# Patient Record
Sex: FEMALE | Race: WHITE | Employment: OTHER | URBAN - METROPOLITAN AREA
[De-identification: names, ages, dates, MRNs, and addresses within clinical notes are randomized per-mention and may not be internally consistent; named-entity substitution may affect disease eponyms.]

---

## 2018-03-28 ENCOUNTER — OFFICE VISIT (OUTPATIENT)
Dept: VASCULAR SURGERY | Age: 68
End: 2018-03-28
Payer: MEDICARE

## 2018-03-28 ENCOUNTER — HOSPITAL ENCOUNTER (OUTPATIENT)
Dept: CARDIOLOGY | Age: 68
Discharge: HOME | End: 2018-03-28
Admitting: SURGERY
Payer: MEDICARE

## 2018-03-28 DIAGNOSIS — I73.9 PVD (PERIPHERAL VASCULAR DISEASE) WITH CLAUDICATION (HCC): Primary | Chronic | ICD-10-CM

## 2018-03-28 PROCEDURE — 99213 OFFICE O/P EST LOW 20 MIN: CPT | Performed by: SURGERY

## 2018-03-28 PROCEDURE — 93923 UPR/LXTR ART STDY 3+ LVLS: CPT

## 2018-03-28 RX ORDER — LEVOTHYROXINE SODIUM 137 MCG
137 TABLET ORAL DAILY
COMMUNITY
Start: 2018-01-16

## 2018-03-28 RX ORDER — TIZANIDINE 2 MG/1
2 TABLET ORAL DAILY
COMMUNITY
Start: 2018-01-02 | End: 2018-10-04

## 2018-03-28 RX ORDER — ALLOPURINOL 300 MG/1
300 TABLET ORAL DAILY
COMMUNITY
Start: 2018-03-24

## 2018-03-28 NOTE — PROGRESS NOTES
Chief Complaint:   Chief Complaint   Patient presents with    Circulatory Problem     follow up PVD         HPI:  Patient came for yearly follow-up of peripheral vascular disease, overall doing well, trying to stay active, trying to lose some weight      Patient denies any focal lateralizing neurological symptoms like loss of speech, vision or loss of function of extremity    Patient can walk a few blocks without difficulty, and denies any symptoms of rest pain    Allergies   Allergen Reactions    Adhesive Tape Other (See Comments)     Pulls skin       Current Outpatient Prescriptions   Medication Sig Dispense Refill    allopurinol (ZYLOPRIM) 300 MG tablet Take 300 mg by mouth daily      SYNTHROID 137 MCG tablet Take 137 mcg by mouth daily      tiZANidine (ZANAFLEX) 2 MG tablet Take 2 mg by mouth daily      sucralfate (CARAFATE) 1 GM tablet Take 1 g by mouth 4 times daily      meloxicam (MOBIC) 7.5 MG tablet Take 7.5 mg by mouth daily      sertraline (ZOLOFT) 50 MG tablet Take 50 mg by mouth daily      JANUVIA 100 MG tablet Take 100 mg by mouth daily       metFORMIN (GLUCOPHAGE) 500 MG tablet Take 1,000 mg by mouth 2 times daily (with meals)      canagliflozin (INVOKANA) 100 MG TABS tablet Take 100 mg by mouth every morning (before breakfast)      Exenatide (BYDUREON) 2 MG PEN Inject into the skin once a week      tiZANidine (ZANAFLEX) 2 MG capsule Take 2 mg by mouth 3 times daily      HYDROcodone-acetaminophen (NORCO) 7.5-325 MG per tablet Take 1 tablet by mouth every 12 hours as needed for Pain      Cholecalciferol (VITAMIN D3) 58184 UNITS CAPS Take 1 capsule by mouth See Admin Instructions 5 days a week      atorvastatin (LIPITOR) 10 MG tablet Take 10 mg by mouth daily      irbesartan (AVAPRO) 150 MG tablet Take 150 mg by mouth daily.  metoprolol (LOPRESSOR) 25 MG tablet Take 25 mg by mouth 2 times daily Take am surgery 12/03.       aspirin 81 MG EC tablet Take 81 mg by mouth daily       file.     Social History Main Topics    Smoking status: Former Smoker     Packs/day: 1.00     Years: 40.00     Quit date: 12/13/2007    Smokeless tobacco: Never Used    Alcohol use No    Drug use: No    Sexual activity: Not on file     Other Topics Concern    Not on file     Social History Narrative    No narrative on file       Review of Systems:    Eyes:  No blurring, diplopia or vision loss  Respiratory:  No cough, pleuritic chest pain, dyspnea, or wheezing  Cardiovascular: No angina, palpitations   Musculoskeletal:  No arthritis or weakness  Neurologic:  No paralysis, paresis, paresthesia, seizures or headach        Physical Exam:  General appearance:  Alert, awake, oriented x 3. No distress. Eyes:  Conjunctivae appear normal; PERRL  Neck:  No jugular venous distention, lymphadenopathy or thyromegaly. No evidence of carotid bruit  Lungs:  Clear to ausculation bilaterally. No rhonchi, crackles, wheezes  Heart:  Regular rate and rhythm. No rub or murmur  Abdomen:  Soft, non-tender. No masses, organomegaly. Musculoskeletal : No joint effusions, tenderness swelling    Neuro: Speech is intact. Moving all extremities. No focal motor or sensory deficits      Extremities:  Both feet are warm to touch. The color of both feet is normal.        Pulses Right  Left    Brachial 3 3    Radial    3=normal   Femoral 2 2  2=diminished   Popliteal    1=barely palpable   Dorsalis pedis    0=absent   Posterior tibial 1 0  4=aneurysmal             Other pertinent information:1. The past medical records were reviewed. Assessment:    1.  PVD (peripheral vascular disease) with claudication (HCC)              Plan:       Discussed in detail the patient regarding the results of the lower extremities arterial Doppler study, left femoral popliteal arterial occlusive disease, with an ankle-brachial index of 0.61, slightly worse compared to last year, patient was counseled regarding weight loss and exercise

## 2018-03-30 NOTE — PROCEDURES
[address]                   [address] [facility],  [facility]                                  VASCULAR REPORT    PATIENT NAME: Gabriel Evans                     :        1950  MED REC NO:   [id number]                            ROOM:  ACCOUNT NO:   [de-identified]                           ADMIT DATE: 2018  PROVIDER:     Cristy Rider MD    DATE OF PROCEDURE:  2018    LOWER EXTREMITY ARTERIAL DOPPLER STUDY    INDICATION:  Symptoms of the feet feeling cold with some difficulty  walking. The lower extremity arterial Doppler study revealed evidence of  left iliac and bilateral femoropopliteal arterial occlusive disease with an  ankle-brachial index of 0.61 on the left side and 0.96 on the right side.         Shawn Arteaga MD    D: 2018 9:31:04       T: 2018 12:51:54     ZACHARY/[email or web address]  Job#: [id number]     Doc#: [id number]    CC:    Yo Santos III, DO

## 2018-04-02 ENCOUNTER — TELEPHONE (OUTPATIENT)
Dept: VASCULAR SURGERY | Age: 68
End: 2018-04-02

## 2018-10-04 ENCOUNTER — OFFICE VISIT (OUTPATIENT)
Dept: CARDIOLOGY CLINIC | Age: 68
End: 2018-10-04
Payer: MEDICARE

## 2018-10-04 VITALS
WEIGHT: 260.6 LBS | HEART RATE: 67 BPM | SYSTOLIC BLOOD PRESSURE: 112 MMHG | DIASTOLIC BLOOD PRESSURE: 60 MMHG | RESPIRATION RATE: 18 BRPM | HEIGHT: 65 IN | BODY MASS INDEX: 43.42 KG/M2

## 2018-10-04 DIAGNOSIS — I49.3 PVC'S (PREMATURE VENTRICULAR CONTRACTIONS): ICD-10-CM

## 2018-10-04 DIAGNOSIS — I10 ESSENTIAL HYPERTENSION: Primary | ICD-10-CM

## 2018-10-04 DIAGNOSIS — I73.9 PVD (PERIPHERAL VASCULAR DISEASE) WITH CLAUDICATION (HCC): Chronic | ICD-10-CM

## 2018-10-04 PROCEDURE — 99213 OFFICE O/P EST LOW 20 MIN: CPT | Performed by: PHYSICIAN ASSISTANT

## 2018-10-04 PROCEDURE — 93000 ELECTROCARDIOGRAM COMPLETE: CPT | Performed by: INTERNAL MEDICINE

## 2018-10-04 NOTE — PROGRESS NOTES
[address] [place] [address] Cardiology        Dear Dr. Queta Johnson, DO,    We had the pleasure of seeing Susu Pitt in the [facility] office. Her primary cardiologist is Dr. Mignon Salomon. Chief Complaint: 1 year f/u for hx abnormal stress test, HTN, PVD, PVCs  Chief Complaint   Patient presents with    Hypertension       Date of Service: 10/4/2018    HISTORY OF PRESENT ILLNESS:    Susu Pitt is a pleasant 77 y.o. female with a past medical history below. Patient presents for one year follow-up for HTN and history of abnormal stress test ( stress revealed inferior perfusion defect but no cath was performed secondary to asymptomatic status). Over the past year, she has been doing well. She has maintained strict compliance on her medications. She has been able to ambulate up a flight of stairs and do household chores without any exertional chest pain, significant ORTIZ, or palpitations. She denies any recent dizziness, near syncope, or falls. She admits to mild chronic bilateral ankle edema but states that it has been at her baseline. She wears compression stockings daily. She does admit to mild indigestion when eating and describes a sharp sub-sternal chest discomfort lasting seconds only in duration and relieve with belching or with Carafate. She is maintaining NSR and is presently asymptomatic. ALLERGIES:    Allergies   Allergen Reactions    Adhesive Tape Other (See Comments)     Pulls skin       PROBLEM LIST:   Patient Active Problem List    Diagnosis Date Noted    Thyroid disease 2015    Lipid disorder 2015    Hyperuricemia 2015    Diabetic neuropathy ([facility] 2015    Primary osteoarthritis of left hip 2015    Chest pain 2013    DM (diabetes mellitus) 2013    HTN (hypertension) 2013    PVD (peripheral vascular disease) with claudication ([address] 2012     Medical history:  1. Morbid obesity.  Weight 251 pounds, BMI 41

## 2019-03-21 DIAGNOSIS — I73.9 PVD (PERIPHERAL VASCULAR DISEASE) WITH CLAUDICATION (HCC): Primary | ICD-10-CM

## 2019-03-28 ENCOUNTER — HOSPITAL ENCOUNTER (OUTPATIENT)
Dept: CARDIOLOGY | Age: 69
Discharge: HOME | End: 2019-03-28
Payer: MEDICARE

## 2019-03-28 ENCOUNTER — OFFICE VISIT (OUTPATIENT)
Dept: VASCULAR SURGERY | Age: 69
End: 2019-03-28
Payer: MEDICARE

## 2019-03-28 DIAGNOSIS — I73.9 PVD (PERIPHERAL VASCULAR DISEASE) WITH CLAUDICATION (HCC): Primary | Chronic | ICD-10-CM

## 2019-03-28 DIAGNOSIS — I73.9 PVD (PERIPHERAL VASCULAR DISEASE) WITH CLAUDICATION (HCC): ICD-10-CM

## 2019-03-28 PROCEDURE — 93923 UPR/LXTR ART STDY 3+ LVLS: CPT

## 2019-03-28 PROCEDURE — 99213 OFFICE O/P EST LOW 20 MIN: CPT | Performed by: SURGERY

## 2019-03-28 RX ORDER — ACETAMINOPHEN 500 MG
500 TABLET ORAL EVERY 6 HOURS PRN
COMMUNITY

## 2019-03-29 NOTE — PROCEDURES
[address]                  [address] [facility], [address] [place] [address]                                VASCULAR REPORT    PATIENT NAME: Rich Hastings                   :        1950  MED REC NO:   [id number]                            ROOM:  ACCOUNT NO:   [de-identified]                           ADMIT DATE: 2018  PROVIDER:     Carri Mckeon MD    DATE OF PROCEDURE:  2019    LOWER EXTREMITY ARTERIAL DOPPLER STUDY    INDICATION:  Difficulty walking. FINDINGS:  The lower extremity arterial Doppler study revealed that the  patient does have evidence of left femoropopliteal arterial occlusive  disease with an ankle-brachial index of 0.61. On the right, the  ankle-brachial index is normal, unchanged from last year.         Lyn Jacobo MD    D: 2019 17:14:34       T: 2019 23:48:45     ZACHARY/[email or web address]_CRISTAL  Job#: [id number]     Doc#: [id number]    CC:

## 2019-10-03 ENCOUNTER — HOSPITAL ENCOUNTER (OUTPATIENT)
Dept: ULTRASOUND IMAGING | Age: 69
Discharge: HOME | End: 2019-10-05
Payer: MEDICARE

## 2019-10-03 ENCOUNTER — HOSPITAL ENCOUNTER (OUTPATIENT)
Dept: INTERVENTIONAL RADIOLOGY/VASCULAR | Age: 69
Discharge: HOME | End: 2019-10-05
Payer: MEDICARE

## 2019-10-03 ENCOUNTER — APPOINTMENT (OUTPATIENT)
Dept: CT IMAGING | Age: 69
End: 2019-10-03
Payer: MEDICARE

## 2019-10-03 DIAGNOSIS — I73.9 CLAUDICATION (HCC): ICD-10-CM

## 2019-10-03 DIAGNOSIS — Z13.6 SCREENING FOR AAA (AORTIC ABDOMINAL ANEURYSM): ICD-10-CM

## 2019-10-03 PROCEDURE — 76706 US ABDL AORTA SCREEN AAA: CPT

## 2019-10-03 PROCEDURE — 93922 UPR/L XTREMITY ART 2 LEVELS: CPT

## 2020-01-24 ENCOUNTER — HOSPITAL ENCOUNTER (OUTPATIENT)
Age: 70
Discharge: HOME | End: 2020-01-26

## 2020-01-24 PROCEDURE — 88305 TISSUE EXAM BY PATHOLOGIST: CPT

## 2020-05-12 ENCOUNTER — HOSPITAL ENCOUNTER (OUTPATIENT)
Dept: CARDIOLOGY | Age: 70
Discharge: HOME | End: 2020-05-12
Payer: MEDICARE

## 2020-05-12 PROCEDURE — 93923 UPR/LXTR ART STDY 3+ LVLS: CPT

## 2020-06-06 ENCOUNTER — HOSPITAL ENCOUNTER (EMERGENCY)
Age: 70
Discharge: HOME | End: 2020-06-07
Attending: EMERGENCY MEDICINE
Payer: MEDICARE

## 2020-06-06 ENCOUNTER — APPOINTMENT (OUTPATIENT)
Dept: ULTRASOUND IMAGING | Age: 70
End: 2020-06-06
Payer: MEDICARE

## 2020-06-06 VITALS
RESPIRATION RATE: 14 BRPM | TEMPERATURE: 98 F | BODY MASS INDEX: 42.99 KG/M2 | OXYGEN SATURATION: 93 % | HEIGHT: 65 IN | DIASTOLIC BLOOD PRESSURE: 71 MMHG | WEIGHT: 258 LBS | HEART RATE: 77 BPM | SYSTOLIC BLOOD PRESSURE: 152 MMHG

## 2020-06-06 PROCEDURE — 6370000000 HC RX 637 (ALT 250 FOR IP): Performed by: EMERGENCY MEDICINE

## 2020-06-06 PROCEDURE — 6360000002 HC RX W HCPCS: Performed by: EMERGENCY MEDICINE

## 2020-06-06 PROCEDURE — 93971 EXTREMITY STUDY: CPT

## 2020-06-06 PROCEDURE — 99283 EMERGENCY DEPT VISIT LOW MDM: CPT

## 2020-06-06 PROCEDURE — 96372 THER/PROPH/DIAG INJ SC/IM: CPT

## 2020-06-06 RX ORDER — ONDANSETRON 4 MG/1
4 TABLET, ORALLY DISINTEGRATING ORAL ONCE
Status: COMPLETED | OUTPATIENT
Start: 2020-06-06 | End: 2020-06-06

## 2020-06-06 RX ORDER — HYDROCODONE BITARTRATE AND ACETAMINOPHEN 5; 325 MG/1; MG/1
1 TABLET ORAL EVERY 6 HOURS PRN
Qty: 12 TABLET | Refills: 0 | Status: SHIPPED | OUTPATIENT
Start: 2020-06-06 | End: 2020-06-09

## 2020-06-06 RX ORDER — MORPHINE SULFATE 4 MG/ML
6 INJECTION, SOLUTION INTRAMUSCULAR; INTRAVENOUS ONCE
Status: COMPLETED | OUTPATIENT
Start: 2020-06-06 | End: 2020-06-06

## 2020-06-06 RX ORDER — KETOROLAC TROMETHAMINE 30 MG/ML
30 INJECTION, SOLUTION INTRAMUSCULAR; INTRAVENOUS ONCE
Status: COMPLETED | OUTPATIENT
Start: 2020-06-06 | End: 2020-06-06

## 2020-06-06 RX ORDER — PREDNISONE 50 MG/1
TABLET ORAL
Qty: 5 TABLET | Refills: 0 | Status: ON HOLD | OUTPATIENT
Start: 2020-06-06 | End: 2020-07-08 | Stop reason: HOSPADM

## 2020-06-06 RX ADMIN — MORPHINE SULFATE 6 MG: 4 INJECTION, SOLUTION INTRAMUSCULAR; INTRAVENOUS at 23:05

## 2020-06-06 RX ADMIN — ONDANSETRON 4 MG: 4 TABLET, ORALLY DISINTEGRATING ORAL at 23:05

## 2020-06-06 RX ADMIN — KETOROLAC TROMETHAMINE 30 MG: 30 INJECTION, SOLUTION INTRAMUSCULAR at 23:05

## 2020-06-06 ASSESSMENT — PAIN DESCRIPTION - ORIENTATION: ORIENTATION: LEFT

## 2020-06-06 ASSESSMENT — PAIN DESCRIPTION - PROGRESSION: CLINICAL_PROGRESSION: GRADUALLY IMPROVING

## 2020-06-06 ASSESSMENT — PAIN DESCRIPTION - ONSET: ONSET: SUDDEN

## 2020-06-06 ASSESSMENT — PAIN DESCRIPTION - DIRECTION: RADIATING_TOWARDS: LOWER BACK

## 2020-06-06 ASSESSMENT — PAIN DESCRIPTION - LOCATION: LOCATION: LEG

## 2020-06-06 ASSESSMENT — PAIN SCALES - GENERAL
PAINLEVEL_OUTOF10: 10
PAINLEVEL_OUTOF10: 10
PAINLEVEL_OUTOF10: 6

## 2020-06-06 ASSESSMENT — PAIN DESCRIPTION - PAIN TYPE: TYPE: ACUTE PAIN

## 2020-06-06 ASSESSMENT — PAIN DESCRIPTION - FREQUENCY: FREQUENCY: CONTINUOUS

## 2020-06-07 ENCOUNTER — APPOINTMENT (OUTPATIENT)
Dept: CT IMAGING | Age: 70
End: 2020-06-07
Payer: MEDICARE

## 2020-06-07 ENCOUNTER — HOSPITAL ENCOUNTER (EMERGENCY)
Age: 70
Discharge: HOME | End: 2020-06-07
Payer: MEDICARE

## 2020-06-07 VITALS
BODY MASS INDEX: 42.99 KG/M2 | WEIGHT: 258 LBS | HEART RATE: 75 BPM | HEIGHT: 65 IN | TEMPERATURE: 97.6 F | DIASTOLIC BLOOD PRESSURE: 81 MMHG | OXYGEN SATURATION: 96 % | SYSTOLIC BLOOD PRESSURE: 140 MMHG | RESPIRATION RATE: 16 BRPM

## 2020-06-07 PROCEDURE — 96372 THER/PROPH/DIAG INJ SC/IM: CPT

## 2020-06-07 PROCEDURE — 99283 EMERGENCY DEPT VISIT LOW MDM: CPT

## 2020-06-07 PROCEDURE — 6370000000 HC RX 637 (ALT 250 FOR IP): Performed by: EMERGENCY MEDICINE

## 2020-06-07 PROCEDURE — 72131 CT LUMBAR SPINE W/O DYE: CPT

## 2020-06-07 PROCEDURE — 6360000002 HC RX W HCPCS: Performed by: PHYSICIAN ASSISTANT

## 2020-06-07 RX ORDER — KETOROLAC TROMETHAMINE 10 MG/1
10 TABLET, FILM COATED ORAL EVERY 6 HOURS PRN
Qty: 20 TABLET | Refills: 0 | Status: ON HOLD | OUTPATIENT
Start: 2020-06-07 | End: 2020-07-08 | Stop reason: HOSPADM

## 2020-06-07 RX ORDER — PREDNISONE 20 MG/1
60 TABLET ORAL ONCE
Status: COMPLETED | OUTPATIENT
Start: 2020-06-07 | End: 2020-06-07

## 2020-06-07 RX ORDER — METHYLPREDNISOLONE SODIUM SUCCINATE 125 MG/2ML
60 INJECTION, POWDER, LYOPHILIZED, FOR SOLUTION INTRAMUSCULAR; INTRAVENOUS ONCE
Status: COMPLETED | OUTPATIENT
Start: 2020-06-07 | End: 2020-06-07

## 2020-06-07 RX ORDER — HYDROCODONE BITARTRATE AND ACETAMINOPHEN 5; 325 MG/1; MG/1
2 TABLET ORAL ONCE
Status: DISCONTINUED | OUTPATIENT
Start: 2020-06-07 | End: 2020-06-07

## 2020-06-07 RX ORDER — DIAZEPAM 5 MG/1
5 TABLET ORAL ONCE
Status: COMPLETED | OUTPATIENT
Start: 2020-06-07 | End: 2020-06-07

## 2020-06-07 RX ORDER — MORPHINE SULFATE 4 MG/ML
6 INJECTION, SOLUTION INTRAMUSCULAR; INTRAVENOUS ONCE
Status: COMPLETED | OUTPATIENT
Start: 2020-06-07 | End: 2020-06-07

## 2020-06-07 RX ORDER — METAXALONE 800 MG/1
800 TABLET ORAL 4 TIMES DAILY PRN
Qty: 30 TABLET | Refills: 0 | Status: SHIPPED | OUTPATIENT
Start: 2020-06-07 | End: 2020-06-17

## 2020-06-07 RX ORDER — ORPHENADRINE CITRATE 30 MG/ML
60 INJECTION INTRAMUSCULAR; INTRAVENOUS ONCE
Status: COMPLETED | OUTPATIENT
Start: 2020-06-07 | End: 2020-06-07

## 2020-06-07 RX ADMIN — ORPHENADRINE CITRATE 60 MG: 30 INJECTION INTRAMUSCULAR; INTRAVENOUS at 09:45

## 2020-06-07 RX ADMIN — DIAZEPAM 5 MG: 5 TABLET ORAL at 00:08

## 2020-06-07 RX ADMIN — METHYLPREDNISOLONE SODIUM SUCCINATE 60 MG: 125 INJECTION, POWDER, FOR SOLUTION INTRAMUSCULAR; INTRAVENOUS at 09:45

## 2020-06-07 RX ADMIN — MORPHINE SULFATE 6 MG: 4 INJECTION, SOLUTION INTRAMUSCULAR; INTRAVENOUS at 11:35

## 2020-06-07 RX ADMIN — PREDNISONE 60 MG: 20 TABLET ORAL at 00:08

## 2020-06-07 ASSESSMENT — PAIN DESCRIPTION - ORIENTATION: ORIENTATION: LEFT;LOWER

## 2020-06-07 ASSESSMENT — PAIN SCALES - GENERAL
PAINLEVEL_OUTOF10: 10
PAINLEVEL_OUTOF10: 10

## 2020-06-07 ASSESSMENT — PAIN DESCRIPTION - LOCATION: LOCATION: LEG

## 2020-06-07 NOTE — ED PROVIDER NOTES
reviewed. Allergies: Adhesive tape    -------------------------------------------------- RESULTS -------------------------------------------------  All laboratory and radiology results have been personally reviewed by myself   LABS:  No results found for this visit on 06/06/20. RADIOLOGY:  Interpreted by Radiologist.  US DUP LOWER EXTREMITY LEFT MARCO   Final Result   Patent deep venous system of the left lower extremity. No   evidence for DVT.          ------------------------- NURSING NOTES AND VITALS REVIEWED ---------------------------   The nursing notes within the ED encounter and vital signs as below have been reviewed. BP (!) 152/71   Pulse 77   Temp 98 °F (36.7 °C) (Oral)   Resp 14   Ht 5' 5\" (1.651 m)   Wt 258 lb (117 kg)   SpO2 93%   BMI 42.93 kg/m²   Oxygen Saturation Interpretation: Normal      ---------------------------------------------------PHYSICAL EXAM--------------------------------------      Constitutional/General: Alert and oriented x3, well appearing, non toxic in mild distress secondary to pain  Head: NC/AT  Eyes: PERRL, EOMI  Mouth: Oropharynx clear, handling secretions, no trismus  Neck: Supple, full ROM, no meningeal signs  Pulmonary: Lungs clear to auscultation bilaterally, no wheezes, rales, or rhonchi. Not in respiratory distress  Cardiovascular:  Regular rate and rhythm, no murmurs, gallops, or rubs. 2+ distal pulses  Abdomen: Soft, non tender, non distended,   Extremities: Moves all extremities x 4. Warm and well perfused  Skin: warm and dry without rash  Neurologic: GCS 15,  Psych: Normal Affect  Low back: Moderate LS paraspinal left-sided tenderness to palpation and tenderness over the sciatic notch.   Motor sensation intact throughout the legs    ------------------------------ ED COURSE/MEDICAL DECISION MAKING----------------------  Medications   ketorolac (TORADOL) injection 30 mg (30 mg Intramuscular Given 6/6/20 5002)   morphine sulfate (PF) injection 6 mg (6 mg

## 2020-06-07 NOTE — ED PROVIDER NOTES
complication, not stated as uncontrolled, and Wears glasses. Past Surgical History:  has a past surgical history that includes Appendectomy; Throat surgery (2002); Colonoscopy; Hysterectomy; joint replacement (Left, 12/3/2015); and Total hip arthroplasty (Left, 12/2015). Social History:  reports that she quit smoking about 12 years ago. Her smoking use included cigarettes. She has a 40.00 pack-year smoking history. She has never used smokeless tobacco. She reports that she does not drink alcohol or use drugs. Family History: family history includes Cancer in her father; Heart Disease in her maternal grandmother; Kidney Disease in her mother. The patients home medications have been reviewed. Allergies: Adhesive tape    -------------------------------------------------- RESULTS -------------------------------------------------  All laboratory and radiology results have been personally reviewed by myself   LABS:  No results found for this visit on 06/07/20. RADIOLOGY:  Interpreted by Sohan Clarke   Final Result      1. Degenerative spondylotic changes. 2. L5-S1: Left far lateral disc protrusion abutting and crowding the   exiting left L5 nerve root just beyond the foramen. 3. Spinal stenoses with moderate left lateral recess encroachment at   L2-3 and L3-4.   4. Mild spinal canal encroachment at L1-2 and L4-5.          ------------------------- NURSING NOTES AND VITALS REVIEWED ---------------------------   The nursing notes within the ED encounter and vital signs as below have been reviewed.    BP (!) 140/81   Pulse 75   Temp 97.6 °F (36.4 °C) (Infrared)   Resp 16   Ht 5' 5\" (1.651 m)   Wt 258 lb (117 kg)   SpO2 96%   BMI 42.93 kg/m²   Oxygen Saturation Interpretation: Normal      ---------------------------------------------------PHYSICAL EXAM--------------------------------------      Constitutional/General: Alert and oriented x3, well appearing, non toxic in NAD  Head: Normocephalic and atraumatic  Eyes: PERRL, EOMI  Mouth: Oropharynx clear, handling secretions, no trismus  Neck: Supple, full ROM,   Pulmonary: Lungs clear to auscultation bilaterally, no wheezes, rales, or rhonchi. Not in respiratory distress  Cardiovascular:  Regular rate and rhythm, no murmurs, gallops, or rubs. 2+ distal pulses  Abdomen: Soft, non tender, non distended, Tenderness to palpation to the left lumbar spine  Extremities: Moves all extremities x 4. Warm and well perfused, 5/5 strength bilateral lower extremities, 2+ reflexes patellar and achilles bilaterally, gait not tested due to pain  Skin: warm and dry without rash  Neurologic: GCS 15,  Psych: Normal Affect      ------------------------------ ED COURSE/MEDICAL DECISION MAKING----------------------  Medications   orphenadrine (NORFLEX) injection 60 mg (60 mg Intramuscular Given 6/7/20 0945)   methylPREDNISolone sodium (SOLU-MEDROL) injection 60 mg (60 mg Intramuscular Given 6/7/20 0945)   morphine sulfate (PF) injection 6 mg (6 mg Intramuscular Given 6/7/20 1135)         ED COURSE:  ED Course as of Jun 07 1412   Sun Jun 07, 2020   1103 Reassessed patient. Remains stable and neurologically intact. Discussed results and recommendations. Patient reports that she feels that she will be able to ambulate at home independently with her walker that she has from her previous hip replacement. [MS]      ED Course User Index  [MS] Elicia Cowart       Medical Decision Making:    Patient is a 77-year-old female presenting emergency department back pain that radiates down the left leg. She does have a history of sciatica on that side which does occasionally flareup on her. Patient is neurologically intact in the emergency department. Ultrasound of the left lower extremity was done yesterday to rule out a DVT due to her calf pain.   She has not had any recent imaging studies of her lumbar spine therefore CT was done in the emergency department today.  Compared to previous MRI scan, degenerative changes have significantly worsened particularly with a disc protrusion at L5-S1 starting to encroach upon the exiting nerve root. CT findings are consistent with her symptoms. Patient had prescriptions for Norco and prednisone filled this morning from her ED visit yesterday. We will add anti-inflammatories and muscle relaxers to her regimen. Recommended very close follow-up with PCP for recheck and return the emergency department any new worsening symptoms. Patient reports that she does have assistive devices at home that she may ambulate around the house with such as a walker and a cane. Patient voiced understanding is agreeable above treatment plan. Counseling: The emergency provider has spoken with the patient and discussed todays results, in addition to providing specific details for the plan of care and counseling regarding the diagnosis and prognosis. Questions are answered at this time and they are agreeable with the plan.      --------------------------------- IMPRESSION AND DISPOSITION ---------------------------------    IMPRESSION  1. Degenerative disc disease, lumbar    2. Protrusion of intervertebral disc of lumbosacral region    3. Lumbar radiculopathy        DISPOSITION  Disposition: Discharge to home  Patient condition is stable      NOTE: This report was transcribed using voice recognition software.  Every effort was made to ensure accuracy; however, inadvertent computerized transcription errors may be present        Martha Fischerma  06/07/20 3180

## 2020-06-30 RX ORDER — TIZANIDINE HYDROCHLORIDE 2 MG/1
2 CAPSULE, GELATIN COATED ORAL 3 TIMES DAILY
Status: ON HOLD | COMMUNITY
End: 2020-07-14

## 2020-07-01 ENCOUNTER — VIRTUAL VISIT (OUTPATIENT)
Dept: VASCULAR SURGERY | Age: 70
End: 2020-07-01
Payer: MEDICARE

## 2020-07-01 PROCEDURE — 99442 PR PHYS/QHP TELEPHONE EVALUATION 11-20 MIN: CPT | Performed by: SURGERY

## 2020-07-01 NOTE — PROGRESS NOTES
TELEPHONE VISIT    Consent:  He and/or health care decision maker is aware that that he may receive a bill for this telephone service, depending on his insurance coverage, and has provided verbal consent to proceed: Yes      Documentation:  I communicated with the patient and/or health care decision maker about her peripheral vascular disease, mainly involving the left leg, involving the left iliac artery tells me that she can walk slowly 5 to 10 minutes at a time, with the help of a walker, recently though had more problems because of herniated disc, received a series of what appears to be epidural injections with partial relief of symptoms, denies symptoms of rest pain    Denies any focal lateralizing neurological symptoms like loss of speech, vision or loss of function of extremity etc    .   Details of this discussion including any medical advice provided: Regarding her peripheral vascular disease, informed her, that I compared the recent lower extremity artery Doppler study done in May with the one that was done last week, overall right leg stable, ankle-brachial index 0.98, on the left with ankle-brachial index of 0.67, with slight improvement compared to last year as is patient was reassured, was instructed to continue the walking program and call me PRN for increasing symptoms in the leg, explained and a follow-up evaluation to see me back in 1 year      I affirm this is a Patient Initiated Episode with a Patient who has not had a related appointment within my department in the past 7 days or scheduled within the next 24 hours.         Patient's location: {[id number]::\"home address in [place]\",\"other address in [facility]   Physician  location home address in [facility]   Other people involved in call          Total Time: minutes: 11-20 minutes

## 2020-07-06 ENCOUNTER — ANESTHESIA EVENT (OUTPATIENT)
Dept: ENDOSCOPY | Age: 70
DRG: 378 | End: 2020-07-06
Payer: MEDICARE

## 2020-07-06 ENCOUNTER — ANESTHESIA (OUTPATIENT)
Dept: ENDOSCOPY | Age: 70
DRG: 378 | End: 2020-07-06
Payer: MEDICARE

## 2020-07-06 ENCOUNTER — APPOINTMENT (OUTPATIENT)
Dept: GENERAL RADIOLOGY | Age: 70
End: 2020-07-06
Payer: MEDICARE

## 2020-07-06 ENCOUNTER — HOSPITAL ENCOUNTER (EMERGENCY)
Age: 70
Discharge: ANOTHER HOSPITAL | End: 2020-07-06
Attending: EMERGENCY MEDICINE
Payer: MEDICARE

## 2020-07-06 ENCOUNTER — APPOINTMENT (OUTPATIENT)
Dept: CT IMAGING | Age: 70
End: 2020-07-06
Payer: MEDICARE

## 2020-07-06 ENCOUNTER — HOSPITAL ENCOUNTER (INPATIENT)
Age: 70
LOS: 2 days | Discharge: HOME | DRG: 378 | End: 2020-07-08
Attending: INTERNAL MEDICINE | Admitting: INTERNAL MEDICINE
Payer: MEDICARE

## 2020-07-06 VITALS
RESPIRATION RATE: 16 BRPM | OXYGEN SATURATION: 98 % | WEIGHT: 250 LBS | DIASTOLIC BLOOD PRESSURE: 40 MMHG | TEMPERATURE: 99.2 F | HEART RATE: 88 BPM | HEIGHT: 65 IN | BODY MASS INDEX: 41.65 KG/M2 | SYSTOLIC BLOOD PRESSURE: 122 MMHG

## 2020-07-06 PROBLEM — K92.2 GI BLEED: Status: ACTIVE | Noted: 2020-07-06

## 2020-07-06 PROBLEM — E87.6 HYPOKALEMIA: Status: ACTIVE | Noted: 2020-07-06

## 2020-07-06 LAB
ABO/RH: NORMAL
ALBUMIN SERPL-MCNC: 3.3 G/DL (ref 3.5–5.2)
ALP BLD-CCNC: 62 U/L (ref 35–104)
ALT SERPL-CCNC: 17 U/L (ref 0–32)
ANION GAP SERPL CALCULATED.3IONS-SCNC: 15 MMOL/L (ref 7–16)
ANTIBODY SCREEN: NORMAL
AST SERPL-CCNC: 35 U/L (ref 0–31)
BASOPHILS ABSOLUTE: 0.03 E9/L (ref 0–0.2)
BASOPHILS RELATIVE PERCENT: 0.2 % (ref 0–2)
BILIRUB SERPL-MCNC: 0.4 MG/DL (ref 0–1.2)
BUN BLDV-MCNC: 55 MG/DL (ref 8–23)
CALCIUM SERPL-MCNC: 9.4 MG/DL (ref 8.6–10.2)
CHLORIDE BLD-SCNC: 96 MMOL/L (ref 98–107)
CO2: 20 MMOL/L (ref 22–29)
CREAT SERPL-MCNC: 0.9 MG/DL (ref 0.5–1)
EKG ATRIAL RATE: 87 BPM
EKG P AXIS: 69 DEGREES
EKG P-R INTERVAL: 152 MS
EKG Q-T INTERVAL: 354 MS
EKG QRS DURATION: 94 MS
EKG QTC CALCULATION (BAZETT): 425 MS
EKG R AXIS: 69 DEGREES
EKG T AXIS: 50 DEGREES
EKG VENTRICULAR RATE: 87 BPM
EOSINOPHILS ABSOLUTE: 0.02 E9/L (ref 0.05–0.5)
EOSINOPHILS RELATIVE PERCENT: 0.1 % (ref 0–6)
GFR AFRICAN AMERICAN: >60
GFR NON-AFRICAN AMERICAN: >60 ML/MIN/1.73
GLUCOSE BLD-MCNC: 164 MG/DL (ref 74–99)
HCT VFR BLD CALC: 21.7 % (ref 34–48)
HCT VFR BLD CALC: 24.7 % (ref 34–48)
HEMOGLOBIN: 6.9 G/DL (ref 11.5–15.5)
HEMOGLOBIN: 8.1 G/DL (ref 11.5–15.5)
IMMATURE GRANULOCYTES #: 0.32 E9/L
IMMATURE GRANULOCYTES %: 2.3 % (ref 0–5)
INR BLD: 1.2
LIPASE: 18 U/L (ref 13–60)
LYMPHOCYTES ABSOLUTE: 2.59 E9/L (ref 1.5–4)
LYMPHOCYTES RELATIVE PERCENT: 18.6 % (ref 20–42)
MCH RBC QN AUTO: 30.1 PG (ref 26–35)
MCHC RBC AUTO-ENTMCNC: 32.8 % (ref 32–34.5)
MCV RBC AUTO: 91.8 FL (ref 80–99.9)
METER GLUCOSE: 127 MG/DL (ref 74–99)
METER GLUCOSE: 147 MG/DL (ref 74–99)
METER GLUCOSE: 185 MG/DL (ref 74–99)
MONOCYTES ABSOLUTE: 0.6 E9/L (ref 0.1–0.95)
MONOCYTES RELATIVE PERCENT: 4.3 % (ref 2–12)
NEUTROPHILS ABSOLUTE: 10.36 E9/L (ref 1.8–7.3)
NEUTROPHILS RELATIVE PERCENT: 74.5 % (ref 43–80)
PDW BLD-RTO: 15.9 FL (ref 11.5–15)
PLATELET # BLD: 358 E9/L (ref 130–450)
PMV BLD AUTO: 9.8 FL (ref 7–12)
POTASSIUM REFLEX MAGNESIUM: 3.8 MMOL/L (ref 3.5–5)
POTASSIUM SERPL-SCNC: 2.7 MMOL/L (ref 3.5–5)
PROTHROMBIN TIME: 14.1 SEC (ref 9.3–12.4)
RBC # BLD: 2.69 E12/L (ref 3.5–5.5)
SODIUM BLD-SCNC: 131 MMOL/L (ref 132–146)
TOTAL PROTEIN: 6 G/DL (ref 6.4–8.3)
TROPONIN: <0.01 NG/ML (ref 0–0.03)
WBC # BLD: 13.9 E9/L (ref 4.5–11.5)

## 2020-07-06 PROCEDURE — 85025 COMPLETE CBC W/AUTO DIFF WBC: CPT

## 2020-07-06 PROCEDURE — 36415 COLL VENOUS BLD VENIPUNCTURE: CPT

## 2020-07-06 PROCEDURE — 93005 ELECTROCARDIOGRAM TRACING: CPT | Performed by: EMERGENCY MEDICINE

## 2020-07-06 PROCEDURE — 86900 BLOOD TYPING SEROLOGIC ABO: CPT

## 2020-07-06 PROCEDURE — 85018 HEMOGLOBIN: CPT

## 2020-07-06 PROCEDURE — P9016 RBC LEUKOCYTES REDUCED: HCPCS

## 2020-07-06 PROCEDURE — 93005 ELECTROCARDIOGRAM TRACING: CPT | Performed by: PHYSICIAN ASSISTANT

## 2020-07-06 PROCEDURE — 2580000003 HC RX 258: Performed by: SURGERY

## 2020-07-06 PROCEDURE — 96375 TX/PRO/DX INJ NEW DRUG ADDON: CPT

## 2020-07-06 PROCEDURE — 85610 PROTHROMBIN TIME: CPT

## 2020-07-06 PROCEDURE — 83690 ASSAY OF LIPASE: CPT

## 2020-07-06 PROCEDURE — 84484 ASSAY OF TROPONIN QUANT: CPT

## 2020-07-06 PROCEDURE — 36430 TRANSFUSION BLD/BLD COMPNT: CPT

## 2020-07-06 PROCEDURE — 96365 THER/PROPH/DIAG IV INF INIT: CPT

## 2020-07-06 PROCEDURE — 82962 GLUCOSE BLOOD TEST: CPT

## 2020-07-06 PROCEDURE — 6360000002 HC RX W HCPCS: Performed by: PHYSICIAN ASSISTANT

## 2020-07-06 PROCEDURE — 99285 EMERGENCY DEPT VISIT HI MDM: CPT

## 2020-07-06 PROCEDURE — 86923 COMPATIBILITY TEST ELECTRIC: CPT

## 2020-07-06 PROCEDURE — 71045 X-RAY EXAM CHEST 1 VIEW: CPT

## 2020-07-06 PROCEDURE — 2580000003 HC RX 258

## 2020-07-06 PROCEDURE — 80053 COMPREHEN METABOLIC PANEL: CPT

## 2020-07-06 PROCEDURE — 2060000000 HC ICU INTERMEDIATE R&B

## 2020-07-06 PROCEDURE — 86901 BLOOD TYPING SEROLOGIC RH(D): CPT

## 2020-07-06 PROCEDURE — C9113 INJ PANTOPRAZOLE SODIUM, VIA: HCPCS | Performed by: EMERGENCY MEDICINE

## 2020-07-06 PROCEDURE — C9113 INJ PANTOPRAZOLE SODIUM, VIA: HCPCS | Performed by: PHYSICIAN ASSISTANT

## 2020-07-06 PROCEDURE — 86850 RBC ANTIBODY SCREEN: CPT

## 2020-07-06 PROCEDURE — 6370000000 HC RX 637 (ALT 250 FOR IP): Performed by: EMERGENCY MEDICINE

## 2020-07-06 PROCEDURE — 6370000000 HC RX 637 (ALT 250 FOR IP): Performed by: PHYSICIAN ASSISTANT

## 2020-07-06 PROCEDURE — 84132 ASSAY OF SERUM POTASSIUM: CPT

## 2020-07-06 PROCEDURE — 93010 ELECTROCARDIOGRAM REPORT: CPT | Performed by: INTERNAL MEDICINE

## 2020-07-06 PROCEDURE — 85014 HEMATOCRIT: CPT

## 2020-07-06 PROCEDURE — 96368 THER/DIAG CONCURRENT INF: CPT

## 2020-07-06 PROCEDURE — 2580000003 HC RX 258: Performed by: EMERGENCY MEDICINE

## 2020-07-06 PROCEDURE — 6360000002 HC RX W HCPCS: Performed by: EMERGENCY MEDICINE

## 2020-07-06 PROCEDURE — 70450 CT HEAD/BRAIN W/O DYE: CPT

## 2020-07-06 RX ORDER — SODIUM CHLORIDE 0.9 % (FLUSH) 0.9 %
10 SYRINGE (ML) INJECTION EVERY 12 HOURS SCHEDULED
Status: CANCELLED | OUTPATIENT
Start: 2020-07-06

## 2020-07-06 RX ORDER — TIZANIDINE 4 MG/1
2 TABLET ORAL 3 TIMES DAILY
Status: DISCONTINUED | OUTPATIENT
Start: 2020-07-06 | End: 2020-07-08 | Stop reason: HOSPADM

## 2020-07-06 RX ORDER — LEVOTHYROXINE SODIUM 137 UG/1
137 TABLET ORAL DAILY
Status: CANCELLED | OUTPATIENT
Start: 2020-07-06

## 2020-07-06 RX ORDER — IRBESARTAN 150 MG/1
150 TABLET ORAL DAILY
Status: CANCELLED | OUTPATIENT
Start: 2020-07-06

## 2020-07-06 RX ORDER — POTASSIUM CHLORIDE 20 MEQ/1
40 TABLET, EXTENDED RELEASE ORAL PRN
Status: DISCONTINUED | OUTPATIENT
Start: 2020-07-06 | End: 2020-07-08 | Stop reason: HOSPADM

## 2020-07-06 RX ORDER — DEXTROSE MONOHYDRATE 50 MG/ML
100 INJECTION, SOLUTION INTRAVENOUS PRN
Status: DISCONTINUED | OUTPATIENT
Start: 2020-07-06 | End: 2020-07-08 | Stop reason: HOSPADM

## 2020-07-06 RX ORDER — ALLOPURINOL 300 MG/1
300 TABLET ORAL DAILY
Status: DISCONTINUED | OUTPATIENT
Start: 2020-07-06 | End: 2020-07-08 | Stop reason: HOSPADM

## 2020-07-06 RX ORDER — LOSARTAN POTASSIUM 50 MG/1
50 TABLET ORAL DAILY
Status: DISCONTINUED | OUTPATIENT
Start: 2020-07-06 | End: 2020-07-08 | Stop reason: HOSPADM

## 2020-07-06 RX ORDER — ONDANSETRON 2 MG/ML
4 INJECTION INTRAMUSCULAR; INTRAVENOUS EVERY 6 HOURS PRN
Status: CANCELLED | OUTPATIENT
Start: 2020-07-06

## 2020-07-06 RX ORDER — METOPROLOL TARTRATE 50 MG/1
25 TABLET, FILM COATED ORAL 2 TIMES DAILY
Status: CANCELLED | OUTPATIENT
Start: 2020-07-06

## 2020-07-06 RX ORDER — ATORVASTATIN CALCIUM 10 MG/1
10 TABLET, FILM COATED ORAL NIGHTLY
Status: DISCONTINUED | OUTPATIENT
Start: 2020-07-06 | End: 2020-07-08 | Stop reason: HOSPADM

## 2020-07-06 RX ORDER — 0.9 % SODIUM CHLORIDE 0.9 %
1000 INTRAVENOUS SOLUTION INTRAVENOUS ONCE
Status: COMPLETED | OUTPATIENT
Start: 2020-07-06 | End: 2020-07-06

## 2020-07-06 RX ORDER — SODIUM CHLORIDE 0.9 % (FLUSH) 0.9 %
10 SYRINGE (ML) INJECTION PRN
Status: CANCELLED | OUTPATIENT
Start: 2020-07-06

## 2020-07-06 RX ORDER — POTASSIUM CHLORIDE 7.45 MG/ML
10 INJECTION INTRAVENOUS PRN
Status: DISCONTINUED | OUTPATIENT
Start: 2020-07-06 | End: 2020-07-08 | Stop reason: HOSPADM

## 2020-07-06 RX ORDER — ATORVASTATIN CALCIUM 10 MG/1
10 TABLET, FILM COATED ORAL DAILY
Status: CANCELLED | OUTPATIENT
Start: 2020-07-06

## 2020-07-06 RX ORDER — ACETAMINOPHEN 325 MG/1
650 TABLET ORAL EVERY 6 HOURS PRN
Status: CANCELLED | OUTPATIENT
Start: 2020-07-06

## 2020-07-06 RX ORDER — DEXTROSE MONOHYDRATE 25 G/50ML
12.5 INJECTION, SOLUTION INTRAVENOUS PRN
Status: DISCONTINUED | OUTPATIENT
Start: 2020-07-06 | End: 2020-07-08 | Stop reason: HOSPADM

## 2020-07-06 RX ORDER — GABAPENTIN 300 MG/1
300 CAPSULE ORAL 3 TIMES DAILY
Status: CANCELLED | OUTPATIENT
Start: 2020-07-06

## 2020-07-06 RX ORDER — IRBESARTAN 150 MG/1
150 TABLET ORAL DAILY
Status: DISCONTINUED | OUTPATIENT
Start: 2020-07-06 | End: 2020-07-06 | Stop reason: CLARIF

## 2020-07-06 RX ORDER — PROMETHAZINE HYDROCHLORIDE 25 MG/1
12.5 TABLET ORAL EVERY 6 HOURS PRN
Status: DISCONTINUED | OUTPATIENT
Start: 2020-07-06 | End: 2020-07-08 | Stop reason: HOSPADM

## 2020-07-06 RX ORDER — SUCRALFATE 1 G/1
1 TABLET ORAL 4 TIMES DAILY
Status: DISCONTINUED | OUTPATIENT
Start: 2020-07-06 | End: 2020-07-08 | Stop reason: HOSPADM

## 2020-07-06 RX ORDER — FUROSEMIDE 20 MG/1
20 TABLET ORAL DAILY
Status: DISCONTINUED | OUTPATIENT
Start: 2020-07-06 | End: 2020-07-08 | Stop reason: HOSPADM

## 2020-07-06 RX ORDER — POTASSIUM CHLORIDE 20 MEQ/1
40 TABLET, EXTENDED RELEASE ORAL PRN
Status: CANCELLED | OUTPATIENT
Start: 2020-07-06

## 2020-07-06 RX ORDER — GABAPENTIN 300 MG/1
300 CAPSULE ORAL 3 TIMES DAILY
Status: DISCONTINUED | OUTPATIENT
Start: 2020-07-06 | End: 2020-07-08 | Stop reason: HOSPADM

## 2020-07-06 RX ORDER — AMLODIPINE BESYLATE 10 MG/1
10 TABLET ORAL DAILY
Status: DISCONTINUED | OUTPATIENT
Start: 2020-07-06 | End: 2020-07-08 | Stop reason: HOSPADM

## 2020-07-06 RX ORDER — 0.9 % SODIUM CHLORIDE 0.9 %
20 INTRAVENOUS SOLUTION INTRAVENOUS ONCE
Status: COMPLETED | OUTPATIENT
Start: 2020-07-06 | End: 2020-07-07

## 2020-07-06 RX ORDER — ACETAMINOPHEN 650 MG/1
650 SUPPOSITORY RECTAL EVERY 6 HOURS PRN
Status: DISCONTINUED | OUTPATIENT
Start: 2020-07-06 | End: 2020-07-08 | Stop reason: HOSPADM

## 2020-07-06 RX ORDER — POTASSIUM CHLORIDE 7.45 MG/ML
10 INJECTION INTRAVENOUS ONCE
Status: COMPLETED | OUTPATIENT
Start: 2020-07-06 | End: 2020-07-06

## 2020-07-06 RX ORDER — TIZANIDINE HYDROCHLORIDE 2 MG/1
2 CAPSULE, GELATIN COATED ORAL 3 TIMES DAILY
Status: CANCELLED | OUTPATIENT
Start: 2020-07-06

## 2020-07-06 RX ORDER — SODIUM CHLORIDE 0.9 % (FLUSH) 0.9 %
10 SYRINGE (ML) INJECTION EVERY 12 HOURS SCHEDULED
Status: DISCONTINUED | OUTPATIENT
Start: 2020-07-06 | End: 2020-07-08 | Stop reason: HOSPADM

## 2020-07-06 RX ORDER — SUCRALFATE 1 G/1
1 TABLET ORAL 4 TIMES DAILY
Status: CANCELLED | OUTPATIENT
Start: 2020-07-06

## 2020-07-06 RX ORDER — PANTOPRAZOLE SODIUM 40 MG/10ML
40 INJECTION, POWDER, LYOPHILIZED, FOR SOLUTION INTRAVENOUS ONCE
Status: COMPLETED | OUTPATIENT
Start: 2020-07-06 | End: 2020-07-06

## 2020-07-06 RX ORDER — MAGNESIUM SULFATE IN WATER 40 MG/ML
2 INJECTION, SOLUTION INTRAVENOUS ONCE
Status: COMPLETED | OUTPATIENT
Start: 2020-07-06 | End: 2020-07-06

## 2020-07-06 RX ORDER — SODIUM CHLORIDE 0.9 % (FLUSH) 0.9 %
10 SYRINGE (ML) INJECTION PRN
Status: DISCONTINUED | OUTPATIENT
Start: 2020-07-06 | End: 2020-07-08 | Stop reason: HOSPADM

## 2020-07-06 RX ORDER — PANTOPRAZOLE SODIUM 40 MG/10ML
40 INJECTION, POWDER, LYOPHILIZED, FOR SOLUTION INTRAVENOUS 2 TIMES DAILY
Status: DISCONTINUED | OUTPATIENT
Start: 2020-07-06 | End: 2020-07-08 | Stop reason: HOSPADM

## 2020-07-06 RX ORDER — ONDANSETRON 2 MG/ML
4 INJECTION INTRAMUSCULAR; INTRAVENOUS ONCE
Status: COMPLETED | OUTPATIENT
Start: 2020-07-06 | End: 2020-07-06

## 2020-07-06 RX ORDER — ACETAMINOPHEN 325 MG/1
650 TABLET ORAL EVERY 6 HOURS PRN
Status: DISCONTINUED | OUTPATIENT
Start: 2020-07-06 | End: 2020-07-08 | Stop reason: HOSPADM

## 2020-07-06 RX ORDER — POTASSIUM CHLORIDE 20 MEQ/1
40 TABLET, EXTENDED RELEASE ORAL ONCE
Status: COMPLETED | OUTPATIENT
Start: 2020-07-06 | End: 2020-07-06

## 2020-07-06 RX ORDER — PROMETHAZINE HYDROCHLORIDE 25 MG/1
12.5 TABLET ORAL EVERY 6 HOURS PRN
Status: CANCELLED | OUTPATIENT
Start: 2020-07-06

## 2020-07-06 RX ORDER — DEXTROSE MONOHYDRATE 25 G/50ML
12.5 INJECTION, SOLUTION INTRAVENOUS PRN
Status: CANCELLED | OUTPATIENT
Start: 2020-07-06

## 2020-07-06 RX ORDER — ONDANSETRON 2 MG/ML
4 INJECTION INTRAMUSCULAR; INTRAVENOUS EVERY 6 HOURS PRN
Status: DISCONTINUED | OUTPATIENT
Start: 2020-07-06 | End: 2020-07-08 | Stop reason: HOSPADM

## 2020-07-06 RX ORDER — NICOTINE POLACRILEX 4 MG
15 LOZENGE BUCCAL PRN
Status: CANCELLED | OUTPATIENT
Start: 2020-07-06

## 2020-07-06 RX ORDER — PANTOPRAZOLE SODIUM 40 MG/10ML
40 INJECTION, POWDER, LYOPHILIZED, FOR SOLUTION INTRAVENOUS 2 TIMES DAILY
Status: CANCELLED | OUTPATIENT
Start: 2020-07-06

## 2020-07-06 RX ORDER — ALLOPURINOL 300 MG/1
300 TABLET ORAL DAILY
Status: CANCELLED | OUTPATIENT
Start: 2020-07-06

## 2020-07-06 RX ORDER — NICOTINE POLACRILEX 4 MG
15 LOZENGE BUCCAL PRN
Status: DISCONTINUED | OUTPATIENT
Start: 2020-07-06 | End: 2020-07-08 | Stop reason: HOSPADM

## 2020-07-06 RX ORDER — SODIUM CHLORIDE 9 MG/ML
INJECTION INTRAVENOUS
Status: COMPLETED
Start: 2020-07-06 | End: 2020-07-06

## 2020-07-06 RX ORDER — POTASSIUM CHLORIDE 7.45 MG/ML
10 INJECTION INTRAVENOUS PRN
Status: CANCELLED | OUTPATIENT
Start: 2020-07-06

## 2020-07-06 RX ORDER — ACETAMINOPHEN 650 MG/1
650 SUPPOSITORY RECTAL EVERY 6 HOURS PRN
Status: CANCELLED | OUTPATIENT
Start: 2020-07-06

## 2020-07-06 RX ORDER — FUROSEMIDE 20 MG/1
20 TABLET ORAL DAILY
Status: CANCELLED | OUTPATIENT
Start: 2020-07-06

## 2020-07-06 RX ORDER — AMLODIPINE BESYLATE 5 MG/1
10 TABLET ORAL DAILY
Status: CANCELLED | OUTPATIENT
Start: 2020-07-06

## 2020-07-06 RX ORDER — DEXTROSE MONOHYDRATE 50 MG/ML
100 INJECTION, SOLUTION INTRAVENOUS PRN
Status: CANCELLED | OUTPATIENT
Start: 2020-07-06

## 2020-07-06 RX ADMIN — MAGNESIUM SULFATE IN WATER 2 G: 40 INJECTION, SOLUTION INTRAVENOUS at 11:12

## 2020-07-06 RX ADMIN — ATORVASTATIN CALCIUM 10 MG: 10 TABLET, FILM COATED ORAL at 20:44

## 2020-07-06 RX ADMIN — SODIUM CHLORIDE 10 ML: 9 INJECTION, SOLUTION INTRAMUSCULAR; INTRAVENOUS; SUBCUTANEOUS at 20:45

## 2020-07-06 RX ADMIN — POTASSIUM CHLORIDE 40 MEQ: 1500 TABLET, EXTENDED RELEASE ORAL at 11:06

## 2020-07-06 RX ADMIN — SODIUM CHLORIDE 1000 ML: 9 INJECTION, SOLUTION INTRAVENOUS at 08:07

## 2020-07-06 RX ADMIN — INSULIN LISPRO 1 UNITS: 100 INJECTION, SOLUTION INTRAVENOUS; SUBCUTANEOUS at 16:26

## 2020-07-06 RX ADMIN — ONDANSETRON 4 MG: 2 INJECTION INTRAMUSCULAR; INTRAVENOUS at 08:07

## 2020-07-06 RX ADMIN — AMLODIPINE BESYLATE 10 MG: 10 TABLET ORAL at 15:08

## 2020-07-06 RX ADMIN — LEVOTHYROXINE SODIUM 137 MCG: 25 TABLET ORAL at 15:12

## 2020-07-06 RX ADMIN — ALLOPURINOL 300 MG: 300 TABLET ORAL at 15:09

## 2020-07-06 RX ADMIN — SERTRALINE HYDROCHLORIDE 50 MG: 50 TABLET ORAL at 15:10

## 2020-07-06 RX ADMIN — PANTOPRAZOLE SODIUM 40 MG: 40 INJECTION, POWDER, FOR SOLUTION INTRAVENOUS at 20:45

## 2020-07-06 RX ADMIN — FUROSEMIDE 20 MG: 20 TABLET ORAL at 15:09

## 2020-07-06 RX ADMIN — PANTOPRAZOLE SODIUM 40 MG: 40 INJECTION, POWDER, FOR SOLUTION INTRAVENOUS at 10:06

## 2020-07-06 RX ADMIN — TIZANIDINE 2 MG: 4 TABLET ORAL at 20:44

## 2020-07-06 RX ADMIN — LOSARTAN POTASSIUM 50 MG: 50 TABLET ORAL at 15:08

## 2020-07-06 RX ADMIN — METOPROLOL TARTRATE 25 MG: 25 TABLET, FILM COATED ORAL at 20:44

## 2020-07-06 RX ADMIN — SODIUM CHLORIDE 20 ML: 9 INJECTION, SOLUTION INTRAVENOUS at 23:19

## 2020-07-06 RX ADMIN — POTASSIUM CHLORIDE 10 MEQ: 7.46 INJECTION, SOLUTION INTRAVENOUS at 11:27

## 2020-07-06 RX ADMIN — SUCRALFATE 1 G: 1 TABLET ORAL at 20:44

## 2020-07-06 RX ADMIN — SUCRALFATE 1 G: 1 TABLET ORAL at 16:25

## 2020-07-06 ASSESSMENT — PAIN DESCRIPTION - LOCATION: LOCATION: BACK

## 2020-07-06 ASSESSMENT — PAIN SCALES - GENERAL
PAINLEVEL_OUTOF10: 0
PAINLEVEL_OUTOF10: 7

## 2020-07-06 ASSESSMENT — PAIN DESCRIPTION - ORIENTATION: ORIENTATION: UPPER

## 2020-07-06 ASSESSMENT — PAIN DESCRIPTION - DESCRIPTORS: DESCRIPTORS: DISCOMFORT

## 2020-07-06 ASSESSMENT — LIFESTYLE VARIABLES: SMOKING_STATUS: 0

## 2020-07-06 ASSESSMENT — PAIN DESCRIPTION - PAIN TYPE: TYPE: CHRONIC PAIN

## 2020-07-06 NOTE — ED PROVIDER NOTES
HPI:  7/6/20, Time: 7:42 AM EDT         Magalys Jauregui is a 65 y.o. female presenting to the ED for dizziness beginning last night. The patient describes it as a sensation of lightheadedness with associated nonbloody, nonbilious emesis. She is concerned that her blood sugars are high because she states that she frequently gets these symptoms when her sugars are running high. She has been compliant with her medications, but she states that she did vomit some of her medications yesterday. She states that approximately 1 month ago she was seen by her doctor for acute on chronic back pain due to arthritis. No recent falls or trauma. She states that on June 16 she was given a steroid injection and steroid pills which she is no longer taking. She is currently on gabapentin. She denies bowel or bladder incontinence, saddle anesthesia, fevers, or extremity numbness or weakness. No chest pain, syncope, shortness of breath, cough, hemoptysis, abdominal pain, or dysuria. She has had some mild diarrhea. Patient lives at home. Patient later admits to melena for the last few days. She takes daily aspirin but no other anticoagulation. Review of Systems:   Pertinent positives and negatives are stated within HPI, all other systems reviewed and are negative.          --------------------------------------------- PAST HISTORY ---------------------------------------------  Past Medical History:  has a past medical history of Back problem, Carotid artery stenosis, Chest pain, Diabetic neuropathy ([address] Full dentures, Hiatal hernia, Hypertension, Hyperuricemia, Lipid disorder, Osteoarthritis, Peripheral vascular disease ([address] Preoperative clearance, PVD (peripheral vascular disease) with claudication ([address] Rapid palpitations, Thyroid disease, Type II or unspecified type diabetes mellitus without mention of complication, not stated as uncontrolled, and Wears glasses.     Past Surgical History:  has a past surgical mmol/L    CO2 20 (L) 22 - 29 mmol/L    Anion Gap 15 7 - 16 mmol/L    Glucose 164 (H) 74 - 99 mg/dL    BUN 55 (H) 8 - 23 mg/dL    CREATININE 0.9 0.5 - 1.0 mg/dL    GFR Non-African American >60 >=60 mL/min/1.73    GFR African American >60     Calcium 9.4 8.6 - 10.2 mg/dL    Total Protein 6.0 (L) 6.4 - 8.3 g/dL    Alb 3.3 (L) 3.5 - 5.2 g/dL    Total Bilirubin 0.4 0.0 - 1.2 mg/dL    Alkaline Phosphatase 62 35 - 104 U/L    ALT 17 0 - 32 U/L    AST 35 (H) 0 - 31 U/L   Troponin   Result Value Ref Range    Troponin <0.01 0.00 - 0.03 ng/mL   Lipase   Result Value Ref Range    Lipase 18 13 - 60 U/L   Potassium   Result Value Ref Range    Potassium 2.7 (LL) 3.5 - 5.0 mmol/L   POCT Glucose   Result Value Ref Range    Meter Glucose 185 (H) 74 - 99 mg/dL   EKG 12 Lead   Result Value Ref Range    Ventricular Rate 87 BPM    Atrial Rate 87 BPM    P-R Interval 152 ms    QRS Duration 94 ms    Q-T Interval 354 ms    QTc Calculation (Bazett) 425 ms    P Axis 69 degrees    R Axis 69 degrees    T Axis 50 degrees       RADIOLOGY:  Interpreted by Radiologist.  CT Head WO Contrast   Final Result   No evidence of intracranial hemorrhage or edema. There is   age-appropriate atrophy. XR CHEST PORTABLE   Final Result   No pulmonary airspace consolidation. Mild atelectasis or fibrosis at the right lung base.             ------------------------- NURSING NOTES AND VITALS REVIEWED ---------------------------   The nursing notes within the ED encounter and vital signs as below have been reviewed.    BP (!) 122/40   Pulse 88   Temp 99.2 °F (37.3 °C)   Resp 16   Ht 5' 5\" (1.651 m)   Wt 250 lb (113.4 kg)   SpO2 98%   BMI 41.60 kg/m²   Oxygen Saturation Interpretation: Normal      ---------------------------------------------------PHYSICAL EXAM--------------------------------------      Constitutional/General: Alert and oriented x3, well appearing, non toxic in NAD  Head: Normocephalic and atraumatic  Eyes: PERRL, EOMI  Mouth: Oropharynx clear, handling secretions, no trismus  Neck: Supple, full ROM,   Pulmonary: Lungs clear to auscultation bilaterally, no wheezes, rales, or rhonchi. Not in respiratory distress  Cardiovascular:  Regular rate and rhythm, no murmurs, gallops, or rubs. 2+ distal pulses  Abdomen: Soft, mild epigastric tenderness, no rebound, no guarding, non distended,   Extremities: Moves all extremities x 4. Warm and well perfused  Skin: warm and dry without rash  Neurologic: GCS 15, Facial symmetry. Speech clear. Cranial nerves intact. No focal motor or sensory deficits. No ataxia with finger-to-nose or heel-to-shin. No drift in upper or lower extremities. No nystagmus. Psych: Normal Affect      ------------------------------ ED COURSE/MEDICAL DECISION MAKING----------------------  Medications   0.9 % sodium chloride bolus (0 mLs Intravenous Stopped 7/6/20 0959)   ondansetron (ZOFRAN) injection 4 mg (4 mg Intravenous Given 7/6/20 0807)   pantoprazole (PROTONIX) injection 40 mg (40 mg Intravenous Given 7/6/20 1006)   potassium chloride 10 mEq/100 mL IVPB (Peripheral Line) (0 mEq Intravenous Stopped 7/6/20 1243)   potassium chloride (KLOR-CON M) extended release tablet 40 mEq (40 mEq Oral Given 7/6/20 1106)   magnesium sulfate 2 g in 50 mL IVPB premix (0 g Intravenous Patient Transferred to Other Facility 7/6/20 1242)       Medical Decision Making/ED COURSE:   Patient is a 75-year-old female presenting with dizziness and emesis. She was hemodynamically stable and afebrile in the ED. She was nontoxic on evaluation. She had a normal neurologic exam.  She was given IV fluids and Zofran. Labs, EKG, chest x-ray, and head CT were obtained. No acute findings on head CT or chest x-ray. I reviewed and interpreted labs which showed a mild leukocytosis and anemia of 8.1, decreased from 10.4 on prior labs. Patient is Hemoccult positive and admitting to melena. She was given Protonix. She has a history of severe gastritis.   She was also found to have hypokalemia, so she was given IV potassium, oral potassium, and IV magnesium. She will be admitted for further work-up and treatment. ED Course as of Jul 06 1458 Mon Jul 06, 2020   4950 EKG: This EKG is signed and interpreted by me. Rate: 87  Rhythm: Sinus  Interpretation: Normal sinus rhythm, T wave inversions in V2 and V3, nonspecific flattening in lateral leads, changed from prior EKG, normal CO interval, normal QTC  Comparison: changes compared to previous EKG      [JA]   0959 On re-evaluation, patient states that she has been having melena for the last few days. She denies NSAID use but she does take daily Aspirin. She denies history of melena. No hematemesis. She states she underwent a colonoscopy in January at [facility] by Dr. Rudolph Lopez, and she had a polyp removed. She underwent an EGD several years ago by Dr. Chika Braden showing severe gastritis. She is not currently on a PPI. She states that she is not currently taking steroids. [JA]   1006 Rectal exam performed with RN chaperone, Kaylee, at bedside. Patient had a moderate amount of dark soft stool in her rectum which was Hemoccult positive. [JA]   200 Son at bedside. Discussed plan for admission due to melena and anemia. [JA]   1111 I spoke with Suzanne Mir working with Dr. Flori Bermudez. Patient was accepted for admission. [JA]      ED Course User Index  [JA] Sheri Mujica MD           Counseling: The emergency provider has spoken with the patient and discussed todays results, in addition to providing specific details for the plan of care and counseling regarding the diagnosis and prognosis. Questions are answered at this time and they are agreeable with the plan.      --------------------------------- IMPRESSION AND DISPOSITION ---------------------------------    IMPRESSION  1. Melena    2. Anemia, unspecified type    3.  Hypokalemia        DISPOSITION  Disposition: Transfer to [facility]. E's for admission  Patient condition is stable      NOTE: This report was transcribed using voice recognition software.  Every effort was made to ensure accuracy; however, inadvertent computerized transcription errors may be present       Marly Dos Santos MD  07/06/20 1500

## 2020-07-06 NOTE — PLAN OF CARE
Problem: Safety:  Goal: Free from accidental physical injury  Description: Free from accidental physical injury  Outcome: Met This Shift  Goal: Free from intentional harm  Description: Free from intentional harm  Outcome: Met This Shift     Problem: Daily Care:  Goal: Daily care needs are met  Description: Daily care needs are met  Outcome: Met This Shift     Problem: Pain:  Goal: Patient's pain/discomfort is manageable  Description: Patient's pain/discomfort is manageable  Outcome: Met This Shift     Problem: Skin Integrity:  Goal: Skin integrity will stabilize  Description: Skin integrity will stabilize  Outcome: Met This Shift     Problem: Discharge Planning:  Goal: Patients continuum of care needs are met  Description: Patients continuum of care needs are met  Outcome: Met This Shift

## 2020-07-06 NOTE — PLAN OF CARE
Problem: Safety:  Goal: Free from accidental physical injury  Description: Free from accidental physical injury  7/6/2020 1837 by Alex Tomlinson RN  Outcome: Met This Shift  7/6/2020 1748 by Alex Tomlinson RN  Outcome: Met This Shift  Goal: Free from intentional harm  Description: Free from intentional harm  7/6/2020 1837 by Alex Tomlinson RN  Outcome: Met This Shift  7/6/2020 1748 by Alex Tomlinson RN  Outcome: Met This Shift     Problem: Daily Care:  Goal: Daily care needs are met  Description: Daily care needs are met  7/6/2020 1837 by Alex Tomlinson RN  Outcome: Met This Shift  7/6/2020 1748 by Alex Tomlinson RN  Outcome: Met This Shift     Problem: Pain:  Goal: Patient's pain/discomfort is manageable  Description: Patient's pain/discomfort is manageable  7/6/2020 1837 by Alex Tomlinson RN  Outcome: Met This Shift  7/6/2020 1748 by Alex Tomlinson RN  Outcome: Met This Shift     Problem: Skin Integrity:  Goal: Skin integrity will stabilize  Description: Skin integrity will stabilize  7/6/2020 1837 by Alex Tomlinson RN  Outcome: Met This Shift  7/6/2020 1748 by Alex Tomlinson RN  Outcome: Met This Shift     Problem: Discharge Planning:  Goal: Patients continuum of care needs are met  Description: Patients continuum of care needs are met  7/6/2020 1837 by Alex Tomlinson RN  Outcome: Met This Shift  7/6/2020 1748 by Alex Tomlinson RN  Outcome: Met This Shift

## 2020-07-06 NOTE — CONSULTS
benign    TOTAL HIP ARTHROPLASTY Left 12/2015       Medications Prior to Admission:    Prior to Admission medications    Medication Sig Start Date End Date Taking? Authorizing Provider   tiZANidine (ZANAFLEX) 2 MG capsule Take 2 mg by mouth 3 times daily    Historical Provider, MD   ketorolac (TORADOL) 10 MG tablet Take 1 tablet by mouth every 6 hours as needed for Pain 6/7/20   MIKA Higuera   predniSONE (DELTASONE) 50 MG tablet Take 50mg po qd x 5 days  QS for 5 days 6/6/20   Ramon Chavis MD   acetaminophen (TYLENOL) 500 MG tablet Take 500 mg by mouth every 6 hours as needed for Pain    Historical Provider, MD   Cimetidine (TAGAMET PO) Take by mouth    Historical Provider, MD   allopurinol (ZYLOPRIM) 300 MG tablet Take 300 mg by mouth daily 3/24/18   Historical Provider, MD   SYNTHROID 137 MCG tablet Take 137 mcg by mouth daily 1/16/18   Historical Provider, MD   sucralfate (CARAFATE) 1 GM tablet Take 1 g by mouth 4 times daily    Historical Provider, MD   meloxicam (MOBIC) 7.5 MG tablet Take 7.5 mg by mouth daily    Historical Provider, MD   sertraline (ZOLOFT) 50 MG tablet Take 50 mg by mouth daily    Historical Provider, MD   JANUVIA 100 MG tablet Take 100 mg by mouth daily  5/26/16   Historical Provider, MD   metFORMIN (GLUCOPHAGE) 500 MG tablet Take 1,000 mg by mouth 2 times daily (with meals)    Historical Provider, MD   canagliflozin (INVOKANA) 100 MG TABS tablet Take 100 mg by mouth every morning (before breakfast)    Historical Provider, MD   HYDROcodone-acetaminophen (NORCO) 7.5-325 MG per tablet Take 1 tablet by mouth every 12 hours as needed for Pain    Historical Provider, MD   Cholecalciferol (VITAMIN D3) 87606 UNITS CAPS Take 1 capsule by mouth See Admin Instructions 5 days a week    Historical Provider, MD   atorvastatin (LIPITOR) 10 MG tablet Take 10 mg by mouth daily    Historical Provider, MD   irbesartan (AVAPRO) 150 MG tablet Take 150 mg by mouth daily.     Historical Provider, MD metoprolol (LOPRESSOR) 25 MG tablet Take 25 mg by mouth 2 times daily Take am surgery . Historical Provider, MD   aspirin 81 MG EC tablet Take 81 mg by mouth daily     Historical Provider, MD   furosemide (LASIX) 20 MG tablet Take 20 mg by mouth daily. Historical Provider, MD   amLODIPine (NORVASC) 10 MG tablet Take 10 mg by mouth daily Take am dos, 2015    Historical Provider, MD   Calcium Carb-Cholecalciferol (CALCIUM 1000 + D PO) Take by mouth daily     Historical Provider, MD   therapeutic multivitamin-minerals (THERAGRAN-M) tablet Take 1 tablet by mouth daily. Historical Provider, MD   Elastic Bandages & Supports ([address]) [address] [facility] by Does not apply route. 20-30 MMHG knee high     Historical Provider, MD       Allergies   Allergen Reactions    Adhesive Tape Other (See Comments)     Pulls skin       Family History   Problem Relation Age of Onset    Kidney Disease Mother     Cancer Father     Heart Disease Maternal Grandmother        Social History     Tobacco Use    Smoking status: Former Smoker     Packs/day: 1.00     Years: 40.00     Pack years: 40.00     Types: Cigarettes     Last attempt to quit: 2007     Years since quittin.5    Smokeless tobacco: Never Used   Substance Use Topics    Alcohol use: No    Drug use: No         Review of Systems   As stated in HPI with no other pertinent positives. PHYSICAL EXAM:    Vitals:    20 1604   BP: (!) 118/56   Pulse: 87   Resp: 18   Temp: 98.1 °F (36.7 °C)   SpO2: 95%       General Appearance:  awake, alert, oriented, in no acute distress  Head/face:  NCAT  Eyes:  Conjunctiva- pale   Lungs:  Normal expansion. Clear to auscultation. No rales, rhonchi, or wheezing. Heart:  Heart regular rate and rhythm  Abdomen:  Soft, non-tender, normal bowel sounds. No bruits, organomegaly or masses.   Extremities: trace pedal edema      LABS:    CBC  Recent Labs     20  0744   WBC 13.9*   HGB 8.1*   HCT the right lung base.          ASSESSMENT/PLAN:  65 y.o. female with upper GI Bleed  -Presence of melena  -History of gastritis and gastric ulcer on 2016 EGD  -Recent Steroid use  -Hgb: 8.1 / Hct: 24.7  -NPO after midnight  -EGD scheduled for tomorrow          Electronically signed by Chin David DO on 7/6/20 at 5:07 PM EDT

## 2020-07-06 NOTE — ED NOTES
Rectal exam done by Dr Tony Ozuna with witness in room  Stool hemoccult positive     Lona Reese RN  07/06/20 1013

## 2020-07-07 VITALS
SYSTOLIC BLOOD PRESSURE: 125 MMHG | RESPIRATION RATE: 16 BRPM | DIASTOLIC BLOOD PRESSURE: 55 MMHG | OXYGEN SATURATION: 96 %

## 2020-07-07 PROBLEM — K92.2 GI BLEED: Status: ACTIVE | Noted: 2020-07-07

## 2020-07-07 PROBLEM — E66.01 MORBID OBESITY WITH BMI OF 40.0-44.9, ADULT (HCC): Chronic | Status: ACTIVE | Noted: 2020-07-07

## 2020-07-07 PROBLEM — E78.5 HYPERLIPIDEMIA LDL GOAL <100: Chronic | Status: ACTIVE | Noted: 2020-07-07

## 2020-07-07 PROBLEM — K92.2 GI BLEED: Status: RESOLVED | Noted: 2020-07-06 | Resolved: 2020-07-07

## 2020-07-07 PROBLEM — E87.6 HYPOKALEMIA: Status: RESOLVED | Noted: 2020-07-06 | Resolved: 2020-07-07

## 2020-07-07 PROBLEM — K27.9 PUD (PEPTIC ULCER DISEASE): Chronic | Status: ACTIVE | Noted: 2020-07-07

## 2020-07-07 PROBLEM — E03.9 ACQUIRED HYPOTHYROIDISM: Chronic | Status: ACTIVE | Noted: 2020-07-07

## 2020-07-07 PROBLEM — I10 ESSENTIAL HYPERTENSION: Chronic | Status: ACTIVE | Noted: 2020-07-07

## 2020-07-07 PROBLEM — D64.9 ANEMIA: Status: ACTIVE | Noted: 2020-07-07

## 2020-07-07 LAB
ANION GAP SERPL CALCULATED.3IONS-SCNC: 10 MMOL/L (ref 7–16)
BASOPHILS ABSOLUTE: 0.04 E9/L (ref 0–0.2)
BASOPHILS RELATIVE PERCENT: 0.4 % (ref 0–2)
BLOOD BANK DISPENSE STATUS: NORMAL
BLOOD BANK DISPENSE STATUS: NORMAL
BLOOD BANK PRODUCT CODE: NORMAL
BLOOD BANK PRODUCT CODE: NORMAL
BPU ID: NORMAL
BPU ID: NORMAL
BUN BLDV-MCNC: 27 MG/DL (ref 8–23)
CALCIUM SERPL-MCNC: 8.8 MG/DL (ref 8.6–10.2)
CHLORIDE BLD-SCNC: 102 MMOL/L (ref 98–107)
CO2: 24 MMOL/L (ref 22–29)
CREAT SERPL-MCNC: 0.9 MG/DL (ref 0.5–1)
DESCRIPTION BLOOD BANK: NORMAL
DESCRIPTION BLOOD BANK: NORMAL
EOSINOPHILS ABSOLUTE: 0.17 E9/L (ref 0.05–0.5)
EOSINOPHILS RELATIVE PERCENT: 1.5 % (ref 0–6)
GFR AFRICAN AMERICAN: >60
GFR NON-AFRICAN AMERICAN: >60 ML/MIN/1.73
GLUCOSE BLD-MCNC: 100 MG/DL (ref 74–99)
HCT VFR BLD CALC: 26.6 % (ref 34–48)
HEMOGLOBIN: 8.7 G/DL (ref 11.5–15.5)
IMMATURE GRANULOCYTES #: 0.21 E9/L
IMMATURE GRANULOCYTES %: 1.9 % (ref 0–5)
LYMPHOCYTES ABSOLUTE: 4.11 E9/L (ref 1.5–4)
LYMPHOCYTES RELATIVE PERCENT: 37.3 % (ref 20–42)
MCH RBC QN AUTO: 30.6 PG (ref 26–35)
MCHC RBC AUTO-ENTMCNC: 32.7 % (ref 32–34.5)
MCV RBC AUTO: 93.7 FL (ref 80–99.9)
METER GLUCOSE: 100 MG/DL (ref 74–99)
METER GLUCOSE: 104 MG/DL (ref 74–99)
MONOCYTES ABSOLUTE: 0.67 E9/L (ref 0.1–0.95)
MONOCYTES RELATIVE PERCENT: 6.1 % (ref 2–12)
NEUTROPHILS ABSOLUTE: 5.81 E9/L (ref 1.8–7.3)
NEUTROPHILS RELATIVE PERCENT: 52.8 % (ref 43–80)
PDW BLD-RTO: 15 FL (ref 11.5–15)
PLATELET # BLD: 269 E9/L (ref 130–450)
PMV BLD AUTO: 9.7 FL (ref 7–12)
POTASSIUM REFLEX MAGNESIUM: 3.7 MMOL/L (ref 3.5–5)
RBC # BLD: 2.84 E12/L (ref 3.5–5.5)
SODIUM BLD-SCNC: 136 MMOL/L (ref 132–146)
WBC # BLD: 11 E9/L (ref 4.5–11.5)

## 2020-07-07 PROCEDURE — 97161 PT EVAL LOW COMPLEX 20 MIN: CPT

## 2020-07-07 PROCEDURE — 96376 TX/PRO/DX INJ SAME DRUG ADON: CPT

## 2020-07-07 PROCEDURE — 7100000011 HC PHASE II RECOVERY - ADDTL 15 MIN: Performed by: SURGERY

## 2020-07-07 PROCEDURE — 6360000002 HC RX W HCPCS: Performed by: PHYSICIAN ASSISTANT

## 2020-07-07 PROCEDURE — P9016 RBC LEUKOCYTES REDUCED: HCPCS

## 2020-07-07 PROCEDURE — 3700000001 HC ADD 15 MINUTES (ANESTHESIA): Performed by: SURGERY

## 2020-07-07 PROCEDURE — 6360000002 HC RX W HCPCS: Performed by: NURSE ANESTHETIST, CERTIFIED REGISTERED

## 2020-07-07 PROCEDURE — 2580000003 HC RX 258

## 2020-07-07 PROCEDURE — 0DB98ZX EXCISION OF DUODENUM, VIA NATURAL OR ARTIFICIAL OPENING ENDOSCOPIC, DIAGNOSTIC: ICD-10-PCS | Performed by: SURGERY

## 2020-07-07 PROCEDURE — 6370000000 HC RX 637 (ALT 250 FOR IP): Performed by: PHYSICIAN ASSISTANT

## 2020-07-07 PROCEDURE — C9113 INJ PANTOPRAZOLE SODIUM, VIA: HCPCS | Performed by: PHYSICIAN ASSISTANT

## 2020-07-07 PROCEDURE — 7100000010 HC PHASE II RECOVERY - FIRST 15 MIN: Performed by: SURGERY

## 2020-07-07 PROCEDURE — 2580000003 HC RX 258: Performed by: NURSE ANESTHETIST, CERTIFIED REGISTERED

## 2020-07-07 PROCEDURE — 2060000000 HC ICU INTERMEDIATE R&B

## 2020-07-07 PROCEDURE — 36415 COLL VENOUS BLD VENIPUNCTURE: CPT

## 2020-07-07 PROCEDURE — 97165 OT EVAL LOW COMPLEX 30 MIN: CPT

## 2020-07-07 PROCEDURE — 88305 TISSUE EXAM BY PATHOLOGIST: CPT

## 2020-07-07 PROCEDURE — 88342 IMHCHEM/IMCYTCHM 1ST ANTB: CPT

## 2020-07-07 PROCEDURE — 3700000000 HC ANESTHESIA ATTENDED CARE: Performed by: SURGERY

## 2020-07-07 PROCEDURE — 2709999900 HC NON-CHARGEABLE SUPPLY: Performed by: SURGERY

## 2020-07-07 PROCEDURE — 82962 GLUCOSE BLOOD TEST: CPT

## 2020-07-07 PROCEDURE — 36430 TRANSFUSION BLD/BLD COMPNT: CPT

## 2020-07-07 PROCEDURE — 80048 BASIC METABOLIC PNL TOTAL CA: CPT

## 2020-07-07 PROCEDURE — 85025 COMPLETE CBC W/AUTO DIFF WBC: CPT

## 2020-07-07 PROCEDURE — 2580000003 HC RX 258: Performed by: PHYSICIAN ASSISTANT

## 2020-07-07 PROCEDURE — 3609012400 HC EGD TRANSORAL BIOPSY SINGLE/MULTIPLE: Performed by: SURGERY

## 2020-07-07 RX ORDER — SODIUM CHLORIDE 9 MG/ML
INJECTION, SOLUTION INTRAVENOUS CONTINUOUS PRN
Status: DISCONTINUED | OUTPATIENT
Start: 2020-07-07 | End: 2020-07-07 | Stop reason: SDUPTHER

## 2020-07-07 RX ORDER — SODIUM CHLORIDE 9 MG/ML
INJECTION INTRAVENOUS
Status: COMPLETED
Start: 2020-07-07 | End: 2020-07-07

## 2020-07-07 RX ORDER — PROPOFOL 10 MG/ML
INJECTION, EMULSION INTRAVENOUS PRN
Status: DISCONTINUED | OUTPATIENT
Start: 2020-07-07 | End: 2020-07-07 | Stop reason: SDUPTHER

## 2020-07-07 RX ADMIN — Medication 10 ML: at 20:42

## 2020-07-07 RX ADMIN — SODIUM CHLORIDE, PRESERVATIVE FREE 20 ML: 5 INJECTION INTRAVENOUS at 06:42

## 2020-07-07 RX ADMIN — LOSARTAN POTASSIUM 50 MG: 50 TABLET ORAL at 12:37

## 2020-07-07 RX ADMIN — SUCRALFATE 1 G: 1 TABLET ORAL at 17:38

## 2020-07-07 RX ADMIN — SUCRALFATE 1 G: 1 TABLET ORAL at 12:36

## 2020-07-07 RX ADMIN — SUCRALFATE 1 G: 1 TABLET ORAL at 20:38

## 2020-07-07 RX ADMIN — SODIUM CHLORIDE: 9 INJECTION, SOLUTION INTRAVENOUS at 11:00

## 2020-07-07 RX ADMIN — PANTOPRAZOLE SODIUM 40 MG: 40 INJECTION, POWDER, FOR SOLUTION INTRAVENOUS at 20:38

## 2020-07-07 RX ADMIN — SERTRALINE HYDROCHLORIDE 50 MG: 50 TABLET ORAL at 12:36

## 2020-07-07 RX ADMIN — PROPOFOL 100 MG: 10 INJECTION, EMULSION INTRAVENOUS at 11:09

## 2020-07-07 RX ADMIN — METOPROLOL TARTRATE 25 MG: 25 TABLET, FILM COATED ORAL at 20:37

## 2020-07-07 RX ADMIN — ALLOPURINOL 300 MG: 300 TABLET ORAL at 12:37

## 2020-07-07 RX ADMIN — AMLODIPINE BESYLATE 10 MG: 10 TABLET ORAL at 12:37

## 2020-07-07 RX ADMIN — Medication 10 ML: at 08:33

## 2020-07-07 RX ADMIN — ATORVASTATIN CALCIUM 10 MG: 10 TABLET, FILM COATED ORAL at 20:37

## 2020-07-07 RX ADMIN — PANTOPRAZOLE SODIUM 40 MG: 40 INJECTION, POWDER, FOR SOLUTION INTRAVENOUS at 06:42

## 2020-07-07 ASSESSMENT — PAIN SCALES - GENERAL
PAINLEVEL_OUTOF10: 0

## 2020-07-07 ASSESSMENT — PAIN - FUNCTIONAL ASSESSMENT: PAIN_FUNCTIONAL_ASSESSMENT: 0-10

## 2020-07-07 NOTE — PROGRESS NOTES
Physical Therapy    Facility/Department: [address] INTERNAL MEDICINE 2  Initial Assessment    NAME: Melissa Dacosta  : 1950  MRN: [id number]    Date of Service: 2020      Patient Diagnosis(es): There were no encounter diagnoses. has a past medical history of Back problem, Carotid artery stenosis, Chest pain, Diabetic neuropathy ([address] Full dentures, Hiatal hernia, Hypertension, Hyperuricemia, Lipid disorder, Osteoarthritis, Peripheral vascular disease ([address] Preoperative clearance, PVD (peripheral vascular disease) with claudication ([address] Rapid palpitations, Thyroid disease, Type II or unspecified type diabetes mellitus without mention of complication, not stated as uncontrolled, and Wears glasses. has a past surgical history that includes Appendectomy; Throat surgery (); Colonoscopy; Hysterectomy; joint replacement (Left, 12/3/2015); and Total hip arthroplasty (Left, 2015). Restric  Referring Provider:  MIKA Hammer      Evaluating Therapist: Clara Miller PT    Room #:409  DIAGNOSIS: GI bleed  Additional Pertinent History:Neuropathy  PRECAUTIONS: falls, alarm    Social:  Pt lives alone in a 1 floor plan 3 steps to enter. Prior to admission independent without device but does have ww if needed     Initial Evaluation  Date: 20 Treatment      Short Term/ Long Term   Goals   Was pt agreeable to Eval/treatment?  yes     Does pt have pain? no     Bed Mobility  Rolling: independent  Supine to sit: independent  Sit to supine: independent  Scooting: independent  indeopendent   Transfers Sit to stand: SBA  Stand to sit: SBA  Stand pivot: SBA  independent   Ambulation    60 feet with ww with CGA  150 feet with AAD independent   Stair Negotiation  Ascended and descended  NT   4 steps with 1 rail with supervision   LE strength     4-/5    4/5   balance      Fair with ww     AM-PAC Raw score                        Pt is alert and Oriented   LE ROM: WFL  Sensation: decreased LE's secondary to neuropathy  Edema: none  Endurance: fair+     ASSESSMENT  Pt displays functional ability as noted in the objective portion of this evaluation. Patient education  Pt educated on Taking time between changes in position    Patient response to education:   Pt verbalized understanding Pt demonstrated skill Pt requires further education in this area   yes yes         Comments:  Loss of balance x1 during ambulation with CGA to correct. .  No c/o dizziness during session      Pt's/ family goals   1. To return home    Patient and or family understand(s) diagnosis, prognosis, and plan of care. yes    PLAN:    PT care will be provided in accordance with the objectives noted above. Exercises and functional mobility practice will be used as well as appropriate assistive devices or modalities to obtain goals. Patient and family education will also be administered as needed. Frequency of treatments: 2-5x/week x 3-5.days    Time in  0800  Time out  0815      Evaluation Time includes thorough review of current medical information, gathering information on past medical history/social history and prior level of function, completion of standardized testing/informal observation of tasks, assessment of data and education on plan of care and goals.     CPT codes:  [x] Low Complexity PT evaluation 70507  [] Moderate Complexity PT evaluation 67959  [] High Complexity PT evaluation 21331  [] PT Re-evaluation 61697  [] Gait training 11192 minutes  [] Manual therapy 09712 minutes  [] Therapeutic activities 84645 minutes  [] Therapeutic exercises 50719 minutes  [] Neuromuscular reeducation 03305 minutes     [facility] PT [id number]

## 2020-07-07 NOTE — ANESTHESIA POSTPROCEDURE EVALUATION
Department of Anesthesiology  Postprocedure Note    Patient: Nila Adams  MRN: [id number]  YOB: 1950  Date of evaluation: 7/7/2020  Time:  11:37 AM     Procedure Summary     Date:  07/07/20 Room / Location:  [facility] / [facility]    Anesthesia Start:  1105 Anesthesia Stop:  1119    Procedures:       EGD BIOPSY (N/A )      anesthesia pre-op for EGD Diagnosis:  (/)    Surgeon:  Curtis Tello MD Responsible Provider:  Brooks Barrett MD    Anesthesia Type:  MAC ASA Status:  3          Anesthesia Type: MAC    Lane Phase I: Lane Score: 10    Lane Phase II:      Last vitals: Reviewed and per EMR flowsheets.        Anesthesia Post Evaluation    Patient location during evaluation: PACU  Patient participation: complete - patient participated  Level of consciousness: awake and alert  Airway patency: patent  Nausea & Vomiting: no nausea and no vomiting  Complications: no  Cardiovascular status: hemodynamically stable  Respiratory status: acceptable  Hydration status: euvolemic

## 2020-07-07 NOTE — PROGRESS NOTES
Nursing Transfer Note    Data:  Summary of patients progress: egd  Reason for transfer: continuation of care    Action:  Explained reason for transfer to patient. Family not in waiting room. Report given to:Yolette GASTON, using RN Handoff Navigator.   Mode of transportation: cart    Response:  RN Recommendations: follow post op

## 2020-07-07 NOTE — PROGRESS NOTES
Occupational Therapy  OCCUPATIONAL THERAPY INITIAL EVALUATION      Date:2020  Patient Name: Annmarie Bosworth  MRN: [id number]  : 1950  Room: [address] [place], [place] [address]    Referring Provider: MIKA Henderson     Evaluating OT: Fadi Gerson OTR/L [id number]    AM-PAC Daily Activity Raw Score:     Recommended Adaptive Equipment: TBD     Diagnosis: GI Bleed    Pertinent Medical History: neuropathy OA,    Precautions:  Falls,      Home Living: Pt lives alone, 1 floor aprtment with 1 small step to enter. Tub/showert unit with seat    Equipment owned: extended tub bench, reacher   Prior Level of Function: Independent  with ADLs , Independent  with IADLs; ambulated with walker recently   Driving: yes     Pain Level: no pain this session ;   Cognition:  Ox3,alert and conversing     Judgement/safety:  Fair +               Memory wfl     Functional Assessment:   Initial Eval Status  Date: 20 Tx Session  STGs = LTGs  1-2 weeks    Feeding Independent      Grooming Set-up    Independent    UB Dressing Set-up  Mod I    LB Dressing Set-up   Lifts leg up onto side of bed   Saira     Bathing SBA  Mod I   Toileting Independent      Bed Mobility  Independent   Mod I    Functional Transfers SBA  Mod I    Functional Mobility SBA, no device   To/from bathroom   Mod I  With good tolerance   Balance Sitting:     Static:  Independent     Standing: SBA     Activity Tolerance Fair + with light activity   Good  with ADL activity   Visual/  Perceptual Glasses: none by bedside                 Right  hand dominant    Strength ROM Additional Info:    RUE  4-/5  WFL good  and wfl FMC/dexterity noted during ADL tasks       LUE 4-/5  WFL good  and wfl FMC/dexterity noted during ADL tasks         Hearing: [facility]   Sensation:  No c/o numbness or tingling     Comments:   Upon arrival, patient lying in bed . At end of session, patient sitting in chair with call light and phone within reach, all lines and tubes intact.   . Overall patient demonstrated  decreased independence and safety during completion of ADL/functional transfer/mobility tasks. .  Based on patient's functional performance as documented above  and prior level of function, patient would benefit from continued skilled OT during/following hospital stay in an effort to increase functional safety, independence with ADLS/IADLS, and overall quality of life. Assessment of current deficit   Functional mobility [x]  ADLs [x] Strength [x]  Cognition []  Functional transfers  [x] IADLs [x] Safety Awareness [x]  Endurance [x]  Fine Motor Coordination [] Balance [x] Vision/perception [] Sensation []   Gross Motor Coordination [] ROM [] Delirium []                  Motor Control []    Plan of Care: OT 1-3x/week PRN  ADL retraining [x]   Equipment needs [x]   Neuromuscular re-education [x] Energy Conservation Techniques [x]  Functional Transfer training [x] Patient and/or Family Education [x]  Functional Mobility training [x]  Environmental Modifications [x]  Cognitive re-training []   Compensatory techniques for ADLs [x]  Splinting Needs []   Positioning to improve overall function [x]   Therapeutic Activity [x]  Therapeutic Exercise  [x]  Visual/Perceptual: []    Delirium prevention/treatment  []   Other:  []    Rehab Potential: Good for established goals     Patient / Family Goal: return home       Patient and/or family were instructed on functional diagnosis, prognosis/goals and OT plan of care. Demonstrated good  understanding.     Low  Evaluation   Tx Time IT:3820  Tx Time OUT: 1005                                                                                                    Evaluation time includes thorough review of current medical information, gathering information on past medical history/social history and prior level of function, completion of standardized testing/informal observation of tasks, assessment of data, and development of POC/Goals      Arlette Francisco OTR/L [id number]

## 2020-07-07 NOTE — H&P
[address] [facility]  Attending History and Physical      CHIEF COMPLAINT:  lightheadedness      HISTORY OF PRESENT ILLNESS:      The patient is a 65 y.o. female patient of dr Stephanie Dunaway who presents with complains of lightheadedness. Patient has been having black stools for about 1 week. She has epigastric abdominal pain. She has been nauseated at times. She denies recent weight gain or loss. She denied chest pain, shortness of breath, fevers, chills and diaphoresis. She was weak with exertion. She felt as if she were going to pass out. She did not take anything for the symptoms. She presented to the ED. She is feeling better since presentation. Past Medical History:    Past Medical History:   Diagnosis Date    Back problem     Carotid artery stenosis     Chest pain 2/22/2013    Diabetic neuropathy ([address] 12/4/2015    Full dentures     Hiatal hernia     Hypertension     Hyperuricemia 12/4/2015    Lipid disorder 12/4/2015    Osteoarthritis     lumbosacral spine arthritis and disc disease    Peripheral vascular disease ([address]     Preoperative clearance     08/2015 per Dr Nasir Covington and 10/2015 per Dr Darcy Barrett for [facility] Dr Kenny Perales.     PVD (peripheral vascular disease) with claudication ([address] 12/13/2012    follows with Dr Ana Laureano Rapid palpitations     follows with Dr Cecy Daly Thyroid disease     Type II or unspecified type diabetes mellitus without mention of complication, not stated as uncontrolled     Wears glasses        Past Surgical History:    Past Surgical History:   Procedure Laterality Date    APPENDECTOMY      COLONOSCOPY      HYSTERECTOMY      with appendectomy    JOINT REPLACEMENT Left 12/3/2015    total hip    THROAT SURGERY  2002    benign    TOTAL HIP ARTHROPLASTY Left 12/2015       Medications Prior to Admission:    Medications Prior to Admission: tiZANidine (ZANAFLEX) 2 MG capsule, Take 2 mg by mouth 3 times daily  ketorolac (TORADOL) 10 MG tablet, Take 1 tablet by mouth every 6 hours as needed for Pain  predniSONE (DELTASONE) 50 MG tablet, Take 50mg po qd x 5 days QS for 5 days  acetaminophen (TYLENOL) 500 MG tablet, Take 500 mg by mouth every 6 hours as needed for Pain  Cimetidine (TAGAMET PO), Take by mouth  allopurinol (ZYLOPRIM) 300 MG tablet, Take 300 mg by mouth daily  SYNTHROID 137 MCG tablet, Take 137 mcg by mouth daily  sucralfate (CARAFATE) 1 GM tablet, Take 1 g by mouth 4 times daily  meloxicam (MOBIC) 7.5 MG tablet, Take 7.5 mg by mouth daily  sertraline (ZOLOFT) 50 MG tablet, Take 50 mg by mouth daily  JANUVIA 100 MG tablet, Take 100 mg by mouth daily   metFORMIN (GLUCOPHAGE) 500 MG tablet, Take 1,000 mg by mouth 2 times daily (with meals)  canagliflozin (INVOKANA) 100 MG TABS tablet, Take 100 mg by mouth every morning (before breakfast)  HYDROcodone-acetaminophen (NORCO) 7.5-325 MG per tablet, Take 1 tablet by mouth every 12 hours as needed for Pain  Cholecalciferol (VITAMIN D3) 11058 UNITS CAPS, Take 1 capsule by mouth See Admin Instructions 5 days a week  atorvastatin (LIPITOR) 10 MG tablet, Take 10 mg by mouth daily  irbesartan (AVAPRO) 150 MG tablet, Take 150 mg by mouth daily. metoprolol (LOPRESSOR) 25 MG tablet, Take 25 mg by mouth 2 times daily Take am surgery 12/03. aspirin 81 MG EC tablet, Take 81 mg by mouth daily   furosemide (LASIX) 20 MG tablet, Take 20 mg by mouth daily. amLODIPine (NORVASC) 10 MG tablet, Take 10 mg by mouth daily Take am dos, 12/03/2015  Calcium Carb-Cholecalciferol (CALCIUM 1000 + D PO), Take by mouth daily   therapeutic multivitamin-minerals (THERAGRAN-M) tablet, Take 1 tablet by mouth daily. Elastic Bandages & Supports (MEDICAL COMPRESSION STOCKINGS) MISC, by Does not apply route. 20-30 MMHG knee high     Allergies:    Adhesive tape    Social History:    reports that she quit smoking about 12 years ago. Her smoking use included cigarettes. She has a 40.00 pack-year smoking history.  She has never used smokeless tobacco. She reports that she does not drink alcohol or use drugs. Family History:   family history includes Cancer in her father; Heart Disease in her maternal grandmother; Kidney Disease in her mother. REVIEW OF SYSTEMS:  As above in the HPI, otherwise negative    PHYSICAL EXAM:    Vitals:  BP (!) 146/66   Pulse 78   Temp 98.1 °F (36.7 °C) (Oral)   Resp 18   Ht 5' 5\" (1.651 m)   Wt 245 lb (111.1 kg)   SpO2 95%   BMI 40.77 kg/m²     General:  Awake, alert, oriented X 3. Well developed, well nourished, well groomed. No apparent distress. HEENT:  Normocephalic, atraumatic. Pupils equal, round, reactive to light. No scleral icterus. No conjunctival injection. Normal lips, teeth, and gums. No nasal discharge. Neck:  Supple  Heart:  RRR, no murmurs, gallops, rubs  Lungs:  CTA bilaterally, bilat symmetrical expansion, no wheeze, rales, or rhonchi  Abdomen:   Bowel sounds present, soft, nontender, no masses, no organomegaly, no peritoneal signs  Extremities:  No clubbing, cyanosis, or edema  Skin:  Warm and dry, no open lesions or rash  Neuro:  Cranial nerves 2-12 intact, no focal deficits  Breast: deferred  Rectal: deferred  Genitalia:  deferred    LABS:    CBC with Differential:    Lab Results   Component Value Date    WBC 11.0 07/07/2020    RBC 2.84 07/07/2020    HGB 8.7 07/07/2020    HCT 26.6 07/07/2020     07/07/2020    MCV 93.7 07/07/2020    MCH 30.6 07/07/2020    MCHC 32.7 07/07/2020    RDW 15.0 07/07/2020    LYMPHOPCT 37.3 07/07/2020    MONOPCT 6.1 07/07/2020    BASOPCT 0.4 07/07/2020    MONOSABS 0.67 07/07/2020    LYMPHSABS 4.11 07/07/2020    EOSABS 0.17 07/07/2020    BASOSABS 0.04 07/07/2020     CMP:    Lab Results   Component Value Date     07/07/2020    K 3.7 07/07/2020     07/07/2020    CO2 24 07/07/2020    BUN 27 07/07/2020    CREATININE 0.9 07/07/2020    GFRAA >60 07/07/2020    LABGLOM >60 07/07/2020    GLUCOSE 100 07/07/2020    PROT 6.0 07/06/2020 LABALBU 3.3 07/06/2020    CALCIUM 8.8 07/07/2020    BILITOT 0.4 07/06/2020    ALKPHOS 62 07/06/2020    AST 35 07/06/2020    ALT 17 07/06/2020     BMP:    Lab Results   Component Value Date     07/07/2020    K 3.7 07/07/2020     07/07/2020    CO2 24 07/07/2020    BUN 27 07/07/2020    LABALBU 3.3 07/06/2020    CREATININE 0.9 07/07/2020    CALCIUM 8.8 07/07/2020    GFRAA >60 07/07/2020    LABGLOM >60 07/07/2020    GLUCOSE 100 07/07/2020     Magnesium:  No results found for: MG  Phosphorus:  No results found for: PHOS  PT/INR:    Lab Results   Component Value Date    PROTIME 14.1 07/06/2020    INR 1.2 07/06/2020     PTT:  No results found for: APTT, PTT[APTT}  Troponin:    Lab Results   Component Value Date    TROPONINI <0.01 07/06/2020     Last 3 Troponin:    Lab Results   Component Value Date    TROPONINI <0.01 07/06/2020     U/A:    Lab Results   Component Value Date    COLORU Yellow 11/25/2015    PROTEINU Negative 11/25/2015    PHUR 7.0 11/25/2015    WBCUA 5-10 11/25/2015    RBCUA 0-1 11/25/2015    BACTERIA RARE 11/25/2015    CLARITYU Clear 11/25/2015    SPECGRAV <=1.005 11/25/2015    LEUKOCYTESUR SMALL 11/25/2015    UROBILINOGEN 0.2 11/25/2015    BILIRUBINUR Negative 11/25/2015    BLOODU Negative 11/25/2015    GLUCOSEU >=1000 11/25/2015     HgBA1c:  No results found for: LABA1C  FLP:  No results found for: TRIG, HDL, LDLCALC, LDLDIRECT, LABVLDL  TSH:  No results found for: TSH  VITAMIN B12: No components found for: B12  FOLATE:  No results found for: FOLATE  IRON:  No results found for: IRON  Iron Saturation:  No components found for: PERCENTFE  TIBC:  No results found for: TIBC  FERRITIN:  No results found for: FERRITIN    ASSESSMENT:      Patient Active Problem List   Diagnosis    [facility]VD [facility]    GI bleed    Diabetes mellitus type 2, uncontrolled ([address]    Hyperlipidemia LDL goal <100    Acquired hypothyroidism    Essential hypertension    Anemia    PUD (peptic ulcer disease)    Morbid obesity with BMI of 40.0-44.9, adult ([facility]         PLAN:    Follows with vascular surgery. General surgery consulted. Endoscopy planned. HD stable. Blood glucose ok, continue to adjust basal/bolus insulin therapy  Continue aggressive lipid therapy  Continue synthroid. Blood pressure ok, continue current medications  Evaluating for acute blood loss anemia secondary to GI bleed. PPI. Continue to encourage weight loss.   Pt/Ot evaluations for discharge planning    Patria Lam MD  11:34 AM  7/7/2020

## 2020-07-07 NOTE — OP NOTE
ESOPHAGOGASTRODUODENOSCOPY PROCEDURE NOTE    DATE OF PROCEDURE: 7/7/2020    PREOPERATIVE DIAGNOSIS:  gib    POSTOPERATIVE DIAGNOSIS/FINDINGS:  Large duodenal ulcer, non bleeding    SURGEON: Corine Farmer MD    ASSISTANT: None    OPERATION: Esophagogastroduodenoscopy bx    ANESTHESIA: Local monitored anesthesia. CONDITION: Stable    COMPLICATIONS: None. Specimens Removed: as above    EBL: None    BRIEF HISTORY:  This is a 65 y.o. female who presents with the complaint of gib. It was recommended the patient undergo an esophagogastrduodenoscopy. The risks/benefits/alternatives/expected outcomes were explained the the patient. The patient verbalized understanding and agreed to proceed. PROCEDURE:  The patient was brought into the endoscopy suite and placed in the left lateral decubitus position. A bite block was placed in the patients mouth. After the initiation of LMAC anesthesia, a gastroscope was inserted into the patient's mouth and passed into the esophagus and into the stomach. Immediately upon entering the stomach the note of normal mucosa was made. The scope was advanced through the pylorus into the first and second portion of the duodenum making the note of large duodenal ulcer. The scope was then withdrawn back into the stomach where it was retroflexed. There was no hiatal hernia noted. The scope was then straightened out and bx done. The scope was then withdrawn the entire length of the esophagus, making the note of a normal esophagus without masses, ulcerations, or lesions noted. The scope was withdrawn entirely. The patient tolerated the procedure well and there were no complications.          Corine Farmer MD  7/7/2020

## 2020-07-07 NOTE — FLOWSHEET NOTE
Pt is symptomatic with low blood pressure, pt states words on tv are blurry. Pt instructed to sit up slowly if she needs to sit up and that it is recommended that the bedpan be used to void while bp is low. Pt verbalized understanding.

## 2020-07-07 NOTE — PLAN OF CARE
Problem: Safety:  Goal: Free from accidental physical injury  Description: Free from accidental physical injury  7/7/2020 0417 by Nathalia Perez RN  Outcome: Met This Shift  7/6/2020 1837 by Pollo Childs RN  Outcome: Met This Shift  7/6/2020 1748 by Pollo Childs RN  Outcome: Met This Shift     Problem: Safety:  Goal: Free from intentional harm  Description: Free from intentional harm  7/7/2020 0417 by Nathalia Perez RN  Outcome: Met This Shift  7/6/2020 1837 by Pollo Childs RN  Outcome: Met This Shift  7/6/2020 1748 by Pollo Childs RN  Outcome: Met This Shift     Problem: Daily Care:  Goal: Daily care needs are met  Description: Daily care needs are met  7/7/2020 0417 by Nahtalia Perez RN  Outcome: Met This Shift  7/6/2020 1837 by Pollo Childs RN  Outcome: Met This Shift  7/6/2020 1748 by Pollo Childs RN  Outcome: Met This Shift     Problem: Pain:  Goal: Patient's pain/discomfort is manageable  Description: Patient's pain/discomfort is manageable  7/7/2020 0417 by Nathalia Perez RN  Outcome: Met This Shift  7/6/2020 1837 by Pollo Childs RN  Outcome: Met This Shift  7/6/2020 1748 by Pollo Childs RN  Outcome: Met This Shift     Problem: Skin Integrity:  Goal: Skin integrity will stabilize  Description: Skin integrity will stabilize  7/7/2020 0417 by Nathalia Perez RN  Outcome: Met This Shift  7/6/2020 1837 by Pollo Childs RN  Outcome: Met This Shift  7/6/2020 1748 by Pollo Childs RN  Outcome: Met This Shift

## 2020-07-07 NOTE — ANESTHESIA PRE PROCEDURE
Department of Anesthesiology  Preprocedure Note       Name:  Silvana Machado   Age:  65 y.o.  :  1950                                          MRN:  [id number]         Date:  2020      Surgeon: Maribel He    Procedure: EGD    Medications prior to admission:   Prior to Admission medications    Medication Sig Start Date End Date Taking?  Authorizing Provider   tiZANidine (ZANAFLEX) 2 MG capsule Take 2 mg by mouth 3 times daily    Historical Provider, MD   ketorolac (TORADOL) 10 MG tablet Take 1 tablet by mouth every 6 hours as needed for Pain 20   Nissa Courser, PA   predniSONE (DELTASONE) 50 MG tablet Take 50mg po qd x 5 days  QS for 5 days 20   Doyle Spindle, MD   acetaminophen (TYLENOL) 500 MG tablet Take 500 mg by mouth every 6 hours as needed for Pain    Historical Provider, MD   Cimetidine (TAGAMET PO) Take by mouth    Historical Provider, MD   allopurinol (ZYLOPRIM) 300 MG tablet Take 300 mg by mouth daily 3/24/18   Historical Provider, MD   SYNTHROID 137 MCG tablet Take 137 mcg by mouth daily 18   Historical Provider, MD   sucralfate (CARAFATE) 1 GM tablet Take 1 g by mouth 4 times daily    Historical Provider, MD   meloxicam (MOBIC) 7.5 MG tablet Take 7.5 mg by mouth daily    Historical Provider, MD   sertraline (ZOLOFT) 50 MG tablet Take 50 mg by mouth daily    Historical Provider, MD   JANUVIA 100 MG tablet Take 100 mg by mouth daily  16   Historical Provider, MD   metFORMIN (GLUCOPHAGE) 500 MG tablet Take 1,000 mg by mouth 2 times daily (with meals)    Historical Provider, MD   canagliflozin (INVOKANA) 100 MG TABS tablet Take 100 mg by mouth every morning (before breakfast)    Historical Provider, MD   HYDROcodone-acetaminophen (NORCO) 7.5-325 MG per tablet Take 1 tablet by mouth every 12 hours as needed for Pain    Historical Provider, MD   Cholecalciferol (VITAMIN D3) 37231 UNITS CAPS Take 1 capsule by mouth See Admin Instructions 5 days a week    Historical Provider, MD atorvastatin (LIPITOR) 10 MG tablet Take 10 mg by mouth daily    Historical Provider, MD   irbesartan (AVAPRO) 150 MG tablet Take 150 mg by mouth daily. Historical Provider, MD   metoprolol (LOPRESSOR) 25 MG tablet Take 25 mg by mouth 2 times daily Take am surgery 12/03. Historical Provider, MD   aspirin 81 MG EC tablet Take 81 mg by mouth daily     Historical Provider, MD   furosemide (LASIX) 20 MG tablet Take 20 mg by mouth daily. Historical Provider, MD   amLODIPine (NORVASC) 10 MG tablet Take 10 mg by mouth daily Take am dos, 12/03/2015    Historical Provider, MD   Calcium Carb-Cholecalciferol (CALCIUM 1000 + D PO) Take by mouth daily     Historical Provider, MD   therapeutic multivitamin-minerals (THERAGRAN-M) tablet Take 1 tablet by mouth daily. Historical Provider, MD   Elastic Bandages & Supports ([address]) [address] [facility] by Does not apply route.  20-30 MMHG knee high     Historical Provider, MD       Current medications:    Current Facility-Administered Medications   Medication Dose Route Frequency Provider Last Rate Last Dose    sodium chloride flush 0.9 % injection 10 mL  10 mL Intravenous PRN Cee Jones MD        acetaminophen (TYLENOL) tablet 650 mg  650 mg Oral Q6H PRN Siobhan Staff, PA        Or    acetaminophen (TYLENOL) suppository 650 mg  650 mg Rectal Q6H PRN Siobhan Staff, PA        magnesium hydroxide (MILK OF MAGNESIA) 400 MG/5ML suspension 30 mL  30 mL Oral Daily PRN Siobhan Staff, PA        potassium chloride (KLOR-CON M) extended release tablet 40 mEq  40 mEq Oral PRN Siobhan Staff, PA        Or    potassium bicarb-citric acid (EFFER-K) effervescent tablet 40 mEq  40 mEq Oral PRN Siobhan Staff, PA        Or    potassium chloride 10 mEq/100 mL IVPB (Peripheral Line)  10 mEq Intravenous PRN Siobhan Staff, PA        promethazine (PHENERGAN) tablet 12.5 mg  12.5 mg Oral Q6H PRN Siobhan Staff, PA        Or    ondansetron (ZOFRAN) injection 4 mg  0.9 % sodium chloride bolus  20 mL Intravenous Once Twila Dimas MD           Allergies: Allergies   Allergen Reactions    Adhesive Tape Other (See Comments)     Pulls skin       Problem List:    Patient Active Problem List   Diagnosis Code    PVD (peripheral vascular disease) with claudication (HCC) I73.9    DM (diabetes mellitus) E11.9    HTN (hypertension) I10    Primary osteoarthritis of left hip M16.12    Thyroid disease E07.9    Lipid disorder E78.9    Hyperuricemia E79.0    Diabetic neuropathy ([address] E11.40    GI bleed K92.2    Hypokalemia E87.6       Past Medical History:        Diagnosis Date    Back problem     Carotid artery stenosis     Chest pain 2013    Diabetic neuropathy ([address] 2015    Full dentures     Hiatal hernia     Hypertension     Hyperuricemia 2015    Lipid disorder 2015    Osteoarthritis     lumbosacral spine arthritis and disc disease    Peripheral vascular disease ([address]     Preoperative clearance     2015 per Dr Nicolas Mehta and 10/2015 per Dr David Moran for [facility] Dr Yunior Patton.  PVD (peripheral vascular disease) with claudication ([address] 2012    follows with Dr Amna Garcia Rapid palpitations     follows with Dr Charbel Ledezma Thyroid disease     Type II or unspecified type diabetes mellitus without mention of complication, not stated as uncontrolled     Wears glasses        Past Surgical History:        Procedure Laterality Date    APPENDECTOMY      COLONOSCOPY      HYSTERECTOMY      with appendectomy    JOINT REPLACEMENT Left 12/3/2015    total hip    THROAT SURGERY  2002    benign    TOTAL HIP ARTHROPLASTY Left 2015       Social History:    Social History     Tobacco Use    Smoking status: Former Smoker     Packs/day: 1.00     Years: 40.00     Pack years: 40.00     Types: Cigarettes     Last attempt to quit: 2007     Years since quittin.5    Smokeless tobacco: Never Used   Substance Use Topics    Alcohol use:  No Counseling given: Not Answered      Vital Signs (Current):   Vitals:    07/06/20 1430 07/06/20 1604 07/06/20 1730   BP: (!) 132/58 (!) 118/56    Pulse: 87 87    Resp: 16 18    Temp: 98.7 °F (37.1 °C) 98.1 °F (36.7 °C)    TempSrc: Oral Oral    SpO2: 97% 95%    Weight:   245 lb (111.1 kg)   Height:   5' 5\" (1.651 m)                                              BP Readings from Last 3 Encounters:   07/06/20 (!) 118/56   07/06/20 (!) 122/40   06/07/20 (!) 140/81       NPO Status:                                                                                 BMI:   Wt Readings from Last 3 Encounters:   07/06/20 245 lb (111.1 kg)   07/06/20 250 lb (113.4 kg)   06/07/20 258 lb (117 kg)     Body mass index is 40.77 kg/m². CBC:   Lab Results   Component Value Date    WBC 13.9 07/06/2020    RBC 2.69 07/06/2020    HGB 6.9 07/06/2020    HCT 21.7 07/06/2020    MCV 91.8 07/06/2020    RDW 15.9 07/06/2020     07/06/2020       CMP:   Lab Results   Component Value Date     07/06/2020    K 2.7 07/06/2020    K 3.8 07/06/2020    CL 96 07/06/2020    CO2 20 07/06/2020    BUN 55 07/06/2020    CREATININE 0.9 07/06/2020    GFRAA >60 07/06/2020    LABGLOM >60 07/06/2020    GLUCOSE 164 07/06/2020    PROT 6.0 07/06/2020    CALCIUM 9.4 07/06/2020    BILITOT 0.4 07/06/2020    ALKPHOS 62 07/06/2020    AST 35 07/06/2020    ALT 17 07/06/2020       POC Tests: No results for input(s): POCGLU, POCNA, POCK, POCCL, POCBUN, POCHEMO, POCHCT in the last 72 hours. Coags:   Lab Results   Component Value Date    PROTIME 14.1 07/06/2020    INR 1.2 07/06/2020       HCG (If Applicable): No results found for: PREGTESTUR, PREGSERUM, HCG, HCGQUANT     ABGs: No results found for: PHART, PO2ART, UIP0TUL, KRA1ZJH, BEART, [id number]     Type & Screen (If Applicable):  No results found for: LABABO, LABRH    Drug/Infectious Status (If Applicable):  No results found for: HIV, HEPCAB    COVID-19 Screening (If Applicable):  No

## 2020-07-08 VITALS
HEIGHT: 65 IN | SYSTOLIC BLOOD PRESSURE: 137 MMHG | DIASTOLIC BLOOD PRESSURE: 57 MMHG | OXYGEN SATURATION: 96 % | HEART RATE: 75 BPM | TEMPERATURE: 97.5 F | BODY MASS INDEX: 40.84 KG/M2 | RESPIRATION RATE: 18 BRPM | WEIGHT: 245.1 LBS

## 2020-07-08 LAB
ALBUMIN SERPL-MCNC: 2.9 G/DL (ref 3.5–5.2)
ALP BLD-CCNC: 55 U/L (ref 35–104)
ALT SERPL-CCNC: 13 U/L (ref 0–32)
ANION GAP SERPL CALCULATED.3IONS-SCNC: 11 MMOL/L (ref 7–16)
AST SERPL-CCNC: 12 U/L (ref 0–31)
BASOPHILS ABSOLUTE: 0.03 E9/L (ref 0–0.2)
BASOPHILS RELATIVE PERCENT: 0.4 % (ref 0–2)
BILIRUB SERPL-MCNC: 0.7 MG/DL (ref 0–1.2)
BUN BLDV-MCNC: 12 MG/DL (ref 8–23)
CALCIUM SERPL-MCNC: 8.8 MG/DL (ref 8.6–10.2)
CHLORIDE BLD-SCNC: 101 MMOL/L (ref 98–107)
CO2: 26 MMOL/L (ref 22–29)
CREAT SERPL-MCNC: 0.8 MG/DL (ref 0.5–1)
EKG ATRIAL RATE: 88 BPM
EKG P AXIS: 61 DEGREES
EKG P-R INTERVAL: 148 MS
EKG Q-T INTERVAL: 366 MS
EKG QRS DURATION: 80 MS
EKG QTC CALCULATION (BAZETT): 442 MS
EKG R AXIS: 54 DEGREES
EKG T AXIS: 59 DEGREES
EKG VENTRICULAR RATE: 88 BPM
EOSINOPHILS ABSOLUTE: 0.19 E9/L (ref 0.05–0.5)
EOSINOPHILS RELATIVE PERCENT: 2.4 % (ref 0–6)
GFR AFRICAN AMERICAN: >60
GFR NON-AFRICAN AMERICAN: >60 ML/MIN/1.73
GLUCOSE BLD-MCNC: 87 MG/DL (ref 74–99)
HCT VFR BLD CALC: 25.7 % (ref 34–48)
HEMOGLOBIN: 8.4 G/DL (ref 11.5–15.5)
IMMATURE GRANULOCYTES #: 0.1 E9/L
IMMATURE GRANULOCYTES %: 1.3 % (ref 0–5)
LYMPHOCYTES ABSOLUTE: 2.25 E9/L (ref 1.5–4)
LYMPHOCYTES RELATIVE PERCENT: 28.7 % (ref 20–42)
MAGNESIUM: 1.5 MG/DL (ref 1.6–2.6)
MCH RBC QN AUTO: 30.9 PG (ref 26–35)
MCHC RBC AUTO-ENTMCNC: 32.7 % (ref 32–34.5)
MCV RBC AUTO: 94.5 FL (ref 80–99.9)
METER GLUCOSE: 101 MG/DL (ref 74–99)
METER GLUCOSE: 120 MG/DL (ref 74–99)
METER GLUCOSE: 99 MG/DL (ref 74–99)
MONOCYTES ABSOLUTE: 0.46 E9/L (ref 0.1–0.95)
MONOCYTES RELATIVE PERCENT: 5.9 % (ref 2–12)
NEUTROPHILS ABSOLUTE: 4.81 E9/L (ref 1.8–7.3)
NEUTROPHILS RELATIVE PERCENT: 61.3 % (ref 43–80)
PDW BLD-RTO: 15.7 FL (ref 11.5–15)
PLATELET # BLD: 236 E9/L (ref 130–450)
PMV BLD AUTO: 9.7 FL (ref 7–12)
POTASSIUM SERPL-SCNC: 3.3 MMOL/L (ref 3.5–5)
RBC # BLD: 2.72 E12/L (ref 3.5–5.5)
SODIUM BLD-SCNC: 138 MMOL/L (ref 132–146)
TOTAL PROTEIN: 4.9 G/DL (ref 6.4–8.3)
WBC # BLD: 7.8 E9/L (ref 4.5–11.5)

## 2020-07-08 PROCEDURE — 82962 GLUCOSE BLOOD TEST: CPT

## 2020-07-08 PROCEDURE — 6360000002 HC RX W HCPCS: Performed by: INTERNAL MEDICINE

## 2020-07-08 PROCEDURE — 96376 TX/PRO/DX INJ SAME DRUG ADON: CPT

## 2020-07-08 PROCEDURE — 96366 THER/PROPH/DIAG IV INF ADDON: CPT

## 2020-07-08 PROCEDURE — 36415 COLL VENOUS BLD VENIPUNCTURE: CPT

## 2020-07-08 PROCEDURE — 6360000002 HC RX W HCPCS: Performed by: PHYSICIAN ASSISTANT

## 2020-07-08 PROCEDURE — 2580000003 HC RX 258: Performed by: PHYSICIAN ASSISTANT

## 2020-07-08 PROCEDURE — 85025 COMPLETE CBC W/AUTO DIFF WBC: CPT

## 2020-07-08 PROCEDURE — C9113 INJ PANTOPRAZOLE SODIUM, VIA: HCPCS | Performed by: PHYSICIAN ASSISTANT

## 2020-07-08 PROCEDURE — 83735 ASSAY OF MAGNESIUM: CPT

## 2020-07-08 PROCEDURE — 80053 COMPREHEN METABOLIC PANEL: CPT

## 2020-07-08 PROCEDURE — 6370000000 HC RX 637 (ALT 250 FOR IP): Performed by: PHYSICIAN ASSISTANT

## 2020-07-08 PROCEDURE — 96365 THER/PROPH/DIAG IV INF INIT: CPT

## 2020-07-08 RX ORDER — MAGNESIUM SULFATE IN WATER 40 MG/ML
2 INJECTION, SOLUTION INTRAVENOUS ONCE
Status: COMPLETED | OUTPATIENT
Start: 2020-07-08 | End: 2020-07-08

## 2020-07-08 RX ORDER — POTASSIUM CHLORIDE 20 MEQ/1
40 TABLET, EXTENDED RELEASE ORAL ONCE
Status: DISCONTINUED | OUTPATIENT
Start: 2020-07-08 | End: 2020-07-08 | Stop reason: HOSPADM

## 2020-07-08 RX ORDER — PANTOPRAZOLE SODIUM 40 MG/1
40 TABLET, DELAYED RELEASE ORAL DAILY
Qty: 30 TABLET | Refills: 0 | Status: ON HOLD | OUTPATIENT
Start: 2020-07-08 | End: 2020-07-16 | Stop reason: SDUPTHER

## 2020-07-08 RX ADMIN — POTASSIUM CHLORIDE 40 MEQ: 20 TABLET, EXTENDED RELEASE ORAL at 07:28

## 2020-07-08 RX ADMIN — AMLODIPINE BESYLATE 10 MG: 10 TABLET ORAL at 08:44

## 2020-07-08 RX ADMIN — METOPROLOL TARTRATE 25 MG: 25 TABLET, FILM COATED ORAL at 08:43

## 2020-07-08 RX ADMIN — LEVOTHYROXINE SODIUM 137 MCG: 25 TABLET ORAL at 05:27

## 2020-07-08 RX ADMIN — MAGNESIUM SULFATE 2 G: 2 INJECTION INTRAVENOUS at 08:44

## 2020-07-08 RX ADMIN — SUCRALFATE 1 G: 1 TABLET ORAL at 13:39

## 2020-07-08 RX ADMIN — FUROSEMIDE 20 MG: 20 TABLET ORAL at 05:27

## 2020-07-08 RX ADMIN — LOSARTAN POTASSIUM 50 MG: 50 TABLET ORAL at 08:44

## 2020-07-08 RX ADMIN — Medication 10 ML: at 08:44

## 2020-07-08 RX ADMIN — ALLOPURINOL 300 MG: 300 TABLET ORAL at 08:43

## 2020-07-08 RX ADMIN — SERTRALINE HYDROCHLORIDE 50 MG: 50 TABLET ORAL at 08:43

## 2020-07-08 RX ADMIN — SUCRALFATE 1 G: 1 TABLET ORAL at 16:14

## 2020-07-08 RX ADMIN — PANTOPRAZOLE SODIUM 40 MG: 40 INJECTION, POWDER, FOR SOLUTION INTRAVENOUS at 06:11

## 2020-07-08 ASSESSMENT — PAIN SCALES - GENERAL: PAINLEVEL_OUTOF10: 0

## 2020-07-08 NOTE — DISCHARGE SUMMARY
Physician Discharge Summary     Patient ID:  Marcio Pope  [id number]  66 y.o.  1950    Admit date: 7/6/2020    Discharge date and time:  7/8/2020    Admission Diagnoses:   Patient Active Problem List   Diagnosis    PVD [facility]peripheral [facility]) [facility]audication [facility]    GI bleed    [facility], [facility]    Hyperlipidemia LDL goal <100    Acquired hypothyroidism    Essential hypertension    Anemia    PUD (peptic ulcer disease)    Morbid obesity with BMI of 40.0-44.9, adult [facility])       Discharge Diagnoses: as above    Consults: general surgery    Procedures: see chart    Hospital Course: patient was admitted with weakness. She was found to suffer acute blood loss anemia secondary to GI bleed. General surgery was consulted. She underwent EGD. PUD was seen. This was felt to be source of bleed. She was placed on PPI and carafate. She was discharged in stable condition. Limitation of pain medications (NSAIDS) was recommended.       Discharge Exam:  See progress note from today    Condition:  stable    Disposition: home    Patient Instructions:   Current Discharge Medication List      START taking these medications    Details   pantoprazole (PROTONIX) 40 MG tablet Take 1 tablet by mouth daily  Qty: 30 tablet, Refills: 0         CONTINUE these medications which have NOT CHANGED    Details   tiZANidine (ZANAFLEX) 2 MG capsule Take 2 mg by mouth 3 times daily      acetaminophen (TYLENOL) 500 MG tablet Take 500 mg by mouth every 6 hours as needed for Pain      Cimetidine (TAGAMET PO) Take by mouth      allopurinol (ZYLOPRIM) 300 MG tablet Take 300 mg by mouth daily      SYNTHROID 137 MCG tablet Take 137 mcg by mouth daily      sucralfate (CARAFATE) 1 GM tablet Take 1 g by mouth 4 times daily      sertraline (ZOLOFT) 50 MG tablet Take 50 mg by mouth daily      JANUVIA 100 MG tablet Take 100 mg by mouth daily       metFORMIN (GLUCOPHAGE) 500 MG tablet Take 1,000 mg by mouth 2 times daily (with meals)      canagliflozin (INVOKANA) 100 MG TABS tablet Take 100 mg by mouth every morning (before breakfast)      HYDROcodone-acetaminophen (NORCO) 7.5-325 MG per tablet Take 1 tablet by mouth every 12 hours as needed for Pain      Cholecalciferol (VITAMIN D3) 99043 UNITS CAPS Take 1 capsule by mouth See Admin Instructions 5 days a week      atorvastatin (LIPITOR) 10 MG tablet Take 10 mg by mouth daily      irbesartan (AVAPRO) 150 MG tablet Take 150 mg by mouth daily. metoprolol (LOPRESSOR) 25 MG tablet Take 25 mg by mouth 2 times daily Take am surgery 12/03. Associated Diagnoses: PVD (peripheral vascular disease) with claudication ([facility] Palpitations; [facility] HTN (hypertension)      furosemide (LASIX) 20 MG tablet Take 20 mg by mouth daily. amLODIPine (NORVASC) 10 MG tablet Take 10 mg by mouth daily Take am dos, 12/03/2015      Calcium Carb-Cholecalciferol (CALCIUM 1000 + D PO) Take by mouth daily       therapeutic multivitamin-minerals (THERAGRAN-M) tablet Take 1 tablet by mouth daily. Elastic Bandages & Supports (MEDICAL COMPRESSION STOCKINGS) MISC by Does not apply route. 20-30 MMHG knee high          STOP taking these medications       ketorolac (TORADOL) 10 MG tablet Comments:   Reason for Stopping:         predniSONE (DELTASONE) 50 MG tablet Comments:   Reason for Stopping:         meloxicam (MOBIC) 7.5 MG tablet Comments:   Reason for Stopping:         aspirin 81 MG EC tablet Comments:   Reason for Stopping:             Activity: activity as tolerated  Diet: low fat, low cholesterol diet    Follow up with dr Tran Rice in 1 week. Follow up with dr Shashi Xiao in 2-3 weeks.       Note that over 30 minutes was spent in preparing discharge papers, discussing discharge with patient, medication review, etc.    Signed:  Victorino Jaeger    7/8/2020  7:33 AM

## 2020-07-08 NOTE — PLAN OF CARE
Problem: Safety:  Goal: Free from accidental physical injury  Description: Free from accidental physical injury  7/8/2020 1417 by Juan Malloy RN  Outcome: Completed  7/8/2020 0533 by Deion Oconnor RN  Outcome: Met This Shift  Goal: Free from intentional harm  Description: Free from intentional harm  7/8/2020 1417 by Jaun Malloy RN  Outcome: Completed  7/8/2020 0533 by Deion Oconnor RN  Outcome: Met This Shift     Problem: Daily Care:  Goal: Daily care needs are met  Description: Daily care needs are met  7/8/2020 1417 by Juan Malloy RN  Outcome: Completed  7/8/2020 0533 by Deion Oconnor RN  Outcome: Met This Shift     Problem: Pain:  Goal: Patient's pain/discomfort is manageable  Description: Patient's pain/discomfort is manageable  7/8/2020 1417 by Juan Malloy RN  Outcome: Completed  7/8/2020 0533 by Deion Oconnor RN  Outcome: Met This Shift  Goal: Pain level will decrease  Description: Pain level will decrease  7/8/2020 1417 by Juan Malloy RN  Outcome: Completed  7/8/2020 0533 by Deion Oconnor RN  Outcome: Met This Shift  Goal: Control of acute pain  Description: Control of acute pain  7/8/2020 1417 by Juan Malloy RN  Outcome: Completed  7/8/2020 0533 by Deion Oconnor RN  Outcome: Met This Shift  Goal: Control of chronic pain  Description: Control of chronic pain  7/8/2020 1417 by Juan Malloy RN  Outcome: Completed  7/8/2020 0533 by Deion Oconnor RN  Outcome: Met This Shift     Problem: Skin Integrity:  Goal: Skin integrity will stabilize  Description: Skin integrity will stabilize  7/8/2020 1417 by Juan Malloy RN  Outcome: Completed  7/8/2020 0533 by Deion Oconnor RN  Outcome: Met This Shift     Problem: Discharge Planning:  Goal: Patients continuum of care needs are met  Description: Patients continuum of care needs are met  Outcome: Completed

## 2020-07-08 NOTE — PROGRESS NOTES
CLINICAL PHARMACY NOTE: MEDS TO [address] Select Patient?: No  Total # of Prescriptions Filled: 1   The following medications were delivered to the patient:  · Pantoprazole sodium  ·   Total # of Interventions Completed: 4  Time Spent (min): 45    Additional Documentation:

## 2020-07-08 NOTE — PLAN OF CARE
Problem: Safety:  Goal: Free from accidental physical injury  Description: Free from accidental physical injury  Outcome: Met This Shift  Goal: Free from intentional harm  Description: Free from intentional harm  Outcome: Met This Shift     Problem: Daily Care:  Goal: Daily care needs are met  Description: Daily care needs are met  Outcome: Met This Shift     Problem: Pain:  Goal: Patient's pain/discomfort is manageable  Description: Patient's pain/discomfort is manageable  Outcome: Met This Shift  Goal: Pain level will decrease  Description: Pain level will decrease  Outcome: Met This Shift  Goal: Control of acute pain  Description: Control of acute pain  Outcome: Met This Shift  Goal: Control of chronic pain  Description: Control of chronic pain  Outcome: Met This Shift     Problem: Skin Integrity:  Goal: Skin integrity will stabilize  Description: Skin integrity will stabilize  Outcome: Met This Shift

## 2020-07-08 NOTE — PROGRESS NOTES
SURGERY  DAILY PROGRESS NOTE  7/8/2020    Chief Complaint:  anemia    Subjective:  Pain controlled, tolerating diet w/ no N/V, EGD demonstrated large non-bleeding duodenal ulcer.  Denies any hematochezia or melena    Objective:  /64   Pulse 75   Temp 98.9 °F (37.2 °C) (Oral)   Resp 16   Ht 5' 5\" (1.651 m)   Wt 245 lb (111.1 kg)   SpO2 93%   BMI 40.77 kg/m²     GENERAL:  Laying in bed, awake, alert, cooperative, no apparent distress  LUNGS:  No increased work of breathing  CARDIOVASCULAR:  Regular rate   ABDOMEN:  Soft, no tenderness, non distended    Assessment/Plan:  65 y.o. female w/ large duodenal ulcer    Continue PPI BID  Continue Carafate  Peptic ulcer diet  OK to Discharge from surgery perspective  Call with any questions    Electronically signed by Avinash Pritchard MD on 7/8/2020 at 7:35 AM

## 2020-07-08 NOTE — PROGRESS NOTES
Subjective: The patient is awake and alert. Status post EGD (7/7/2020). No problems overnight. Denies chest pain, angina, and dyspnea. Denies abdominal pain. Tolerating diet. No nausea or vomiting. Objective:    /62   Pulse 75   Temp 98.3 °F (36.8 °C) (Oral)   Resp 16   Ht 5' 5\" (1.651 m)   Wt 245 lb (111.1 kg)   SpO2 96%   BMI 40.77 kg/m²     Current medications that patient is taking have been reviewed. Heart:  RRR, no murmurs, gallops, or rubs.   Lungs:  CTA bilaterally, no wheeze, rales or rhonchi  Abd: bowel sounds present, soft, nontender, nondistended, no masses  Extrem:  No cyanosis or edema    CBC with Differential:    Lab Results   Component Value Date    WBC 7.8 07/08/2020    RBC 2.72 07/08/2020    HGB 8.4 07/08/2020    HCT 25.7 07/08/2020     07/08/2020    MCV 94.5 07/08/2020    MCH 30.9 07/08/2020    MCHC 32.7 07/08/2020    RDW 15.7 07/08/2020    LYMPHOPCT 28.7 07/08/2020    MONOPCT 5.9 07/08/2020    BASOPCT 0.4 07/08/2020    MONOSABS 0.46 07/08/2020    LYMPHSABS 2.25 07/08/2020    EOSABS 0.19 07/08/2020    BASOSABS 0.03 07/08/2020     CMP:    Lab Results   Component Value Date     07/08/2020    K 3.3 07/08/2020    K 3.7 07/07/2020     07/08/2020    CO2 26 07/08/2020    BUN 12 07/08/2020    CREATININE 0.8 07/08/2020    GFRAA >60 07/08/2020    LABGLOM >60 07/08/2020    GLUCOSE 87 07/08/2020    PROT 4.9 07/08/2020    LABALBU 2.9 07/08/2020    CALCIUM 8.8 07/08/2020    BILITOT 0.7 07/08/2020    ALKPHOS 55 07/08/2020    AST 12 07/08/2020    ALT 13 07/08/2020     BMP:    Lab Results   Component Value Date     07/08/2020    K 3.3 07/08/2020    K 3.7 07/07/2020     07/08/2020    CO2 26 07/08/2020    BUN 12 07/08/2020    LABALBU 2.9 07/08/2020    CREATININE 0.8 07/08/2020    CALCIUM 8.8 07/08/2020    GFRAA >60 07/08/2020    LABGLOM >60 07/08/2020    GLUCOSE 87 07/08/2020     Magnesium:    Lab Results   Component Value Date    MG 1.5 07/08/2020 Phosphorus:  No results found for: PHOS  PT/INR:    Lab Results   Component Value Date    PROTIME 14.1 07/06/2020    INR 1.2 07/06/2020     PTT:  No results found for: APTT, PTT[APTT}     Assessment:    Patient Active Problem List   Diagnosis    PVD (peripheral vascular disease) with claudication ([facility]    GI bleed    Diabetes mellitus type 2, uncontrolled ([facility]    Hyperlipidemia LDL goal <100    Acquired hypothyroidism    Essential hypertension    Anemia    PUD (peptic ulcer disease)    Morbid obesity with BMI of 40.0-44.9, adult ([facility]       Plan:    Stable. Secondary to PUD. HD stable. Hgb stable. Blood glucose ok, continue to adjust basal/bolus insulin therapy  Continue aggressive lipid therapy  Continue synthroid. Blood pressure ok, continue current medications  Hgb stable. PPI. Carafate. Continue to encourage weight loss. Pt/Ot evaluations for discharge planning. Ok to discharge if ok with surgery.       Nirmal Poet    10:57 AM  7/8/2020

## 2020-07-09 ENCOUNTER — APPOINTMENT (OUTPATIENT)
Dept: GENERAL RADIOLOGY | Age: 70
End: 2020-07-09
Payer: MEDICARE

## 2020-07-09 ENCOUNTER — HOSPITAL ENCOUNTER (OUTPATIENT)
Age: 70
Setting detail: OBSERVATION
Discharge: HOME | End: 2020-07-10
Attending: EMERGENCY MEDICINE | Admitting: INTERNAL MEDICINE
Payer: MEDICARE

## 2020-07-09 PROBLEM — R55 NEAR SYNCOPE: Status: ACTIVE | Noted: 2020-07-09

## 2020-07-09 PROBLEM — R42 LIGHTHEADEDNESS: Status: ACTIVE | Noted: 2020-07-09

## 2020-07-09 LAB
ALBUMIN SERPL-MCNC: 3.2 G/DL (ref 3.5–5.2)
ALP BLD-CCNC: 78 U/L (ref 35–104)
ALT SERPL-CCNC: 17 U/L (ref 0–32)
ANION GAP SERPL CALCULATED.3IONS-SCNC: 14 MMOL/L (ref 7–16)
AST SERPL-CCNC: 15 U/L (ref 0–31)
BASOPHILS ABSOLUTE: 0.04 E9/L (ref 0–0.2)
BASOPHILS RELATIVE PERCENT: 0.3 % (ref 0–2)
BILIRUB SERPL-MCNC: 0.5 MG/DL (ref 0–1.2)
BUN BLDV-MCNC: 17 MG/DL (ref 8–23)
CALCIUM SERPL-MCNC: 9.3 MG/DL (ref 8.6–10.2)
CHLORIDE BLD-SCNC: 100 MMOL/L (ref 98–107)
CHP ED QC CHECK: YES
CO2: 22 MMOL/L (ref 22–29)
CREAT SERPL-MCNC: 1.3 MG/DL (ref 0.5–1)
EOSINOPHILS ABSOLUTE: 0.17 E9/L (ref 0.05–0.5)
EOSINOPHILS RELATIVE PERCENT: 1.5 % (ref 0–6)
GFR AFRICAN AMERICAN: 49
GFR NON-AFRICAN AMERICAN: 40 ML/MIN/1.73
GLUCOSE BLD-MCNC: 143 MG/DL (ref 74–99)
HCT VFR BLD CALC: 30.1 % (ref 34–48)
HEMOCCULT STL QL: NEGATIVE
HEMOGLOBIN: 9.3 G/DL (ref 11.5–15.5)
IMMATURE GRANULOCYTES #: 0.16 E9/L
IMMATURE GRANULOCYTES %: 1.4 % (ref 0–5)
INR BLD: 1.2
LYMPHOCYTES ABSOLUTE: 3.04 E9/L (ref 1.5–4)
LYMPHOCYTES RELATIVE PERCENT: 26.6 % (ref 20–42)
MCH RBC QN AUTO: 30 PG (ref 26–35)
MCHC RBC AUTO-ENTMCNC: 30.9 % (ref 32–34.5)
MCV RBC AUTO: 97.1 FL (ref 80–99.9)
MONOCYTES ABSOLUTE: 0.74 E9/L (ref 0.1–0.95)
MONOCYTES RELATIVE PERCENT: 6.5 % (ref 2–12)
NEUTROPHILS ABSOLUTE: 7.3 E9/L (ref 1.8–7.3)
NEUTROPHILS RELATIVE PERCENT: 63.7 % (ref 43–80)
PDW BLD-RTO: 16.1 FL (ref 11.5–15)
PLATELET # BLD: 255 E9/L (ref 130–450)
PMV BLD AUTO: 9.8 FL (ref 7–12)
POTASSIUM SERPL-SCNC: 3.3 MMOL/L (ref 3.5–5)
PRO-BNP: 230 PG/ML (ref 0–125)
PROTHROMBIN TIME: 13.5 SEC (ref 9.3–12.4)
RBC # BLD: 3.1 E12/L (ref 3.5–5.5)
SODIUM BLD-SCNC: 136 MMOL/L (ref 132–146)
TOTAL PROTEIN: 5.4 G/DL (ref 6.4–8.3)
TROPONIN: <0.01 NG/ML (ref 0–0.03)
WBC # BLD: 11.5 E9/L (ref 4.5–11.5)

## 2020-07-09 PROCEDURE — 83880 ASSAY OF NATRIURETIC PEPTIDE: CPT

## 2020-07-09 PROCEDURE — 85025 COMPLETE CBC W/AUTO DIFF WBC: CPT

## 2020-07-09 PROCEDURE — 71045 X-RAY EXAM CHEST 1 VIEW: CPT

## 2020-07-09 PROCEDURE — 84484 ASSAY OF TROPONIN QUANT: CPT

## 2020-07-09 PROCEDURE — 93005 ELECTROCARDIOGRAM TRACING: CPT | Performed by: EMERGENCY MEDICINE

## 2020-07-09 PROCEDURE — 36415 COLL VENOUS BLD VENIPUNCTURE: CPT

## 2020-07-09 PROCEDURE — 81001 URINALYSIS AUTO W/SCOPE: CPT

## 2020-07-09 PROCEDURE — 6370000000 HC RX 637 (ALT 250 FOR IP): Performed by: EMERGENCY MEDICINE

## 2020-07-09 PROCEDURE — 80053 COMPREHEN METABOLIC PANEL: CPT

## 2020-07-09 PROCEDURE — 99285 EMERGENCY DEPT VISIT HI MDM: CPT

## 2020-07-09 PROCEDURE — 85610 PROTHROMBIN TIME: CPT

## 2020-07-09 PROCEDURE — 2580000003 HC RX 258: Performed by: EMERGENCY MEDICINE

## 2020-07-09 RX ORDER — POTASSIUM CHLORIDE 20 MEQ/1
40 TABLET, EXTENDED RELEASE ORAL ONCE
Status: COMPLETED | OUTPATIENT
Start: 2020-07-09 | End: 2020-07-09

## 2020-07-09 RX ORDER — 0.9 % SODIUM CHLORIDE 0.9 %
1000 INTRAVENOUS SOLUTION INTRAVENOUS ONCE
Status: COMPLETED | OUTPATIENT
Start: 2020-07-09 | End: 2020-07-10

## 2020-07-09 RX ADMIN — POTASSIUM CHLORIDE 40 MEQ: 1500 TABLET, EXTENDED RELEASE ORAL at 23:50

## 2020-07-09 RX ADMIN — SODIUM CHLORIDE 1000 ML: 9 INJECTION, SOLUTION INTRAVENOUS at 22:00

## 2020-07-10 VITALS
WEIGHT: 246.03 LBS | BODY MASS INDEX: 40.99 KG/M2 | DIASTOLIC BLOOD PRESSURE: 63 MMHG | OXYGEN SATURATION: 93 % | TEMPERATURE: 98 F | SYSTOLIC BLOOD PRESSURE: 132 MMHG | HEART RATE: 85 BPM | HEIGHT: 65 IN | RESPIRATION RATE: 18 BRPM

## 2020-07-10 PROBLEM — N17.9 AKI (ACUTE KIDNEY INJURY) (HCC): Status: ACTIVE | Noted: 2020-07-10

## 2020-07-10 LAB
ANION GAP SERPL CALCULATED.3IONS-SCNC: 12 MMOL/L (ref 7–16)
BACTERIA: ABNORMAL /HPF
BASOPHILS ABSOLUTE: 0.04 E9/L (ref 0–0.2)
BASOPHILS RELATIVE PERCENT: 0.4 % (ref 0–2)
BILIRUBIN URINE: NEGATIVE
BLOOD, URINE: ABNORMAL
BUN BLDV-MCNC: 14 MG/DL (ref 8–23)
CALCIUM SERPL-MCNC: 9.6 MG/DL (ref 8.6–10.2)
CHLORIDE BLD-SCNC: 105 MMOL/L (ref 98–107)
CLARITY: CLEAR
CO2: 22 MMOL/L (ref 22–29)
COLOR: YELLOW
CREAT SERPL-MCNC: 1.1 MG/DL (ref 0.5–1)
EKG ATRIAL RATE: 76 BPM
EKG P AXIS: 67 DEGREES
EKG P-R INTERVAL: 154 MS
EKG Q-T INTERVAL: 376 MS
EKG QRS DURATION: 84 MS
EKG QTC CALCULATION (BAZETT): 423 MS
EKG R AXIS: 67 DEGREES
EKG T AXIS: 68 DEGREES
EKG VENTRICULAR RATE: 76 BPM
EOSINOPHILS ABSOLUTE: 0.17 E9/L (ref 0.05–0.5)
EOSINOPHILS RELATIVE PERCENT: 1.7 % (ref 0–6)
EPITHELIAL CELLS, UA: ABNORMAL /HPF
GFR AFRICAN AMERICAN: 59
GFR NON-AFRICAN AMERICAN: 49 ML/MIN/1.73
GLUCOSE BLD-MCNC: 93 MG/DL (ref 74–99)
GLUCOSE URINE: 250 MG/DL
HCT VFR BLD CALC: 28.9 % (ref 34–48)
HCT VFR BLD CALC: 30.3 % (ref 34–48)
HEMOGLOBIN: 9.1 G/DL (ref 11.5–15.5)
HEMOGLOBIN: 9.6 G/DL (ref 11.5–15.5)
HYALINE CASTS: ABNORMAL /LPF (ref 0–2)
IMMATURE GRANULOCYTES #: 0.14 E9/L
IMMATURE GRANULOCYTES %: 1.4 % (ref 0–5)
KETONES, URINE: NEGATIVE MG/DL
LEUKOCYTE ESTERASE, URINE: ABNORMAL
LYMPHOCYTES ABSOLUTE: 3.56 E9/L (ref 1.5–4)
LYMPHOCYTES RELATIVE PERCENT: 35.6 % (ref 20–42)
MAGNESIUM: 1.5 MG/DL (ref 1.6–2.6)
MCH RBC QN AUTO: 29.8 PG (ref 26–35)
MCHC RBC AUTO-ENTMCNC: 31.7 % (ref 32–34.5)
MCV RBC AUTO: 94.1 FL (ref 80–99.9)
METER GLUCOSE: 120 MG/DL (ref 74–99)
MONOCYTES ABSOLUTE: 0.58 E9/L (ref 0.1–0.95)
MONOCYTES RELATIVE PERCENT: 5.8 % (ref 2–12)
NEUTROPHILS ABSOLUTE: 5.52 E9/L (ref 1.8–7.3)
NEUTROPHILS RELATIVE PERCENT: 55.1 % (ref 43–80)
NITRITE, URINE: NEGATIVE
PDW BLD-RTO: 15.9 FL (ref 11.5–15)
PH UA: 6 (ref 5–9)
PLATELET # BLD: 260 E9/L (ref 130–450)
PMV BLD AUTO: 9.6 FL (ref 7–12)
POTASSIUM REFLEX MAGNESIUM: 3.4 MMOL/L (ref 3.5–5)
PRO-BNP: 149 PG/ML (ref 0–125)
PROTEIN UA: NEGATIVE MG/DL
RBC # BLD: 3.22 E12/L (ref 3.5–5.5)
RBC UA: ABNORMAL /HPF (ref 0–2)
SODIUM BLD-SCNC: 139 MMOL/L (ref 132–146)
SPECIFIC GRAVITY UA: <=1.005 (ref 1–1.03)
UROBILINOGEN, URINE: 0.2 E.U./DL
WBC # BLD: 10 E9/L (ref 4.5–11.5)
WBC UA: ABNORMAL /HPF (ref 0–5)

## 2020-07-10 PROCEDURE — G0378 HOSPITAL OBSERVATION PER HR: HCPCS

## 2020-07-10 PROCEDURE — 83735 ASSAY OF MAGNESIUM: CPT

## 2020-07-10 PROCEDURE — 82962 GLUCOSE BLOOD TEST: CPT

## 2020-07-10 PROCEDURE — 6370000000 HC RX 637 (ALT 250 FOR IP): Performed by: PHYSICIAN ASSISTANT

## 2020-07-10 PROCEDURE — 93010 ELECTROCARDIOGRAM REPORT: CPT | Performed by: INTERNAL MEDICINE

## 2020-07-10 PROCEDURE — 85025 COMPLETE CBC W/AUTO DIFF WBC: CPT

## 2020-07-10 PROCEDURE — 85014 HEMATOCRIT: CPT

## 2020-07-10 PROCEDURE — 36415 COLL VENOUS BLD VENIPUNCTURE: CPT

## 2020-07-10 PROCEDURE — 85018 HEMOGLOBIN: CPT

## 2020-07-10 PROCEDURE — 97161 PT EVAL LOW COMPLEX 20 MIN: CPT

## 2020-07-10 PROCEDURE — 83880 ASSAY OF NATRIURETIC PEPTIDE: CPT

## 2020-07-10 PROCEDURE — 97535 SELF CARE MNGMENT TRAINING: CPT

## 2020-07-10 PROCEDURE — 2580000003 HC RX 258: Performed by: PHYSICIAN ASSISTANT

## 2020-07-10 PROCEDURE — 97165 OT EVAL LOW COMPLEX 30 MIN: CPT

## 2020-07-10 PROCEDURE — 80048 BASIC METABOLIC PNL TOTAL CA: CPT

## 2020-07-10 RX ORDER — POTASSIUM CHLORIDE 7.45 MG/ML
10 INJECTION INTRAVENOUS PRN
Status: DISCONTINUED | OUTPATIENT
Start: 2020-07-10 | End: 2020-07-10 | Stop reason: HOSPADM

## 2020-07-10 RX ORDER — ONDANSETRON 2 MG/ML
4 INJECTION INTRAMUSCULAR; INTRAVENOUS EVERY 6 HOURS PRN
Status: DISCONTINUED | OUTPATIENT
Start: 2020-07-10 | End: 2020-07-10 | Stop reason: HOSPADM

## 2020-07-10 RX ORDER — SODIUM CHLORIDE 0.9 % (FLUSH) 0.9 %
10 SYRINGE (ML) INJECTION PRN
Status: DISCONTINUED | OUTPATIENT
Start: 2020-07-10 | End: 2020-07-10 | Stop reason: HOSPADM

## 2020-07-10 RX ORDER — ALLOPURINOL 300 MG/1
300 TABLET ORAL DAILY
Status: DISCONTINUED | OUTPATIENT
Start: 2020-07-10 | End: 2020-07-10 | Stop reason: HOSPADM

## 2020-07-10 RX ORDER — POTASSIUM CHLORIDE 20 MEQ/1
40 TABLET, EXTENDED RELEASE ORAL PRN
Status: DISCONTINUED | OUTPATIENT
Start: 2020-07-10 | End: 2020-07-10 | Stop reason: HOSPADM

## 2020-07-10 RX ORDER — NICOTINE POLACRILEX 4 MG
15 LOZENGE BUCCAL PRN
Status: DISCONTINUED | OUTPATIENT
Start: 2020-07-10 | End: 2020-07-10 | Stop reason: HOSPADM

## 2020-07-10 RX ORDER — SODIUM CHLORIDE 0.9 % (FLUSH) 0.9 %
10 SYRINGE (ML) INJECTION EVERY 12 HOURS SCHEDULED
Status: DISCONTINUED | OUTPATIENT
Start: 2020-07-10 | End: 2020-07-10 | Stop reason: HOSPADM

## 2020-07-10 RX ORDER — LOSARTAN POTASSIUM 50 MG/1
50 TABLET ORAL DAILY
Status: DISCONTINUED | OUTPATIENT
Start: 2020-07-10 | End: 2020-07-10 | Stop reason: HOSPADM

## 2020-07-10 RX ORDER — HYDROCODONE BITARTRATE AND ACETAMINOPHEN 7.5; 325 MG/1; MG/1
1 TABLET ORAL EVERY 12 HOURS PRN
Status: DISCONTINUED | OUTPATIENT
Start: 2020-07-10 | End: 2020-07-10 | Stop reason: HOSPADM

## 2020-07-10 RX ORDER — ACETAMINOPHEN 325 MG/1
650 TABLET ORAL EVERY 6 HOURS PRN
Status: DISCONTINUED | OUTPATIENT
Start: 2020-07-10 | End: 2020-07-10 | Stop reason: HOSPADM

## 2020-07-10 RX ORDER — LEVOTHYROXINE SODIUM 137 UG/1
137 TABLET ORAL DAILY
Status: DISCONTINUED | OUTPATIENT
Start: 2020-07-10 | End: 2020-07-10 | Stop reason: HOSPADM

## 2020-07-10 RX ORDER — SUCRALFATE 1 G/1
1 TABLET ORAL 4 TIMES DAILY
Status: DISCONTINUED | OUTPATIENT
Start: 2020-07-10 | End: 2020-07-10 | Stop reason: HOSPADM

## 2020-07-10 RX ORDER — DEXTROSE MONOHYDRATE 50 MG/ML
100 INJECTION, SOLUTION INTRAVENOUS PRN
Status: DISCONTINUED | OUTPATIENT
Start: 2020-07-10 | End: 2020-07-10 | Stop reason: HOSPADM

## 2020-07-10 RX ORDER — AMLODIPINE BESYLATE 10 MG/1
10 TABLET ORAL DAILY
Status: DISCONTINUED | OUTPATIENT
Start: 2020-07-10 | End: 2020-07-10 | Stop reason: HOSPADM

## 2020-07-10 RX ORDER — PANTOPRAZOLE SODIUM 40 MG/1
40 TABLET, DELAYED RELEASE ORAL DAILY
Status: DISCONTINUED | OUTPATIENT
Start: 2020-07-10 | End: 2020-07-10 | Stop reason: HOSPADM

## 2020-07-10 RX ORDER — DEXTROSE MONOHYDRATE 25 G/50ML
12.5 INJECTION, SOLUTION INTRAVENOUS PRN
Status: DISCONTINUED | OUTPATIENT
Start: 2020-07-10 | End: 2020-07-10 | Stop reason: HOSPADM

## 2020-07-10 RX ORDER — ACETAMINOPHEN 650 MG/1
650 SUPPOSITORY RECTAL EVERY 6 HOURS PRN
Status: DISCONTINUED | OUTPATIENT
Start: 2020-07-10 | End: 2020-07-10 | Stop reason: HOSPADM

## 2020-07-10 RX ORDER — ATORVASTATIN CALCIUM 10 MG/1
10 TABLET, FILM COATED ORAL DAILY
Status: DISCONTINUED | OUTPATIENT
Start: 2020-07-10 | End: 2020-07-10 | Stop reason: HOSPADM

## 2020-07-10 RX ORDER — PROMETHAZINE HYDROCHLORIDE 25 MG/1
12.5 TABLET ORAL EVERY 6 HOURS PRN
Status: DISCONTINUED | OUTPATIENT
Start: 2020-07-10 | End: 2020-07-10 | Stop reason: HOSPADM

## 2020-07-10 RX ADMIN — PANTOPRAZOLE SODIUM 40 MG: 40 TABLET, DELAYED RELEASE ORAL at 09:22

## 2020-07-10 RX ADMIN — SUCRALFATE 1 G: 1 TABLET ORAL at 09:22

## 2020-07-10 RX ADMIN — ALLOPURINOL 300 MG: 300 TABLET ORAL at 09:22

## 2020-07-10 RX ADMIN — AMLODIPINE BESYLATE 10 MG: 10 TABLET ORAL at 09:22

## 2020-07-10 RX ADMIN — LEVOTHYROXINE SODIUM 137 MCG: 0.14 TABLET ORAL at 07:11

## 2020-07-10 RX ADMIN — ATORVASTATIN CALCIUM 10 MG: 10 TABLET, FILM COATED ORAL at 09:22

## 2020-07-10 RX ADMIN — LOSARTAN POTASSIUM 50 MG: 50 TABLET, FILM COATED ORAL at 09:22

## 2020-07-10 RX ADMIN — SUCRALFATE 1 G: 1 TABLET ORAL at 12:02

## 2020-07-10 RX ADMIN — SERTRALINE HYDROCHLORIDE 50 MG: 50 TABLET, FILM COATED ORAL at 09:22

## 2020-07-10 RX ADMIN — SODIUM CHLORIDE, PRESERVATIVE FREE 10 ML: 5 INJECTION INTRAVENOUS at 09:23

## 2020-07-10 RX ADMIN — POTASSIUM CHLORIDE 40 MEQ: 1500 TABLET, EXTENDED RELEASE ORAL at 09:22

## 2020-07-10 ASSESSMENT — PAIN SCALES - GENERAL
PAINLEVEL_OUTOF10: 0
PAINLEVEL_OUTOF10: 0

## 2020-07-10 NOTE — CARE COORDINATION
SOCIAL WORK/DISCHARGE PLANNING;  Pt seen in room. She was discharged from [facility] two days ago. She is alert and oriented. Pt lives alone in a one floor plan apartment. There are no steps. Pt reports being independent prior to admission, still drives. Per pt, her son,ROLF-JOHNATHAN-L and brother live close by. Pt has a history of roma at [facility] after hip surgerry in the past and also [facility]  but no current services. Pt not sure of any needs at this time. If physician feels HHC is needed, she would be agreeable. She does not have a preference as long as covered by her insurance. Pt has a walker at home. Pt's pcp is Dr. Abigail Mercer. Plan is home via family upon discharge.   Chitra Baker [facility]  [phone]

## 2020-07-10 NOTE — PROGRESS NOTES
Occupational Therapy  OCCUPATIONAL THERAPY INITIAL EVALUATION      Date:7/10/2020  Patient Name: Abdoul Ceja  MRN: [id number]  : 1950  Room: [address] [place] [address]    Referring Provider:  MIKA Cooper    Evaluating OT: Herlinda Chin OTR/L #[id number]     AM-PAC Daily Activity Raw Score:     Recommended Adaptive Equipment:  TBD     Diagnosis: Near Syncope    Patient presented to ED for dizziness and lightheadedness      Pertinent Medical History:    Past Medical History:   Diagnosis Date    Back problem     Carotid artery stenosis     Chest pain 2013    Diabetic neuropathy ([facility] 2015    Full dentures     Hiatal hernia     Hypertension     Hyperuricemia 2015    Lipid disorder 2015    Osteoarthritis     lumbosacral spine arthritis and disc disease    Peripheral vascular disease ([facility]     Preoperative clearance     2015 per Dr Negar Aguilar and 10/2015 per Dr Lopez Jimenez for [facility] Dr Shilpa Hastings.  PVD (peripheral vascular disease) with claudication ([facility] 2012    follows with Dr Alanis Corporal Rapid palpitations     follows with Dr Viviana Brar Thyroid disease     Type II or unspecified type diabetes mellitus without mention of complication, not stated as uncontrolled     Wears glasses       Precautions:  Falls, monitor BP     Home Living: Pt lives alone in a 1st floor apartment with [address]   Bathroom setup: yes   Equipment owned: w/w     Prior Level of Function: Independent with ADLs , Independent with IADLs; ambulated without AD  Driving: yes  Occupation: none    Pain Level: Pt denies pain this session;  Therapist facilitated repositioning to address pain      Cognition: A&O: 4/4; Follows 2 step directions   Memory:  good   Sequencing:  good   Problem solving:  fair   Judgement/safety:  fair     Functional Assessment:   Initial Eval Status  Date: 7/10/20 Treatment Status  Date: Short Term Goals = Long Term Goals  Treatment frequency: 1-4x/wk; PRN   Feeding Independent       Grooming regular lower body clothing?: A Little  How much help for Bathing?: A Little  How much help for Toileting?: A Little  How much help for putting on and taking off regular upper body clothing?: A Little  How much help for taking care of personal grooming?: A Little  How much help for eating meals?: None  AM-[facility] Inpatient Daily Activity Raw Score: 19  AM-PAC Inpatient ADL T-Scale Score : 40.22  ADL Inpatient CMS 0-100% Score: 42.8  ADL Inpatient CMS G-Code Modifier : CK       low Evaluation +     Treatment Time In:925            Treatment Time Out: 935              Treatment Charges: Mins Units   Ther Ex  64776     Manual Therapy 38289     Thera Activities 19633 2 1   ADL/Home Mgt 27976 8 1   Neuro Re-ed 19729     Group Therapy      Orthotic manage/training  92759     Non-Billable Time     Total Timed Treatment 10 2             Lenette Dubin OTR/L #[id number]

## 2020-07-10 NOTE — DISCHARGE SUMMARY
Physician Discharge Summary     Patient ID:  Kylee Wall  [id number]  78 y.o.  1950    Admit date: 7/9/2020    Discharge date and time:  7/10/2020    Discharge Diagnoses: Principal Problem:    Lightheadedness  Active Problems:    Diabetes mellitus type 2, uncontrolled ([facility]    Hyperlipidemia LDL goal <100    Acquired hypothyroidism    PUD (peptic ulcer disease)    Morbid obesity with BMI of 40.0-44.9, adult ([facility]    Near syncope    NAHEED (acute kidney injury) ([facility]  Resolved Problems:    * No resolved hospital problems.  *      Consults: IP CONSULT TO INTERNAL MEDICINE  IP CONSULT TO SOCIAL WORK    Procedures: See below    Hospital Course: Admit to observation on telemetry  Orthostatics-negative  IV fluids  Continue PPI for peptic ulcer disease  Hold BP meds  Medications for other co morbidities cont as appropriate w dosage adjustments as necessary  PT/OT  DVT PPx  DC planning DC home encourage fluids,   Admit to observation on telemetry  Orthostatics-negative  IV fluids  Continue PPI for peptic ulcer disease  Hold BP meds  Medications for other co morbidities cont as appropriate w dosage adjustments as necessary  PT/OT  DVT PPx  DC planning DC home encourage fluids, Hiold BP meds, Monitor BP, F/U closely PCP         Discharge Exam:  See progress note from today    Condition:  Stable    Disposition: home    Patient Instructions:   Current Discharge Medication List      CONTINUE these medications which have NOT CHANGED    Details   pantoprazole (PROTONIX) 40 MG tablet Take 1 tablet by mouth daily  Qty: 30 tablet, Refills: 0      tiZANidine (ZANAFLEX) 2 MG capsule Take 2 mg by mouth 3 times daily      acetaminophen (TYLENOL) 500 MG tablet Take 500 mg by mouth every 6 hours as needed for Pain      allopurinol (ZYLOPRIM) 300 MG tablet Take 300 mg by mouth daily      SYNTHROID 137 MCG tablet Take 137 mcg by mouth daily      sucralfate (CARAFATE) 1 GM tablet Take 1 g by mouth 4 times daily      sertraline (ZOLOFT) 50

## 2020-07-10 NOTE — PROGRESS NOTES
Physical Therapy  Physical Therapy Initial Assessment     Name: Robert Briceño  : 1950  MRN: [id number]    Referring Provider:  MIKA Agee    Date of Service: 7/10/2020    Evaluating PT:  Marixa Sim PT   Room #:  3223/5869-Z  Diagnosis: near syncope  PMHx/PSHx:   has a past medical history of Back problem, Carotid artery stenosis, Chest pain, Diabetic neuropathy ([address] Full dentures, Hiatal hernia, Hypertension, Hyperuricemia, Lipid disorder, Osteoarthritis, Peripheral vascular disease ([address] Preoperative clearance, PVD (peripheral vascular disease) with claudication ([address] Rapid palpitations, Thyroid disease, Type II or unspecified type diabetes mellitus without mention of complication, not stated as uncontrolled, and Wears glasses. Procedure/Surgery:   has a past medical history of Back problem, Carotid artery stenosis, Chest pain, Diabetic neuropathy ([address] Full dentures, Hiatal hernia, Hypertension, Hyperuricemia, Lipid disorder, Osteoarthritis, Peripheral vascular disease ([address] Preoperative clearance, PVD (peripheral vascular disease) with claudication ([address] Rapid palpitations, Thyroid disease, Type II or unspecified type diabetes mellitus without mention of complication, not stated as uncontrolled, and Wears glasses. Precautions:  Falls,    Equipment Needs:  None has fww. SUBJECTIVE:    Patient lives alone  in a apartment  with 1 step to enter without Rail  Bed is on 1 floor and bath is on 1 floor. Patient ambulated with wheeled walker  PTA. Equipment owned: Elliott Angel      OBJECTIVE:   Initial Evaluation  Date: 7/10/20 Treatment Short Term/ Long Term   Goals   AM-PAC 6 Clicks 65/46     Was pt agreeable to Eval/treatment? yes     Does pt have pain? none     Bed Mobility  Rolling: NT  Supine to sit: NT  Sit to supine: NT  Scooting: NT  Rolling: Ind  Supine to sit:  Ind  Sit to supine: Ind  Scooting: Ind   Transfers Sit to stand: SBA  Stand to sit: SBA  Stand pivot: SBA  Sit to stand: Mod Ind  Stand to sit: Mod Ind  Stand pivot: Mod Ind   Ambulation    30 feet with fww SBA  150 feet with fww Mod Ind   Stair negotiation: ascended and descended  NT  Not barrier to D/C.    ROM BUE:  See OT eval  BLE:  wfl     Strength BUE: See OT eval   RLE:  4+/5  LLE:   4+/5  5/5   Balance Sitting EOB:  Ind  Dynamic Standing:  SBA for safety. Sitting EOB:  Ind  Dynamic Standing: Mod Ind. Pt is A & O x 3  Sensation:  Pt denies numbness and tingling to extremities  Edema:  none    Vitals:  Blood Pressure at rest 148/65 sitting Blood Pressure at rest 120/58 standing Blood Pressure post session 133/58 post gait sitting 133/58   Heart Rate at rest 88 Heart Rate at rest 107 Heart Rate post session 98   SPO2 at rest - SPO2 at rest - SPO2 post session -     Therapeutic Exercises:  none    Patient education  Pt educated on pausing once standing for safety. Use of fww once home also for safety. Patient response to education:   Pt verbalized understanding Pt demonstrated skill Pt requires further education in this area   yes       ASSESSMENT:    Comments/treatment:  Patient was seen this date for PT evaluation. Patient was agreeable to evaluation. Results of the functional assessment are noted above. Upon entering the room patient was found sitting EOB Ind. . BP measured as noted above. Report of slight dizziness with change in positions. Able to ambulate with SBA for safety to restroom and back. . At end of session, patient sitting edge of bed with  call light and phone within reach,  all lines and tubes intact, nursing notified. This patient can benefit from the continuation of skilled PT  to maximize functional level and return to PLOF. Pt's/ family goals   1. Home when able. Patient and or family understand(s) diagnosis, prognosis, and plan of care. yes    PLAN:    PT care will be provided in accordance with the objectives noted above.  Exercises and functional mobility practice will be used as well as appropriate assistive devices or modalities to obtain goals. Patient and family education will also be administered as needed. Frequency of treatments: 2-5x/week x 1-2 weeks. Time in  0930  Time out  0945    Total Treatment Time  15 minutes     Evaluation Time includes thorough review of current medical information, gathering information on past medical history/social history and prior level of function, completion of standardized testing/informal observation of tasks, assessment of data and education on plan of care and goals.     CPT codes:  [x] Low Complexity PT evaluation 12262  [] Moderate Complexity PT evaluation 46193  [] High Complexity PT evaluation 58759  [] PT Re-evaluation 08656  [] Gait training 13704 - minutes  [] Manual therapy [phone] - minutes  [] Therapeutic activities 97635 - minutes  [] Therapeutic exercises 60108 - minutes  [] Neuromuscular reeducation 17457 - minutes     Maryjane Arguelles, [id number] [facility]

## 2020-07-10 NOTE — ED PROVIDER NOTES
HPI:  7/9/20, Time: 9:47 PM EDT         Abdoul Ceja is a 65 y.o. female presenting to the ED for feeling dizzy lightheaded, beginning short time ago. The complaint has been persistent, moderate in severity, and worsened by nothing. Patient was just recently leaves from [facility].  Patient was admitted there for GI bleed. Patient had endoscopy done there. Patient has a history of bleeding ulcer. Patient reporting her blood pressure was low and that her blood sugar was elevated. Patient reporting no chest pain she reports no abdominal pain she reports no black or tarry stools she reports no hematemesis. Patient reporting no leg pain or swelling. .  EMS patient systolic blood pressure was in the 80s she was given over 300 cc of fluid prior to arrival.    ROS:   Pertinent positives and negatives are stated within HPI, all other systems reviewed and are negative.  --------------------------------------------- PAST HISTORY ---------------------------------------------  Past Medical History:  has a past medical history of Back problem, Carotid artery stenosis, Chest pain, Diabetic neuropathy ([address] Full dentures, Hiatal hernia, Hypertension, Hyperuricemia, Lipid disorder, Osteoarthritis, Peripheral vascular disease ([address] Preoperative clearance, PVD (peripheral vascular disease) with claudication ([address] Rapid palpitations, Thyroid disease, Type II or unspecified type diabetes mellitus without mention of complication, not stated as uncontrolled, and Wears glasses. Past Surgical History:  has a past surgical history that includes Appendectomy; Throat surgery (2002); Colonoscopy; Hysterectomy; joint replacement (Left, 12/3/2015); Total hip arthroplasty (Left, 12/2015); and Upper gastrointestinal endoscopy (N/A, 7/7/2020). Social History:  reports that she quit smoking about 12 years ago. Her smoking use included cigarettes. She has a 40.00 pack-year smoking history.  She has never used smokeless tobacco. She reports that she does not drink alcohol or use drugs. Family History: family history includes Cancer in her father; Heart Disease in her maternal grandmother; Kidney Disease in her mother. The patients home medications have been reviewed. Allergies: Adhesive tape    ---------------------------------------------------PHYSICAL EXAM--------------------------------------    Constitutional/General: Alert and oriented x3, well appearing, non toxic in NAD  Head: Normocephalic and atraumatic  Eyes: PERRL, EOMI  Mouth: Oropharynx clear, handling secretions, no trismus  Neck: Supple, full ROM, non tender to palpation in the midline, no stridor, no crepitus, no meningeal signs  Pulmonary: Lungs clear to auscultation bilaterally, no wheezes, rales, or rhonchi. Not in respiratory distress  Cardiovascular:  Regular rate. Regular rhythm. No murmurs, gallops, or rubs. 2+ distal pulses  Chest: no chest wall tenderness  Abdomen: Soft. Non tender. Non distended. +BS. No rebound, guarding, or rigidity. No pulsatile masses appreciated. Musculoskeletal: Moves all extremities x 4. Warm and well perfused, no clubbing, cyanosis, or edema. Capillary refill <3 seconds  Skin: warm and dry. No rashes. Neurologic: GCS 15, CN 2-12 grossly intact, no focal deficits, symmetric strength 5/5 in the upper and lower extremities bilaterally  Psych: Normal Affect    -------------------------------------------------- RESULTS -------------------------------------------------  I have personally reviewed all laboratory and imaging results for this patient. Results are listed below.      LABS:  Results for orders placed or performed during the hospital encounter of 07/09/20   CBC auto differential   Result Value Ref Range    WBC 11.5 4.5 - 11.5 E9/L    RBC 3.10 (L) 3.50 - 5.50 E12/L    Hemoglobin 9.3 (L) 11.5 - 15.5 g/dL    Hematocrit 30.1 (L) 34.0 - 48.0 %    MCV 97.1 80.0 - 99.9 fL    MCH 30.0 26.0 - 35.0 pg    MCHC 30.9 (L) 32.0 - 34.5 %    RDW 16.1 (H) 11.5 - 15.0 fL    Platelets 957 199 - 786 E9/L    MPV 9.8 7.0 - 12.0 fL    Neutrophils % 63.7 43.0 - 80.0 %    Immature Granulocytes % 1.4 0.0 - 5.0 %    Lymphocytes % 26.6 20.0 - 42.0 %    Monocytes % 6.5 2.0 - 12.0 %    Eosinophils % 1.5 0.0 - 6.0 %    Basophils % 0.3 0.0 - 2.0 %    Neutrophils Absolute 7.30 1.80 - 7.30 E9/L    Immature Granulocytes # 0.16 E9/L    Lymphocytes Absolute 3.04 1.50 - 4.00 E9/L    Monocytes Absolute 0.74 0.10 - 0.95 E9/L    Eosinophils Absolute 0.17 0.05 - 0.50 E9/L    Basophils Absolute 0.04 0.00 - 0.20 E9/L   Comprehensive Metabolic Panel   Result Value Ref Range    Sodium 136 132 - 146 mmol/L    Potassium 3.3 (L) 3.5 - 5.0 mmol/L    Chloride 100 98 - 107 mmol/L    CO2 22 22 - 29 mmol/L    Anion Gap 14 7 - 16 mmol/L    Glucose 143 (H) 74 - 99 mg/dL    BUN 17 8 - 23 mg/dL    CREATININE 1.3 (H) 0.5 - 1.0 mg/dL    GFR Non-African American 40 >=60 mL/min/1.73    GFR African American 49     Calcium 9.3 8.6 - 10.2 mg/dL    Total Protein 5.4 (L) 6.4 - 8.3 g/dL    Alb 3.2 (L) 3.5 - 5.2 g/dL    Total Bilirubin 0.5 0.0 - 1.2 mg/dL    Alkaline Phosphatase 78 35 - 104 U/L    ALT 17 0 - 32 U/L    AST 15 0 - 31 U/L   Troponin   Result Value Ref Range    Troponin <0.01 0.00 - 0.03 ng/mL   Protime-INR   Result Value Ref Range    Protime 13.5 (H) 9.3 - 12.4 sec    INR 1.2    Brain Natriuretic Peptide   Result Value Ref Range    Pro- (H) 0 - 125 pg/mL   POCT occult blood stool   Result Value Ref Range    OCCULT BLOOD FECAL negative     QC OK? yes        RADIOLOGY:  Interpreted by Radiologist.  XR CHEST PORTABLE   Final Result      Bibasilar infiltrates superimposed upon chronic scarring. EKG:  This EKG is signed and interpreted by me.     Rate: 76  Rhythm: Sinus  Interpretation: no acute changes  Comparison: stable as compared to patient's most recent EKG        ------------------------- NURSING NOTES AND VITALS REVIEWED ---------------------------   The nursing notes within the ED encounter and vital signs as below have been reviewed by myself. BP (!) 113/55   Pulse 87   Temp 97.2 °F (36.2 °C)   Resp 18   Ht 5' 4\" (1.626 m)   Wt 240 lb (108.9 kg)   SpO2 95%   BMI 41.20 kg/m²   Oxygen Saturation Interpretation: Normal    The patients available past medical records and past encounters were reviewed. ------------------------------ ED COURSE/MEDICAL DECISION MAKING----------------------  Medications   0.9 % sodium chloride bolus (1,000 mLs Intravenous New Bag 7/9/20 2200)   potassium chloride (KLOR-CON M) extended release tablet 40 mEq (has no administration in time range)             Medical Decision Making:        Re-Evaluations:             Re-evaluation. Patients symptoms are improving  Signs have improved. Patient is in no distress here. Patient reporting no chest pain or difficulty breathing. Reporting no cough no fever. Patient reporting no respiratory symptoms at all. I did review her chest x-ray. Consultations:               Spoke to to Angelo Mason who is on-call for Dr Casper Loco and will admit  Critical Care: This patient's ED course included: a personal history and physicial eaxmination    This patient has been closely monitored during their ED course. Counseling: The emergency provider has spoken with the patient and discussed todays results, in addition to providing specific details for the plan of care and counseling regarding the diagnosis and prognosis. Questions are answered at this time and they are agreeable with the plan.       --------------------------------- IMPRESSION AND DISPOSITION ---------------------------------    IMPRESSION  1. Lightheadedness    2. Near syncope    3. History of GI bleed        DISPOSITION  Disposition: Admit to telemetry  Patient condition is stable        NOTE: This report was transcribed using voice recognition software.  Every effort was made to ensure accuracy; however, inadvertent computerized transcription errors may be present          Gilles Nolasco MD  07/09/20 0892       Gilles Nolasco MD  07/09/20 0400

## 2020-07-10 NOTE — H&P
[address] [facility]  History and Physical      CHIEF COMPLAINT: Lightheadedness      HISTORY OF PRESENT ILLNESS:      The patient is a 65 y.o. female patient of Dr. Kuldip Hicks history of carotid artery stenosis diabetes, PAD, hypothyroidism who presents with lightheadedness. Patient was recently admitted for generalized weakness found to have anemia she was evaluated with endoscopy for GI bleeding. EGD revealed peptic ulcer disease. Patient was discharged home on the 7/8 and returned on the 7/9 for lightheadedness. No chest pain shortness of breath palpitations. No vomiting or diarrhea. Per EMS patient's blood pressure was in the 80s and she was given 300 cc bolus. Better after fluids. Past Medical History:    Past Medical History:   Diagnosis Date    Back problem     Carotid artery stenosis     Chest pain 2/22/2013    Diabetic neuropathy ([address] 12/4/2015    Full dentures     Hiatal hernia     Hypertension     Hyperuricemia 12/4/2015    Lipid disorder 12/4/2015    Osteoarthritis     lumbosacral spine arthritis and disc disease    Peripheral vascular disease ([address]     Preoperative clearance     08/2015 per Dr King Quintana and 10/2015 per Dr Kuldip Hicks for [facility] Dr Catina Mcmanus.     PVD (peripheral vascular disease) with claudication ([address] 12/13/2012    follows with Dr Millicent Roblero Rapid palpitations     follows with Dr Doris Rivera Thyroid disease     Type II or unspecified type diabetes mellitus without mention of complication, not stated as uncontrolled     Wears glasses        Past Surgical History:    Past Surgical History:   Procedure Laterality Date    APPENDECTOMY      COLONOSCOPY      HYSTERECTOMY      with appendectomy    JOINT REPLACEMENT Left 12/3/2015    total hip    THROAT SURGERY  2002    benign    TOTAL HIP ARTHROPLASTY Left 12/2015    UPPER GASTROINTESTINAL ENDOSCOPY N/A 7/7/2020    EGD [facility]OPSY performed by Deniz [facility]iz [facility] at [facility]COPY       Medications Prior to drink alcohol or use drugs. Family History:   family history includes Cancer in her father; Heart Disease in her maternal grandmother; Kidney Disease in her mother. REVIEW OF SYSTEMS:  As above in the HPI, otherwise negative    PHYSICAL EXAM:    Vitals:  BP (!) 126/59   Pulse 78   Temp 98.7 °F (37.1 °C) (Oral)   Resp 18   Ht 5' 5\" (1.651 m)   Wt 246 lb 0.5 oz (111.6 kg)   SpO2 95%   BMI 40.94 kg/m²     General:  Awake, alert, oriented X 3. Well developed, well nourished, well groomed. No apparent distress. HEENT:  Normocephalic, atraumatic. Pupils equal, round, reactive to light. No scleral icterus. No conjunctival injection. Normal lips, teeth, and gums. No nasal discharge. Neck:  Supple  Heart:  RRR, no murmurs, gallops, rubs  Lungs:  CTA bilaterally, bilat symmetrical expansion, no wheeze, rales, or rhonchi  Abdomen:   Bowel sounds present, soft, nontender, no masses, no organomegaly, no peritoneal signs  Extremities:  No clubbing, cyanosis, or edema  Skin:  Warm and dry, no open lesions or rash  Neuro:  Cranial nerves 2-12 intact, no focal deficits  Breast: deferred  Rectal: deferred  Genitalia:  deferred    LABS:    CBC with Differential:    Lab Results   Component Value Date    WBC 10.0 07/10/2020    RBC 3.22 07/10/2020    HGB 9.1 07/10/2020    HCT 28.9 07/10/2020     07/10/2020    MCV 94.1 07/10/2020    MCH 29.8 07/10/2020    MCHC 31.7 07/10/2020    RDW 15.9 07/10/2020    LYMPHOPCT 35.6 07/10/2020    MONOPCT 5.8 07/10/2020    BASOPCT 0.4 07/10/2020    MONOSABS 0.58 07/10/2020    LYMPHSABS 3.56 07/10/2020    EOSABS 0.17 07/10/2020    BASOSABS 0.04 07/10/2020     CMP:    Lab Results   Component Value Date     07/10/2020    K 3.4 07/10/2020     07/10/2020    CO2 22 07/10/2020    BUN 14 07/10/2020    CREATININE 1.1 07/10/2020    GFRAA 59 07/10/2020    LABGLOM 49 07/10/2020    GLUCOSE 93 07/10/2020    PROT 5.4 07/09/2020    LABALBU 3.2 07/09/2020    CALCIUM 9.6 07/10/2020 BILITOT 0.5 07/09/2020    ALKPHOS 78 07/09/2020    AST 15 07/09/2020    ALT 17 07/09/2020     Magnesium:    Lab Results   Component Value Date    MG 1.5 07/10/2020     Phosphorus:  No results found for: PHOS  PT/INR:    Lab Results   Component Value Date    PROTIME 13.5 07/09/2020    INR 1.2 07/09/2020     Last 3 Troponin:    Lab Results   Component Value Date    TROPONINI <0.01 07/09/2020    TROPONINI <0.01 07/06/2020     U/A:    Lab Results   Component Value Date    COLORU Yellow 07/09/2020    PROTEINU Negative 07/09/2020    PHUR 6.0 07/09/2020    WBCUA 2-5 07/09/2020    RBCUA NONE 07/09/2020    BACTERIA FEW 07/09/2020    CLARITYU Clear 07/09/2020    SPECGRAV <=1.005 07/09/2020    LEUKOCYTESUR SMALL 07/09/2020    UROBILINOGEN 0.2 07/09/2020    BILIRUBINUR Negative 07/09/2020    BLOODU TRACE-LYSED 07/09/2020    GLUCOSEU 250 07/09/2020     ABG:  No results found for: PH, PCO2, PO2, HCO3, BE, THGB, TCO2, O2SAT  HgBA1c:  No results found for: LABA1C  FLP:  No results found for: TRIG, HDL, LDLCALC, LDLDIRECT, LABVLDL  TSH:  No results found for: TSH    XR CHEST PORTABLE   Final Result      Bibasilar infiltrates superimposed upon chronic scarring. ASSESSMENT:      Principal Problem:    Lightheadedness  Active Problems:    Diabetes mellitus type 2, uncontrolled ([facility]    Hyperlipidemia LDL goal <100    Acquired hypothyroidism    PUD (peptic ulcer disease)    Morbid obesity with BMI of 40.0-44.9, adult ([facility]    Near syncope    NAHEED (acute kidney injury) ([facility]  Resolved Problems:    * No resolved hospital problems.  *      PLAN:    Admit to observation on telemetry  Orthostatics-negative  IV fluids  Continue PPI for peptic ulcer disease  Hold BP meds  Medications for other co morbidities cont as appropriate w dosage adjustments as necessary  PT/OT  DVT PPx  DC planning DC home encourage fluids,   Admit to observation on telemetry  Orthostatics-negative  IV fluids  Continue PPI for peptic ulcer disease  Hold BP meds  Medications for other co morbidities cont as appropriate w dosage adjustments as necessary  PT/OT  DVT PPx  DC planning DC home encourage fluids, Hiold BP meds, Monitor BP, F/U closely PCP         Electronically signed by Sam Witt MD on 7/10/2020 at 9:39 AM

## 2020-07-13 ENCOUNTER — APPOINTMENT (OUTPATIENT)
Dept: GENERAL RADIOLOGY | Age: 70
End: 2020-07-13
Payer: MEDICARE

## 2020-07-13 ENCOUNTER — HOSPITAL ENCOUNTER (OUTPATIENT)
Age: 70
Setting detail: OBSERVATION
Discharge: HOME | End: 2020-07-16
Attending: EMERGENCY MEDICINE | Admitting: INTERNAL MEDICINE
Payer: MEDICARE

## 2020-07-13 PROBLEM — K62.5 RECTAL BLEEDING: Status: ACTIVE | Noted: 2020-07-13

## 2020-07-13 LAB
ABO/RH: NORMAL
ALBUMIN SERPL-MCNC: 3.2 G/DL (ref 3.5–5.2)
ALP BLD-CCNC: 70 U/L (ref 35–104)
ALT SERPL-CCNC: 11 U/L (ref 0–32)
ANION GAP SERPL CALCULATED.3IONS-SCNC: 15 MMOL/L (ref 7–16)
ANTIBODY SCREEN: NORMAL
AST SERPL-CCNC: 10 U/L (ref 0–31)
BASOPHILS ABSOLUTE: 0.02 E9/L (ref 0–0.2)
BASOPHILS RELATIVE PERCENT: 0.2 % (ref 0–2)
BILIRUB SERPL-MCNC: 0.7 MG/DL (ref 0–1.2)
BUN BLDV-MCNC: 18 MG/DL (ref 8–23)
CALCIUM SERPL-MCNC: 9.8 MG/DL (ref 8.6–10.2)
CHLORIDE BLD-SCNC: 103 MMOL/L (ref 98–107)
CO2: 19 MMOL/L (ref 22–29)
CREAT SERPL-MCNC: 0.9 MG/DL (ref 0.5–1)
EOSINOPHILS ABSOLUTE: 0.09 E9/L (ref 0.05–0.5)
EOSINOPHILS RELATIVE PERCENT: 0.9 % (ref 0–6)
GFR AFRICAN AMERICAN: >60
GFR NON-AFRICAN AMERICAN: >60 ML/MIN/1.73
GLUCOSE BLD-MCNC: 192 MG/DL (ref 74–99)
HCT VFR BLD CALC: 27.6 % (ref 34–48)
HEMOGLOBIN: 8.7 G/DL (ref 11.5–15.5)
IMMATURE GRANULOCYTES #: 0.1 E9/L
IMMATURE GRANULOCYTES %: 1 % (ref 0–5)
LACTIC ACID: 2.7 MMOL/L (ref 0.5–2.2)
LACTIC ACID: 3.5 MMOL/L (ref 0.5–2.2)
LYMPHOCYTES ABSOLUTE: 1.51 E9/L (ref 1.5–4)
LYMPHOCYTES RELATIVE PERCENT: 15.4 % (ref 20–42)
MCH RBC QN AUTO: 29.9 PG (ref 26–35)
MCHC RBC AUTO-ENTMCNC: 31.5 % (ref 32–34.5)
MCV RBC AUTO: 94.8 FL (ref 80–99.9)
MONOCYTES ABSOLUTE: 0.47 E9/L (ref 0.1–0.95)
MONOCYTES RELATIVE PERCENT: 4.8 % (ref 2–12)
NEUTROPHILS ABSOLUTE: 7.64 E9/L (ref 1.8–7.3)
NEUTROPHILS RELATIVE PERCENT: 77.7 % (ref 43–80)
PDW BLD-RTO: 15 FL (ref 11.5–15)
PLATELET # BLD: 230 E9/L (ref 130–450)
PMV BLD AUTO: 10 FL (ref 7–12)
POTASSIUM REFLEX MAGNESIUM: 3.6 MMOL/L (ref 3.5–5)
RBC # BLD: 2.91 E12/L (ref 3.5–5.5)
SODIUM BLD-SCNC: 137 MMOL/L (ref 132–146)
TOTAL PROTEIN: 5.4 G/DL (ref 6.4–8.3)
TROPONIN: 0.02 NG/ML (ref 0–0.03)
WBC # BLD: 9.8 E9/L (ref 4.5–11.5)

## 2020-07-13 PROCEDURE — 6370000000 HC RX 637 (ALT 250 FOR IP): Performed by: SURGERY

## 2020-07-13 PROCEDURE — 86850 RBC ANTIBODY SCREEN: CPT

## 2020-07-13 PROCEDURE — 83605 ASSAY OF LACTIC ACID: CPT

## 2020-07-13 PROCEDURE — 94761 N-INVAS EAR/PLS OXIMETRY MLT: CPT

## 2020-07-13 PROCEDURE — 86901 BLOOD TYPING SEROLOGIC RH(D): CPT

## 2020-07-13 PROCEDURE — 71045 X-RAY EXAM CHEST 1 VIEW: CPT

## 2020-07-13 PROCEDURE — 85025 COMPLETE CBC W/AUTO DIFF WBC: CPT

## 2020-07-13 PROCEDURE — 86900 BLOOD TYPING SEROLOGIC ABO: CPT

## 2020-07-13 PROCEDURE — 96361 HYDRATE IV INFUSION ADD-ON: CPT

## 2020-07-13 PROCEDURE — 84484 ASSAY OF TROPONIN QUANT: CPT

## 2020-07-13 PROCEDURE — 80053 COMPREHEN METABOLIC PANEL: CPT

## 2020-07-13 PROCEDURE — C9113 INJ PANTOPRAZOLE SODIUM, VIA: HCPCS | Performed by: SURGERY

## 2020-07-13 PROCEDURE — 93005 ELECTROCARDIOGRAM TRACING: CPT | Performed by: STUDENT IN AN ORGANIZED HEALTH CARE EDUCATION/TRAINING PROGRAM

## 2020-07-13 PROCEDURE — 99285 EMERGENCY DEPT VISIT HI MDM: CPT

## 2020-07-13 PROCEDURE — 81001 URINALYSIS AUTO W/SCOPE: CPT

## 2020-07-13 PROCEDURE — 6360000002 HC RX W HCPCS: Performed by: SURGERY

## 2020-07-13 PROCEDURE — 2580000003 HC RX 258: Performed by: SURGERY

## 2020-07-13 PROCEDURE — 2580000003 HC RX 258: Performed by: STUDENT IN AN ORGANIZED HEALTH CARE EDUCATION/TRAINING PROGRAM

## 2020-07-13 PROCEDURE — 96374 THER/PROPH/DIAG INJ IV PUSH: CPT

## 2020-07-13 RX ORDER — SUCRALFATE 1 G/1
1 TABLET ORAL EVERY 6 HOURS SCHEDULED
Status: DISCONTINUED | OUTPATIENT
Start: 2020-07-13 | End: 2020-07-14 | Stop reason: SDUPTHER

## 2020-07-13 RX ORDER — PANTOPRAZOLE SODIUM 40 MG/10ML
40 INJECTION, POWDER, LYOPHILIZED, FOR SOLUTION INTRAVENOUS 2 TIMES DAILY
Status: DISCONTINUED | OUTPATIENT
Start: 2020-07-13 | End: 2020-07-14 | Stop reason: SDUPTHER

## 2020-07-13 RX ORDER — 0.9 % SODIUM CHLORIDE 0.9 %
1000 INTRAVENOUS SOLUTION INTRAVENOUS ONCE
Status: COMPLETED | OUTPATIENT
Start: 2020-07-13 | End: 2020-07-13

## 2020-07-13 RX ORDER — SODIUM CHLORIDE 9 MG/ML
10 INJECTION INTRAVENOUS 2 TIMES DAILY
Status: DISCONTINUED | OUTPATIENT
Start: 2020-07-13 | End: 2020-07-14 | Stop reason: SDUPTHER

## 2020-07-13 RX ADMIN — SODIUM CHLORIDE, PRESERVATIVE FREE 10 ML: 5 INJECTION INTRAVENOUS at 23:07

## 2020-07-13 RX ADMIN — SODIUM CHLORIDE 1000 ML: 9 INJECTION, SOLUTION INTRAVENOUS at 20:31

## 2020-07-13 RX ADMIN — SUCRALFATE 1 G: 1 TABLET ORAL at 23:07

## 2020-07-13 RX ADMIN — PANTOPRAZOLE SODIUM 40 MG: 40 INJECTION, POWDER, FOR SOLUTION INTRAVENOUS at 23:07

## 2020-07-13 ASSESSMENT — ENCOUNTER SYMPTOMS
ABDOMINAL PAIN: 0
VOMITING: 0
SHORTNESS OF BREATH: 0
NAUSEA: 0
SORE THROAT: 0
BLOOD IN STOOL: 1
DIARRHEA: 0
RHINORRHEA: 0
BACK PAIN: 0
COUGH: 0

## 2020-07-14 PROBLEM — E66.9 OBESITY (BMI 30-39.9): Status: ACTIVE | Noted: 2020-07-14

## 2020-07-14 LAB
ALBUMIN SERPL-MCNC: 2.9 G/DL (ref 3.5–5.2)
ALP BLD-CCNC: 59 U/L (ref 35–104)
ALT SERPL-CCNC: 10 U/L (ref 0–32)
ANION GAP SERPL CALCULATED.3IONS-SCNC: 14 MMOL/L (ref 7–16)
APTT: 29.9 SEC (ref 24.5–35.1)
AST SERPL-CCNC: 14 U/L (ref 0–31)
BACTERIA: ABNORMAL /HPF
BASOPHILS ABSOLUTE: 0.03 E9/L (ref 0–0.2)
BASOPHILS RELATIVE PERCENT: 0.3 % (ref 0–2)
BILIRUB SERPL-MCNC: 0.6 MG/DL (ref 0–1.2)
BILIRUBIN DIRECT: <0.2 MG/DL (ref 0–0.3)
BILIRUBIN URINE: NEGATIVE
BILIRUBIN, INDIRECT: ABNORMAL MG/DL (ref 0–1)
BLOOD, URINE: ABNORMAL
BUN BLDV-MCNC: 19 MG/DL (ref 8–23)
CALCIUM SERPL-MCNC: 9.2 MG/DL (ref 8.6–10.2)
CHLORIDE BLD-SCNC: 106 MMOL/L (ref 98–107)
CHOLESTEROL, TOTAL: 83 MG/DL (ref 0–199)
CLARITY: CLEAR
CO2: 21 MMOL/L (ref 22–29)
COLOR: YELLOW
CREAT SERPL-MCNC: 0.8 MG/DL (ref 0.5–1)
EKG ATRIAL RATE: 108 BPM
EKG P AXIS: 68 DEGREES
EKG P-R INTERVAL: 148 MS
EKG Q-T INTERVAL: 318 MS
EKG QRS DURATION: 78 MS
EKG QTC CALCULATION (BAZETT): 426 MS
EKG R AXIS: 65 DEGREES
EKG T AXIS: 58 DEGREES
EKG VENTRICULAR RATE: 108 BPM
EOSINOPHILS ABSOLUTE: 0.15 E9/L (ref 0.05–0.5)
EOSINOPHILS RELATIVE PERCENT: 1.6 % (ref 0–6)
FERRITIN: 23 NG/ML
FOLATE: 18.8 NG/ML (ref 4.8–24.2)
GFR AFRICAN AMERICAN: >60
GFR NON-AFRICAN AMERICAN: >60 ML/MIN/1.73
GLUCOSE BLD-MCNC: 100 MG/DL (ref 74–99)
GLUCOSE URINE: NEGATIVE MG/DL
HBA1C MFR BLD: 5.6 % (ref 4–5.6)
HCT VFR BLD CALC: 24.6 % (ref 34–48)
HCT VFR BLD CALC: 24.6 % (ref 34–48)
HCT VFR BLD CALC: 25.1 % (ref 34–48)
HCT VFR BLD CALC: 25.9 % (ref 34–48)
HDLC SERPL-MCNC: 32 MG/DL
HEMOGLOBIN: 7.6 G/DL (ref 11.5–15.5)
HEMOGLOBIN: 7.6 G/DL (ref 11.5–15.5)
HEMOGLOBIN: 7.8 G/DL (ref 11.5–15.5)
HEMOGLOBIN: 8.1 G/DL (ref 11.5–15.5)
IMMATURE GRANULOCYTES #: 0.07 E9/L
IMMATURE GRANULOCYTES %: 0.8 % (ref 0–5)
INR BLD: 1.2
IRON SATURATION: 16 % (ref 15–50)
IRON: 36 MCG/DL (ref 37–145)
KETONES, URINE: NEGATIVE MG/DL
LDL CHOLESTEROL CALCULATED: 23 MG/DL (ref 0–99)
LEUKOCYTE ESTERASE, URINE: ABNORMAL
LYMPHOCYTES ABSOLUTE: 3.04 E9/L (ref 1.5–4)
LYMPHOCYTES RELATIVE PERCENT: 33.3 % (ref 20–42)
MCH RBC QN AUTO: 29 PG (ref 26–35)
MCH RBC QN AUTO: 29.2 PG (ref 26–35)
MCH RBC QN AUTO: 29.3 PG (ref 26–35)
MCHC RBC AUTO-ENTMCNC: 30.9 % (ref 32–34.5)
MCHC RBC AUTO-ENTMCNC: 31.1 % (ref 32–34.5)
MCHC RBC AUTO-ENTMCNC: 31.3 % (ref 32–34.5)
MCV RBC AUTO: 93.5 FL (ref 80–99.9)
MCV RBC AUTO: 93.9 FL (ref 80–99.9)
MCV RBC AUTO: 94.4 FL (ref 80–99.9)
MONOCYTES ABSOLUTE: 0.58 E9/L (ref 0.1–0.95)
MONOCYTES RELATIVE PERCENT: 6.3 % (ref 2–12)
NEUTROPHILS ABSOLUTE: 5.27 E9/L (ref 1.8–7.3)
NEUTROPHILS RELATIVE PERCENT: 57.7 % (ref 43–80)
NITRITE, URINE: NEGATIVE
PDW BLD-RTO: 15 FL (ref 11.5–15)
PDW BLD-RTO: 15.1 FL (ref 11.5–15)
PDW BLD-RTO: 15.2 FL (ref 11.5–15)
PH UA: 5.5 (ref 5–9)
PLATELET # BLD: 202 E9/L (ref 130–450)
PLATELET # BLD: 227 E9/L (ref 130–450)
PLATELET # BLD: 244 E9/L (ref 130–450)
PMV BLD AUTO: 10 FL (ref 7–12)
PMV BLD AUTO: 9.8 FL (ref 7–12)
PMV BLD AUTO: 9.9 FL (ref 7–12)
POTASSIUM REFLEX MAGNESIUM: 3.7 MMOL/L (ref 3.5–5)
PROTEIN UA: NEGATIVE MG/DL
PROTHROMBIN TIME: 13.8 SEC (ref 9.3–12.4)
RBC # BLD: 2.62 E12/L (ref 3.5–5.5)
RBC # BLD: 2.66 E12/L (ref 3.5–5.5)
RBC # BLD: 2.77 E12/L (ref 3.5–5.5)
RBC UA: ABNORMAL /HPF (ref 0–2)
REASON FOR REJECTION: NORMAL
REJECTED TEST: NORMAL
SODIUM BLD-SCNC: 141 MMOL/L (ref 132–146)
SPECIFIC GRAVITY UA: 1.01 (ref 1–1.03)
TOTAL IRON BINDING CAPACITY: 228 MCG/DL (ref 250–450)
TOTAL PROTEIN: 5.1 G/DL (ref 6.4–8.3)
TRIGL SERPL-MCNC: 142 MG/DL (ref 0–149)
TROPONIN: 0.02 NG/ML (ref 0–0.03)
TROPONIN: <0.01 NG/ML (ref 0–0.03)
TSH SERPL DL<=0.05 MIU/L-ACNC: 6.03 UIU/ML (ref 0.27–4.2)
UROBILINOGEN, URINE: 0.2 E.U./DL
VITAMIN B-12: 521 PG/ML (ref 211–946)
VLDLC SERPL CALC-MCNC: 28 MG/DL
WBC # BLD: 7.8 E9/L (ref 4.5–11.5)
WBC # BLD: 8.5 E9/L (ref 4.5–11.5)
WBC # BLD: 9.1 E9/L (ref 4.5–11.5)
WBC UA: ABNORMAL /HPF (ref 0–5)

## 2020-07-14 PROCEDURE — 80048 BASIC METABOLIC PNL TOTAL CA: CPT

## 2020-07-14 PROCEDURE — 82746 ASSAY OF FOLIC ACID SERUM: CPT

## 2020-07-14 PROCEDURE — 6360000002 HC RX W HCPCS: Performed by: NURSE PRACTITIONER

## 2020-07-14 PROCEDURE — 82607 VITAMIN B-12: CPT

## 2020-07-14 PROCEDURE — 85730 THROMBOPLASTIN TIME PARTIAL: CPT

## 2020-07-14 PROCEDURE — 83550 IRON BINDING TEST: CPT

## 2020-07-14 PROCEDURE — 6370000000 HC RX 637 (ALT 250 FOR IP): Performed by: NURSE PRACTITIONER

## 2020-07-14 PROCEDURE — 85610 PROTHROMBIN TIME: CPT

## 2020-07-14 PROCEDURE — 85014 HEMATOCRIT: CPT

## 2020-07-14 PROCEDURE — 80076 HEPATIC FUNCTION PANEL: CPT

## 2020-07-14 PROCEDURE — 82728 ASSAY OF FERRITIN: CPT

## 2020-07-14 PROCEDURE — 85025 COMPLETE CBC W/AUTO DIFF WBC: CPT

## 2020-07-14 PROCEDURE — 84484 ASSAY OF TROPONIN QUANT: CPT

## 2020-07-14 PROCEDURE — 2580000003 HC RX 258: Performed by: NURSE PRACTITIONER

## 2020-07-14 PROCEDURE — 83540 ASSAY OF IRON: CPT

## 2020-07-14 PROCEDURE — G0378 HOSPITAL OBSERVATION PER HR: HCPCS

## 2020-07-14 PROCEDURE — 6370000000 HC RX 637 (ALT 250 FOR IP): Performed by: INTERNAL MEDICINE

## 2020-07-14 PROCEDURE — 85018 HEMOGLOBIN: CPT

## 2020-07-14 PROCEDURE — 80061 LIPID PANEL: CPT

## 2020-07-14 PROCEDURE — 36415 COLL VENOUS BLD VENIPUNCTURE: CPT

## 2020-07-14 PROCEDURE — 83036 HEMOGLOBIN GLYCOSYLATED A1C: CPT

## 2020-07-14 PROCEDURE — 84443 ASSAY THYROID STIM HORMONE: CPT

## 2020-07-14 PROCEDURE — 85027 COMPLETE CBC AUTOMATED: CPT

## 2020-07-14 PROCEDURE — 96376 TX/PRO/DX INJ SAME DRUG ADON: CPT

## 2020-07-14 PROCEDURE — C9113 INJ PANTOPRAZOLE SODIUM, VIA: HCPCS | Performed by: NURSE PRACTITIONER

## 2020-07-14 RX ORDER — ONDANSETRON 2 MG/ML
4 INJECTION INTRAMUSCULAR; INTRAVENOUS EVERY 6 HOURS PRN
Status: DISCONTINUED | OUTPATIENT
Start: 2020-07-14 | End: 2020-07-16 | Stop reason: HOSPADM

## 2020-07-14 RX ORDER — IRBESARTAN 150 MG/1
150 TABLET ORAL DAILY
Status: DISCONTINUED | OUTPATIENT
Start: 2020-07-14 | End: 2020-07-14 | Stop reason: CLARIF

## 2020-07-14 RX ORDER — SUCRALFATE 1 G/1
1 TABLET ORAL 4 TIMES DAILY
Status: DISCONTINUED | OUTPATIENT
Start: 2020-07-14 | End: 2020-07-16 | Stop reason: HOSPADM

## 2020-07-14 RX ORDER — HYDROCODONE BITARTRATE AND ACETAMINOPHEN 7.5; 325 MG/1; MG/1
1 TABLET ORAL EVERY 12 HOURS PRN
Status: DISCONTINUED | OUTPATIENT
Start: 2020-07-14 | End: 2020-07-16 | Stop reason: HOSPADM

## 2020-07-14 RX ORDER — SODIUM CHLORIDE 0.9 % (FLUSH) 0.9 %
10 SYRINGE (ML) INJECTION EVERY 12 HOURS SCHEDULED
Status: DISCONTINUED | OUTPATIENT
Start: 2020-07-14 | End: 2020-07-16 | Stop reason: HOSPADM

## 2020-07-14 RX ORDER — SODIUM CHLORIDE 0.9 % (FLUSH) 0.9 %
10 SYRINGE (ML) INJECTION PRN
Status: DISCONTINUED | OUTPATIENT
Start: 2020-07-14 | End: 2020-07-16 | Stop reason: HOSPADM

## 2020-07-14 RX ORDER — LOSARTAN POTASSIUM 50 MG/1
50 TABLET ORAL DAILY
Status: DISCONTINUED | OUTPATIENT
Start: 2020-07-14 | End: 2020-07-16 | Stop reason: HOSPADM

## 2020-07-14 RX ORDER — B-COMPLEX WITH VITAMIN C
2 TABLET ORAL DAILY
Status: DISCONTINUED | OUTPATIENT
Start: 2020-07-14 | End: 2020-07-16 | Stop reason: HOSPADM

## 2020-07-14 RX ORDER — M-VIT,TX,IRON,MINS/CALC/FOLIC 27MG-0.4MG
1 TABLET ORAL DAILY
Status: DISCONTINUED | OUTPATIENT
Start: 2020-07-14 | End: 2020-07-16 | Stop reason: HOSPADM

## 2020-07-14 RX ORDER — TIZANIDINE 4 MG/1
2 TABLET ORAL 3 TIMES DAILY
Status: DISCONTINUED | OUTPATIENT
Start: 2020-07-14 | End: 2020-07-15

## 2020-07-14 RX ORDER — FUROSEMIDE 20 MG/1
20 TABLET ORAL DAILY
Status: DISCONTINUED | OUTPATIENT
Start: 2020-07-14 | End: 2020-07-15

## 2020-07-14 RX ORDER — PANTOPRAZOLE SODIUM 40 MG/10ML
40 INJECTION, POWDER, LYOPHILIZED, FOR SOLUTION INTRAVENOUS 2 TIMES DAILY
Status: DISCONTINUED | OUTPATIENT
Start: 2020-07-14 | End: 2020-07-16 | Stop reason: HOSPADM

## 2020-07-14 RX ORDER — SODIUM CHLORIDE 9 MG/ML
10 INJECTION INTRAVENOUS 2 TIMES DAILY
Status: DISCONTINUED | OUTPATIENT
Start: 2020-07-14 | End: 2020-07-16 | Stop reason: HOSPADM

## 2020-07-14 RX ORDER — CHOLECALCIFEROL (VITAMIN D3) 50 MCG
10000 TABLET ORAL
Status: DISCONTINUED | OUTPATIENT
Start: 2020-07-14 | End: 2020-07-16 | Stop reason: HOSPADM

## 2020-07-14 RX ORDER — AMLODIPINE BESYLATE 10 MG/1
10 TABLET ORAL DAILY
Status: DISCONTINUED | OUTPATIENT
Start: 2020-07-14 | End: 2020-07-15

## 2020-07-14 RX ORDER — ACETAMINOPHEN 650 MG/1
650 SUPPOSITORY RECTAL EVERY 6 HOURS PRN
Status: DISCONTINUED | OUTPATIENT
Start: 2020-07-14 | End: 2020-07-16 | Stop reason: HOSPADM

## 2020-07-14 RX ORDER — ACETAMINOPHEN 325 MG/1
650 TABLET ORAL EVERY 6 HOURS PRN
Status: DISCONTINUED | OUTPATIENT
Start: 2020-07-14 | End: 2020-07-16 | Stop reason: HOSPADM

## 2020-07-14 RX ORDER — POLYETHYLENE GLYCOL 3350 17 G/17G
17 POWDER, FOR SOLUTION ORAL DAILY PRN
Status: DISCONTINUED | OUTPATIENT
Start: 2020-07-14 | End: 2020-07-16 | Stop reason: HOSPADM

## 2020-07-14 RX ORDER — ATORVASTATIN CALCIUM 10 MG/1
10 TABLET, FILM COATED ORAL DAILY
Status: DISCONTINUED | OUTPATIENT
Start: 2020-07-14 | End: 2020-07-16 | Stop reason: HOSPADM

## 2020-07-14 RX ORDER — LEVOTHYROXINE SODIUM 137 UG/1
137 TABLET ORAL DAILY
Status: DISCONTINUED | OUTPATIENT
Start: 2020-07-14 | End: 2020-07-16 | Stop reason: HOSPADM

## 2020-07-14 RX ORDER — PROMETHAZINE HYDROCHLORIDE 25 MG/1
12.5 TABLET ORAL EVERY 6 HOURS PRN
Status: DISCONTINUED | OUTPATIENT
Start: 2020-07-14 | End: 2020-07-16 | Stop reason: HOSPADM

## 2020-07-14 RX ORDER — ALLOPURINOL 300 MG/1
300 TABLET ORAL DAILY
Status: DISCONTINUED | OUTPATIENT
Start: 2020-07-14 | End: 2020-07-16 | Stop reason: HOSPADM

## 2020-07-14 RX ADMIN — METOPROLOL TARTRATE 25 MG: 25 TABLET, FILM COATED ORAL at 01:49

## 2020-07-14 RX ADMIN — SERTRALINE HYDROCHLORIDE 50 MG: 50 TABLET ORAL at 08:42

## 2020-07-14 RX ADMIN — LEVOTHYROXINE SODIUM 137 MCG: 0.14 TABLET ORAL at 07:59

## 2020-07-14 RX ADMIN — SUCRALFATE 1 G: 1 TABLET ORAL at 13:25

## 2020-07-14 RX ADMIN — SUCRALFATE 1 G: 1 TABLET ORAL at 17:27

## 2020-07-14 RX ADMIN — METOPROLOL TARTRATE 25 MG: 25 TABLET, FILM COATED ORAL at 20:37

## 2020-07-14 RX ADMIN — PANTOPRAZOLE SODIUM 40 MG: 40 INJECTION, POWDER, FOR SOLUTION INTRAVENOUS at 20:37

## 2020-07-14 RX ADMIN — SODIUM CHLORIDE, PRESERVATIVE FREE 10 ML: 5 INJECTION INTRAVENOUS at 08:42

## 2020-07-14 RX ADMIN — MULTIPLE VITAMINS W/ MINERALS TAB 1 TABLET: TAB at 08:42

## 2020-07-14 RX ADMIN — PANTOPRAZOLE SODIUM 40 MG: 40 INJECTION, POWDER, FOR SOLUTION INTRAVENOUS at 08:42

## 2020-07-14 RX ADMIN — LOSARTAN POTASSIUM 50 MG: 50 TABLET, FILM COATED ORAL at 08:42

## 2020-07-14 RX ADMIN — Medication 10000 UNITS: at 13:25

## 2020-07-14 RX ADMIN — SODIUM CHLORIDE, PRESERVATIVE FREE 10 ML: 5 INJECTION INTRAVENOUS at 08:43

## 2020-07-14 RX ADMIN — ATORVASTATIN CALCIUM 10 MG: 10 TABLET, FILM COATED ORAL at 08:42

## 2020-07-14 RX ADMIN — SODIUM CHLORIDE, PRESERVATIVE FREE 10 ML: 5 INJECTION INTRAVENOUS at 20:37

## 2020-07-14 RX ADMIN — PANTOPRAZOLE SODIUM 40 MG: 40 INJECTION, POWDER, FOR SOLUTION INTRAVENOUS at 01:50

## 2020-07-14 RX ADMIN — ALLOPURINOL 300 MG: 300 TABLET ORAL at 08:42

## 2020-07-14 RX ADMIN — CALCIUM CARBONATE-VITAMIN D TAB 500 MG-200 UNIT 2 TABLET: 500-200 TAB at 08:42

## 2020-07-14 RX ADMIN — SUCRALFATE 1 G: 1 TABLET ORAL at 08:42

## 2020-07-14 RX ADMIN — SODIUM CHLORIDE, PRESERVATIVE FREE 10 ML: 5 INJECTION INTRAVENOUS at 01:50

## 2020-07-14 RX ADMIN — METOPROLOL TARTRATE 25 MG: 25 TABLET, FILM COATED ORAL at 08:42

## 2020-07-14 RX ADMIN — SODIUM CHLORIDE, PRESERVATIVE FREE 10 ML: 5 INJECTION INTRAVENOUS at 20:38

## 2020-07-14 RX ADMIN — SUCRALFATE 1 G: 1 TABLET ORAL at 20:37

## 2020-07-14 ASSESSMENT — PAIN SCALES - GENERAL
PAINLEVEL_OUTOF10: 0

## 2020-07-14 NOTE — CONSULTS
GENERAL SURGERY  CONSULT NOTE            Date: 7/13/2020        Patient Name: Katie Lubin     YOB: 1950      Age:  65 y.o. Consult by: Dr Sousa Centers to: Dr Shannon Sagastume  Reason:  BRBPR    Chief Complaint     Chief Complaint   Patient presents with    Rectal Bleeding     pt reports she threw up about 6pm ana went to bathroom and had a bloody bm     History Obtained From   patient    History of Present Illness   Katie Lubin is a 65 y.o. female with nonbleeding duodenal ulcer seen by Dr Shannon Sagastume on 7/7/2020 EGD and hx of diverticulosis with negative colonoscopy 2013 (no recent weight loss) who presents for BRBPR mixed with stool x2 today, associated with one nonbloody brown emesis. But now in ED the NG output shows blood tinged mucous ~50cc. Patient has had:  [] coffee ground emesis  [] hematemesis  [] nausea  [] abdominal pain  [] melena  [x] hematochezia  [] None of the above    Last dose of blood thinner : quit aspirin 1 week ago because of duodenal ulcer found  NSAIDs : no  Steroids : no  Alcohol : no  Tobacco : no    Past Medical History     Past Medical History:   Diagnosis Date    Back problem     Carotid artery stenosis     Chest pain 2/22/2013    Diabetic neuropathy ([address] 12/4/2015    Full dentures     Hiatal hernia     Hypertension     Hyperuricemia 12/4/2015    Lipid disorder 12/4/2015    Osteoarthritis     lumbosacral spine arthritis and disc disease    Peripheral vascular disease ([address]     Preoperative clearance     08/2015 per Dr Cyrus Xavier and 10/2015 per Dr Miguel Rodarte for [facility] Dr Dheeraj Omalley.     PVD (peripheral vascular disease) with claudication ([address] 12/13/2012    follows with Dr Patti Oliver Rapid palpitations     follows with Dr Hortensia Carlin Thyroid disease     Type II or unspecified type diabetes mellitus without mention of complication, not stated as uncontrolled     Wears glasses         Past Surgical History     Past Surgical History:   Procedure Laterality Date    APPENDECTOMY      COLONOSCOPY      HYSTERECTOMY      with appendectomy    JOINT REPLACEMENT Left 12/3/2015    total hip    THROAT SURGERY  2002    benign    TOTAL HIP ARTHROPLASTY Left 12/2015    UPPER GASTROINTESTINAL ENDOSCOPY N/A 7/7/2020    EGD BIOPSY performed by Fadi Dodge MD at [facility] ENDOSCOPY        Medications - Prior to Admission and Current Meds     Prior to Admission medications    Medication Sig Start Date End Date Taking? Authorizing Provider   pantoprazole (PROTONIX) 40 MG tablet Take 1 tablet by mouth daily 7/8/20   Gem Gupta MD   tiZANidine (ZANAFLEX) 2 MG capsule Take 2 mg by mouth 3 times daily    Historical Provider, MD   acetaminophen (TYLENOL) 500 MG tablet Take 500 mg by mouth every 6 hours as needed for Pain    Historical Provider, MD   allopurinol (ZYLOPRIM) 300 MG tablet Take 300 mg by mouth daily 3/24/18   Historical Provider, MD   SYNTHROID 137 MCG tablet Take 137 mcg by mouth daily 1/16/18   Historical Provider, MD   sucralfate (CARAFATE) 1 GM tablet Take 1 g by mouth 4 times daily    Historical Provider, MD   sertraline (ZOLOFT) 50 MG tablet Take 50 mg by mouth daily    Historical Provider, MD   JANUVIA 100 MG tablet Take 100 mg by mouth daily  5/26/16   Historical Provider, MD   metFORMIN (GLUCOPHAGE) 500 MG tablet Take 1,000 mg by mouth 2 times daily (with meals)    Historical Provider, MD   canagliflozin (INVOKANA) 100 MG TABS tablet Take 100 mg by mouth every morning (before breakfast)    Historical Provider, MD   HYDROcodone-acetaminophen (NORCO) 7.5-325 MG per tablet Take 1 tablet by mouth every 12 hours as needed for Pain    Historical Provider, MD   Cholecalciferol (VITAMIN D3) 69570 UNITS CAPS Take 1 capsule by mouth See Admin Instructions 5 days a week    Historical Provider, MD   atorvastatin (LIPITOR) 10 MG tablet Take 10 mg by mouth daily    Historical Provider, MD   irbesartan (AVAPRO) 150 MG tablet Take 150 mg by mouth daily. Historical Provider, MD   metoprolol (LOPRESSOR) 25 MG tablet Take 25 mg by mouth 2 times daily Take am surgery 12/03. Historical Provider, MD   furosemide (LASIX) 20 MG tablet Take 20 mg by mouth daily. Historical Provider, MD   amLODIPine (NORVASC) 10 MG tablet Take 10 mg by mouth daily Take am dos, 12/03/2015    Historical Provider, MD   Calcium Carb-Cholecalciferol (CALCIUM 1000 + D PO) Take by mouth daily     Historical Provider, MD   therapeutic multivitamin-minerals (THERAGRAN-M) tablet Take 1 tablet by mouth daily. Historical Provider, MD   Elastic Bandages & Supports ([address]) [address] [facility] by Does not apply route. 20-30 MMHG knee high     Historical Provider, MD        Inpatient:  No current facility-administered medications for this encounter. Allergies   Adhesive tape    Social History     Social History     Tobacco History     Smoking Status  Former Smoker Quit date  12/13/2007 Smoking Frequency  1 pack/day for 40 years (36 pk yrs) Smoking Tobacco Type  Cigarettes    Smokeless Tobacco Use  Never Used          Alcohol History     Alcohol Use Status  No          Drug Use     Drug Use Status  No          Sexual Activity     Sexually Active  Not Asked                Family History     Family History   Problem Relation Age of Onset    Kidney Disease Mother     Cancer Father     Heart Disease Maternal Grandmother        Review of Systems   Pertinent ROS listed in HPI, all others negative    Physical Exam   BP (!) 169/68   Pulse 101   Temp 96.2 °F (35.7 °C)   Resp 17   Ht 5' 5\" (1.651 m)   Wt 236 lb (107 kg)   SpO2 96%   BMI 39.27 kg/m²     GENERAL:  No acute distress. Alert and oriented. HEAD:  Normocephalic, atraumatic. EYES:  No scleral icterus. Conjugate gaze. ENT/NECK:  Trachea midline, mucous membranes moist.   LUNGS:  No cough. Nonlabored breathing on room air. CARDIOVASC:  Mild tachy rate, no cyanosis. ABDOMEN:  Soft, non-distended, non-tender.  No guarding / rigidity / rebound. RECTAL:  No hemorrhoids. FOBT positive on scant brown stool  LIMBS:  No deformities, no edema. NEURO:  Face symmetric, moves all extremities  SKIN:  Warm, dry. Labs    CBC  Recent Labs     20   WBC 9.8   HGB 8.7*   HCT 27.6*        BMP  Recent Labs     20      K 3.6      CO2 19*   BUN 18   CREATININE 0.9   CALCIUM 9.8     Liver Function  Recent Labs     20   BILITOT 0.7   AST 10   ALT 11   ALKPHOS 70   PROT 5.4*   LABALBU 3.2*     No results for input(s): LACTATE in the last 72 hours. No results for input(s): INR, PTT in the last 72 hours. Invalid input(s): PT    Imaging/Diagnostics Last 24 Hours   Xr Chest Portable    Result Date: 2020  Patient MRN: [id number] : 1950 Age:  65 years Gender: Female Order Date: 2020 8:30 PM Exam: XR CHEST PORTABLE Number of Images: 1 view Indication:   GI bleed GI bleed Comparison: 2020 FINDINGS: Minimal opacity at the right base is probably atelectasis and appears less pronounced compared to the prior study. Heart and pulmonary vascularity are normal. Neither costophrenic angle is slightly blunted. Mild atelectasis suggested at the right base which is less pronounced than on the prior study. Assessment        65 y.o. female with BRBPR, recently found nonbleeding duodenal ulcer, has hx diverticulosis but given blood from NG is most likely rebleeding duo ulcer    Plan   - PPI BID, carafate, hold thinners  - monitor hgb Q6  - transfuse prn <7 per primary  - NPO IVF  - EGD in the morning (sooner if becomes unstable)    Plan was discussed with Dr. Jia Clement.     Electronically signed by Mariaelena Kelley MD on 20 at 10:46 PM EDT

## 2020-07-14 NOTE — ED PROVIDER NOTES
Noni Hull is a 65 y.o. female with a PMHx significant for PVD, HLD, HTN,  who presents for evaluation of bright red blood per rectum, beginning prior to arrival.  The complaint has been persistent, moderate in severity, and worsened by nothing. The patient states that she was recently scoped by GI 5 days ago and was told her peptic ulcer was no longer bleeding. She notes today at 1800 she started feel nauseated had an episode of nonbloody vomit. She then felt the need to have a bowel movement and states it was bright red blood. She notes a second episode prior to EMS arriving. She denies any dizziness, headedness, chest pain, shortness of breath, nausea, vomiting. Patient does note she has been taking her medication as prescribed. The history is provided by the patient and medical records. Review of Systems   Constitutional: Negative for chills, diaphoresis and fever. HENT: Negative for congestion, rhinorrhea and sore throat. Eyes: Negative for visual disturbance. Respiratory: Negative for cough and shortness of breath. Cardiovascular: Negative for chest pain. Gastrointestinal: Positive for blood in stool. Negative for abdominal pain, diarrhea, nausea and vomiting. Genitourinary: Negative for dysuria and urgency. Musculoskeletal: Negative for back pain. Skin: Negative for rash. Neurological: Negative for dizziness, light-headedness, numbness and headaches. Physical Exam  Vitals signs and nursing note reviewed. Constitutional:       General: She is not in acute distress. Appearance: Normal appearance. She is well-developed. She is not ill-appearing. HENT:      Head: Normocephalic and atraumatic. Right Ear: External ear normal.      Left Ear: External ear normal. There is no impacted cerumen. Eyes:      General:         Right eye: No discharge. Left eye: No discharge. Extraocular Movements: Extraocular movements intact. Conjunctiva/sclera: Conjunctivae normal.   Neck:      Musculoskeletal: Normal range of motion and neck supple. Cardiovascular:      Rate and Rhythm: Normal rate and regular rhythm. Heart sounds: Normal heart sounds. No murmur. Pulmonary:      Effort: Pulmonary effort is normal. No respiratory distress. Breath sounds: Normal breath sounds. No stridor. No wheezing. Abdominal:      General: There is no distension. Palpations: Abdomen is soft. There is no mass. Tenderness: There is no abdominal tenderness. Musculoskeletal: Normal range of motion. General: No swelling, tenderness or deformity. Skin:     General: Skin is warm and dry. Coloration: Skin is not jaundiced or pale. Findings: No bruising. Neurological:      General: No focal deficit present. Mental Status: She is alert and oriented to person, place, and time. Cranial Nerves: No cranial nerve deficit. Coordination: Coordination normal.          Procedures     [facility]     ED Course as of Jul 14 0214   Mon Jul 13, 2020 2112 Fluids being given     Lactic Acid(!): 3.5 [BB]   2324 Spoke with General Surgery, Dr. Pankaj Hendricks. They will provide consult  Requesting medical admission      [BB]   3020 Spoke with Silas Boston for Dr. Beatriz Mccarthy, discussed the patient. They will admit the patient. [BB]      ED Course User Index  [BB] Radha Tidwell DO      Omega Tinajero presents to the ED for evaluation of bright red blood per rectum. Differential diagnoses included but not limited to GI bleed, fissure, fistula, hemorrhoids. Workup in the ED revealed labs normal limits, not a large decrease in Hgb. On rectal exam there was bright red blood per rectum. Patient was given fluids for their symptoms. Patient was given fluids as she was tachycardic. Case was discussed with general surgery who evaluated her at bedside and will provide consult.  Patient requires continued workup and management of their symptoms and will be admitted to the hospital for further evaluation and treatment.      --------------------------------------------- PAST HISTORY ---------------------------------------------  Past Medical History:  has a past medical history of Back problem, Carotid artery stenosis, Chest pain, Diabetic neuropathy ([facility] [address] Full dentures, Hiatal hernia, Hypertension, Hyperuricemia, Lipid disorder, Osteoarthritis, Peripheral vascular disease ([facility] [address] Preoperative clearance, PVD (peripheral vascular disease) with claudication ([facility] [address] Rapid palpitations, Thyroid disease, Type II or unspecified type diabetes mellitus without mention of complication, not stated as uncontrolled, and Wears glasses. Past Surgical History:  has a past surgical history that includes Appendectomy; Throat surgery (2002); Colonoscopy; Hysterectomy; joint replacement (Left, 12/3/2015); Total hip arthroplasty (Left, 12/2015); and Upper gastrointestinal endoscopy (N/A, 7/7/2020). Social History:  reports that she quit smoking about 12 years ago. Her smoking use included cigarettes. She has a 40.00 pack-year smoking history. She has never used smokeless tobacco. She reports that she does not drink alcohol or use drugs. Family History: family history includes Cancer in her father; Heart Disease in her maternal grandmother; Kidney Disease in her mother. The patients home medications have been reviewed.     Allergies: Adhesive tape    -------------------------------------------------- RESULTS -------------------------------------------------    LABS:  Results for orders placed or performed during the hospital encounter of 07/13/20   CBC Auto Differential   Result Value Ref Range    WBC 9.8 4.5 - 11.5 E9/L    RBC 2.91 (L) 3.50 - 5.50 E12/L    Hemoglobin 8.7 (L) 11.5 - 15.5 g/dL    Hematocrit 27.6 (L) 34.0 - 48.0 %    MCV 94.8 80.0 - 99.9 fL    MCH 29.9 26.0 - 35.0 pg    MCHC 31.5 (L) 32.0 - 34.5 %    RDW 15.0 11.5 - 15.0 fL    Platelets 003 316 - 136 E9/L    MPV Folate 18.8 4.8 - 24.2 ng/mL   Ferritin   Result Value Ref Range    Ferritin 23 ng/mL   TYPE AND SCREEN   Result Value Ref Range    ABO/Rh O POS     Antibody Screen NEG        RADIOLOGY:  XR CHEST PORTABLE   Final Result   Mild atelectasis suggested at the right base which is less pronounced   than on the prior study. ------------------------- NURSING NOTES AND VITALS REVIEWED ---------------------------  Date / Time Roomed:  7/13/2020  7:59 PM  ED Bed Assignment:  [id number]/4505-B    The nursing notes within the ED encounter and vital signs as below have been reviewed. Patient Vitals for the past 24 hrs:   BP Temp Temp src Pulse Resp SpO2 Height Weight   07/14/20 0111 (!) 160/73 97.5 °F (36.4 °C) Temporal 99 18 94 % -- --   07/13/20 2155 (!) 169/68 -- -- 101 17 96 % -- --   07/13/20 2006 128/71 96.2 °F (35.7 °C) -- 120 18 98 % 5' 5\" (1.651 m) 236 lb (107 kg)       Oxygen Saturation Interpretation: Normal    ------------------------------------------ PROGRESS NOTES ------------------------------------------  Counseling:  I have spoken with the patient and discussed todays results, in addition to providing specific details for the plan of care and counseling regarding the diagnosis and prognosis. Their questions are answered at this time and they are agreeable with the plan of admission.    --------------------------------- ADDITIONAL PROVIDER NOTES ---------------------------------  Consultations:  Spoke with Aleida Castelan for Dr. Joana Curiel. Discussed case. They will admit the patient. This patient's ED course included: a personal history and physicial examination, re-evaluation prior to disposition, multiple bedside re-evaluations, IV medications, cardiac monitoring, continuous pulse oximetry and complex medical decision making and emergency management    This patient has remained hemodynamically stable during their ED course. Diagnosis:  1.  Hemorrhage of rectum and anus        Disposition:  Patient's disposition: Admit to telemetry  Patient's condition is stable.              Edi Gomez DO  Resident  07/14/20 6951

## 2020-07-14 NOTE — CARE COORDINATION
Patient from home, lives alone. She uses a walker at all times and also has a wheelchair for long distances. Home is one floor. No homecare active. She tells me she was at [facility] several years ago after a hip replacement, this was a positive experience. She feels she will be able to return home with no new needs at discharge. Her son lives 5 minutes away and is supportive. PCP is Dr. Miguel Rodarte. Family will provide transport home. Planned EGD in am. Readmission screening completed. For questions I can be reached at [phone].  Juan C [place]

## 2020-07-14 NOTE — PROGRESS NOTES
GENERAL SURGERY  DAILY PROGRESS NOTE  7/14/2020    Chief Complaint   Patient presents with    Rectal Bleeding     pt reports she threw up about 6pm ana went to bathroom and had a bloody bm       Subjective:  Pt states that she is feeling well this morning. She denies any abdominal pain or N/V. Nurse reported bloody BM this morning. Objective:  BP (!) 160/73   Pulse 99   Temp 97.5 °F (36.4 °C) (Temporal)   Resp 18   Ht 5' 5\" (1.651 m)   Wt 236 lb (107 kg)   SpO2 94%   BMI 39.27 kg/m²     GENERAL:  Laying in bed, awake, alert, cooperative, no apparent distress  LUNGS:  No increased work of breathing  CARDIOVASCULAR:  RR  ABDOMEN:  Soft, non-tender, non-distended    Assessment/Plan:  65 y.o. female with GI bleed, with hx of non-bleeding duodenal ulcer and diverticulosis.     - Trend H/H, transfuse PRN Hgb <7  - Plan for EGD tomorrow 7/15  - NPO @ [facility] for procedure  - Monitor vitals  - Continue PPI and Carafate  - IVF  - Replete Lytes PRN    Electronically signed by Irish Jimenez MD on 7/14/2020 at 6:46 AM

## 2020-07-14 NOTE — PLAN OF CARE
Problem: Bleeding:  Goal: Will show no signs and symptoms of excessive bleeding  Outcome: Met This Shift

## 2020-07-15 ENCOUNTER — ANESTHESIA EVENT (OUTPATIENT)
Dept: ENDOSCOPY | Age: 70
End: 2020-07-15
Payer: MEDICARE

## 2020-07-15 ENCOUNTER — ANESTHESIA (OUTPATIENT)
Dept: ENDOSCOPY | Age: 70
End: 2020-07-15
Payer: MEDICARE

## 2020-07-15 VITALS
SYSTOLIC BLOOD PRESSURE: 115 MMHG | DIASTOLIC BLOOD PRESSURE: 59 MMHG | RESPIRATION RATE: 27 BRPM | OXYGEN SATURATION: 100 %

## 2020-07-15 LAB
HCT VFR BLD CALC: 24.1 % (ref 34–48)
HCT VFR BLD CALC: 24.5 % (ref 34–48)
HCT VFR BLD CALC: 26.1 % (ref 34–48)
HCT VFR BLD CALC: 27 % (ref 34–48)
HCT VFR BLD CALC: 29 % (ref 34–48)
HEMOGLOBIN: 7.4 G/DL (ref 11.5–15.5)
HEMOGLOBIN: 7.5 G/DL (ref 11.5–15.5)
HEMOGLOBIN: 7.9 G/DL (ref 11.5–15.5)
HEMOGLOBIN: 8.3 G/DL (ref 11.5–15.5)
HEMOGLOBIN: 8.9 G/DL (ref 11.5–15.5)
MCH RBC QN AUTO: 28.7 PG (ref 26–35)
MCH RBC QN AUTO: 28.8 PG (ref 26–35)
MCH RBC QN AUTO: 29.1 PG (ref 26–35)
MCH RBC QN AUTO: 29.5 PG (ref 26–35)
MCH RBC QN AUTO: 29.6 PG (ref 26–35)
MCHC RBC AUTO-ENTMCNC: 30.2 % (ref 32–34.5)
MCHC RBC AUTO-ENTMCNC: 30.3 % (ref 32–34.5)
MCHC RBC AUTO-ENTMCNC: 30.7 % (ref 32–34.5)
MCHC RBC AUTO-ENTMCNC: 30.7 % (ref 32–34.5)
MCHC RBC AUTO-ENTMCNC: 31.1 % (ref 32–34.5)
MCV RBC AUTO: 93.4 FL (ref 80–99.9)
MCV RBC AUTO: 94.9 FL (ref 80–99.9)
MCV RBC AUTO: 95.3 FL (ref 80–99.9)
MCV RBC AUTO: 96.1 FL (ref 80–99.9)
MCV RBC AUTO: 96.3 FL (ref 80–99.9)
PDW BLD-RTO: 15.2 FL (ref 11.5–15)
PDW BLD-RTO: 15.2 FL (ref 11.5–15)
PDW BLD-RTO: 15.3 FL (ref 11.5–15)
PDW BLD-RTO: 15.3 FL (ref 11.5–15)
PDW BLD-RTO: 15.5 FL (ref 11.5–15)
PLATELET # BLD: 206 E9/L (ref 130–450)
PLATELET # BLD: 218 E9/L (ref 130–450)
PLATELET # BLD: 231 E9/L (ref 130–450)
PLATELET # BLD: 254 E9/L (ref 130–450)
PLATELET # BLD: 263 E9/L (ref 130–450)
PMV BLD AUTO: 10 FL (ref 7–12)
PMV BLD AUTO: 10 FL (ref 7–12)
PMV BLD AUTO: 10.1 FL (ref 7–12)
PMV BLD AUTO: 10.2 FL (ref 7–12)
PMV BLD AUTO: 9.9 FL (ref 7–12)
RBC # BLD: 2.57 E12/L (ref 3.5–5.5)
RBC # BLD: 2.58 E12/L (ref 3.5–5.5)
RBC # BLD: 2.75 E12/L (ref 3.5–5.5)
RBC # BLD: 2.81 E12/L (ref 3.5–5.5)
RBC # BLD: 3.01 E12/L (ref 3.5–5.5)
WBC # BLD: 6.1 E9/L (ref 4.5–11.5)
WBC # BLD: 7.2 E9/L (ref 4.5–11.5)
WBC # BLD: 7.3 E9/L (ref 4.5–11.5)
WBC # BLD: 8.7 E9/L (ref 4.5–11.5)
WBC # BLD: 8.8 E9/L (ref 4.5–11.5)

## 2020-07-15 PROCEDURE — G0378 HOSPITAL OBSERVATION PER HR: HCPCS

## 2020-07-15 PROCEDURE — 2580000003 HC RX 258: Performed by: SURGERY

## 2020-07-15 PROCEDURE — 6370000000 HC RX 637 (ALT 250 FOR IP): Performed by: INTERNAL MEDICINE

## 2020-07-15 PROCEDURE — 36415 COLL VENOUS BLD VENIPUNCTURE: CPT

## 2020-07-15 PROCEDURE — 85027 COMPLETE CBC AUTOMATED: CPT

## 2020-07-15 PROCEDURE — 6370000000 HC RX 637 (ALT 250 FOR IP): Performed by: SURGERY

## 2020-07-15 PROCEDURE — 3700000000 HC ANESTHESIA ATTENDED CARE: Performed by: SURGERY

## 2020-07-15 PROCEDURE — 7100000000 HC PACU RECOVERY - FIRST 15 MIN: Performed by: SURGERY

## 2020-07-15 PROCEDURE — C9113 INJ PANTOPRAZOLE SODIUM, VIA: HCPCS | Performed by: SURGERY

## 2020-07-15 PROCEDURE — 2709999900 HC NON-CHARGEABLE SUPPLY: Performed by: SURGERY

## 2020-07-15 PROCEDURE — 6360000002 HC RX W HCPCS: Performed by: NURSE PRACTITIONER

## 2020-07-15 PROCEDURE — 6370000000 HC RX 637 (ALT 250 FOR IP): Performed by: NURSE PRACTITIONER

## 2020-07-15 PROCEDURE — 6360000002 HC RX W HCPCS: Performed by: NURSE ANESTHETIST, CERTIFIED REGISTERED

## 2020-07-15 PROCEDURE — 93005 ELECTROCARDIOGRAM TRACING: CPT | Performed by: NURSE PRACTITIONER

## 2020-07-15 PROCEDURE — 2580000003 HC RX 258: Performed by: NURSE PRACTITIONER

## 2020-07-15 PROCEDURE — 7100000001 HC PACU RECOVERY - ADDTL 15 MIN: Performed by: SURGERY

## 2020-07-15 PROCEDURE — 2580000003 HC RX 258: Performed by: NURSE ANESTHETIST, CERTIFIED REGISTERED

## 2020-07-15 PROCEDURE — C9113 INJ PANTOPRAZOLE SODIUM, VIA: HCPCS | Performed by: NURSE PRACTITIONER

## 2020-07-15 PROCEDURE — 3609017100 HC EGD: Performed by: SURGERY

## 2020-07-15 PROCEDURE — 3700000001 HC ADD 15 MINUTES (ANESTHESIA): Performed by: SURGERY

## 2020-07-15 PROCEDURE — 96376 TX/PRO/DX INJ SAME DRUG ADON: CPT

## 2020-07-15 PROCEDURE — 6360000002 HC RX W HCPCS: Performed by: SURGERY

## 2020-07-15 RX ORDER — SODIUM CHLORIDE 9 MG/ML
INJECTION, SOLUTION INTRAVENOUS CONTINUOUS PRN
Status: DISCONTINUED | OUTPATIENT
Start: 2020-07-15 | End: 2020-07-15 | Stop reason: SDUPTHER

## 2020-07-15 RX ORDER — PROPOFOL 10 MG/ML
INJECTION, EMULSION INTRAVENOUS PRN
Status: DISCONTINUED | OUTPATIENT
Start: 2020-07-15 | End: 2020-07-15 | Stop reason: SDUPTHER

## 2020-07-15 RX ADMIN — ALLOPURINOL 300 MG: 300 TABLET ORAL at 08:15

## 2020-07-15 RX ADMIN — SODIUM CHLORIDE, PRESERVATIVE FREE 10 ML: 5 INJECTION INTRAVENOUS at 08:18

## 2020-07-15 RX ADMIN — CALCIUM CARBONATE-VITAMIN D TAB 500 MG-200 UNIT 2 TABLET: 500-200 TAB at 08:15

## 2020-07-15 RX ADMIN — PROPOFOL 80 MG: 10 INJECTION, EMULSION INTRAVENOUS at 11:22

## 2020-07-15 RX ADMIN — SODIUM CHLORIDE, PRESERVATIVE FREE 10 ML: 5 INJECTION INTRAVENOUS at 20:30

## 2020-07-15 RX ADMIN — SUCRALFATE 1 G: 1 TABLET ORAL at 16:42

## 2020-07-15 RX ADMIN — PROPOFOL 30 MG: 10 INJECTION, EMULSION INTRAVENOUS at 11:23

## 2020-07-15 RX ADMIN — SUCRALFATE 1 G: 1 TABLET ORAL at 12:53

## 2020-07-15 RX ADMIN — PROPOFOL 30 MG: 10 INJECTION, EMULSION INTRAVENOUS at 11:24

## 2020-07-15 RX ADMIN — ATORVASTATIN CALCIUM 10 MG: 10 TABLET, FILM COATED ORAL at 08:18

## 2020-07-15 RX ADMIN — METOPROLOL TARTRATE 25 MG: 25 TABLET, FILM COATED ORAL at 08:15

## 2020-07-15 RX ADMIN — SODIUM CHLORIDE, PRESERVATIVE FREE 10 ML: 5 INJECTION INTRAVENOUS at 08:14

## 2020-07-15 RX ADMIN — LOSARTAN POTASSIUM 50 MG: 50 TABLET, FILM COATED ORAL at 08:15

## 2020-07-15 RX ADMIN — SERTRALINE HYDROCHLORIDE 50 MG: 50 TABLET ORAL at 08:15

## 2020-07-15 RX ADMIN — LEVOTHYROXINE SODIUM 137 MCG: 0.14 TABLET ORAL at 08:15

## 2020-07-15 RX ADMIN — PANTOPRAZOLE SODIUM 40 MG: 40 INJECTION, POWDER, FOR SOLUTION INTRAVENOUS at 20:30

## 2020-07-15 RX ADMIN — METOPROLOL TARTRATE 25 MG: 25 TABLET, FILM COATED ORAL at 20:30

## 2020-07-15 RX ADMIN — SUCRALFATE 1 G: 1 TABLET ORAL at 20:30

## 2020-07-15 RX ADMIN — SUCRALFATE 1 G: 1 TABLET ORAL at 08:15

## 2020-07-15 RX ADMIN — SODIUM CHLORIDE: 9 INJECTION, SOLUTION INTRAVENOUS at 10:37

## 2020-07-15 RX ADMIN — Medication 10000 UNITS: at 08:15

## 2020-07-15 RX ADMIN — PANTOPRAZOLE SODIUM 40 MG: 40 INJECTION, POWDER, FOR SOLUTION INTRAVENOUS at 08:14

## 2020-07-15 RX ADMIN — MULTIPLE VITAMINS W/ MINERALS TAB 1 TABLET: TAB at 08:15

## 2020-07-15 ASSESSMENT — LIFESTYLE VARIABLES: SMOKING_STATUS: 0

## 2020-07-15 ASSESSMENT — PAIN SCALES - GENERAL
PAINLEVEL_OUTOF10: 0

## 2020-07-15 NOTE — H&P
2002    benign    TOTAL HIP ARTHROPLASTY Left 12/2015    UPPER GASTROINTESTINAL ENDOSCOPY N/A 7/7/2020    EGD BIOPSY performed by Fadi Dodge MD at [address]         Medications Prior to Admission:    Medications Prior to Admission: pantoprazole (PROTONIX) 40 MG tablet, Take 1 tablet by mouth daily  acetaminophen (TYLENOL) 500 MG tablet, Take 500 mg by mouth every 6 hours as needed for Pain  allopurinol (ZYLOPRIM) 300 MG tablet, Take 300 mg by mouth daily  SYNTHROID 137 MCG tablet, Take 175 mcg by mouth daily   sucralfate (CARAFATE) 1 GM tablet, Take 1 g by mouth 4 times daily  sertraline (ZOLOFT) 50 MG tablet, Take 50 mg by mouth daily  JANUVIA 100 MG tablet, Take 100 mg by mouth daily   metFORMIN (GLUCOPHAGE) 500 MG tablet, Take 1,000 mg by mouth 2 times daily (with meals)  HYDROcodone-acetaminophen (NORCO) 7.5-325 MG per tablet, Take 1 tablet by mouth every 12 hours as needed for Pain  Cholecalciferol (VITAMIN D3) 57208 UNITS CAPS, Take 1 capsule by mouth See Admin Instructions 5 days a week  atorvastatin (LIPITOR) 10 MG tablet, Take 10 mg by mouth daily  irbesartan (AVAPRO) 150 MG tablet, Take 150 mg by mouth daily. metoprolol (LOPRESSOR) 25 MG tablet, Take 25 mg by mouth 2 times daily Take am surgery 12/03. Calcium Carb-Cholecalciferol (CALCIUM 1000 + D PO), Take by mouth daily   therapeutic multivitamin-minerals (THERAGRAN-M) tablet, Take 1 tablet by mouth daily. [DISCONTINUED] tiZANidine (ZANAFLEX) 2 MG capsule, Take 2 mg by mouth 3 times daily  [DISCONTINUED] canagliflozin (INVOKANA) 100 MG TABS tablet, Take 100 mg by mouth every morning (before breakfast)  Elastic Bandages & Supports ([address]) MISC, by Does not apply route. 20-30 MMHG knee high   [DISCONTINUED] furosemide (LASIX) 20 MG tablet, Take 20 mg by mouth daily.     [DISCONTINUED] amLODIPine (NORVASC) 10 MG tablet, Take 10 mg by mouth daily Take am dos, 12/03/2015    Allergies:  Adhesive tape    Social History: TOBACCO:   reports that she quit smoking about 12 years ago. Her smoking use included cigarettes. She has a 40.00 pack-year smoking history. She has never used smokeless tobacco.  ETOH:   reports no history of alcohol use. MARITAL STATUS:    OCCUPATION:      Family History:       Problem Relation Age of Onset    Kidney Disease Mother     Cancer Father     Heart Disease Maternal Grandmother        REVIEW OF SYSTEMS:    General ROS: positive for  - fatigue  Hematological and Lymphatic ROS: negative  Endocrine ROS: negative  Respiratory ROS: no cough, shortness of breath, or wheezing  Cardiovascular ROS: no chest pain or dyspnea on exertion  Gastrointestinal ROS: no abdominal pain, change in bowel habits, or black or bloody stools  Genito-Urinary ROS: no dysuria, trouble voiding, or hematuria  Neurological ROS: no TIA or stroke symptoms  negative    Vitals:  /63   Pulse 80   Temp 97.1 °F (36.2 °C) (Temporal)   Resp 18   Ht 5' 5\" (1.651 m)   Wt 236 lb (107 kg)   SpO2 95%   BMI 39.27 kg/m²     PHYSICAL EXAM:  General:  Awake, alert, oriented X 3. Well developed, well nourished, well groomed. No apparent distress. HEENT:  Normocephalic, atraumatic. Pupils equal, round, reactive to light. No scleral icterus. No conjunctival injection. Normal lips, teeth, and gums. No nasal discharge. Neck:  Supple  Heart:  RRR, no murmurs, gallops, rubs, carotid upstroke normal, no carotid bruits  Lungs:  CTA bilaterally, bilat symmetrical expansion, no wheeze, rales, or rhonchi  Abdomen:   Bowel sounds present, soft, nontender, no masses, no organomegaly, no peritoneal signs  Extremities:  No clubbing, cyanosis, or edema  Skin:  Warm and dry, no open lesions or rash  Neuro:  Cranial nerves 2-12 intact, no focal deficits  Breast: deferred  Rectal: deferred  Genitalia:  deferred      DATA:     Recent Labs     07/14/20  1622 07/15/20  0007 07/15/20  0510   WBC 7.8 7.2 6.1   HGB 7.6* 7.5* 7.4*    218 206 Recent Labs     07/13/20 2022 07/14/20  0520    141   K 3.6 3.7   BUN 18 19   CREATININE 0.9 0.8     Recent Labs     07/13/20 2022 07/14/20  0520   PROT 5.4* 5.1*   INR  --  1.2     Recent Labs     07/13/20 2022 07/14/20  0520   AST 10 14   ALT 11 10   ALKPHOS 70 61   BILIDIR  --  <0.2   BILITOT 0.7 0.6     No results for input(s): BNP in the last 72 hours. Recent Labs     07/13/20 2022 07/14/20 0520 07/14/20  1018   TROPONINI 0.02 <0.01 0.02       ASSESSMENT:      Active Problems:    PVD (peripheral vascular disease) with claudication (HCC)    GI bleed    Diabetes mellitus type 2, uncontrolled (HCC)    Hyperlipidemia LDL goal <100    Acquired hypothyroidism    Essential hypertension    Anemia    Rectal bleeding    Obesity (BMI 30-39. 9)  Resolved Problems:    * No resolved hospital problems.  *        PLAN:    Admit to telemetry for evaluation of GI bleed  H&H every 8 hours x3  No chemical DVT prophylaxis  Hold aspirin  General surgery evaluation for endoscopy  Resume home medications, BP meds with parameters    DVT prophylaxis  PT OT  Discharge plan        Thi Casas MD  7/15/2020  8:17 AM

## 2020-07-15 NOTE — ANESTHESIA PRE PROCEDURE
Department of Anesthesiology  Preprocedure Note       Name:  Sunitha Marshall   Age:  65 y.o.  :  1950                                          MRN:  [id number]         Date:  7/15/2020      Surgeon: Yanelis Stacy    Procedure: EGD    Medications prior to admission:   Prior to Admission medications    Medication Sig Start Date End Date Taking? Authorizing Provider   pantoprazole (PROTONIX) 40 MG tablet Take 1 tablet by mouth daily 20   Ryan Calix MD   acetaminophen (TYLENOL) 500 MG tablet Take 500 mg by mouth every 6 hours as needed for Pain    Historical Provider, MD   allopurinol (ZYLOPRIM) 300 MG tablet Take 300 mg by mouth daily 3/24/18   Historical Provider, MD   SYNTHROID 137 MCG tablet Take 175 mcg by mouth daily  18   Historical Provider, MD   sucralfate (CARAFATE) 1 GM tablet Take 1 g by mouth 4 times daily    Historical Provider, MD   sertraline (ZOLOFT) 50 MG tablet Take 50 mg by mouth daily    Historical Provider, MD   JANUVIA 100 MG tablet Take 100 mg by mouth daily  16   Historical Provider, MD   metFORMIN (GLUCOPHAGE) 500 MG tablet Take 1,000 mg by mouth 2 times daily (with meals)    Historical Provider, MD   HYDROcodone-acetaminophen (NORCO) 7.5-325 MG per tablet Take 1 tablet by mouth every 12 hours as needed for Pain    Historical Provider, MD   Cholecalciferol (VITAMIN D3) [address] UNITS CAPS Take 1 capsule by mouth See Admin Instructions 5 days a week    Historical Provider, MD   atorvastatin (LIPITOR) 10 MG tablet Take 10 mg by mouth daily    Historical Provider, MD   irbesartan (AVAPRO) 150 MG tablet Take 150 mg by mouth daily. Historical Provider, MD   metoprolol (LOPRESSOR) 25 MG tablet Take 25 mg by mouth 2 times daily Take am surgery . Historical Provider, MD   Calcium Carb-Cholecalciferol (CALCIUM 1000 + D PO) Take by mouth daily     Historical Provider, MD   therapeutic multivitamin-minerals (THERAGRAN-M) tablet Take 1 tablet by mouth daily.       Historical Provider, MD   Elastic Bandages & Supports ([address]) [facility] by Does not apply route. 20-30 MMHG knee high     Historical Provider, MD       Current medications:    No current facility-administered medications for this visit. No current outpatient medications on file.      Facility-Administered Medications Ordered in Other Visits   Medication Dose Route Frequency Provider Last Rate Last Dose    allopurinol (ZYLOPRIM) tablet 300 mg  300 mg Oral Daily Dalia Troy, APRN - CNP   300 mg at 07/15/20 0815    Oyster Shell Calcium/D 500-200 MG-UNIT 2 tablet  2 tablet Oral Daily Dalia Troy, APRN - CNP   2 tablet at 07/15/20 0815    atorvastatin (LIPITOR) tablet 10 mg  10 mg Oral Daily Dalia Troy, APRN - CNP   10 mg at 07/15/20 0818    amLODIPine (NORVASC) tablet 10 mg  10 mg Oral Daily Dalia Troy, APRN - CNP        vitamin D (CHOLECALCIFEROL) tablet 10,000 Units  10,000 Units Oral Once per day on Mon Tue Wed Thu Fri Dalia Troy, APRN - CNP   10,000 Units at 07/15/20 0815    furosemide (LASIX) tablet 20 mg  20 mg Oral Daily Dalia Troy, APRN - CNP        HYDROcodone-acetaminophen ([address]) 7.5-325 MG per tablet 1 tablet  1 tablet Oral Q12H PRN Adam Jiang APRN - CNP        metoprolol tartrate (LOPRESSOR) tablet 25 mg  25 mg Oral BID Dalia Troy, APRN - CNP   25 mg at 07/15/20 0815    sertraline (ZOLOFT) tablet 50 mg  50 mg Oral Daily Dalia Troy, APRN - CNP   50 mg at 07/15/20 0815    sucralfate (CARAFATE) tablet 1 g  1 g Oral 4x Daily Dalia Troy, APRN - CNP   1 g at 07/15/20 0815    levothyroxine (SYNTHROID) tablet 137 mcg  137 mcg Oral Daily Dalia Troy, APRN - CNP   137 mcg at 07/15/20 0815    therapeutic multivitamin-minerals 1 tablet  1 tablet Oral Daily Dalia Troy, APRN - CNP   1 tablet at 07/15/20 0815    tiZANidine (ZANAFLEX) tablet 2 mg  2 mg Oral TID STACI Duncan - CNP  pantoprazole (PROTONIX) injection 40 mg  40 mg Intravenous BID Lavena Lie Masters, APRN - CNP   40 mg at 07/15/20 4827    And    sodium chloride (PF) 0.9 % injection 10 mL  10 mL Intravenous BID Lavena Lie Masters, APRN - CNP   10 mL at 07/15/20 0818    sodium chloride flush 0.9 % injection 10 mL  10 mL Intravenous 2 times per day Jun Liu, APRN - CNP   10 mL at 07/15/20 0497    sodium chloride flush 0.9 % injection 10 mL  10 mL Intravenous PRN Lavena Lie Masters, APRN - CNP   10 mL at 07/14/20 0150    acetaminophen (TYLENOL) tablet 650 mg  650 mg Oral Q6H PRN Lavena Lie Masters, APRN - CNP        Or   [facility] acetaminophen (TYLENOL) suppository 650 mg  650 mg Rectal Q6H PRN Lavena Lie Masters, APRN - CNP        polyethylene glycol (GLYCOLAX) packet 17 g  17 g Oral Daily PRN Lavena Lie Masters, APRN - CNP        promethazine (PHENERGAN) tablet 12.5 mg  12.5 mg Oral Q6H PRN Lavena Lie Masters, APRN - CNP        Or    ondansetron (ZOFRAN) injection 4 mg  4 mg Intravenous Q6H PRN Lavena Lie Masters, APRN - CNP        losartan (COZAAR) tablet 50 mg  50 mg Oral Daily Shawn Garvey MD   50 mg at 07/15/20 0815       Allergies: Allergies   Allergen Reactions    Adhesive Tape Other (See Comments)     Pulls skin       Problem List:    Patient Active Problem List   Diagnosis Code    PVD (peripheral vascular disease) with claudication ([facility]) I73.9    GI bleed K92.2    Diabetes mellitus type 2, uncontrolled ([facility] E11.65    Hyperlipidemia LDL goal <100 E78.5    Acquired hypothyroidism E03.9    Essential hypertension I10    Anemia D64.9    PUD (peptic ulcer disease) K27.9    Morbid obesity with BMI of 40.0-44.9, adult ([facility]) E66.01, Z68.41    Lightheadedness R42    Near syncope R55    NAHEED (acute kidney injury) ([facility] N17.9    Rectal bleeding K62.5    Obesity (BMI 30-39. 9) E66.9       Past Medical History:        Diagnosis Date    Back problem     Carotid artery stenosis     Chest pain 2013    Diabetic neuropathy ([facility] [facility] 2015    Full dentures     Hiatal hernia     Hypertension     Hyperuricemia 2015    Lipid disorder 2015    Osteoarthritis     lumbosacral spine arthritis and disc disease    Peripheral vascular disease ([facility] [facility]     Preoperative clearance     2015 per Dr Shobha Sutton and 10/2015 per Dr Ferny Schmid for [facility] Dr Francis Washington.  PVD (peripheral vascular disease) with claudication ([facility] 2012    follows with Dr Dee Curry Rapid palpitations     follows with Dr Dre Espino Thyroid disease     Type II or unspecified type diabetes mellitus without mention of complication, not stated as uncontrolled     Wears glasses        Past Surgical History:        Procedure Laterality Date    APPENDECTOMY      COLONOSCOPY      HYSTERECTOMY      with appendectomy    JOINT REPLACEMENT Left 12/3/2015    total hip    THROAT SURGERY  2002    benign    TOTAL HIP ARTHROPLASTY Left 2015    UPPER GASTROINTESTINAL ENDOSCOPY N/A 2020    EGD BIOPSY performed by David Argueta MD at [address] [place] [address] Crossing History:    Social History     Tobacco Use    Smoking status: Former Smoker     Packs/day: 1.00     Years: 40.00     Pack years: 40.00     Types: Cigarettes     Last attempt to quit: 2007     Years since quittin.5    Smokeless tobacco: Never Used   Substance Use Topics    Alcohol use: No                                Counseling given: Not Answered      Vital Signs (Current): There were no vitals filed for this visit.                                            BP Readings from Last 3 Encounters:   07/15/20 129/68   07/10/20 132/63   20 (!) 137/57       NPO Status:                                                                                 BMI:   Wt Readings from Last 3 Encounters:   20 236 lb (107 kg)   07/10/20 246 lb 0.5 oz (111.6 kg)   20 245 lb 1.6 oz (111.2 kg)     There is no height or weight on file to calculate BMI.    CBC:   Lab Results   Component Value Date    WBC 6.1 07/15/2020    RBC 2.57 07/15/2020    HGB 7.4 07/15/2020    HCT 24.5 07/15/2020    MCV 95.3 07/15/2020    RDW 15.3 07/15/2020     07/15/2020       CMP:   Lab Results   Component Value Date     07/14/2020    K 3.7 07/14/2020     07/14/2020    CO2 21 07/14/2020    BUN 19 07/14/2020    CREATININE 0.8 07/14/2020    GFRAA >60 07/14/2020    LABGLOM >60 07/14/2020    GLUCOSE 100 07/14/2020    PROT 5.1 07/14/2020    CALCIUM 9.2 07/14/2020    BILITOT 0.6 07/14/2020    ALKPHOS 59 07/14/2020    AST 14 07/14/2020    ALT 10 07/14/2020       POC Tests: No results for input(s): POCGLU, POCNA, POCK, POCCL, POCBUN, POCHEMO, POCHCT in the last 72 hours. Coags:   Lab Results   Component Value Date    PROTIME 13.8 07/14/2020    INR 1.2 07/14/2020    APTT 29.9 07/14/2020       HCG (If Applicable): No results found for: PREGTESTUR, PREGSERUM, HCG, HCGQUANT     ABGs: No results found for: PHART, PO2ART, AIW0MZS, LOA3XFC, BEART, J2ZPVQJO     Type & Screen (If Applicable):  No results found for: LABABO, LABRH    Drug/Infectious Status (If Applicable):  No results found for: HIV, HEPCAB    COVID-19 Screening (If Applicable): No results found for: COVID19      Anesthesia Evaluation  Patient summary reviewed and Nursing notes reviewed no history of anesthetic complications:   Airway: Mallampati: III  TM distance: >3 FB   Neck ROM: full  Mouth opening: > = 3 FB Dental:    (+) edentulous      Pulmonary: breath sounds clear to auscultation      (-) not a current smoker (former smoker- quit 2007)                           Cardiovascular:  Exercise tolerance: good (>4 METS),   (+) hypertension:,       ECG reviewed  Rhythm: regular  Rate: normal                 ROS comment: EKG: Normal Sinus Rhythm 70. Neuro/Psych:                ROS comment: Diabetic neuropathy GI/Hepatic/Renal:   (+) hiatal hernia, GERD:, PUD, morbid obesity         ROS comment: GIB.

## 2020-07-15 NOTE — OP NOTE
ESOPHAGOGASTRODUODENOSCOPY PROCEDURE NOTE    DATE OF PROCEDURE: 7/15/2020    PREOPERATIVE DIAGNOSIS:  Anemia     POSTOPERATIVE DIAGNOSIS/FINDINGS:  Gastritis, duodinitis, no active bleeding     SURGEON: David Argueta MD    ASSISTANT: None    OPERATION: Esophagogastroduodenoscopy     ANESTHESIA: Local monitored anesthesia. CONDITION: Stable    COMPLICATIONS: None. Specimens Removed: as above    EBL: None    BRIEF HISTORY:  This is a 65 y.o. female who presents with the complaint of anemia. It was recommended the patient undergo an esophagogastrduodenoscopy. The risks/benefits/alternatives/expected outcomes were explained the the patient. The patient verbalized understanding and agreed to proceed. PROCEDURE:  The patient was brought into the endoscopy suite and placed in the left lateral decubitus position. A bite block was placed in the patients mouth. After the initiation of LMAC anesthesia, a gastroscope was inserted into the patient's mouth and passed into the esophagus and into the stomach. Immediately upon entering the stomach the note of gastritis was made. The scope was advanced through the pylorus into the first and second portion of the duodenum making the note of severe duodinitis. The scope was then withdrawn back into the stomach where it was retroflexed. There was no hiatal hernia noted. The scope was then straightened out. The scope was then withdrawn the entire length of the esophagus, making the note of a normal esophagus without masses, ulcerations, or lesions noted. The scope was withdrawn entirely. The patient tolerated the procedure well and there were no complications.       David Argueta MD  7/15/2020

## 2020-07-15 NOTE — PROGRESS NOTES
Subjective: The patient is awake and alert. No acute events overnight. Denies chest pain, angina, SOB   Scope today     Objective:    /68   Pulse 82   Temp 97.9 °F (36.6 °C) (Oral)   Resp 8   Ht 5' 5\" (1.651 m)   Wt 236 lb (107 kg)   SpO2 94%   BMI 39.27 kg/m²     In: 720 [P.O.:720]  Out: -     HEENT: NCAT,  PERRLA, No JVD  Heart:  RRR, no murmurs, gallops, or rubs. Lungs:  CTA bilaterally, no wheeze, rales or rhonchi  Abd: bowel sounds present, nontender, nondistended, no masses  Extrem:  No clubbing, cyanosis, or edema     Recent Labs     07/14/20  1622 07/15/20  0007 07/15/20  0510   WBC 7.8 7.2 6.1   HGB 7.6* 7.5* 7.4*   HCT 24.6* 24.1* 24.5*    218 206       Recent Labs     07/13/20  2022 07/14/20  0520    141   K 3.6 3.7    106   CO2 19* 21*   BUN 18 19   CREATININE 0.9 0.8   CALCIUM 9.8 9.2       Assessment:    Patient Active Problem List   Diagnosis    PVD (peripheral vascular disease) with claudication (H[facility])    GI bleed    Diabetes mellitus type 2, uncontrolled ([facility] [address] [facility]    Hyperlipidemia LDL goal <100    Acquired hypothyroidism    Essential hypertension    Anemia    PUD (peptic ulcer disease)    Morbid obesity with BMI of 40.0-44.9, adult ([facility] [address] [facility]    Lightheadedness    Near syncope    NAHEED (acute kidney injury) ([facility] [address] [facility]    Rectal bleeding    Obesity (BMI 30-39. 9)       Plan:    Admit to telemetry for evaluation of GI bleed  H&H every 8 hours x3-hemoglobin has dropped 2 g since admission-7.4 today  No chemical DVT prophylaxis  Hold aspirin  General surgery evaluation for endoscopy-planned for today  Resume home medications, BP meds with parameters     DVT Prophylaxis   PT/OT  Discharge planning after EGD completed if no acute bleed      All consultants notes reviewed    Jyothi Grace MD  10:25 AM  7/15/2020

## 2020-07-15 NOTE — ANESTHESIA POSTPROCEDURE EVALUATION
Department of Anesthesiology  Postprocedure Note    Patient: Americo Veronica  MRN: [id number]  YOB: 1950  Date of evaluation: 7/15/2020  Time:  11:48 AM     Procedure Summary     Date:  07/15/20 Room / Location:  Jessie Mahan [facility] 01 / [facility]    Anesthesia Start:  1119 Anesthesia Stop:  1134    Procedure:  EGD (N/A ) Diagnosis:  (GIB)    Surgeon:  Fadi Dodge MD Responsible Provider:  Sidra Benton MD    Anesthesia Type:  MAC ASA Status:  3          Anesthesia Type: MAC    Lane Phase I: Lane Score: 10    Lane Phase II:      Last vitals: Reviewed and per EMR flowsheets.        Anesthesia Post Evaluation    Patient location during evaluation: PACU  Patient participation: complete - patient participated  Level of consciousness: awake  Pain score: 0  Airway patency: patent  Nausea & Vomiting: no nausea  Complications: no  Cardiovascular status: blood pressure returned to baseline  Respiratory status: acceptable  Hydration status: euvolemic

## 2020-07-15 NOTE — PROGRESS NOTES
GENERAL SURGERY  DAILY PROGRESS NOTE  7/15/2020    Chief Complaint   Patient presents with    Rectal Bleeding     pt reports she threw up about 6pm ana went to bathroom and had a bloody bm       Subjective:  Pt states that she is feeling well this morning. She denies any abdominal pain, hematochezia, or N/V. Objective:  /63   Pulse 80   Temp 97.1 °F (36.2 °C) (Temporal)   Resp 18   Ht 5' 5\" (1.651 m)   Wt 236 lb (107 kg)   SpO2 95%   BMI 39.27 kg/m²     GENERAL:  Laying in bed, awake, alert, cooperative, no apparent distress  LUNGS:  No increased work of breathing  CARDIOVASCULAR:  RR  ABDOMEN:  Soft, non-tender, non-distended    Assessment/Plan:  65 y.o. female with GI bleed, with hx of non-bleeding duodenal ulcer and diverticulosis.     - Trend H/H, transfuse PRN Hgb <7  - Plan for EGD today 7/15  - NPO for procedure  - Monitor vitals  - Continue PPI BID  - Continue Carafate QID  - IVF  - Replete Lytes PRN    Electronically signed by Marcelo Hutchison MD on 7/15/2020 at 6:42 AM

## 2020-07-16 VITALS
WEIGHT: 236 LBS | OXYGEN SATURATION: 92 % | HEIGHT: 65 IN | RESPIRATION RATE: 14 BRPM | HEART RATE: 74 BPM | SYSTOLIC BLOOD PRESSURE: 137 MMHG | DIASTOLIC BLOOD PRESSURE: 60 MMHG | BODY MASS INDEX: 39.32 KG/M2 | TEMPERATURE: 97.4 F

## 2020-07-16 LAB
EKG ATRIAL RATE: 70 BPM
EKG P AXIS: 51 DEGREES
EKG P-R INTERVAL: 152 MS
EKG Q-T INTERVAL: 382 MS
EKG QRS DURATION: 88 MS
EKG QTC CALCULATION (BAZETT): 412 MS
EKG R AXIS: 43 DEGREES
EKG T AXIS: 46 DEGREES
EKG VENTRICULAR RATE: 70 BPM
HCT VFR BLD CALC: 25.2 % (ref 34–48)
HCT VFR BLD CALC: 27.3 % (ref 34–48)
HEMOGLOBIN: 7.6 G/DL (ref 11.5–15.5)
HEMOGLOBIN: 8.2 G/DL (ref 11.5–15.5)

## 2020-07-16 PROCEDURE — 6370000000 HC RX 637 (ALT 250 FOR IP): Performed by: SURGERY

## 2020-07-16 PROCEDURE — C9113 INJ PANTOPRAZOLE SODIUM, VIA: HCPCS | Performed by: SURGERY

## 2020-07-16 PROCEDURE — 93010 ELECTROCARDIOGRAM REPORT: CPT | Performed by: INTERNAL MEDICINE

## 2020-07-16 PROCEDURE — 36415 COLL VENOUS BLD VENIPUNCTURE: CPT

## 2020-07-16 PROCEDURE — 6360000002 HC RX W HCPCS: Performed by: SURGERY

## 2020-07-16 PROCEDURE — 85014 HEMATOCRIT: CPT

## 2020-07-16 PROCEDURE — 85018 HEMOGLOBIN: CPT

## 2020-07-16 PROCEDURE — 2580000003 HC RX 258: Performed by: SURGERY

## 2020-07-16 PROCEDURE — G0378 HOSPITAL OBSERVATION PER HR: HCPCS

## 2020-07-16 RX ORDER — PANTOPRAZOLE SODIUM 40 MG/1
40 TABLET, DELAYED RELEASE ORAL
Qty: 30 TABLET | Refills: 0 | Status: SHIPPED | OUTPATIENT
Start: 2020-07-16 | End: 2021-12-07

## 2020-07-16 RX ADMIN — LOSARTAN POTASSIUM 50 MG: 50 TABLET, FILM COATED ORAL at 08:31

## 2020-07-16 RX ADMIN — ATORVASTATIN CALCIUM 10 MG: 10 TABLET, FILM COATED ORAL at 08:31

## 2020-07-16 RX ADMIN — SUCRALFATE 1 G: 1 TABLET ORAL at 12:19

## 2020-07-16 RX ADMIN — SODIUM CHLORIDE, PRESERVATIVE FREE 10 ML: 5 INJECTION INTRAVENOUS at 08:32

## 2020-07-16 RX ADMIN — SUCRALFATE 1 G: 1 TABLET ORAL at 17:46

## 2020-07-16 RX ADMIN — Medication 10000 UNITS: at 08:31

## 2020-07-16 RX ADMIN — METOPROLOL TARTRATE 25 MG: 25 TABLET, FILM COATED ORAL at 08:32

## 2020-07-16 RX ADMIN — LEVOTHYROXINE SODIUM 137 MCG: 0.14 TABLET ORAL at 06:37

## 2020-07-16 RX ADMIN — ALLOPURINOL 300 MG: 300 TABLET ORAL at 08:31

## 2020-07-16 RX ADMIN — MULTIPLE VITAMINS W/ MINERALS TAB 1 TABLET: TAB at 08:31

## 2020-07-16 RX ADMIN — CALCIUM CARBONATE-VITAMIN D TAB 500 MG-200 UNIT 2 TABLET: 500-200 TAB at 08:31

## 2020-07-16 RX ADMIN — SUCRALFATE 1 G: 1 TABLET ORAL at 08:31

## 2020-07-16 RX ADMIN — SERTRALINE HYDROCHLORIDE 50 MG: 50 TABLET ORAL at 08:31

## 2020-07-16 RX ADMIN — PANTOPRAZOLE SODIUM 40 MG: 40 INJECTION, POWDER, FOR SOLUTION INTRAVENOUS at 08:32

## 2020-07-16 ASSESSMENT — PAIN SCALES - GENERAL
PAINLEVEL_OUTOF10: 0
PAINLEVEL_OUTOF10: 0

## 2020-07-16 NOTE — PLAN OF CARE
Problem: Bleeding:  Goal: Will show no signs and symptoms of excessive bleeding  Description: Will show no signs and symptoms of excessive bleeding  Outcome: Met This Shift     Problem: Falls - Risk of:  Goal: Will remain free from falls  Description: Will remain free from falls  Outcome: Met This Shift  Goal: Absence of physical injury  Description: Absence of physical injury  Outcome: Met This Shift

## 2020-07-16 NOTE — PROGRESS NOTES
GENERAL SURGERY  DAILY PROGRESS NOTE  7/16/2020    Chief Complaint   Patient presents with    Rectal Bleeding     pt reports she threw up about 6pm ana went to bathroom and had a bloody bm       Subjective:  Pt states that she is feeling good. She denies any abdominal pain, melena, or hematochezia. Objective:  BP (!) 125/58   Pulse 74   Temp 98.7 °F (37.1 °C) (Oral)   Resp 16   Ht 5' 5\" (1.651 m)   Wt 236 lb (107 kg)   SpO2 98%   BMI 39.27 kg/m²     GENERAL:  Laying in bed, awake, alert, cooperative, no apparent distress  LUNGS:  No increased work of breathing  CARDIOVASCULAR:  RR  ABDOMEN:  Soft, non-tender, non-distended    Assessment/Plan:  65 y.o. female with GI bleed, with hx of non-bleeding duodenal ulcer and diverticulosis. EGD showed gastritis/duodenitis.     - Trend H/H, transfuse PRN Hgb<7  - If H/H stable, okay to DC from surgical standpoint  - IVF  - PPI BID  - Replete lytes PRN    Electronically signed by Amanda Fajardo MD on 7/16/2020 at 6:55 AM

## 2020-07-16 NOTE — CARE COORDINATION
Discharge order noted on chart. Plan is home with no anticipated needs. Family will provide transport home. CM will follow for any needs that arise.   Cass Jolly, RN  [facility] Case Management  [phone]

## 2020-07-16 NOTE — PROGRESS NOTES
Subjective: The patient is awake and alert. No acute events overnight. Denies chest pain, angina, SOB   S/p Scope 7/15    Objective:    BP (!) 142/63   Pulse 81   Temp 98.1 °F (36.7 °C) (Oral)   Resp 18   Ht 5' 5\" (1.651 m)   Wt 236 lb (107 kg)   SpO2 91%   BMI 39.27 kg/m²     In: 840 [P.O.:840]  Out: -     HEENT: NCAT,  PERRLA, No JVD  Heart:  RRR, no murmurs, gallops, or rubs. Lungs:  CTA bilaterally, no wheeze, rales or rhonchi  Abd: bowel sounds present, nontender, nondistended, no masses  Extrem:  No clubbing, cyanosis, or edema     Recent Labs     07/15/20  1320 07/15/20  1731 07/15/20  2320 07/16/20  0943   WBC 8.8 7.3 8.7  --    HGB 8.9* 8.3* 7.9* 7.6*   HCT 29.0* 27.0* 26.1* 25.2*    231 254  --        Recent Labs     07/13/20  2022 07/14/20  0520    141   K 3.6 3.7    106   CO2 19* 21*   BUN 18 19   CREATININE 0.9 0.8   CALCIUM 9.8 9.2       Assessment:    Patient Active Problem List   Diagnosis    PVD (peripheral vascular disease) with claudication ([address]    GI bleed    Diabetes mellitus type 2, uncontrolled ([address]    Hyperlipidemia LDL goal <100    Acquired hypothyroidism    Essential hypertension    Anemia    PUD (peptic ulcer disease)    Morbid obesity with BMI of 40.0-44.9, adult ([address]    Lightheadedness    Near syncope    NAHEED (acute kidney injury) ([address]    Rectal bleeding    Obesity (BMI 30-39. 9)       Plan:    Admit to telemetry for evaluation of GI bleed  H&H every 8 hours x3-hemoglobin has dropped 2 g since admission-7.4 today drom 8.9 since transfusion   No chemical DVT prophylaxis  Resume asa   General surgery evaluation for endoscopy- gastritis  and duodenitis on scope - no active bleeding   Resume home medications, BP meds with parameters   advance diet to full liquids then diabetic diet     DVT Prophylaxis   PT/OT  Discharge planning if next h/h is stable - ok for dc home       All consultants notes reviewed    Thi Casas MD  1:05 PM  7/16/2020

## 2020-08-05 NOTE — DISCHARGE SUMMARY
Physician Discharge Summary     Patient ID:  Magalys Jauregui  [id number]  21 y.o.  1950    Admit date: 7/13/2020    Discharge date and time:7/16  Admission Diagnoses:   Patient Active Problem List   Diagnosis    [facility]    GI bleed    Diabetes mellitus type 2, uncontrolled ([facility]    Hyperlipidemia LDL goal <100    Acquired hypothyroidism    Essential hypertension    Anemia    PUD (peptic ulcer disease)    Morbid obesity with BMI of 40.0-44.9, adult ([facility]    Lightheadedness    Near syncope    NAHEED (acute kidney injury) ([facility]    Rectal bleeding    Obesity (BMI 30-39. 9)       Discharge Diagnoses: GIB     Consults: gen surg     Procedures: repeat EGD Gastritis, duodinitis, no active bleeding        Hospital Course: The patient is a 65 y.o. female of [address] [place] [address] with significant past medical history of carotid artery stenosis, hypertension, hyperlipidemia, on daily 81 mg aspirin otherwise no antiplatelet or anticoagulation who presents with several episodes of bright red blood per rectum. She denies any hematemesis or black stool. Denies nausea vomiting lightheadedness or syncope. She proceeded to ER when she saw blood in the toilet and on the toilet paper. ED course revealed H&H 8.7/27.6 down to 8.1/26 on my evaluation. No abdominal pain is reported.   /63   Pulse 80   Temp 97.1 °F (36.2 °C) (Temporal)   Resp 18   Ht 5' 5\" (1.651 m)   Wt 236 lb (107 kg)   SpO2 95%   BMI 39.27 kg/m²    Admit to telemetry for evaluation of GI bleed  H&H every 8 hours x3-7.6 at dc - stable within 1 g over past few checks , continue ppi 40 daily   No chemical DVT prophylaxis  Resume asa   General surgery evaluation for endoscopy- gastritis  and duodenitis on scope - no active bleeding   Resume home medications, BP meds with parameters   advance diet to full liquids then diabetic     Ok for home with [facility] hgb   No results for input(s): WBC, HGB, HCT, PLT in the last 72 hours. No results for input(s): NA, K, CL, CO2, BUN, CREATININE, CALCIUM in the last 72 hours. Invalid input(s): GLU    Xr Chest Portable    Result Date: 2020  Patient MRN: [id number] : 1950 Age:  65 years Gender: Female Order Date: 2020 8:30 PM Exam: XR CHEST PORTABLE Number of Images: 1 view Indication:   GI bleed GI bleed Comparison: 2020 FINDINGS: Minimal opacity at the right base is probably atelectasis and appears less pronounced compared to the prior study. Heart and pulmonary vascularity are normal. Neither costophrenic angle is slightly blunted. Mild atelectasis suggested at the right base which is less pronounced than on the prior study. Discharge Exam:    HEENT: NCAT,  PERRLA, No JVD  Heart:  RRR, no murmurs, gallops, or rubs.   Lungs:  CTA bilaterally, no wheeze, rales or rhonchi  Abd: bowel sounds present, nontender, nondistended, no masses  Extrem:  No clubbing, cyanosis, or edema    Disposition: home     Patient Condition at Discharge: stable     Patient Instructions:      Medication List      CHANGE how you take these medications    pantoprazole 40 MG tablet  Commonly known as:  PROTONIX  Take 1 tablet by mouth 2 times daily (before meals)  What changed:  when to take this        CONTINUE taking these medications    acetaminophen 500 MG tablet  Commonly known as:  TYLENOL     allopurinol 300 MG tablet  Commonly known as:  ZYLOPRIM     atorvastatin 10 MG tablet  Commonly known as:  LIPITOR     Avapro 150 MG tablet  Generic drug:  irbesartan     CALCIUM 1000 + D PO     HYDROcodone-acetaminophen 7.5-325 MG per tablet  Commonly known as:  NORCO     Januvia 100 MG tablet  Generic drug:  SITagliptin     Medical Compression Stockings Misc  Notes to patient:  Apply stockings in morning and remove at night     metFORMIN 500 MG tablet  Commonly known as:  GLUCOPHAGE     metoprolol tartrate 25 MG tablet  Commonly known as:  [employer] sertraline 50 MG tablet  Commonly known as:  ZOLOFT     sucralfate 1 GM tablet  Commonly known as:  CARAFATE     Synthroid 137 MCG tablet  Generic drug:  levothyroxine     therapeutic multivitamin-minerals tablet     Vitamin D3 250 MCG ([address]67 UT) Caps           Where to Get Your Medications      These medications were sent to [address], [address]  [place] [address]    Phone:  [phone]   · pantoprazole 40 MG tablet       Activity: activity as tolerated  Diet: clear liquids, advance as tolerated and low fat, low cholesterol diet    Pt has been advised to: Follow-up with ENRICO CHI III, DO in 1 week.   Follow-up with consultants as recommended by them    Note that over 30 minutes was spent in preparing discharge papers, discussing discharge with patient, medication review, etc.    Signed:  Romana Silence, MD  8/4/2020  8:07 PM

## 2021-05-13 DIAGNOSIS — I73.9 PVD (PERIPHERAL VASCULAR DISEASE) WITH CLAUDICATION (HCC): Primary | ICD-10-CM

## 2021-05-20 ENCOUNTER — HOSPITAL ENCOUNTER (OUTPATIENT)
Dept: CARDIOLOGY | Age: 71
Discharge: HOME | End: 2021-05-20
Payer: MEDICARE

## 2021-05-20 ENCOUNTER — OFFICE VISIT (OUTPATIENT)
Dept: VASCULAR SURGERY | Age: 71
End: 2021-05-20
Payer: MEDICARE

## 2021-05-20 VITALS — BODY MASS INDEX: 38.32 KG/M2 | HEIGHT: 65 IN | WEIGHT: 230 LBS

## 2021-05-20 DIAGNOSIS — I73.9 PVD (PERIPHERAL VASCULAR DISEASE) WITH CLAUDICATION (HCC): ICD-10-CM

## 2021-05-20 DIAGNOSIS — I73.9 PVD (PERIPHERAL VASCULAR DISEASE) WITH CLAUDICATION (HCC): Primary | Chronic | ICD-10-CM

## 2021-05-20 PROBLEM — D64.9 ANEMIA: Status: RESOLVED | Noted: 2020-07-07 | Resolved: 2021-05-20

## 2021-05-20 PROBLEM — K62.5 RECTAL BLEEDING: Status: RESOLVED | Noted: 2020-07-13 | Resolved: 2021-05-20

## 2021-05-20 PROBLEM — R42 LIGHTHEADEDNESS: Status: RESOLVED | Noted: 2020-07-09 | Resolved: 2021-05-20

## 2021-05-20 PROBLEM — N17.9 AKI (ACUTE KIDNEY INJURY) (HCC): Status: RESOLVED | Noted: 2020-07-10 | Resolved: 2021-05-20

## 2021-05-20 PROBLEM — K92.2 GI BLEED: Status: RESOLVED | Noted: 2020-07-07 | Resolved: 2021-05-20

## 2021-05-20 PROBLEM — R55 NEAR SYNCOPE: Status: RESOLVED | Noted: 2020-07-09 | Resolved: 2021-05-20

## 2021-05-20 PROCEDURE — 99214 OFFICE O/P EST MOD 30 MIN: CPT | Performed by: SURGERY

## 2021-05-20 PROCEDURE — 93923 UPR/LXTR ART STDY 3+ LVLS: CPT

## 2021-05-20 RX ORDER — CANAGLIFLOZIN 100 MG/1
100 TABLET, FILM COATED ORAL
COMMUNITY

## 2021-05-20 RX ORDER — ASPIRIN 81 MG/1
81 TABLET ORAL DAILY
COMMUNITY

## 2021-05-20 NOTE — PROGRESS NOTES
Chief Complaint:   Chief Complaint   Patient presents with    Circulatory Problem     PVD         HPI: Patient came to the office, for evaluation of peripheral vascular disease with claudication, left leg more than the right leg, patient tells me sometimes in the morning she gets tingling numbness of both feet including the right leg which she has good circulation, patient has had diabetes mellitus for some neuropathy    Patient denies any back problems    Patient was not seen in the office last year because of Covid virus situation    Patient overall doing well has no major other health issues      Patient denies any focal lateralizing neurological symptoms like loss of speech, vision or loss of function of extremity    Patient can walk a few blocks slowly, and denies any symptoms of rest pain    Allergies   Allergen Reactions    Adhesive Tape Other (See Comments)     Pulls skin       Current Outpatient Medications   Medication Sig Dispense Refill    canagliflozin (INVOKANA) 100 MG TABS tablet Take 100 mg by mouth every morning (before breakfast)      Semaglutide (OZEMPIC, 1 MG/DOSE, SC) Inject into the skin      aspirin 81 MG EC tablet Take 81 mg by mouth daily      pantoprazole (PROTONIX) 40 MG tablet Take 1 tablet by mouth 2 times daily (before meals) 30 tablet 0    acetaminophen (TYLENOL) 500 MG tablet Take 500 mg by mouth every 6 hours as needed for Pain      allopurinol (ZYLOPRIM) 300 MG tablet Take 300 mg by mouth daily      SYNTHROID 137 MCG tablet Take 175 mcg by mouth daily       sucralfate (CARAFATE) 1 GM tablet Take 1 g by mouth 4 times daily      sertraline (ZOLOFT) 50 MG tablet Take 50 mg by mouth daily      metFORMIN (GLUCOPHAGE) 500 MG tablet Take 1,000 mg by mouth 2 times daily (with meals)      HYDROcodone-acetaminophen (NORCO) 7.5-325 MG per tablet Take 1 tablet by mouth every 12 hours as needed for Pain      Cholecalciferol (VITAMIN D3) 01772 UNITS CAPS Take 1 capsule by mouth See Admin Instructions 5 days a week      atorvastatin (LIPITOR) 10 MG tablet Take 10 mg by mouth daily      irbesartan (AVAPRO) 150 MG tablet Take 150 mg by mouth daily.  metoprolol (LOPRESSOR) 25 MG tablet Take 25 mg by mouth 2 times daily Take am surgery 12/03.  Calcium Carb-Cholecalciferol (CALCIUM 1000 + D PO) Take by mouth daily       therapeutic multivitamin-minerals (THERAGRAN-M) tablet Take 1 tablet by mouth daily.  Elastic Bandages & Supports (MEDICAL COMPRESSION STOCKINGS) MISC by Does not apply route. 20-30 MMHG knee high        No current facility-administered medications for this visit. Past Medical History:   Diagnosis Date    Back problem     Carotid artery stenosis     Chest pain 2/22/2013    Diabetic neuropathy [facility] 12/4/2015    Full dentures     Hiatal hernia     Hypertension     Hyperuricemia 12/4/2015    Lipid disorder 12/4/2015    Osteoarthritis     lumbosacral spine arthritis and disc disease    Peripheral vascular disease ([address]     Preoperative clearance     08/2015 per Dr Anthony Collins and 10/2015 per [facility] Heather Molina for [facility] Dr Kody Guzman.     PVD (peripheral vascular disease) with claudication ([facility] 12/13/2012    follows with Dr Anita Cooper Rapid palpitations     follows with Dr Adenike Waggoner Thyroid disease     Type II or unspecified type diabetes mellitus without mention of complication, not stated as uncontrolled     Wears glasses        Past Surgical History:   Procedure Laterality Date    APPENDECTOMY      COLONOSCOPY      HYSTERECTOMY      with appendectomy    JOINT REPLACEMENT Left 12/3/2015    total hip    THROAT SURGERY  2002    benign    TOTAL HIP ARTHROPLASTY Left 12/2015    UPPER GASTROINTESTINAL ENDOSCOPY N/A 7/7/2020    EGD BIOPSY performed by Kilo Mayo MD at [address] [place] [address],[address] Floor N/A 7/15/2020    EGD performed by Kilo Mayo MD at [place] [address] History   Problem Relation Age of Onset    Kidney Disease Mother     Cancer Father     Heart Disease Maternal Grandmother        Social History     Socioeconomic History    Marital status:      Spouse name: Not on file    Number of children: Not on file    Years of education: Not on file    Highest education level: Not on file   Occupational History    Not on file   Tobacco Use    Smoking status: Former Smoker     Packs/day: 1.00     Years: 40.00     Pack years: 40.00     Types: Cigarettes     Quit date: 2007     Years since quittin.4    Smokeless tobacco: Never Used   Vaping Use    Vaping Use: Never used   Substance and Sexual Activity    Alcohol use: No    Drug use: No    Sexual activity: Not on file   Other Topics Concern    Not on file   Social History Narrative    Not on file     Social Determinants of Health     Financial Resource Strain:     Difficulty of Paying Living Expenses:    Food Insecurity:     Worried About Running Out of Food in the Last Year:     [address] in the Last Year:    Transportation Needs:     Lack of Transportation (Medical):  Lack of Transportation (Non-Medical):    Physical Activity:     Days of Exercise per Week:     Minutes of Exercise per Session:    Stress:     Feeling of Stress :    Social Connections:     Frequency of Communication with Friends and Family:     Frequency of Social Gatherings with Friends and Family:     Attends Catholic Services:     Active Member of Clubs or Organizations:     Attends Club or Organization Meetings:     Marital Status:    Intimate Partner Violence:     Fear of Current or Ex-Partner:     Emotionally Abused:     Physically Abused:     Sexually Abused:        Review of Systems:    Eyes:  No blurring, diplopia or vision loss. Respiratory:  No cough, pleuritic chest pain, dyspnea, or wheezing. Cardiovascular: No angina, palpitations . Musculoskeletal:  No arthritis or weakness.   Neurologic:  No paralysis, paresis, paresthesia, seizures or headache. Endocrinology: Diabetes mellitus with neuropathy, hypothyroidism          Physical Exam:  General appearance:  Alert, awake, oriented x 3. No distress. Eyes:  Conjunctivae appear normal; PERRL  Neck:  No jugular venous distention, lymphadenopathy or thyromegaly. No evidence of carotid bruit  Lungs:  Clear to ausculation bilaterally. No rhonchi, crackles, wheezes  Heart:  Regular rate and rhythm. No rub or murmur  Abdomen:  Soft, non-tender. No masses, organomegaly. Musculoskeletal : No joint effusions, tenderness swelling    Neuro: Speech is intact. Moving all extremities. No focal motor or sensory deficits      Extremities:  Both feet are warm to touch. The color of both feet is normal.        Pulses Right  Left    Brachial 3 3    Radial    3=normal   Femoral 2 2  2=diminished   Popliteal    1=barely palpable   Dorsalis pedis 2 1  0=absent   Posterior tibial    4=aneurysmal             Other pertinent information:1. The past medical records were reviewed. Assessment:    1. PVD (peripheral vascular disease) with claudication ([facility]              Plan:       Discussed with the patient regarding the risks of the lower extremity artery Doppler study, on the left side, left femoral-popliteal arterial occlusive disease with an ankle-brachial index of 0.55, with slight worsening compared to last year but with adequate collateral flow to the left foot based upon the pulse volume recordings of the metatarsal, on the right side, normal, patient was reassured              Patient was instructed to continue walking program and to call if any worsening of symptoms and to call if any focal lateralizing neurological symptoms like loss of speech, vision or loss of function of extremity. All the questions were answered. Indicated follow-up: Return in about 1 year (around 5/20/2022), or if symptoms worsen or fail to improve.

## 2021-05-24 NOTE — PROCEDURES
[address]                  [address] [facility],  [facility]                                VASCULAR REPORT    PATIENT NAME: Abdi Espino                   :        1950  MED REC NO:   [id number]                            ROOM:  ACCOUNT NO:   [de-identified]                           ADMIT DATE: 2021  PROVIDER:     Juan Medeiros MD    DATE OF PROCEDURE:  2021    LOWER EXTREMITY ARTERIAL DOPPLER STUDY    INDICATION:  Difficulty walking. FINDINGS:  Lower extremity arterial Doppler study revealed that on the  left side, the patient has left femoropopliteal arterial occlusive  disease with ankle-brachial index of 0.55. On the right side  ankle-brachial index is normal, with slight worsening on the left  compared to the last year.         Lillian Altman MD    D: 2021 16:31:26       T: 2021 16:33:23     [id number]  Job#: [id number]     Doc#: [id number]    CC:

## 2021-05-25 ENCOUNTER — OFFICE VISIT (OUTPATIENT)
Dept: CARDIOLOGY CLINIC | Age: 71
End: 2021-05-25
Payer: MEDICARE

## 2021-05-25 ENCOUNTER — NURSE ONLY (OUTPATIENT)
Dept: CARDIOLOGY CLINIC | Age: 71
End: 2021-05-25

## 2021-05-25 VITALS
OXYGEN SATURATION: 96 % | WEIGHT: 246 LBS | RESPIRATION RATE: 16 BRPM | HEART RATE: 66 BPM | BODY MASS INDEX: 40.98 KG/M2 | SYSTOLIC BLOOD PRESSURE: 128 MMHG | HEIGHT: 65 IN | DIASTOLIC BLOOD PRESSURE: 64 MMHG

## 2021-05-25 DIAGNOSIS — I10 ESSENTIAL HYPERTENSION: Primary | ICD-10-CM

## 2021-05-25 DIAGNOSIS — R00.2 PALPITATIONS: ICD-10-CM

## 2021-05-25 PROCEDURE — 93000 ELECTROCARDIOGRAM COMPLETE: CPT | Performed by: INTERNAL MEDICINE

## 2021-05-25 PROCEDURE — 99203 OFFICE O/P NEW LOW 30 MIN: CPT | Performed by: INTERNAL MEDICINE

## 2021-05-25 ASSESSMENT — ENCOUNTER SYMPTOMS
BACK PAIN: 0
CONSTIPATION: 0
DIARRHEA: 0
ABDOMINAL PAIN: 0
BLOOD IN STOOL: 0
COUGH: 0
NAUSEA: 0
VOMITING: 0
WHEEZING: 0
SHORTNESS OF BREATH: 0

## 2021-05-25 NOTE — PROGRESS NOTES
Patient was in today and had a 14 Day Zio XT placed per Dr Koki Rosenberg. Patient given instructions and questions answered, patient verbalized understanding.       [place] XT : [id number]    Ferny Choe MA

## 2021-05-25 NOTE — PROGRESS NOTES
pain 2013    Diabetic neuropathy ([facility] 2015    Full dentures     Hiatal hernia     Hypertension     Hyperuricemia 2015    Lipid disorder 2015    Osteoarthritis     lumbosacral spine arthritis and disc disease    Peripheral vascular disease ([address]     Preoperative clearance     2015 per Dr Swathi Sims and 10/2015 per Dr Jordan Rodriguez for [facility] Dr Palmer Brittle.  PVD (peripheral vascular disease) with claudication ([facility] 2012    follows with Dr Leisa Pelayo Rapid palpitations     follows with Dr Goldie Quijano Thyroid disease     Type II or unspecified type diabetes mellitus without mention of complication, not stated as uncontrolled     Wears glasses        Past Surgical History:  Past Surgical History:   Procedure Laterality Date    APPENDECTOMY      COLONOSCOPY      HYSTERECTOMY      with appendectomy    JOINT REPLACEMENT Left 12/3/2015    total hip    THROAT SURGERY  2002    benign    TOTAL HIP ARTHROPLASTY Left 2015    UPPER GASTROINTESTINAL ENDOSCOPY N/A 2020    EGD BIOPSY performed by Silvina Satnos MD at [address] [place] [address] N/A 7/15/2020    EGD performed by Silvina Santos MD at [facility] ENDOSCOPY       Family History:  Family History   Problem Relation Age of Onset    Kidney Disease Mother     Cancer Father     Heart Disease Maternal Grandmother        Social History:  Social History     Socioeconomic History    Marital status:       Spouse name: Not on file    Number of children: Not on file    Years of education: Not on file    Highest education level: Not on file   Occupational History    Not on file   Tobacco Use    Smoking status: Former Smoker     Packs/day: 1.00     Years: 40.00     Pack years: 40.00     Types: Cigarettes     Quit date: 2007     Years since quittin.4    Smokeless tobacco: Never Used   Vaping Use    Vaping Use: Never used   Substance and Sexual Activity    Alcohol use: Not Currently    Drug use: Never    Sexual activity: Not on file   Other Topics Concern    Not on file   Social History Narrative    Drinks occ tea. Drinks 3 cups of decaf coffee daily. Social Determinants of Health     Financial Resource Strain:     Difficulty of Paying Living Expenses:    Food Insecurity:     Worried About Running Out of Food in the Last Year:     [address] in the Last Year:    Transportation Needs:     Lack of Transportation (Medical):  Lack of Transportation (Non-Medical):    Physical Activity:     Days of Exercise per Week:     Minutes of Exercise per Session:    Stress:     Feeling of Stress :    Social Connections:     Frequency of Communication with Friends and Family:     Frequency of Social Gatherings with Friends and Family:     Attends Yazidi Services:     Active Member of Clubs or Organizations:     Attends Club or Organization Meetings:     Marital Status:    Intimate Partner Violence:     Fear of Current or Ex-Partner:     Emotionally Abused:     Physically Abused:     Sexually Abused: Allergies:   Allergies   Allergen Reactions    Adhesive Tape Other (See Comments)     Pulls skin       Current Medications:  Current Outpatient Medications   Medication Sig Dispense Refill    Multiple Vitamin (MULTI-VITAMIN DAILY PO) Take by mouth daily      canagliflozin (INVOKANA) 100 MG TABS tablet Take 100 mg by mouth every morning (before breakfast)      Semaglutide (OZEMPIC, 1 MG/DOSE, SC) Inject into the skin once a week On Tues      aspirin 81 MG EC tablet Take 81 mg by mouth daily      acetaminophen (TYLENOL) 500 MG tablet Take 500 mg by mouth every 6 hours as needed for Pain      allopurinol (ZYLOPRIM) 300 MG tablet Take 300 mg by mouth daily      SYNTHROID 137 MCG tablet Take 137 mcg by mouth daily       sucralfate (CARAFATE) 1 GM tablet Take 1 g by mouth 4 times daily      sertraline (ZOLOFT) 50 MG tablet Take 50 mg by mouth daily      metFORMIN (GLUCOPHAGE) Comments: Distant heart sounds. Pulmonary:      Breath sounds: No wheezing or rales. Comments: Decreased air entry bilaterally with no wheezing or crackles. Chest:      Chest wall: No tenderness. Abdominal:      General: Bowel sounds are normal. There is no distension. Palpations: Abdomen is soft. There is no mass. Tenderness: There is no abdominal tenderness. Musculoskeletal:      Cervical back: Neck supple. Right lower leg: Edema present. Left lower leg: Edema present. Comments: Trivial bilateral leg edema. Skin:     General: Skin is warm and dry. Neurological:      Mental Status: She is alert and oriented to person, place, and time.           Laboratory Tests:  Lab Results   Component Value Date    CREATININE 0.8 07/14/2020    BUN 19 07/14/2020     07/14/2020    K 3.7 07/14/2020     07/14/2020    CO2 21 (L) 07/14/2020     Lab Results   Component Value Date    MG 1.5 07/10/2020     Lab Results   Component Value Date    WBC 8.7 07/15/2020    HGB 8.2 (L) 07/16/2020    HCT 27.3 (L) 07/16/2020    MCV 94.9 07/15/2020     07/15/2020     Lab Results   Component Value Date    ALT 10 07/14/2020    AST 14 07/14/2020    ALKPHOS 59 07/14/2020    BILITOT 0.6 07/14/2020     Lab Results   Component Value Date    TROPONINI 0.02 07/14/2020    TROPONINI <0.01 07/14/2020    TROPONINI 0.02 07/13/2020     Lab Results   Component Value Date    INR 1.2 07/14/2020    INR 1.2 07/09/2020    INR 1.2 07/06/2020    PROTIME 13.8 (H) 07/14/2020    PROTIME 13.5 (H) 07/09/2020    PROTIME 14.1 (H) 07/06/2020     Lab Results   Component Value Date    TSH 6.030 (H) 07/14/2020     Lab Results   Component Value Date    LABA1C 5.6 07/14/2020     No results found for: EAG  Lab Results   Component Value Date    CHOL 83 07/14/2020     Lab Results   Component Value Date    TRIG 142 07/14/2020     Lab Results   Component Value Date    HDL 32 07/14/2020     Lab Results   Component Value Date [address] 07/14/2020     Lab Results   Component Value Date    LABVLDL 28 07/14/2020       ASSESSMENT / PLAN:  -Palpitations: Could be due to skip beats or PAF. -Pretension: Controlled. -Hyperlipidemia: On atorvastatin.  -Peripheral arterial disease with claudication of the left leg and history of carotid stenosis: Patient is followed up by Dr. Hammad Andres. -Diabetes.  -History of peptic ulcer disease.  -Hypothyroidism.  -Obesity. -GERD and hiatal hernia. We will continue current cardiac medications. We will arrange for the patient to have ZIO XT monitoring for 14 days to rule out PAF. We will arrange for the patient to have an echocardiogram to assess her left atrial size, LV function, rule out valvular heart disease and assess RV systolic pressure. Consider a sleep study if elevated RV systolic pressure. Patient was advised to lose weight. Follow-up at the office in 1 year if no significant abnormality on ZIO XT monitoring or echocardiogram.    Thank you for allowing me to participate in your patient's care. Please feel free to contact me if you have any questions or concerns.     Ellen Jefefry MD [facility] [place], [address] [facility]

## 2021-07-01 ENCOUNTER — TELEPHONE (OUTPATIENT)
Dept: CARDIOLOGY CLINIC | Age: 71
End: 2021-07-01

## 2021-07-01 NOTE — TELEPHONE ENCOUNTER
Pt called wanting to know what the outcome was with her wearing the heart monitor.  She can be reached at [phone]

## 2021-07-07 DIAGNOSIS — R00.2 PALPITATIONS: ICD-10-CM

## 2021-08-27 ENCOUNTER — HOSPITAL ENCOUNTER (OUTPATIENT)
Dept: CARDIOLOGY | Age: 71
Discharge: HOME | End: 2021-08-27
Payer: MEDICARE

## 2021-08-27 DIAGNOSIS — I10 ESSENTIAL HYPERTENSION: ICD-10-CM

## 2021-08-27 LAB
LV EF: 58 %
LVEF MODALITY: NORMAL

## 2021-08-27 PROCEDURE — 93306 TTE W/DOPPLER COMPLETE: CPT

## 2021-12-07 ENCOUNTER — HOSPITAL ENCOUNTER (EMERGENCY)
Age: 71
Discharge: HOME | End: 2021-12-07
Attending: EMERGENCY MEDICINE
Payer: MEDICARE

## 2021-12-07 ENCOUNTER — APPOINTMENT (OUTPATIENT)
Dept: GENERAL RADIOLOGY | Age: 71
End: 2021-12-07
Payer: MEDICARE

## 2021-12-07 VITALS
DIASTOLIC BLOOD PRESSURE: 72 MMHG | BODY MASS INDEX: 40.77 KG/M2 | TEMPERATURE: 96.3 F | OXYGEN SATURATION: 95 % | HEART RATE: 66 BPM | RESPIRATION RATE: 14 BRPM | SYSTOLIC BLOOD PRESSURE: 177 MMHG | WEIGHT: 245 LBS

## 2021-12-07 DIAGNOSIS — S82.832A OTHER CLOSED FRACTURE OF DISTAL END OF LEFT FIBULA, INITIAL ENCOUNTER: ICD-10-CM

## 2021-12-07 DIAGNOSIS — S92.352A DISPLACED FRACTURE OF FIFTH METATARSAL BONE, LEFT FOOT, INITIAL ENCOUNTER FOR CLOSED FRACTURE: Primary | ICD-10-CM

## 2021-12-07 DIAGNOSIS — R03.0 ELEVATED BLOOD PRESSURE READING: ICD-10-CM

## 2021-12-07 PROCEDURE — 73630 X-RAY EXAM OF FOOT: CPT

## 2021-12-07 PROCEDURE — 99283 EMERGENCY DEPT VISIT LOW MDM: CPT

## 2021-12-07 PROCEDURE — 73610 X-RAY EXAM OF ANKLE: CPT

## 2021-12-07 NOTE — ED PROVIDER NOTES
HPI:  12/7/21, Time: 2:11 PM CARIDAD Pendleton is a 66 y.o. female presenting to the ED for left lateral ankle and left lateral foot swelling with bruising, beginning 6 days ago after she accidentally \"rolled\" it. She has no pain as she is diabetic but thinks it may be broken. She has been walking around on it, but has been holding it tight with compression hose/stocking and her diabetic shoe. The complaint has been persistent, moderate in severity, and worsened by nothing. Patient denies any other complaints or injury at this time. She did not fall. ROS:   A complete review of systems was performed and all pertinent positives and negatives are stated within HPI, all other systems reviewed and are negative.      --------------------------------------------- PAST HISTORY ---------------------------------------------  Past Medical History:  has a past medical history of Back problem, Carotid artery stenosis, Chest pain, Diabetic neuropathy ([address] Full dentures, Hiatal hernia, Hypertension, Hyperuricemia, Lipid disorder, Osteoarthritis, Peripheral vascular disease ([address] Preoperative clearance, PVD (peripheral vascular disease) with claudication ([address] Rapid palpitations, Thyroid disease, Type II or unspecified type diabetes mellitus without mention of complication, not stated as uncontrolled, and Wears glasses. Past Surgical History:  has a past surgical history that includes Appendectomy; Throat surgery (2002); Colonoscopy; Hysterectomy; joint replacement (Left, 12/3/2015); Total hip arthroplasty (Left, 12/2015); Upper gastrointestinal endoscopy (N/A, 7/7/2020); and Upper gastrointestinal endoscopy (N/A, 7/15/2020). Social History:  reports that she quit smoking about 14 years ago. Her smoking use included cigarettes. She has a 40.00 pack-year smoking history. She has never used smokeless tobacco. She reports previous alcohol use. She reports that she does not use drugs.     Family History: family history includes Cancer in her father; Heart Disease in her maternal grandmother; Kidney Disease in her mother. The patients home medications have been reviewed. Allergies: Adhesive tape        ----------------------------------------PHYSICAL EXAM--------------------------------------  Constitutional:  Well developed, well nourished, no acute distress, non-toxic appearance   Eyes:  PERRL, conjunctiva normal, EOMI  HENT:  Atraumatic, external ears normal, nose normal, oropharynx moist. Neck- normal range of motion, no nuchal rigidity   Respiratory:  No respiratory distress   Cardiovascular:  Normal rate, normal rhythm. Radial and DP pulses 2+ bilaterally. Her meds are soft. She is warm and well perfused. Cap refill less than 3 seconds. Musculoskeletal:  No edema, no tenderness, no deformities. Back- no tenderness. Left distal ankle with diffuse swelling and mild tenderness to palpation at the left distal fibula. She is diffusely swollen in the left foot with dependent contusions of the lateral foot but no crepitus or tenderness to palpation. Integument:  Well hydrated. Adequate perfusion. Neurologic:  Alert & oriented x 3, CN 2-12 normal,  no focal deficits noted. Normal gait. Psychiatric:  Speech and behavior appropriate       -------------------------------------------------- RESULTS -------------------------------------------------  I have personally reviewed all laboratory and imaging results for this patient. Results are listed below. LABS:  No results found for this visit on 12/07/21. RADIOLOGY:  Interpreted by Radiologist.  XR FOOT LEFT (MIN 3 VIEWS)   Final Result   1. Very subtle, non-displaced, acute oblique fracture through the distal   left fibular metaphysis. 2. Acute oblique fracture through the base of the left 5th metatarsal bone,   with up to 0.4 cm of diastasis of the major fracture fragments. 3.  Soft tissue swelling, as described.       4. Degenerative disease, enthesopathy, subcentimeter probable bone island,   and background diffuse osteopenia, as described. .      RECOMMENDATION:   1. Recommend post-treatment follow-up imaging of the left foot and ankle, as   directed by orthopedic consultation. .         XR ANKLE LEFT (MIN 3 VIEWS)   Final Result   1. Very subtle, non-displaced, acute oblique fracture through the distal   left fibular metaphysis. 2. Acute oblique fracture through the base of the left 5th metatarsal bone,   with up to 0.4 cm of diastasis of the major fracture fragments. 3.  Soft tissue swelling, as described. 4.  Degenerative disease, enthesopathy, subcentimeter probable bone island,   and background diffuse osteopenia, as described. .      RECOMMENDATION:   1. Recommend post-treatment follow-up imaging of the left foot and ankle, as   directed by orthopedic consultation. Nereyda Broderick ------------------------- NURSING NOTES AND VITALS REVIEWED ---------------------------  The nursing notes within the ED encounter and vital signs as below have been reviewed by myself. BP (!) 177/72   Pulse 66   Temp 96.3 °F (35.7 °C) (Temporal)   Resp 14   Wt 245 lb (111.1 kg)   SpO2 95%   BMI 40.77 kg/m²   Oxygen Saturation Interpretation: Normal      The patients available past medical records and past encounters were reviewed. ------------------------------ ED COURSE/MEDICAL DECISION MAKING----------------------  Medications - No data to display        Procedures:   none      Medical Decision Making:    Patient has a distal left fibula metaphyseal fracture as well as 1/5 metatarsal fracture on the left side. We do not have crutches here nor does she feel she would be stable on crutches. We do not have any orthopedic boots to place her in.   Podiatry was consulted, as she sees Dr. Gin Khalil, she has been instructed to go immediately to his office right now and he will see her in office and treat

## 2021-12-07 NOTE — ED NOTES
Discharged to go directly to podiatrist. Verbalized understanding.       Yaakov Clark RN  12/07/21 [id number]

## 2022-05-11 DIAGNOSIS — I73.9 PERIPHERAL VASCULAR DISEASE (HCC): Primary | ICD-10-CM

## 2022-05-25 ENCOUNTER — TELEPHONE (OUTPATIENT)
Dept: VASCULAR SURGERY | Age: 72
End: 2022-05-25

## 2022-05-25 NOTE — TELEPHONE ENCOUNTER
Called to confirm appointment for 5/26/22 at 2 p.m, left message with date, time, and phone number for patient.

## 2022-05-26 ENCOUNTER — OFFICE VISIT (OUTPATIENT)
Dept: VASCULAR SURGERY | Age: 72
End: 2022-05-26
Payer: MEDICARE

## 2022-05-26 ENCOUNTER — HOSPITAL ENCOUNTER (OUTPATIENT)
Dept: CARDIOLOGY | Age: 72
Discharge: HOME | End: 2022-05-26
Payer: MEDICARE

## 2022-05-26 VITALS — BODY MASS INDEX: 38.65 KG/M2 | WEIGHT: 232 LBS | HEIGHT: 65 IN

## 2022-05-26 DIAGNOSIS — I73.9 PVD (PERIPHERAL VASCULAR DISEASE) WITH CLAUDICATION (HCC): Primary | Chronic | ICD-10-CM

## 2022-05-26 DIAGNOSIS — I73.9 PERIPHERAL VASCULAR DISEASE (HCC): ICD-10-CM

## 2022-05-26 PROCEDURE — 93923 UPR/LXTR ART STDY 3+ LVLS: CPT

## 2022-05-26 PROCEDURE — 1123F ACP DISCUSS/DSCN MKR DOCD: CPT | Performed by: SURGERY

## 2022-05-26 PROCEDURE — 99213 OFFICE O/P EST LOW 20 MIN: CPT | Performed by: SURGERY

## 2022-05-26 NOTE — PROGRESS NOTES
Chief Complaint:   Chief Complaint   Patient presents with    Circulatory Problem     pve         HPI: Patient came to the office, for follow-up evaluation of peripheral vascular disease, mainly on the left side, overall doing well      Patient denies any focal lateralizing neurological symptoms like loss of speech, vision or loss of function of extremity    Patient can walk a few blocks , and denies any symptoms of rest pain    Allergies   Allergen Reactions    Adhesive Tape Other (See Comments)     Pulls skin       Current Outpatient Medications   Medication Sig Dispense Refill    Multiple Vitamin (MULTI-VITAMIN DAILY PO) Take by mouth daily      canagliflozin (INVOKANA) 100 MG TABS tablet Take 100 mg by mouth every morning (before breakfast)      Semaglutide (OZEMPIC, 1 MG/DOSE, SC) Inject into the skin once a week On Tues      aspirin 81 MG EC tablet Take 81 mg by mouth daily      acetaminophen (TYLENOL) 500 MG tablet Take 500 mg by mouth every 6 hours as needed for Pain      allopurinol (ZYLOPRIM) 300 MG tablet Take 300 mg by mouth daily      SYNTHROID 137 MCG tablet Take 137 mcg by mouth daily       sucralfate (CARAFATE) 1 GM tablet Take 1 g by mouth 4 times daily      sertraline (ZOLOFT) 50 MG tablet Take 50 mg by mouth daily      metFORMIN (GLUCOPHAGE) 500 MG tablet Take 1,000 mg by mouth 2 times daily (with meals)      HYDROcodone-acetaminophen (NORCO) 7.5-325 MG per tablet Take 0.5-1 tablets by mouth every 8 hours as needed for Pain.  Cholecalciferol (VITAMIN D3) 12375 UNITS CAPS Take 1 capsule by mouth See Admin Instructions 5 days a week      atorvastatin (LIPITOR) 10 MG tablet Take 10 mg by mouth daily      irbesartan (AVAPRO) 150 MG tablet Take 150 mg by mouth daily.       metoprolol (LOPRESSOR) 25 MG tablet Take 25 mg by mouth 2 times daily       Calcium Carb-Cholecalciferol (CALCIUM 1000 + D PO) Take by mouth daily       Elastic Bandages & Supports (MEDICAL COMPRESSION STOCKINGS) MISC by Does not apply route. 20-30 MMHG knee high        No current facility-administered medications for this visit. Past Medical History:   Diagnosis Date    Back problem     Carotid artery stenosis     Chest pain 2/22/2013    Diabetic neuropathy ([facility] 12/4/2015    Full dentures     Hiatal hernia     Hypertension     Hyperuricemia 12/4/2015    Lipid disorder 12/4/2015    Osteoarthritis     lumbosacral spine arthritis and disc disease    Peripheral vascular disease ([address]     Preoperative clearance     08/2015 per Dr Kayden Aguilar and 10/2015 per Dr Flor Rdz for [facility] Dr Nancy Doll.  PVD (peripheral vascular disease) with claudication ([facility] 12/13/2012    follows with Dr Chapin Blake Rapid palpitations     follows with Dr Geoff Flores Thyroid disease     Type II or unspecified type diabetes mellitus without mention of complication, not stated as uncontrolled     Wears glasses        Past Surgical History:   Procedure Laterality Date    APPENDECTOMY      COLONOSCOPY      HYSTERECTOMY      with appendectomy    JOINT REPLACEMENT Left 12/3/2015    total hip    THROAT SURGERY  2002    benign    TOTAL HIP ARTHROPLASTY Left 12/2015    UPPER GASTROINTESTINAL ENDOSCOPY N/A 7/7/2020    EGD BIOPSY performed by Suad Riley MD at [address] [place] [address] N/A 7/15/2020    EGD performed by Suad Riley MD at [facility] ENDOSCOPY       Family History   Problem Relation Age of Onset    Kidney Disease Mother     Cancer Father     Heart Disease Maternal Grandmother        Social History     Socioeconomic History    Marital status:       Spouse name: Not on file    Number of children: Not on file    Years of education: Not on file    Highest education level: Not on file   Occupational History    Not on file   Tobacco Use    Smoking status: Former Smoker     Packs/day: 1.00     Years: 40.00     Pack years: 40.00     Types: Cigarettes     Quit date: 12/13/2007 Years since quittin.4    Smokeless tobacco: Never Used   Vaping Use    Vaping Use: Never used   Substance and Sexual Activity    Alcohol use: Not Currently    Drug use: Never    Sexual activity: Not on file   Other Topics Concern    Not on file   Social History Narrative    Drinks occ tea. Drinks 3 cups of decaf coffee daily. Social Determinants of Health     Financial Resource Strain:     Difficulty of Paying Living Expenses: Not on file   Food Insecurity:     Worried About Running Out of Food in the Last Year: Not on file    [place] of Food in the Last Year: Not on file   Transportation Needs:     Lack of Transportation (Medical): Not on file    Lack of Transportation (Non-Medical): Not on file   Physical Activity:     Days of Exercise per Week: Not on file    Minutes of Exercise per Session: Not on file   Stress:     Feeling of Stress : Not on file   Social Connections:     Frequency of Communication with Friends and Family: Not on file    Frequency of Social Gatherings with Friends and Family: Not on file    Attends Taoist Services: Not on file    Active Member of [address] [place] [address] or Organizations: Not on file    Attends Club or Organization Meetings: Not on file    Marital Status: Not on file   Intimate Partner Violence:     Fear of Current or Ex-Partner: Not on file    Emotionally Abused: Not on file    Physically Abused: Not on file    Sexually Abused: Not on file   Housing Stability:     Unable to Pay for Housing in the Last Year: Not on file    Number of [place] in the Last Year: Not on file    Unstable Housing in the Last Year: Not on file       Review of Systems:    Eyes:  No blurring, diplopia or vision loss. Respiratory:  No cough, pleuritic chest pain, dyspnea, or wheezing. Cardiovascular: No angina, palpitations . Hypertension, hyperlipidemia  Musculoskeletal:  No arthritis or weakness.   Neurologic:  No paralysis, paresis, paresthesia, seizures or headache. Endocrinology: Diabetes mellitus, hypothyroidism          Physical Exam:  General appearance:  Alert, awake, oriented x 3. No distress. Eyes:  Conjunctivae appear normal; PERRL  Neck:  No jugular venous distention, lymphadenopathy or thyromegaly. No evidence of carotid bruit  Lungs:  Clear to ausculation bilaterally. No rhonchi, crackles, wheezes  Heart:  Regular rate and rhythm. No rub or murmur  Abdomen:  Soft, non-tender. No masses, organomegaly. Musculoskeletal : No joint effusions, tenderness swelling    Neuro: Speech is intact. Moving all extremities. No focal motor or sensory deficits      Extremities:  Both feet are warm to touch. The color of both feet is normal.        Pulses Right  Left    Brachial 3 3    Radial    3=normal   Femoral 2 2  2=diminished   Popliteal    1=barely palpable   Dorsalis pedis 2 0  0=absent   Posterior tibial    4=aneurysmal             Other pertinent information:1. The past medical records were reviewed. Assessment:    1. PVD (peripheral vascular disease) with claudication ([facility]              Plan:       Discussed the patient regarding the results of the lower extremity artery Doppler study, within normal range of 0.92 on the left, left femoral-popliteal arterial occlusive disease, with an ankle-brachial necks of 0.61 on the left, with slight improvement, patient was reassured, call as needed if any increasing symptoms, continue the walking program              Patient was instructed to continue walking program and to call if any worsening of symptoms and to call if any focal lateralizing neurological symptoms like loss of speech, vision or loss of function of extremity. All the questions were answered. Indicated follow-up: Return in about 1 year (around 5/26/2023), or if symptoms worsen or fail to improve.

## 2022-05-27 ENCOUNTER — OFFICE VISIT (OUTPATIENT)
Dept: CARDIOLOGY CLINIC | Age: 72
End: 2022-05-27
Payer: MEDICARE

## 2022-05-27 VITALS
HEIGHT: 65 IN | BODY MASS INDEX: 42.32 KG/M2 | SYSTOLIC BLOOD PRESSURE: 142 MMHG | HEART RATE: 67 BPM | DIASTOLIC BLOOD PRESSURE: 72 MMHG | WEIGHT: 254 LBS | RESPIRATION RATE: 18 BRPM

## 2022-05-27 DIAGNOSIS — I10 ESSENTIAL HYPERTENSION: Primary | ICD-10-CM

## 2022-05-27 PROCEDURE — 93000 ELECTROCARDIOGRAM COMPLETE: CPT | Performed by: INTERNAL MEDICINE

## 2022-05-27 PROCEDURE — 1123F ACP DISCUSS/DSCN MKR DOCD: CPT | Performed by: INTERNAL MEDICINE

## 2022-05-27 PROCEDURE — 99214 OFFICE O/P EST MOD 30 MIN: CPT | Performed by: INTERNAL MEDICINE

## 2022-05-27 ASSESSMENT — ENCOUNTER SYMPTOMS
ABDOMINAL PAIN: 0
BLOOD IN STOOL: 0
CONSTIPATION: 0
VOMITING: 0
SHORTNESS OF BREATH: 0
COUGH: 0
DIARRHEA: 1
WHEEZING: 0
BACK PAIN: 0
NAUSEA: 0

## 2022-05-27 NOTE — PROGRESS NOTES
OUTPATIENT CARDIOLOGY FOLLOW-UP    HPI:    Name: Britney Vital    Age: 66 y.o. Primary Care Physician: Александр Link III, DO    Date of Service: 5/27/2022    Chief Complaint: 1 year follow-up visit. History of present illness :   68-year-old morbidly obese ex-smoker female who comes today for 1 year follow-up visit. She has history of hypertension, peripheral arterial disease of the lower extremities, carotid stenosis, diabetes, hypothyroidism, gout, peptic ulcer disease, anemia, hiatal hernia, and depression. Patient has been fairly active. She can walk up to 1 hour a day with no chest discomfort or dyspnea on exertion. She denies palpitations, dizziness or syncope. She denies orthopnea or PND but admits to get chronic lower extremity edema. EKG done today revealed sinus rhythm at 67 bpm, left atrial enlargement, nonspecific T wave changes and artifacts. Review of Systems:   Review of Systems   Constitutional: Negative for chills, fatigue and fever. HENT: Negative for nosebleeds. Respiratory: Negative for cough, shortness of breath and wheezing. She snores at night but denies gasping for air. Gastrointestinal: Positive for diarrhea. Negative for abdominal pain, blood in stool, constipation, nausea and vomiting. GERD. Genitourinary: Negative for dysuria and hematuria. Musculoskeletal: Negative for back pain, joint swelling and myalgias. Knee pain. Neurological: Negative for syncope and light-headedness. Psychiatric/Behavioral: The patient is not nervous/anxious.            Past Medical History:  Past Medical History:   Diagnosis Date    Back problem     Carotid artery stenosis     Chest pain 2/22/2013    Diabetic neuropathy ([facility] [address] 12/4/2015    Full dentures     Hiatal hernia     Hypertension     Hyperuricemia 12/4/2015    Lipid disorder 12/4/2015    Osteoarthritis     lumbosacral spine arthritis and disc disease    Peripheral vascular disease [facility])     Preoperative clearance     2015 per Dr Ruddy Valencia and 10/2015 per Dr Vesta Lemon for [facility] Dr Samina Sanchez.  PVD (peripheral vascular disease) with claudication ([address] 2012    follows with Dr Anam Marie Rapid palpitations     follows with Dr Iraj Rodriguez Thyroid disease     Type II or unspecified type diabetes mellitus without mention of complication, not stated as uncontrolled     Wears glasses        Past Surgical History:  Past Surgical History:   Procedure Laterality Date    APPENDECTOMY      COLONOSCOPY      HYSTERECTOMY      with appendectomy    JOINT REPLACEMENT Left 12/3/2015    total hip    THROAT SURGERY  2002    benign    TOTAL HIP ARTHROPLASTY Left 2015    UPPER GASTROINTESTINAL ENDOSCOPY N/A 2020    EGD BIOPSY performed by Nathaniel Prado MD at [address] [place], [place] [address]Third [facility] N/A 7/15/2020    EGD performed by Nathaniel Prado MD at [facility] ENDOSCOPY       Family History:  Family History   Problem Relation Age of Onset    Kidney Disease Mother     Cancer Father     Heart Disease Maternal Grandmother        Social History:  Social History     Socioeconomic History    Marital status:      Spouse name: Not on file    Number of children: Not on file    Years of education: Not on file    Highest education level: Not on file   Occupational History    Not on file   Tobacco Use    Smoking status: Former Smoker     Packs/day: 1.00     Years: 40.00     Pack years: 40.00     Types: Cigarettes     Quit date: 2007     Years since quittin.4    Smokeless tobacco: Never Used   Vaping Use    Vaping Use: Never used   Substance and Sexual Activity    Alcohol use: Not Currently    Drug use: Never    Sexual activity: Not on file   Other Topics Concern    Not on file   Social History Narrative    Drinks occ tea. Drinks 3 cups of decaf coffee daily.       Social Determinants of Health     Financial Resource Strain:     Difficulty of Paying Living Expenses: Not on file   Food Insecurity:     Worried About Running Out of Food in the Last Year: Not on file    A[place]ou of Food in the Last Year: Not on file   Transportation Needs:     Lack of Transportation (Medical): Not on file    Lack of Transportation (Non-Medical): Not on file   Physical Activity:     Days of Exercise per Week: Not on file    Minutes of Exercise per Session: Not on file   Stress:     Feeling of Stress : Not on file   Social Connections:     Frequency of Communication with Friends and Family: Not on file    Frequency of Social Gatherings with Friends and Family: Not on file    Attends [employer] Services: Not on file    Active Member of [address] [place] [address] [employer] or Organizations: Not on file    Attends Club or Organization Meetings: Not on file    Marital Status: Not on file   Intimate Partner Violence:     Fear of Current or Ex-Partner: Not on file    Emotionally Abused: Not on file    Physically Abused: Not on file    Sexually Abused: Not on file   Housing Stability:     Unable to Pay for Housing in the Last Year: Not on file    Number of [place] in the Last Year: Not on file    Unstable Housing in the Last Year: Not on file       Allergies:   Allergies   Allergen Reactions    Adhesive Tape Other (See Comments)     Pulls skin       Current Medications:  Current Outpatient Medications   Medication Sig Dispense Refill    Multiple Vitamin (MULTI-VITAMIN DAILY PO) Take by mouth daily      canagliflozin (INVOKANA) 100 MG TABS tablet Take 100 mg by mouth every morning (before breakfast)      Semaglutide (OZEMPIC, 1 MG/DOSE, SC) Inject into the skin once a week On Tues      aspirin 81 MG EC tablet Take 81 mg by mouth daily      acetaminophen (TYLENOL) 500 MG tablet Take 500 mg by mouth every 6 hours as needed for Pain      allopurinol (ZYLOPRIM) 300 MG tablet Take 300 mg by mouth daily      SYNTHROID 137 MCG tablet Take 137 mcg by mouth daily       sucralfate (CARAFATE) 1 GM tablet Take 1 g by mouth 4 times daily      sertraline (ZOLOFT) 50 MG tablet Take 50 mg by mouth daily      metFORMIN (GLUCOPHAGE) 500 MG tablet Take 1,000 mg by mouth 2 times daily (with meals)      HYDROcodone-acetaminophen (NORCO) 7.5-325 MG per tablet Take 0.5-1 tablets by mouth every 8 hours as needed for Pain.  Cholecalciferol (VITAMIN D3) 02780 UNITS CAPS Take 1 capsule by mouth See Admin Instructions 5 days a week      atorvastatin (LIPITOR) 10 MG tablet Take 10 mg by mouth daily      irbesartan (AVAPRO) 150 MG tablet Take 150 mg by mouth daily.  metoprolol (LOPRESSOR) 25 MG tablet Take 25 mg by mouth 2 times daily       Calcium Carb-Cholecalciferol (CALCIUM 1000 + D PO) Take by mouth daily       Elastic Bandages & Supports ([address]) MISC by Does not apply route. 20-30 MMHG knee high        No current facility-administered medications for this visit. Physical Exam:  There were no vitals taken for this visit. Wt Readings from Last 3 Encounters:   05/26/22 232 lb (105.2 kg)   12/07/21 245 lb (111.1 kg)   05/25/21 246 lb (111.6 kg)       Physical Exam  Constitutional:       General: She is not in acute distress. Appearance: She is well-developed. She is obese. HENT:      Head: Normocephalic and atraumatic. Neck:      Vascular: No carotid bruit or JVD. Cardiovascular:      Rate and Rhythm: Normal rate and regular rhythm. Heart sounds: No murmur heard. No friction rub. No gallop. Comments: Distant heart sounds. Pulmonary:      Breath sounds: No wheezing or rales. Comments: Decreased air entry bilaterally with no wheezing or crackles. Chest:      Chest wall: No tenderness. Abdominal:      General: Bowel sounds are normal. There is no distension. Palpations: Abdomen is soft. There is no mass. Tenderness: There is no abdominal tenderness. Comments: No abdominal bruit. Musculoskeletal:      Cervical back: Neck supple.       Right lower leg: Edema present. Left lower leg: Edema present. Comments: Minimal bilateral leg edema. Skin:     General: Skin is warm and dry. Neurological:      Mental Status: She is alert and oriented to person, place, and time. Laboratory Tests:  Lab Results   Component Value Date    CREATININE 0.8 07/14/2020    BUN 19 07/14/2020     07/14/2020    K 3.7 07/14/2020     07/14/2020    CO2 21 (L) 07/14/2020     Lab Results   Component Value Date    MG 1.5 07/10/2020     Lab Results   Component Value Date    WBC 8.7 07/15/2020    HGB 8.2 (L) 07/16/2020    HCT 27.3 (L) 07/16/2020    MCV 94.9 07/15/2020     07/15/2020     Lab Results   Component Value Date    ALT 10 07/14/2020    AST 14 07/14/2020    ALKPHOS 59 07/14/2020    BILITOT 0.6 07/14/2020     Lab Results   Component Value Date    TROPONINI 0.02 07/14/2020    TROPONINI <0.01 07/14/2020    TROPONINI 0.02 07/13/2020     Lab Results   Component Value Date    INR 1.2 07/14/2020    INR 1.2 07/09/2020    INR 1.2 07/06/2020    PROTIME 13.8 (H) 07/14/2020    PROTIME 13.5 (H) 07/09/2020    PROTIME 14.1 (H) 07/06/2020     Lab Results   Component Value Date    TSH 6.030 (H) 07/14/2020     Lab Results   Component Value Date    LABA1C 5.6 07/14/2020       Lab Results   Component Value Date    CHOL 83 07/14/2020     Lab Results   Component Value Date    TRIG 142 07/14/2020     Lab Results   Component Value Date    HDL 32 07/14/2020     Lab Results   Component Value Date    LDLCALC 23 07/14/2020     Lab Results   Component Value Date    LABVLDL 28 07/14/2020       Cardiac Tests:  Echocardiogram done on 8/27/2021:   Technically adequate study. Mildly dilated left atrium. Absence of significant valvular heart disease. Grade 1 diastolic dysfunction. EF 55-60%.     ASSESSMENT / PLAN:  -Palpitations: Due to PAF and short nonsustained VT. [id number] score at least 5.  -Hypertension: Mildly elevated today but could be exacerbated by whitecoat hypertension.  -Hyperlipidemia: On atorvastatin.  -Peripheral arterial disease and history of carotid stenosis: Patient is followed up by Dr. Dotty Espinosa. -Diabetes. -Normocytic anemia in 2020.  -History of peptic ulcer disease.  -Hypothyroidism.  -Obesity. -GERD and hiatal hernia. We will continue current cardiac medications. Patient was advised to have low-salt diet and to lose weight. Patient will benefit from Eliquis to prevent CVA. She was asked to visit her PCP and ask about her last blood count and if no contraindication to starting Eliquis. Will follow-up at the office in 1 year. The patient's current medication list, allergies, problem list and results of all previously ordered testing were reviewed at today's visit. {  Roberta Sky MD , [address] [facility].   [facility]) Cardiology

## 2022-05-29 NOTE — PROCEDURES
[address]                  [address] [facility],  [facility]                                VASCULAR REPORT    PATIENT NAME: Norma Hurley                   :        1950  MED REC NO:   [id number]                            ROOM:  ACCOUNT NO:   [de-identified]                           ADMIT DATE: 2022  PROVIDER:     Delpha Favre, MD    DATE OF PROCEDURE:  2022    LOWER EXTREMITY ARTERIAL DOPPLER STUDY    INDICATIONS:  Difficulty walking, both the feet feeling cold. FINDINGS:  The lower extremity arterial Doppler study revealed evidence  of mild right femoropopliteal arterial occlusive disease with an  ankle-brachial index of 0.92 and on the left side, moderate  femoropopliteal arterial occlusive disease with ankle-brachial index of  0.61, overall unchanged from the previous testing done last year.         Scottie Coates MD    D: 2022 7:42:10       T: 2022 7:44:26     [email or web address]  Job#: [id number]     Doc#: [id number]    CC:

## 2022-09-23 ENCOUNTER — TELEPHONE (OUTPATIENT)
Dept: CASE MANAGEMENT | Age: 72
End: 2022-09-23

## 2022-09-23 NOTE — TELEPHONE ENCOUNTER
I called the patient and she confirmed her CT lung screening at [facility] on 9/24/2022 at 10:00 am.  I reminded the patient to arrive at 9:30 am, enter through the main entrance, and register. Patient confirmed.           Electronically signed by Antonieta Ly on 9/23/22 at 9:50 AM EDT

## 2022-09-24 ENCOUNTER — HOSPITAL ENCOUNTER (OUTPATIENT)
Dept: CT IMAGING | Age: 72
Discharge: HOME | End: 2022-09-26
Payer: MEDICARE

## 2022-09-24 DIAGNOSIS — Z12.2 ENCOUNTER FOR SCREENING FOR MALIGNANT NEOPLASM OF RESPIRATORY ORGANS: ICD-10-CM

## 2022-09-24 DIAGNOSIS — Z87.891 FORMER SMOKER: ICD-10-CM

## 2022-09-24 PROCEDURE — 71271 CT THORAX LUNG CANCER SCR C-: CPT

## 2022-09-26 ENCOUNTER — TELEPHONE (OUTPATIENT)
Dept: CASE MANAGEMENT | Age: 72
End: 2022-09-26

## 2022-09-26 NOTE — TELEPHONE ENCOUNTER
No call, encounter opened to process CT Lung Screening. CT Lung Screen: 9/24/2022    Impression   1. Small patchy opacity seen within the superior segment of the right lower   lobe which could represent postinflammatory scarring or possibly resolving   pneumonia. 2. There is no suspicious mass or suspicious pulmonary nodule. 3. Significant calcified plaque within the coronary arteries       LUNG RADS:   Per ACR Lung-RADS Version 1.1       Category 2, Benign appearance or behavior. Management:  Continue annual lung screening with LDCT in 12 months. RECOMMENDATIONS:   If you would like to register your patient with the [facility], please contact the Nurse Navigator at   [phone]. Pack years: 36    Social History     Tobacco Use  Smoking Status: Former Smoker    Start Date:    Quit Date: 12/13/2007   Types: Cigarettes   Packs/Day: 1   Years: 36   Pack Years: 36   Smokeless Tobacco: Never         Results letter sent to patient via my chart or mailed.      [facility]

## 2023-05-16 ENCOUNTER — HOSPITAL ENCOUNTER (OUTPATIENT)
Age: 73
Discharge: HOME | End: 2023-05-18

## 2023-05-16 PROCEDURE — 88305 TISSUE EXAM BY PATHOLOGIST: CPT

## 2023-05-19 DIAGNOSIS — I73.9 PVD (PERIPHERAL VASCULAR DISEASE) WITH CLAUDICATION (HCC): Primary | Chronic | ICD-10-CM

## 2023-06-06 ENCOUNTER — TELEPHONE (OUTPATIENT)
Dept: VASCULAR SURGERY | Age: 73
End: 2023-06-06

## 2023-06-08 ENCOUNTER — OFFICE VISIT (OUTPATIENT)
Dept: VASCULAR SURGERY | Age: 73
End: 2023-06-08
Payer: MEDICARE

## 2023-06-08 ENCOUNTER — HOSPITAL ENCOUNTER (OUTPATIENT)
Dept: CARDIOLOGY | Age: 73
Discharge: HOME | End: 2023-06-08
Attending: SURGERY
Payer: MEDICARE

## 2023-06-08 VITALS — WEIGHT: 223 LBS | HEIGHT: 65 IN | BODY MASS INDEX: 37.15 KG/M2

## 2023-06-08 DIAGNOSIS — I73.9 PVD (PERIPHERAL VASCULAR DISEASE) WITH CLAUDICATION (HCC): Chronic | ICD-10-CM

## 2023-06-08 DIAGNOSIS — I73.9 PVD (PERIPHERAL VASCULAR DISEASE) WITH CLAUDICATION (HCC): Primary | Chronic | ICD-10-CM

## 2023-06-08 PROCEDURE — 1123F ACP DISCUSS/DSCN MKR DOCD: CPT | Performed by: SURGERY

## 2023-06-08 PROCEDURE — 93923 UPR/LXTR ART STDY 3+ LVLS: CPT

## 2023-06-08 PROCEDURE — 99213 OFFICE O/P EST LOW 20 MIN: CPT | Performed by: SURGERY

## 2023-06-08 RX ORDER — IRBESARTAN AND HYDROCHLOROTHIAZIDE 300; 12.5 MG/1; MG/1
1 TABLET, FILM COATED ORAL DAILY
COMMUNITY

## 2023-06-08 NOTE — PROGRESS NOTES
normal.        Pulses Right  Left    Brachial 3 3    Radial    3=normal   Femoral 2 2  2=diminished   Popliteal    1=barely palpable   Dorsalis pedis 1 0  0=absent   Posterior tibial    4=aneurysmal             Other pertinent information:1. The past medical records were reviewed. Assessment:    1. PVD (peripheral vascular disease) with claudication ([facility] [address]              Plan:       Discussed the patient regarding the results of the lower extremity arterial Doppler study, mainly femoral-popliteal arterial occlusive disease, with an ankle-brachial index of 0.83 on the right and 0.60 on the left, with minimal worsening of the right compared to last year, has good collateral flow, reassured and to call me as needed if her symptoms worsen              Patient was instructed to continue walking program and to call if any worsening of symptoms and to call if any focal lateralizing neurological symptoms like loss of speech, vision or loss of function of extremity. All the questions were answered. Indicated follow-up: Return in about 1 year (around 6/8/2024), or if symptoms worsen or fail to improve.

## 2023-06-09 NOTE — PROCEDURES
[address]                  [address] [facility],  [facility]                                VASCULAR REPORT    PATIENT NAME: Priti Elise                   :        1950  MED REC NO:   [id number]                            ROOM:  ACCOUNT NO:   [de-identified]                           ADMIT DATE: 2023  PROVIDER:     Ankur Huerta MD    DATE OF PROCEDURE:  2023    LOWER EXTREMITY ARTERIAL DOPPLER STUDY    INDICATION  Difficulty walking. FINDINGS:  The lower extremity arterial Doppler study revealed evidence  of right iliac and bilateral femoropopliteal arterial occlusive disease  with an ankle-brachial index of 0.83 on the right and 0.60 on the left  with minimal worsening of right compared to last year.         Eliezer Elam MD    D: 2023 16:34:11       T: 2023 16:36:31     [email or web address]  Job#: [id number]     Doc#: [id number]    CC:

## 2023-06-20 ENCOUNTER — OFFICE VISIT (OUTPATIENT)
Dept: CARDIOLOGY CLINIC | Age: 73
End: 2023-06-20
Payer: MEDICARE

## 2023-06-20 VITALS
HEIGHT: 65 IN | RESPIRATION RATE: 18 BRPM | DIASTOLIC BLOOD PRESSURE: 60 MMHG | BODY MASS INDEX: 37.15 KG/M2 | SYSTOLIC BLOOD PRESSURE: 132 MMHG | WEIGHT: 223 LBS | HEART RATE: 61 BPM

## 2023-06-20 DIAGNOSIS — I10 ESSENTIAL HYPERTENSION: Primary | Chronic | ICD-10-CM

## 2023-06-20 PROCEDURE — 1123F ACP DISCUSS/DSCN MKR DOCD: CPT | Performed by: INTERNAL MEDICINE

## 2023-06-20 PROCEDURE — 99213 OFFICE O/P EST LOW 20 MIN: CPT | Performed by: INTERNAL MEDICINE

## 2023-06-20 PROCEDURE — 93000 ELECTROCARDIOGRAM COMPLETE: CPT | Performed by: INTERNAL MEDICINE

## 2023-06-20 PROCEDURE — 3075F SYST BP GE 130 - 139MM HG: CPT | Performed by: INTERNAL MEDICINE

## 2023-06-20 PROCEDURE — 3078F DIAST BP <80 MM HG: CPT | Performed by: INTERNAL MEDICINE

## 2023-06-20 ASSESSMENT — ENCOUNTER SYMPTOMS
ABDOMINAL PAIN: 0
NAUSEA: 0
BLOOD IN STOOL: 0
COUGH: 0
DIARRHEA: 0
BACK PAIN: 0
WHEEZING: 0
VOMITING: 0
CONSTIPATION: 0
SHORTNESS OF BREATH: 0

## 2023-06-20 NOTE — PROGRESS NOTES
OUTPATIENT CARDIOLOGY FOLLOW-UP    HPI:    Name: Eliz Caal    Age: 74 y.o. Primary Care Physician: Negin Graham III, DO    Date of Service: 6/20/2023    Chief Complaint:   Chief Complaint   Patient presents with    Atrial Fibrillation     13 mo ov        History of present illness :   75-year-old morbidly obese ex-smoker female who comes today for 1 year follow-up visit. She has history of hypertension, peripheral arterial disease of the lower extremities, carotid stenosis, diabetes, hypothyroidism, gout, peptic ulcer disease, anemia, hiatal hernia, and depression. Patient feels good. She has lost 25 pounds. She can walk 2 blocks and has been doing her housework with no chest discomfort or dyspnea on exertion. She denies palpitations, dizziness or syncope. She admits to get a little lower extremity edema. EKG done today revealed sinus rhythm at 61 bpm, left atrial enlargement and nonspecific T wave changes. Review of Systems:   Review of Systems   Constitutional:  Negative for chills, fatigue and fever. HENT:  Negative for nosebleeds. Respiratory:  Negative for cough, shortness of breath and wheezing. Gastrointestinal:  Negative for abdominal pain, blood in stool, constipation, diarrhea, nausea and vomiting. GERD. Genitourinary:  Negative for dysuria and hematuria. Musculoskeletal:  Negative for back pain, joint swelling and myalgias. Aching. Neurological:  Negative for syncope and light-headedness. Psychiatric/Behavioral:  The patient is not nervous/anxious.          Past Medical History:  Past Medical History:   Diagnosis Date    A-fib [facility])     Back problem     Carotid artery stenosis     Chest pain 02/22/2013    Diabetic neuropathy ([facility] [address] 12/04/2015    Full dentures     Hiatal hernia     Hypertension     Hyperuricemia 12/04/2015    Lipid disorder 12/04/2015    Osteoarthritis     lumbosacral spine arthritis and disc disease    Peripheral vascular

## 2023-08-15 ENCOUNTER — HOSPITAL ENCOUNTER (EMERGENCY)
Age: 73
Discharge: HOME | End: 2023-08-15
Payer: MEDICARE

## 2023-08-15 VITALS
DIASTOLIC BLOOD PRESSURE: 65 MMHG | TEMPERATURE: 97.9 F | BODY MASS INDEX: 36.78 KG/M2 | SYSTOLIC BLOOD PRESSURE: 140 MMHG | WEIGHT: 221 LBS | OXYGEN SATURATION: 95 % | HEART RATE: 62 BPM | RESPIRATION RATE: 14 BRPM

## 2023-08-15 DIAGNOSIS — S61.012A LACERATION OF LEFT THUMB WITHOUT FOREIGN BODY WITHOUT DAMAGE TO NAIL, INITIAL ENCOUNTER: Primary | ICD-10-CM

## 2023-08-15 PROCEDURE — 6360000002 HC RX W HCPCS: Performed by: NURSE PRACTITIONER

## 2023-08-15 PROCEDURE — 12001 RPR S/N/AX/GEN/TRNK 2.5CM/<: CPT

## 2023-08-15 PROCEDURE — 2500000003 HC RX 250 WO HCPCS: Performed by: NURSE PRACTITIONER

## 2023-08-15 PROCEDURE — 90471 IMMUNIZATION ADMIN: CPT | Performed by: NURSE PRACTITIONER

## 2023-08-15 PROCEDURE — 99284 EMERGENCY DEPT VISIT MOD MDM: CPT

## 2023-08-15 PROCEDURE — 90714 TD VACC NO PRESV 7 YRS+ IM: CPT | Performed by: NURSE PRACTITIONER

## 2023-08-15 RX ORDER — LIDOCAINE HYDROCHLORIDE 10 MG/ML
20 INJECTION, SOLUTION INFILTRATION; PERINEURAL ONCE
Status: COMPLETED | OUTPATIENT
Start: 2023-08-15 | End: 2023-08-15

## 2023-08-15 RX ORDER — CEPHALEXIN 500 MG/1
500 CAPSULE ORAL 4 TIMES DAILY
Qty: 28 CAPSULE | Refills: 0 | Status: SHIPPED | OUTPATIENT
Start: 2023-08-15 | End: 2023-08-22

## 2023-08-15 RX ORDER — BACITRACIN ZINC 500 [USP'U]/G
OINTMENT TOPICAL ONCE
Status: DISCONTINUED | OUTPATIENT
Start: 2023-08-15 | End: 2023-08-15 | Stop reason: HOSPADM

## 2023-08-15 RX ADMIN — CLOSTRIDIUM TETANI TOXOID ANTIGEN (FORMALDEHYDE INACTIVATED) AND CORYNEBACTERIUM DIPHTHERIAE TOXOID ANTIGEN (FORMALDEHYDE INACTIVATED) 0.5 ML: 5; 2 INJECTION, SUSPENSION INTRAMUSCULAR at 13:11

## 2023-08-15 RX ADMIN — LIDOCAINE HYDROCHLORIDE 20 ML: 10 INJECTION, SOLUTION INFILTRATION; PERINEURAL at 13:12

## 2023-08-15 ASSESSMENT — PAIN DESCRIPTION - PAIN TYPE: TYPE: ACUTE PAIN

## 2023-08-15 ASSESSMENT — PAIN DESCRIPTION - FREQUENCY: FREQUENCY: CONTINUOUS

## 2023-08-15 ASSESSMENT — PAIN DESCRIPTION - DESCRIPTORS: DESCRIPTORS: DISCOMFORT;THROBBING;SORE

## 2023-08-15 ASSESSMENT — PAIN SCALES - GENERAL: PAINLEVEL_OUTOF10: 8

## 2023-08-15 ASSESSMENT — PAIN DESCRIPTION - ORIENTATION: ORIENTATION: LEFT

## 2023-08-15 ASSESSMENT — PAIN - FUNCTIONAL ASSESSMENT
PAIN_FUNCTIONAL_ASSESSMENT: 0-10
PAIN_FUNCTIONAL_ASSESSMENT: PREVENTS OR INTERFERES SOME ACTIVE ACTIVITIES AND ADLS

## 2023-08-15 ASSESSMENT — PAIN DESCRIPTION - ONSET: ONSET: ON-GOING

## 2023-08-15 NOTE — ED PROVIDER NOTES
Affect      ------------------------------ ED COURSE/MEDICAL DECISION MAKING----------------------  Medications   bacitracin zinc ointment (has no administration in time range)   tetanus & diphtheria toxoids (adult) 5-2 LFU injection 0.5 mL (0.5 mLs IntraMUSCular Given 8/15/23 1311)   lidocaine 1 % injection 20 mL (20 mLs IntraDERmal Given by Other 8/15/23 1312)             LACERATION REPAIR  PROCEDURE NOTE:  Unless otherwise indicated, this procedure was performed by myself      Laceration #: 1. Location: Left thumb volar aspect  Length: 2 cm cm. The wound area was cleansed with povidone iodine, cleansed with povidone iodine scrub and draped in a sterile fashion. The wound area was anesthetized with Lidocaine 1% without epinephrine. WOUND COMPLEXITY:    Debridement: None. Undermining: None. Wound Margins Revised: None. Flaps Aligned: no. The wound was explored with the following results no foreign body or tendon injury seen. The wound was closed with 5-0 Prolene using interrupted sutures. Dressing:  bacitracin was placed. Total number suture: 3      Medical Decision Making:    Sherryle Bridgeman is a 74 y.o. old female presenting to the emergency department for a laceration to the left thumb that occurred last evening, patient states that she was opening a can of apple sauce whenever the lid of the can lacerated her left thumb. She did apply immediate direct pressure to the area but states it is continuing to bleed. She is on Eliquis. She denies any difficulty moving the thumb, states that she is unable to get the bleeding to stop without manual direct pressure. Rates her current pain as an 8 out of 10, worse with movement.     Atilio Santana advised patient that as the laceration is greater than 8 hours old at this time suture repair would not be indicated however patient is continuing to bleed, on further inspection the tissue underlying is well vascularized and there is no evidence of a foreign body, gross

## 2023-08-15 NOTE — DISCHARGE INSTRUCTIONS
As you are aware, we do not typically suture lacerations that are greater than 8 hours old however as you were continue to bleed I did go ahead and put some sutures in. Please follow-up with your PCP in 1 week to have him reevaluate the wound, the stitches may come out in 7 to 10 days. Wash the area gently with mild soap and water twice daily, pat dry, apply bacitracin and a bandage. Please leave the dressing that we put on today in place until tomorrow. Do not soak the sutures for long periods of time and water. I will put you on an antibiotic to prevent infection in the area.

## 2023-11-07 ENCOUNTER — APPOINTMENT (OUTPATIENT)
Dept: CT IMAGING | Age: 73
End: 2023-11-07
Payer: MEDICARE

## 2023-11-07 ENCOUNTER — HOSPITAL ENCOUNTER (EMERGENCY)
Age: 73
Discharge: ANOTHER HOSPITAL | End: 2023-11-07
Attending: STUDENT IN AN ORGANIZED HEALTH CARE EDUCATION/TRAINING PROGRAM | Admitting: INTERNAL MEDICINE
Payer: MEDICARE

## 2023-11-07 VITALS
BODY MASS INDEX: 35.78 KG/M2 | WEIGHT: 215 LBS | SYSTOLIC BLOOD PRESSURE: 110 MMHG | RESPIRATION RATE: 16 BRPM | TEMPERATURE: 98.2 F | OXYGEN SATURATION: 98 % | HEART RATE: 75 BPM | DIASTOLIC BLOOD PRESSURE: 60 MMHG

## 2023-11-07 DIAGNOSIS — E87.20 LACTIC ACIDOSIS: ICD-10-CM

## 2023-11-07 DIAGNOSIS — R91.8 LUNG MASS: Primary | ICD-10-CM

## 2023-11-07 DIAGNOSIS — D72.829 LEUKOCYTOSIS, UNSPECIFIED TYPE: ICD-10-CM

## 2023-11-07 LAB
ALBUMIN SERPL-MCNC: 3.5 G/DL (ref 3.5–5.2)
ALP SERPL-CCNC: 80 U/L (ref 35–104)
ALT SERPL-CCNC: 12 U/L (ref 0–32)
ANION GAP SERPL CALCULATED.3IONS-SCNC: 16 MMOL/L (ref 7–16)
AST SERPL-CCNC: 11 U/L (ref 0–31)
BASOPHILS # BLD: 0.1 K/UL (ref 0–0.2)
BASOPHILS NFR BLD: 1 % (ref 0–2)
BILIRUB SERPL-MCNC: 0.5 MG/DL (ref 0–1.2)
BNP SERPL-MCNC: 255 PG/ML (ref 0–450)
BUN SERPL-MCNC: 27 MG/DL (ref 6–23)
CALCIUM SERPL-MCNC: 10.1 MG/DL (ref 8.6–10.2)
CHLORIDE SERPL-SCNC: 101 MMOL/L (ref 98–107)
CO2 SERPL-SCNC: 23 MMOL/L (ref 22–29)
CREAT SERPL-MCNC: 1.1 MG/DL (ref 0.5–1)
EOSINOPHIL # BLD: 0.65 K/UL (ref 0.05–0.5)
EOSINOPHILS RELATIVE PERCENT: 4 % (ref 0–6)
ERYTHROCYTE [DISTWIDTH] IN BLOOD BY AUTOMATED COUNT: 15.9 % (ref 11.5–15)
GFR SERPL CREATININE-BSD FRML MDRD: 54 ML/MIN/1.73M2
GLUCOSE SERPL-MCNC: 160 MG/DL (ref 74–99)
HCT VFR BLD AUTO: 34.3 % (ref 34–48)
HGB BLD-MCNC: 10.8 G/DL (ref 11.5–15.5)
IMM GRANULOCYTES # BLD AUTO: 0.18 K/UL (ref 0–0.58)
IMM GRANULOCYTES NFR BLD: 1 % (ref 0–5)
LACTATE BLDV-SCNC: 3 MMOL/L (ref 0.5–1.9)
LACTATE BLDV-SCNC: 3.5 MMOL/L (ref 0.5–2.2)
LYMPHOCYTES NFR BLD: 2.37 K/UL (ref 1.5–4)
LYMPHOCYTES RELATIVE PERCENT: 15 % (ref 20–42)
MCH RBC QN AUTO: 27.3 PG (ref 26–35)
MCHC RBC AUTO-ENTMCNC: 31.5 G/DL (ref 32–34.5)
MCV RBC AUTO: 86.8 FL (ref 80–99.9)
MONOCYTES NFR BLD: 0.84 K/UL (ref 0.1–0.95)
MONOCYTES NFR BLD: 5 % (ref 2–12)
NEUTROPHILS NFR BLD: 75 % (ref 43–80)
NEUTS SEG NFR BLD: 12.14 K/UL (ref 1.8–7.3)
PLATELET # BLD AUTO: 408 K/UL (ref 130–450)
PMV BLD AUTO: 9.5 FL (ref 7–12)
POTASSIUM SERPL-SCNC: 3.8 MMOL/L (ref 3.5–5)
PROT SERPL-MCNC: 6.6 G/DL (ref 6.4–8.3)
RBC # BLD AUTO: 3.95 M/UL (ref 3.5–5.5)
SODIUM SERPL-SCNC: 140 MMOL/L (ref 132–146)
TROPONIN I SERPL HS-MCNC: 16 NG/L (ref 0–9)
TROPONIN I SERPL HS-MCNC: 17 NG/L (ref 0–9)
WBC OTHER # BLD: 16.3 K/UL (ref 4.5–11.5)

## 2023-11-07 PROCEDURE — 85025 COMPLETE CBC W/AUTO DIFF WBC: CPT

## 2023-11-07 PROCEDURE — 83880 ASSAY OF NATRIURETIC PEPTIDE: CPT

## 2023-11-07 PROCEDURE — 74177 CT ABD & PELVIS W/CONTRAST: CPT

## 2023-11-07 PROCEDURE — 2580000003 HC RX 258: Performed by: STUDENT IN AN ORGANIZED HEALTH CARE EDUCATION/TRAINING PROGRAM

## 2023-11-07 PROCEDURE — 6370000000 HC RX 637 (ALT 250 FOR IP): Performed by: STUDENT IN AN ORGANIZED HEALTH CARE EDUCATION/TRAINING PROGRAM

## 2023-11-07 PROCEDURE — 71275 CT ANGIOGRAPHY CHEST: CPT

## 2023-11-07 PROCEDURE — 99285 EMERGENCY DEPT VISIT HI MDM: CPT

## 2023-11-07 PROCEDURE — 80053 COMPREHEN METABOLIC PANEL: CPT

## 2023-11-07 PROCEDURE — 83605 ASSAY OF LACTIC ACID: CPT

## 2023-11-07 PROCEDURE — 93005 ELECTROCARDIOGRAM TRACING: CPT | Performed by: STUDENT IN AN ORGANIZED HEALTH CARE EDUCATION/TRAINING PROGRAM

## 2023-11-07 PROCEDURE — 96376 TX/PRO/DX INJ SAME DRUG ADON: CPT

## 2023-11-07 PROCEDURE — 96374 THER/PROPH/DIAG INJ IV PUSH: CPT

## 2023-11-07 PROCEDURE — 6360000004 HC RX CONTRAST MEDICATION: Performed by: RADIOLOGY

## 2023-11-07 PROCEDURE — 96375 TX/PRO/DX INJ NEW DRUG ADDON: CPT

## 2023-11-07 PROCEDURE — 84484 ASSAY OF TROPONIN QUANT: CPT

## 2023-11-07 PROCEDURE — 87040 BLOOD CULTURE FOR BACTERIA: CPT

## 2023-11-07 PROCEDURE — 6360000002 HC RX W HCPCS: Performed by: STUDENT IN AN ORGANIZED HEALTH CARE EDUCATION/TRAINING PROGRAM

## 2023-11-07 RX ORDER — 0.9 % SODIUM CHLORIDE 0.9 %
500 INTRAVENOUS SOLUTION INTRAVENOUS ONCE
Status: COMPLETED | OUTPATIENT
Start: 2023-11-07 | End: 2023-11-07

## 2023-11-07 RX ORDER — SODIUM CHLORIDE 0.9 % (FLUSH) 0.9 %
5-40 SYRINGE (ML) INJECTION PRN
Status: CANCELLED | OUTPATIENT
Start: 2023-11-07

## 2023-11-07 RX ORDER — ONDANSETRON 4 MG/1
4 TABLET, ORALLY DISINTEGRATING ORAL EVERY 8 HOURS PRN
Status: CANCELLED | OUTPATIENT
Start: 2023-11-07

## 2023-11-07 RX ORDER — FENTANYL CITRATE 50 UG/ML
50 INJECTION, SOLUTION INTRAMUSCULAR; INTRAVENOUS ONCE
Status: COMPLETED | OUTPATIENT
Start: 2023-11-07 | End: 2023-11-07

## 2023-11-07 RX ORDER — DEXTROSE MONOHYDRATE 100 MG/ML
INJECTION, SOLUTION INTRAVENOUS CONTINUOUS PRN
Status: CANCELLED | OUTPATIENT
Start: 2023-11-07

## 2023-11-07 RX ORDER — ACETAMINOPHEN 325 MG/1
650 TABLET ORAL EVERY 6 HOURS PRN
Status: CANCELLED | OUTPATIENT
Start: 2023-11-07

## 2023-11-07 RX ORDER — METOPROLOL TARTRATE 50 MG/1
25 TABLET, FILM COATED ORAL 2 TIMES DAILY
Status: CANCELLED | OUTPATIENT
Start: 2023-11-07

## 2023-11-07 RX ORDER — INSULIN LISPRO 100 [IU]/ML
0-4 INJECTION, SOLUTION INTRAVENOUS; SUBCUTANEOUS
Status: CANCELLED | OUTPATIENT
Start: 2023-11-08

## 2023-11-07 RX ORDER — LEVOTHYROXINE SODIUM 137 UG/1
137 TABLET ORAL DAILY
Status: CANCELLED | OUTPATIENT
Start: 2023-11-08

## 2023-11-07 RX ORDER — SODIUM CHLORIDE 0.9 % (FLUSH) 0.9 %
5-40 SYRINGE (ML) INJECTION EVERY 12 HOURS SCHEDULED
Status: CANCELLED | OUTPATIENT
Start: 2023-11-07

## 2023-11-07 RX ORDER — ONDANSETRON 2 MG/ML
4 INJECTION INTRAMUSCULAR; INTRAVENOUS EVERY 6 HOURS PRN
Status: CANCELLED | OUTPATIENT
Start: 2023-11-07

## 2023-11-07 RX ORDER — ALLOPURINOL 300 MG/1
300 TABLET ORAL DAILY
Status: CANCELLED | OUTPATIENT
Start: 2023-11-08

## 2023-11-07 RX ORDER — MULTIVITAMIN
1 TABLET ORAL DAILY
Status: CANCELLED | OUTPATIENT
Start: 2023-11-08

## 2023-11-07 RX ORDER — SODIUM CHLORIDE 9 MG/ML
INJECTION, SOLUTION INTRAVENOUS PRN
Status: CANCELLED | OUTPATIENT
Start: 2023-11-07

## 2023-11-07 RX ORDER — GUAIFENESIN/DEXTROMETHORPHAN 100-10MG/5
5 SYRUP ORAL EVERY 6 HOURS PRN
Status: CANCELLED | OUTPATIENT
Start: 2023-11-07

## 2023-11-07 RX ORDER — ASPIRIN 81 MG/1
81 TABLET ORAL DAILY
Status: CANCELLED | OUTPATIENT
Start: 2023-11-08

## 2023-11-07 RX ORDER — 0.9 % SODIUM CHLORIDE 0.9 %
1000 INTRAVENOUS SOLUTION INTRAVENOUS ONCE
Status: DISCONTINUED | OUTPATIENT
Start: 2023-11-07 | End: 2023-11-08 | Stop reason: HOSPADM

## 2023-11-07 RX ORDER — SODIUM CHLORIDE 9 MG/ML
INJECTION, SOLUTION INTRAVENOUS CONTINUOUS
Status: CANCELLED | OUTPATIENT
Start: 2023-11-07

## 2023-11-07 RX ORDER — POLYETHYLENE GLYCOL 3350 17 G/17G
17 POWDER, FOR SOLUTION ORAL DAILY PRN
Status: CANCELLED | OUTPATIENT
Start: 2023-11-07

## 2023-11-07 RX ORDER — SUCRALFATE 1 G/1
1 TABLET ORAL 4 TIMES DAILY
Status: CANCELLED | OUTPATIENT
Start: 2023-11-07

## 2023-11-07 RX ORDER — DOXYCYCLINE HYCLATE 100 MG/1
100 CAPSULE ORAL ONCE
Status: COMPLETED | OUTPATIENT
Start: 2023-11-07 | End: 2023-11-07

## 2023-11-07 RX ORDER — ATORVASTATIN CALCIUM 10 MG/1
10 TABLET, FILM COATED ORAL NIGHTLY
Status: CANCELLED | OUTPATIENT
Start: 2023-11-08

## 2023-11-07 RX ORDER — ACETAMINOPHEN 650 MG/1
650 SUPPOSITORY RECTAL EVERY 6 HOURS PRN
Status: CANCELLED | OUTPATIENT
Start: 2023-11-07

## 2023-11-07 RX ORDER — INSULIN LISPRO 100 [IU]/ML
0-4 INJECTION, SOLUTION INTRAVENOUS; SUBCUTANEOUS NIGHTLY
Status: CANCELLED | OUTPATIENT
Start: 2023-11-07

## 2023-11-07 RX ADMIN — WATER 1000 MG: 1 INJECTION INTRAMUSCULAR; INTRAVENOUS; SUBCUTANEOUS at 21:21

## 2023-11-07 RX ADMIN — IOPAMIDOL 75 ML: 755 INJECTION, SOLUTION INTRAVENOUS at 18:58

## 2023-11-07 RX ADMIN — DOXYCYCLINE HYCLATE 100 MG: 100 CAPSULE ORAL at 21:20

## 2023-11-07 RX ADMIN — FENTANYL CITRATE 50 MCG: 50 INJECTION INTRAMUSCULAR; INTRAVENOUS at 18:07

## 2023-11-07 RX ADMIN — SODIUM CHLORIDE 500 ML: 9 INJECTION, SOLUTION INTRAVENOUS at 18:06

## 2023-11-07 RX ADMIN — FENTANYL CITRATE 50 MCG: 50 INJECTION INTRAMUSCULAR; INTRAVENOUS at 21:23

## 2023-11-07 RX ADMIN — SODIUM CHLORIDE 500 ML: 9 INJECTION, SOLUTION INTRAVENOUS at 19:58

## 2023-11-07 ASSESSMENT — PAIN SCALES - GENERAL
PAINLEVEL_OUTOF10: 10
PAINLEVEL_OUTOF10: 10

## 2023-11-07 ASSESSMENT — PAIN DESCRIPTION - ORIENTATION
ORIENTATION: RIGHT
ORIENTATION: RIGHT;MID

## 2023-11-07 ASSESSMENT — PAIN - FUNCTIONAL ASSESSMENT
PAIN_FUNCTIONAL_ASSESSMENT: PREVENTS OR INTERFERES SOME ACTIVE ACTIVITIES AND ADLS
PAIN_FUNCTIONAL_ASSESSMENT: PREVENTS OR INTERFERES SOME ACTIVE ACTIVITIES AND ADLS

## 2023-11-07 ASSESSMENT — ENCOUNTER SYMPTOMS
NAUSEA: 0
VOMITING: 0
DIARRHEA: 0
SHORTNESS OF BREATH: 0
CHEST TIGHTNESS: 0
PHOTOPHOBIA: 0
COUGH: 0
ABDOMINAL PAIN: 1
ABDOMINAL DISTENTION: 0

## 2023-11-07 ASSESSMENT — PAIN DESCRIPTION - LOCATION
LOCATION: RIB CAGE
LOCATION: BACK;RIB CAGE

## 2023-11-07 NOTE — ED PROVIDER NOTES
(H) 11.5 - 15.0 %    Platelets 486 261 - 410 k/uL    MPV 9.5 7.0 - 12.0 fL    Neutrophils % 75 43.0 - 80.0 %    Lymphocytes % 15 (L) 20.0 - 42.0 %    Monocytes % 5 2.0 - 12.0 %    Eosinophils % 4 0 - 6 %    Basophils % 1 0.0 - 2.0 %    Immature Granulocytes 1 0.0 - 5.0 %    Neutrophils Absolute 12.14 (H) 1.80 - 7.30 k/uL    Lymphocytes Absolute 2.37 1.50 - 4.00 k/uL    Monocytes Absolute 0.84 0.10 - 0.95 k/uL    Eosinophils Absolute 0.65 (H) 0.05 - 0.50 k/uL    Basophils Absolute 0.10 0.00 - 0.20 k/uL    Absolute Immature Granulocyte 0.18 0.00 - 0.58 k/uL   Comprehensive Metabolic Panel w/ Reflex to MG   Result Value Ref Range    Sodium 140 132 - 146 mmol/L    Potassium 3.8 3.5 - 5.0 mmol/L    Chloride 101 98 - 107 mmol/L    CO2 23 22 - 29 mmol/L    Anion Gap 16 7 - 16 mmol/L    Glucose 160 (H) 74 - 99 mg/dL    BUN 27 (H) 6 - 23 mg/dL    Creatinine 1.1 (H) 0.50 - 1.00 mg/dL    Est, Glom Filt Rate 54 (L) >60 mL/min/1.73m2    Calcium 10.1 8.6 - 10.2 mg/dL    Total Protein 6.6 6.4 - 8.3 g/dL    Albumin 3.5 3.5 - 5.2 g/dL    Total Bilirubin 0.5 0.0 - 1.2 mg/dL    Alkaline Phosphatase 80 35 - 104 U/L    ALT 12 0 - 32 U/L    AST 11 0 - 31 U/L   Troponin   Result Value Ref Range    Troponin, High Sensitivity 17 (H) 0 - 9 ng/L   Lactic Acid   Result Value Ref Range    Lactic Acid 3.5 (HH) 0.5 - 2.2 mmol/L   Brain Natriuretic Peptide   Result Value Ref Range    Pro- 0 - 450 pg/mL   Troponin   Result Value Ref Range    Troponin, High Sensitivity 16 (H) 0 - 9 ng/L   EKG 12 Lead   Result Value Ref Range    Ventricular Rate 78 BPM    Atrial Rate 78 BPM    P-R Interval 160 ms    QRS Duration 78 ms    Q-T Interval 372 ms    QTc Calculation (Bazett) 424 ms    P Axis 81 degrees    R Axis 64 degrees    T Axis 61 degrees       Radiology  CTA CHEST W CONTRAST   Final Result      1.   No evidence of acute pulmonary emboli.      2.  7.3 x 6.2 cm low-density pleural base mass in the superior segment of the   right lower lobe

## 2023-11-08 ENCOUNTER — HOSPITAL ENCOUNTER (INPATIENT)
Age: 73
LOS: 5 days | Discharge: HOME | DRG: 195 | End: 2023-11-13
Attending: INTERNAL MEDICINE | Admitting: FAMILY MEDICINE
Payer: MEDICARE

## 2023-11-08 DIAGNOSIS — R91.8 LUNG MASS: Primary | ICD-10-CM

## 2023-11-08 LAB
B PARAP IS1001 DNA NPH QL NAA+NON-PROBE: NOT DETECTED
B PERT DNA SPEC QL NAA+PROBE: NOT DETECTED
BILIRUB UR QL STRIP: NEGATIVE
C PNEUM DNA NPH QL NAA+NON-PROBE: NOT DETECTED
CEA SERPL-MCNC: 1.8 NG/ML (ref 0–5.2)
CLARITY UR: CLEAR
COLOR UR: YELLOW
EKG ATRIAL RATE: 78 BPM
EKG P AXIS: 81 DEGREES
EKG P-R INTERVAL: 160 MS
EKG Q-T INTERVAL: 372 MS
EKG QRS DURATION: 78 MS
EKG QTC CALCULATION (BAZETT): 424 MS
EKG R AXIS: 64 DEGREES
EKG T AXIS: 61 DEGREES
EKG VENTRICULAR RATE: 78 BPM
FLUAV RNA NPH QL NAA+NON-PROBE: NOT DETECTED
FLUBV RNA NPH QL NAA+NON-PROBE: NOT DETECTED
GLUCOSE BLD-MCNC: 120 MG/DL (ref 74–99)
GLUCOSE BLD-MCNC: 123 MG/DL (ref 74–99)
GLUCOSE BLD-MCNC: 124 MG/DL (ref 74–99)
GLUCOSE BLD-MCNC: 136 MG/DL (ref 74–99)
GLUCOSE UR STRIP-MCNC: NEGATIVE MG/DL
HADV DNA NPH QL NAA+NON-PROBE: NOT DETECTED
HCOV 229E RNA NPH QL NAA+NON-PROBE: NOT DETECTED
HCOV HKU1 RNA NPH QL NAA+NON-PROBE: NOT DETECTED
HCOV NL63 RNA NPH QL NAA+NON-PROBE: NOT DETECTED
HCOV OC43 RNA NPH QL NAA+NON-PROBE: NOT DETECTED
HGB UR QL STRIP.AUTO: NEGATIVE
HMPV RNA NPH QL NAA+NON-PROBE: NOT DETECTED
HPIV1 RNA NPH QL NAA+NON-PROBE: NOT DETECTED
HPIV2 RNA NPH QL NAA+NON-PROBE: NOT DETECTED
HPIV3 RNA NPH QL NAA+NON-PROBE: NOT DETECTED
HPIV4 RNA NPH QL NAA+NON-PROBE: NOT DETECTED
KETONES UR STRIP-MCNC: NEGATIVE MG/DL
LACTATE BLDV-SCNC: 1.7 MMOL/L (ref 0.5–2.2)
LEUKOCYTE ESTERASE UR QL STRIP: ABNORMAL
M PNEUMO DNA NPH QL NAA+NON-PROBE: NOT DETECTED
NITRITE UR QL STRIP: NEGATIVE
PH UR STRIP: 6.5 [PH] (ref 5–9)
PROCALCITONIN SERPL-MCNC: 0.1 NG/ML (ref 0–0.08)
PROT UR STRIP-MCNC: NEGATIVE MG/DL
RBC #/AREA URNS HPF: ABNORMAL /HPF
RSV RNA NPH QL NAA+NON-PROBE: NOT DETECTED
RV+EV RNA NPH QL NAA+NON-PROBE: NOT DETECTED
SARS-COV-2 RNA NPH QL NAA+NON-PROBE: NOT DETECTED
SP GR UR STRIP: 1.01 (ref 1–1.03)
SPECIMEN DESCRIPTION: NORMAL
TROPONIN I SERPL HS-MCNC: 19 NG/L (ref 0–9)
UROBILINOGEN UR STRIP-ACNC: 0.2 EU/DL (ref 0–1)
WBC #/AREA URNS HPF: ABNORMAL /HPF

## 2023-11-08 PROCEDURE — 81001 URINALYSIS AUTO W/SCOPE: CPT

## 2023-11-08 PROCEDURE — 86301 IMMUNOASSAY TUMOR CA 19-9: CPT

## 2023-11-08 PROCEDURE — 82962 GLUCOSE BLOOD TEST: CPT

## 2023-11-08 PROCEDURE — 83605 ASSAY OF LACTIC ACID: CPT

## 2023-11-08 PROCEDURE — 2060000000 HC ICU INTERMEDIATE R&B

## 2023-11-08 PROCEDURE — 87449 NOS EACH ORGANISM AG IA: CPT

## 2023-11-08 PROCEDURE — 36415 COLL VENOUS BLD VENIPUNCTURE: CPT

## 2023-11-08 PROCEDURE — 6360000002 HC RX W HCPCS: Performed by: FAMILY MEDICINE

## 2023-11-08 PROCEDURE — 0202U NFCT DS 22 TRGT SARS-COV-2: CPT

## 2023-11-08 PROCEDURE — 2580000003 HC RX 258: Performed by: NURSE PRACTITIONER

## 2023-11-08 PROCEDURE — 6370000000 HC RX 637 (ALT 250 FOR IP): Performed by: FAMILY MEDICINE

## 2023-11-08 PROCEDURE — 6360000002 HC RX W HCPCS: Performed by: NURSE PRACTITIONER

## 2023-11-08 PROCEDURE — 2500000003 HC RX 250 WO HCPCS: Performed by: NURSE PRACTITIONER

## 2023-11-08 PROCEDURE — 87899 AGENT NOS ASSAY W/OPTIC: CPT

## 2023-11-08 PROCEDURE — 84484 ASSAY OF TROPONIN QUANT: CPT

## 2023-11-08 PROCEDURE — 87081 CULTURE SCREEN ONLY: CPT

## 2023-11-08 PROCEDURE — 84145 PROCALCITONIN (PCT): CPT

## 2023-11-08 PROCEDURE — 93010 ELECTROCARDIOGRAM REPORT: CPT | Performed by: INTERNAL MEDICINE

## 2023-11-08 PROCEDURE — 6370000000 HC RX 637 (ALT 250 FOR IP): Performed by: NURSE PRACTITIONER

## 2023-11-08 PROCEDURE — 82378 CARCINOEMBRYONIC ANTIGEN: CPT

## 2023-11-08 RX ORDER — POLYETHYLENE GLYCOL 3350 17 G/17G
17 POWDER, FOR SOLUTION ORAL DAILY PRN
Status: DISCONTINUED | OUTPATIENT
Start: 2023-11-08 | End: 2023-11-13 | Stop reason: HOSPADM

## 2023-11-08 RX ORDER — ONDANSETRON 2 MG/ML
4 INJECTION INTRAMUSCULAR; INTRAVENOUS EVERY 6 HOURS PRN
Status: DISCONTINUED | OUTPATIENT
Start: 2023-11-08 | End: 2023-11-13 | Stop reason: HOSPADM

## 2023-11-08 RX ORDER — SODIUM CHLORIDE 0.9 % (FLUSH) 0.9 %
5-40 SYRINGE (ML) INJECTION EVERY 12 HOURS SCHEDULED
Status: DISCONTINUED | OUTPATIENT
Start: 2023-11-08 | End: 2023-11-13 | Stop reason: HOSPADM

## 2023-11-08 RX ORDER — PANTOPRAZOLE SODIUM 40 MG/1
40 TABLET, DELAYED RELEASE ORAL DAILY
COMMUNITY

## 2023-11-08 RX ORDER — ATORVASTATIN CALCIUM 10 MG/1
10 TABLET, FILM COATED ORAL NIGHTLY
Status: DISCONTINUED | OUTPATIENT
Start: 2023-11-08 | End: 2023-11-13 | Stop reason: HOSPADM

## 2023-11-08 RX ORDER — ASPIRIN 81 MG/1
81 TABLET ORAL DAILY
Status: DISCONTINUED | OUTPATIENT
Start: 2023-11-08 | End: 2023-11-13 | Stop reason: HOSPADM

## 2023-11-08 RX ORDER — DEXTROSE MONOHYDRATE 100 MG/ML
INJECTION, SOLUTION INTRAVENOUS CONTINUOUS PRN
Status: DISCONTINUED | OUTPATIENT
Start: 2023-11-08 | End: 2023-11-13 | Stop reason: HOSPADM

## 2023-11-08 RX ORDER — GUAIFENESIN/DEXTROMETHORPHAN 100-10MG/5
5 SYRUP ORAL EVERY 6 HOURS PRN
Status: DISCONTINUED | OUTPATIENT
Start: 2023-11-08 | End: 2023-11-13 | Stop reason: HOSPADM

## 2023-11-08 RX ORDER — SODIUM CHLORIDE 9 MG/ML
INJECTION, SOLUTION INTRAVENOUS PRN
Status: DISCONTINUED | OUTPATIENT
Start: 2023-11-08 | End: 2023-11-13 | Stop reason: HOSPADM

## 2023-11-08 RX ORDER — INSULIN LISPRO 100 [IU]/ML
0-4 INJECTION, SOLUTION INTRAVENOUS; SUBCUTANEOUS NIGHTLY
Status: DISCONTINUED | OUTPATIENT
Start: 2023-11-08 | End: 2023-11-13 | Stop reason: HOSPADM

## 2023-11-08 RX ORDER — ACETAMINOPHEN 650 MG/1
650 SUPPOSITORY RECTAL EVERY 6 HOURS PRN
Status: DISCONTINUED | OUTPATIENT
Start: 2023-11-08 | End: 2023-11-13 | Stop reason: HOSPADM

## 2023-11-08 RX ORDER — ACETAMINOPHEN 325 MG/1
650 TABLET ORAL EVERY 6 HOURS PRN
Status: DISCONTINUED | OUTPATIENT
Start: 2023-11-08 | End: 2023-11-13 | Stop reason: HOSPADM

## 2023-11-08 RX ORDER — HYDROCODONE BITARTRATE AND ACETAMINOPHEN 5; 325 MG/1; MG/1
1 TABLET ORAL EVERY 12 HOURS PRN
Status: DISCONTINUED | OUTPATIENT
Start: 2023-11-08 | End: 2023-11-08

## 2023-11-08 RX ORDER — SODIUM CHLORIDE 9 MG/ML
INJECTION, SOLUTION INTRAVENOUS CONTINUOUS
Status: DISCONTINUED | OUTPATIENT
Start: 2023-11-08 | End: 2023-11-13

## 2023-11-08 RX ORDER — MULTIVITAMIN WITH IRON
1 TABLET ORAL DAILY
Status: DISCONTINUED | OUTPATIENT
Start: 2023-11-08 | End: 2023-11-13 | Stop reason: HOSPADM

## 2023-11-08 RX ORDER — MORPHINE SULFATE 2 MG/ML
2 INJECTION, SOLUTION INTRAMUSCULAR; INTRAVENOUS EVERY 4 HOURS PRN
Status: DISCONTINUED | OUTPATIENT
Start: 2023-11-08 | End: 2023-11-13 | Stop reason: HOSPADM

## 2023-11-08 RX ORDER — INSULIN LISPRO 100 [IU]/ML
0-4 INJECTION, SOLUTION INTRAVENOUS; SUBCUTANEOUS
Status: DISCONTINUED | OUTPATIENT
Start: 2023-11-08 | End: 2023-11-13 | Stop reason: HOSPADM

## 2023-11-08 RX ORDER — LANOLIN ALCOHOL/MO/W.PET/CERES
6 CREAM (GRAM) TOPICAL NIGHTLY PRN
Status: DISCONTINUED | OUTPATIENT
Start: 2023-11-08 | End: 2023-11-13 | Stop reason: HOSPADM

## 2023-11-08 RX ORDER — CYCLOBENZAPRINE HCL 10 MG
10 TABLET ORAL 3 TIMES DAILY PRN
Status: DISCONTINUED | OUTPATIENT
Start: 2023-11-08 | End: 2023-11-09

## 2023-11-08 RX ORDER — ALLOPURINOL 300 MG/1
300 TABLET ORAL DAILY
Status: DISCONTINUED | OUTPATIENT
Start: 2023-11-08 | End: 2023-11-13 | Stop reason: HOSPADM

## 2023-11-08 RX ORDER — HYDROCODONE BITARTRATE AND ACETAMINOPHEN 5; 325 MG/1; MG/1
1 TABLET ORAL EVERY 12 HOURS PRN
Status: DISCONTINUED | OUTPATIENT
Start: 2023-11-08 | End: 2023-11-13 | Stop reason: HOSPADM

## 2023-11-08 RX ORDER — SODIUM CHLORIDE 0.9 % (FLUSH) 0.9 %
5-40 SYRINGE (ML) INJECTION PRN
Status: DISCONTINUED | OUTPATIENT
Start: 2023-11-08 | End: 2023-11-13 | Stop reason: HOSPADM

## 2023-11-08 RX ORDER — ONDANSETRON 4 MG/1
4 TABLET, ORALLY DISINTEGRATING ORAL EVERY 8 HOURS PRN
Status: DISCONTINUED | OUTPATIENT
Start: 2023-11-08 | End: 2023-11-13 | Stop reason: HOSPADM

## 2023-11-08 RX ORDER — SUCRALFATE 1 G/1
1 TABLET ORAL 4 TIMES DAILY
Status: DISCONTINUED | OUTPATIENT
Start: 2023-11-08 | End: 2023-11-13 | Stop reason: HOSPADM

## 2023-11-08 RX ADMIN — MULTIVITAMIN TABLET 1 TABLET: TABLET at 08:36

## 2023-11-08 RX ADMIN — SODIUM CHLORIDE, PRESERVATIVE FREE 10 ML: 5 INJECTION INTRAVENOUS at 08:37

## 2023-11-08 RX ADMIN — DOXYCYCLINE 100 MG: 100 INJECTION, POWDER, LYOPHILIZED, FOR SOLUTION INTRAVENOUS at 08:40

## 2023-11-08 RX ADMIN — SUCRALFATE 1 G: 1 TABLET ORAL at 21:02

## 2023-11-08 RX ADMIN — ALLOPURINOL 300 MG: 300 TABLET ORAL at 10:28

## 2023-11-08 RX ADMIN — MORPHINE SULFATE 2 MG: 2 INJECTION, SOLUTION INTRAMUSCULAR; INTRAVENOUS at 17:09

## 2023-11-08 RX ADMIN — WATER 1000 MG: 1 INJECTION INTRAMUSCULAR; INTRAVENOUS; SUBCUTANEOUS at 21:04

## 2023-11-08 RX ADMIN — SODIUM CHLORIDE: 9 INJECTION, SOLUTION INTRAVENOUS at 22:20

## 2023-11-08 RX ADMIN — SODIUM CHLORIDE: 9 INJECTION, SOLUTION INTRAVENOUS at 08:41

## 2023-11-08 RX ADMIN — ATORVASTATIN CALCIUM 10 MG: 10 TABLET, FILM COATED ORAL at 21:03

## 2023-11-08 RX ADMIN — SUCRALFATE 1 G: 1 TABLET ORAL at 06:09

## 2023-11-08 RX ADMIN — SUCRALFATE 1 G: 1 TABLET ORAL at 15:15

## 2023-11-08 RX ADMIN — METOPROLOL TARTRATE 25 MG: 25 TABLET, FILM COATED ORAL at 08:35

## 2023-11-08 RX ADMIN — CALCIUM CARBONATE-VITAMIN D TAB 500 MG-200 UNIT 2 TABLET: 500-200 TAB at 08:36

## 2023-11-08 RX ADMIN — LEVOTHYROXINE SODIUM 137 MCG: 0.11 TABLET ORAL at 06:08

## 2023-11-08 RX ADMIN — CYCLOBENZAPRINE 10 MG: 10 TABLET, FILM COATED ORAL at 22:24

## 2023-11-08 RX ADMIN — HYDROCODONE BITARTRATE AND ACETAMINOPHEN 1 TABLET: 5; 325 TABLET ORAL at 15:32

## 2023-11-08 RX ADMIN — SUCRALFATE 1 G: 1 TABLET ORAL at 10:28

## 2023-11-08 RX ADMIN — SODIUM CHLORIDE: 9 INJECTION, SOLUTION INTRAVENOUS at 01:00

## 2023-11-08 RX ADMIN — DOXYCYCLINE 100 MG: 100 INJECTION, POWDER, LYOPHILIZED, FOR SOLUTION INTRAVENOUS at 21:17

## 2023-11-08 RX ADMIN — HYDROCODONE BITARTRATE AND ACETAMINOPHEN 1 TABLET: 5; 325 TABLET ORAL at 05:36

## 2023-11-08 RX ADMIN — SERTRALINE 50 MG: 50 TABLET, FILM COATED ORAL at 10:28

## 2023-11-08 RX ADMIN — METOPROLOL TARTRATE 25 MG: 25 TABLET, FILM COATED ORAL at 21:03

## 2023-11-08 RX ADMIN — ASPIRIN 81 MG: 81 TABLET, COATED ORAL at 08:35

## 2023-11-08 ASSESSMENT — PAIN SCALES - GENERAL
PAINLEVEL_OUTOF10: 7
PAINLEVEL_OUTOF10: 9
PAINLEVEL_OUTOF10: 4
PAINLEVEL_OUTOF10: 10
PAINLEVEL_OUTOF10: 10
PAINLEVEL_OUTOF10: 0
PAINLEVEL_OUTOF10: 3

## 2023-11-08 ASSESSMENT — PAIN DESCRIPTION - LOCATION
LOCATION: BACK

## 2023-11-08 ASSESSMENT — PAIN DESCRIPTION - DESCRIPTORS
DESCRIPTORS: ACHING;SORE;SHARP;SHOOTING
DESCRIPTORS: SHOOTING;SHARP;SPASM

## 2023-11-08 ASSESSMENT — PAIN DESCRIPTION - ORIENTATION
ORIENTATION: MID;RIGHT;LEFT
ORIENTATION: RIGHT;MID

## 2023-11-08 NOTE — PROGRESS NOTES
Spoke with Jonh RN in IR and notified her that patient last took her eliquis at 1600 last night and it is on hold right now

## 2023-11-08 NOTE — H&P
[facility]  History and Physical      CHIEF COMPLAINT:    No chief complaint on file. Patient of Mariajose Romeo DO presents with:  Lung mass    History of Present Illness:   Patient is a 40-year-old female with a past medical history of A-fib, carotid artery stenosis, chest pain, diabetic neuropathy, hypertension, hyperlipidemia, hypothyroidism. Patient presented to the ED with complaints of pain in her mid back to lower thoracic area right side. Patient states she is only mildly on her left side due to increasing pain with breathing pain is positional.  Patient does not have any nausea vomiting or diarrhea. Patient went to her primary care physician. Was told that she has fluid in her abdomen. Patient was sent in for evaluation. ER work-up revealed no lung mass measuring 7.3 x 6.2 cm suspicious for lung malignancy, lactic acid 3.5, WBC 16.3. Patient was started on IV antibiotics and admitted to telemetry unit for further treatment. Cyrus Harmon REVIEW OF SYSTEMS:  Pertinent negatives are above in HPI. 10 point ROS otherwise negative. Past Medical History:   Diagnosis Date    A-fib [facility])     Back problem     Carotid artery stenosis     Chest pain 02/22/2013    Diabetic neuropathy ([address]) 12/04/2015    Full dentures     Hiatal hernia     Hypertension     Hyperuricemia 12/04/2015    Lipid disorder 12/04/2015    Osteoarthritis     lumbosacral spine arthritis and disc disease    Peripheral vascular disease (HCC)     Preoperative clearance     08/2015 per Dr Esther Soares and 10/2015 per Dr Brittanie Martin for [facility] Dr Sobeida Aguayo.     PVD (peripheral vascular disease) with claudication ([address]) 12/13/2012    follows with Dr Stuart Morales    Rapid palpitations     follows with Dr Esther Soares    Thyroid disease     Type II or unspecified type diabetes mellitus without mention of complication, not stated as uncontrolled     Wears glasses          Past Surgical History:   Procedure Laterality Date    APPENDECTOMY markers  Supplement O2 currently room air  Hold anticoagulation  Consult pulmonary for possible biopsy  IV Rocephin/Doxy  Monitor WBC-16.3    Diabetes Mellitus  -Monitor labs  -ISS glucose control  -Hypoglycemia protocol initiated      Medication for other comorbidities continue as appropriate dose adjustment as necessary.     DVT prophylaxis  PT OT  Discharge planning       Code status: Full  Requires inpatient level of care    Electronically signed by Kinjal Navarro DO on 11/8/2023 at 3:48 PM

## 2023-11-08 NOTE — PROGRESS NOTES
Dr. Doss Rinne notified that the muscle on patients middle back right where she states her pain is is buldged up almost like knot, orders received

## 2023-11-08 NOTE — CONSULTS
Sommer Alfredo M.D.,[facility]  Rell Baez D.O., F.A.C.O.I., Sherry Christianson M.D. Daron Noriega M.D. Antione May D.O. Patient:  Trav Tomlinson 74 y.o. female MRN: [id number]     Date of Service: 11/8/2023      PULMONARY CONSULTATION    Reason for Consultation: Lung mass  Referring Physician: STACI Abdullahi    Communication with the referring physician will be sent via the electronic medical record. Chief Complaint: back pain    CODE STATUS: Full Code     SUBJECTIVE:  HPI:  Trav Tomlinson is a 74 y.o. female not previously known to us with a PMH of AF on eliquis, LISS, PVD, OA, HTN, thyroid disease we were asked to see for a lung mass. Corry Sanders presented to [facility] ED with back pain that appears to  be pleuritic and reproducible on-going for about 1 month. She has been only able to lay on her left side due to increased pain. Imaging was done showing a RLL mass 7.3 x 2.6 cm in size. She did have a lung screening CT done 1 year ago which at that time showed a RLL opacity more concerning for resolving pneumonia or scarring. At that time she was also being treated for a sinus infection. She was transferred to [facility] for admission. Corry Sanders is a former 40-pack year cigarette smoker and has been in remission since 2007. She has never been seen by a pulmonologist, and takes no pulmonary medications. She has had about a 9 pound weight loss since 10/08 due to lack of appetite. There is a family history of pancreatic cancer with her father, gynecological cancer with her sister resulting in a hysterectomy and skin cancer with her brother but no family history of lung cancer. Corry Sanders was seen today sitting up on the side of the bed. She reports having chills yesterday but denies any fevers. She does not have a cough. She has pain on her lower back but has improved slightly.   Lungs are clear on exam.    Past Medical History:   Diagnosis Date    A-fib ([address]) is identified. 3.  Mildly enlarged left adrenal gland suggesting possible adrenal  hyperplasia. A definitive adrenal mass is not identified. 4.  Dilation/ectasia of the infrarenal abdominal aorta measuring 2.8 cm in  transverse dimension. Recommend abdomen/pelvis CT or MR imaging follow-up in  5 years. 5.  Otherwise no acute pathology noted. CT Lung Screening 9/24/2022:        Echo 8/27/21:   Summary   Technically adequate study. Mildly dilated left atrium. Absence of significant valvular heart disease. Grade 1 diastolic dysfunction. EF 55-60%.     Labs:  Lab Results   Component Value Date/Time    WBC 16.3 11/07/2023 05:45 PM    RBC 3.95 11/07/2023 05:45 PM    HGB 10.8 11/07/2023 05:45 PM    HCT 34.3 11/07/2023 05:45 PM    MCV 86.8 11/07/2023 05:45 PM    MCH 27.3 11/07/2023 05:45 PM    MCHC 31.5 11/07/2023 05:45 PM    RDW 15.9 11/07/2023 05:45 PM     11/07/2023 05:45 PM    MPV 9.5 11/07/2023 05:45 PM     Lab Results   Component Value Date/Time     11/07/2023 05:45 PM    K 3.8 11/07/2023 05:45 PM    K 3.7 07/14/2020 05:20 AM     11/07/2023 05:45 PM    CO2 23 11/07/2023 05:45 PM    BUN 27 11/07/2023 05:45 PM    CREATININE 1.1 11/07/2023 05:45 PM    LABALBU 3.5 11/07/2023 05:45 PM    CALCIUM 10.1 11/07/2023 05:45 PM    GFRAA >60 07/14/2020 05:20 AM    LABGLOM 54 11/07/2023 05:45 PM     Lab Results   Component Value Date/Time    PROTIME 13.8 07/14/2020 05:20 AM    INR 1.2 07/14/2020 05:20 AM     Recent Labs     11/07/23  1745   PROBNP 255       Assessment:  Right lower lobe lung mass vs infectious process, 7.3 x 6.2 cm  Pleuritic back pain secondary to above  Leukocytosis, WBC 16.3  Atrial fibrillation, on eliquis  Former nicotine dependence    Plan:  Continue to monitor on room air, apply supplemental oxygen as needed to maintain SpO2 greater than 92%  Hold eliquis   Lung mass bx via bronch vs IR under CT guidance  Rocephin and doxy for CAP coverage  Check procalcitonin,

## 2023-11-08 NOTE — ED NOTES
441-A is bed assignment and N2N is [phone].  PAS ETA 60-90 mins      Eino Achilles  11/07/23 1189

## 2023-11-09 LAB
ANION GAP SERPL CALCULATED.3IONS-SCNC: 10 MMOL/L (ref 7–16)
BASOPHILS # BLD: 0.09 K/UL (ref 0–0.2)
BASOPHILS NFR BLD: 1 % (ref 0–2)
BUN SERPL-MCNC: 12 MG/DL (ref 6–23)
CALCIUM SERPL-MCNC: 9.1 MG/DL (ref 8.6–10.2)
CANCER AG19-9 SERPL IA-ACNC: 8 U/ML (ref 0–35)
CHLORIDE SERPL-SCNC: 103 MMOL/L (ref 98–107)
CO2 SERPL-SCNC: 25 MMOL/L (ref 22–29)
CREAT SERPL-MCNC: 0.7 MG/DL (ref 0.5–1)
EOSINOPHIL # BLD: 0.91 K/UL (ref 0.05–0.5)
EOSINOPHILS RELATIVE PERCENT: 8 % (ref 0–6)
ERYTHROCYTE [DISTWIDTH] IN BLOOD BY AUTOMATED COUNT: 16 % (ref 11.5–15)
GFR SERPL CREATININE-BSD FRML MDRD: >60 ML/MIN/1.73M2
GLUCOSE BLD-MCNC: 102 MG/DL (ref 74–99)
GLUCOSE BLD-MCNC: 111 MG/DL (ref 74–99)
GLUCOSE BLD-MCNC: 121 MG/DL (ref 74–99)
GLUCOSE BLD-MCNC: 99 MG/DL (ref 74–99)
GLUCOSE SERPL-MCNC: 115 MG/DL (ref 74–99)
HCT VFR BLD AUTO: 30.5 % (ref 34–48)
HGB BLD-MCNC: 9.5 G/DL (ref 11.5–15.5)
IMM GRANULOCYTES # BLD AUTO: 0.12 K/UL (ref 0–0.58)
IMM GRANULOCYTES NFR BLD: 1 % (ref 0–5)
INR PPP: 1.4
L PNEUMO1 AG UR QL IA.RAPID: NEGATIVE
LYMPHOCYTES NFR BLD: 2.21 K/UL (ref 1.5–4)
LYMPHOCYTES RELATIVE PERCENT: 18 % (ref 20–42)
MAGNESIUM SERPL-MCNC: 1.2 MG/DL (ref 1.6–2.6)
MCH RBC QN AUTO: 27.2 PG (ref 26–35)
MCHC RBC AUTO-ENTMCNC: 31.1 G/DL (ref 32–34.5)
MCV RBC AUTO: 87.4 FL (ref 80–99.9)
MICROORGANISM SPEC CULT: NORMAL
MONOCYTES NFR BLD: 0.91 K/UL (ref 0.1–0.95)
MONOCYTES NFR BLD: 8 % (ref 2–12)
NEUTROPHILS NFR BLD: 65 % (ref 43–80)
NEUTS SEG NFR BLD: 7.78 K/UL (ref 1.8–7.3)
PLATELET # BLD AUTO: 286 K/UL (ref 130–450)
PMV BLD AUTO: 9.5 FL (ref 7–12)
POTASSIUM SERPL-SCNC: 3.4 MMOL/L (ref 3.5–5)
PROTHROMBIN TIME: 15.5 SEC (ref 9.3–12.4)
RBC # BLD AUTO: 3.49 M/UL (ref 3.5–5.5)
S PNEUM AG SPEC QL: NEGATIVE
SODIUM SERPL-SCNC: 138 MMOL/L (ref 132–146)
SPECIMEN DESCRIPTION: NORMAL
WBC OTHER # BLD: 12 K/UL (ref 4.5–11.5)

## 2023-11-09 PROCEDURE — 85025 COMPLETE CBC W/AUTO DIFF WBC: CPT

## 2023-11-09 PROCEDURE — 2500000003 HC RX 250 WO HCPCS: Performed by: NURSE PRACTITIONER

## 2023-11-09 PROCEDURE — 83735 ASSAY OF MAGNESIUM: CPT

## 2023-11-09 PROCEDURE — 82962 GLUCOSE BLOOD TEST: CPT

## 2023-11-09 PROCEDURE — 85610 PROTHROMBIN TIME: CPT

## 2023-11-09 PROCEDURE — 36415 COLL VENOUS BLD VENIPUNCTURE: CPT

## 2023-11-09 PROCEDURE — 6360000002 HC RX W HCPCS: Performed by: FAMILY MEDICINE

## 2023-11-09 PROCEDURE — 6360000002 HC RX W HCPCS: Performed by: NURSE PRACTITIONER

## 2023-11-09 PROCEDURE — 80048 BASIC METABOLIC PNL TOTAL CA: CPT

## 2023-11-09 PROCEDURE — 6370000000 HC RX 637 (ALT 250 FOR IP): Performed by: FAMILY MEDICINE

## 2023-11-09 PROCEDURE — 2580000003 HC RX 258: Performed by: NURSE PRACTITIONER

## 2023-11-09 PROCEDURE — 2060000000 HC ICU INTERMEDIATE R&B

## 2023-11-09 PROCEDURE — 6360000002 HC RX W HCPCS

## 2023-11-09 PROCEDURE — 6370000000 HC RX 637 (ALT 250 FOR IP): Performed by: NURSE PRACTITIONER

## 2023-11-09 RX ORDER — POTASSIUM CHLORIDE 20 MEQ/1
40 TABLET, EXTENDED RELEASE ORAL ONCE
Status: COMPLETED | OUTPATIENT
Start: 2023-11-09 | End: 2023-11-09

## 2023-11-09 RX ORDER — KETOROLAC TROMETHAMINE 30 MG/ML
15 INJECTION, SOLUTION INTRAMUSCULAR; INTRAVENOUS EVERY 6 HOURS PRN
Status: DISCONTINUED | OUTPATIENT
Start: 2023-11-09 | End: 2023-11-13 | Stop reason: HOSPADM

## 2023-11-09 RX ORDER — CYCLOBENZAPRINE HCL 10 MG
10 TABLET ORAL 3 TIMES DAILY
Status: DISCONTINUED | OUTPATIENT
Start: 2023-11-09 | End: 2023-11-13 | Stop reason: HOSPADM

## 2023-11-09 RX ORDER — MAGNESIUM SULFATE 1 G/100ML
1000 INJECTION INTRAVENOUS
Status: COMPLETED | OUTPATIENT
Start: 2023-11-09 | End: 2023-11-09

## 2023-11-09 RX ADMIN — CYCLOBENZAPRINE 10 MG: 10 TABLET, FILM COATED ORAL at 13:23

## 2023-11-09 RX ADMIN — SODIUM CHLORIDE: 9 INJECTION, SOLUTION INTRAVENOUS at 09:57

## 2023-11-09 RX ADMIN — CYCLOBENZAPRINE 10 MG: 10 TABLET, FILM COATED ORAL at 20:19

## 2023-11-09 RX ADMIN — SODIUM CHLORIDE, PRESERVATIVE FREE 10 ML: 5 INJECTION INTRAVENOUS at 20:42

## 2023-11-09 RX ADMIN — METOPROLOL TARTRATE 25 MG: 25 TABLET, FILM COATED ORAL at 08:30

## 2023-11-09 RX ADMIN — ATORVASTATIN CALCIUM 10 MG: 10 TABLET, FILM COATED ORAL at 20:19

## 2023-11-09 RX ADMIN — SUCRALFATE 1 G: 1 TABLET ORAL at 20:19

## 2023-11-09 RX ADMIN — SUCRALFATE 1 G: 1 TABLET ORAL at 06:46

## 2023-11-09 RX ADMIN — HYDROCODONE BITARTRATE AND ACETAMINOPHEN 1 TABLET: 5; 325 TABLET ORAL at 08:30

## 2023-11-09 RX ADMIN — HYDROCODONE BITARTRATE AND ACETAMINOPHEN 1 TABLET: 5; 325 TABLET ORAL at 20:19

## 2023-11-09 RX ADMIN — SUCRALFATE 1 G: 1 TABLET ORAL at 15:52

## 2023-11-09 RX ADMIN — SODIUM CHLORIDE: 9 INJECTION, SOLUTION INTRAVENOUS at 20:19

## 2023-11-09 RX ADMIN — DOXYCYCLINE 100 MG: 100 INJECTION, POWDER, LYOPHILIZED, FOR SOLUTION INTRAVENOUS at 08:32

## 2023-11-09 RX ADMIN — POTASSIUM CHLORIDE 40 MEQ: 1500 TABLET, EXTENDED RELEASE ORAL at 11:23

## 2023-11-09 RX ADMIN — KETOROLAC TROMETHAMINE 15 MG: 30 INJECTION, SOLUTION INTRAMUSCULAR; INTRAVENOUS at 17:27

## 2023-11-09 RX ADMIN — METOPROLOL TARTRATE 25 MG: 25 TABLET, FILM COATED ORAL at 20:19

## 2023-11-09 RX ADMIN — MAGNESIUM SULFATE HEPTAHYDRATE 1000 MG: 1 INJECTION, SOLUTION INTRAVENOUS at 13:26

## 2023-11-09 RX ADMIN — DOXYCYCLINE 100 MG: 100 INJECTION, POWDER, LYOPHILIZED, FOR SOLUTION INTRAVENOUS at 20:49

## 2023-11-09 RX ADMIN — LEVOTHYROXINE SODIUM 137 MCG: 0.11 TABLET ORAL at 06:46

## 2023-11-09 RX ADMIN — MAGNESIUM SULFATE HEPTAHYDRATE 1000 MG: 1 INJECTION, SOLUTION INTRAVENOUS at 11:26

## 2023-11-09 RX ADMIN — WATER 1000 MG: 1 INJECTION INTRAMUSCULAR; INTRAVENOUS; SUBCUTANEOUS at 20:41

## 2023-11-09 ASSESSMENT — PAIN DESCRIPTION - PAIN TYPE
TYPE: CHRONIC PAIN
TYPE: CHRONIC PAIN

## 2023-11-09 ASSESSMENT — PAIN DESCRIPTION - FREQUENCY
FREQUENCY: CONTINUOUS
FREQUENCY: CONTINUOUS

## 2023-11-09 ASSESSMENT — PAIN DESCRIPTION - DESCRIPTORS
DESCRIPTORS: THROBBING
DESCRIPTORS: THROBBING

## 2023-11-09 ASSESSMENT — PAIN DESCRIPTION - LOCATION
LOCATION: BACK
LOCATION: BACK

## 2023-11-09 ASSESSMENT — PAIN SCALES - GENERAL
PAINLEVEL_OUTOF10: 6
PAINLEVEL_OUTOF10: 8

## 2023-11-09 ASSESSMENT — PAIN DESCRIPTION - ONSET
ONSET: ON-GOING
ONSET: ON-GOING

## 2023-11-09 ASSESSMENT — PAIN - FUNCTIONAL ASSESSMENT: PAIN_FUNCTIONAL_ASSESSMENT: PREVENTS OR INTERFERES SOME ACTIVE ACTIVITIES AND ADLS

## 2023-11-09 ASSESSMENT — PAIN DESCRIPTION - ORIENTATION
ORIENTATION: LOWER
ORIENTATION: LOWER

## 2023-11-09 NOTE — ACP (ADVANCE CARE PLANNING)
Advance Care Planning   Healthcare Decision Maker:    Primary Decision Maker: Nate Rodriguezagustin - Child - [phone]    Primary Decision Maker: Twan Madi - Child - [phone]    Supplemental (Other) Decision Maker: Gomez Shultz - Brother/Sister - [phone]

## 2023-11-09 NOTE — CARE COORDINATION
CASE MANAGEMENT. ... Met with patient at the bedside. Ms Jayashree Carlos voices being independent from home alone. Denies having any dme. Has history at [employer]. No HHC. Is current with outpt physical therapy at [facility]. She plans to return home at discharge and continue with outpt therapy. No needs noted at this time. Currently on iv doxy and iv rocephin. Tolerating room air. Anticoagulation on hold. Plan for IR lung bx tomorrow. Will follow.

## 2023-11-09 NOTE — PROGRESS NOTES
Case reviewed with radiologist, Dr. Roman Ortez. Eliquis and Aspirin to remain on hold for lung biopsy 11/10. Patient to be NPO 0000. This information relayed to [employer].

## 2023-11-09 NOTE — PLAN OF CARE
Problem: Discharge Planning  Goal: Discharge to home or other facility with appropriate resources  11/8/2023 2245 by Geo Cedeno RN  Outcome: Progressing  11/8/2023 1302 by Bridget Mojica RN  Outcome: Progressing     Problem: Pain  Goal: Verbalizes/displays adequate comfort level or baseline comfort level  11/8/2023 2245 by Geo Cedeno RN  Outcome: Progressing  11/8/2023 1302 by Bridget Mojica RN  Outcome: Progressing     Problem: Safety - Adult  Goal: Free from fall injury  11/8/2023 2245 by Geo Cedeno RN  Outcome: Progressing  11/8/2023 1302 by Bridget Mojica RN  Outcome: Progressing

## 2023-11-10 LAB
ANION GAP SERPL CALCULATED.3IONS-SCNC: 7 MMOL/L (ref 7–16)
BASOPHILS # BLD: 0.1 K/UL (ref 0–0.2)
BASOPHILS NFR BLD: 1 % (ref 0–2)
BUN SERPL-MCNC: 13 MG/DL (ref 6–23)
CALCIUM SERPL-MCNC: 9.3 MG/DL (ref 8.6–10.2)
CHLORIDE SERPL-SCNC: 107 MMOL/L (ref 98–107)
CO2 SERPL-SCNC: 25 MMOL/L (ref 22–29)
CREAT SERPL-MCNC: 0.7 MG/DL (ref 0.5–1)
EOSINOPHIL # BLD: 0.86 K/UL (ref 0.05–0.5)
EOSINOPHILS RELATIVE PERCENT: 7 % (ref 0–6)
ERYTHROCYTE [DISTWIDTH] IN BLOOD BY AUTOMATED COUNT: 15.9 % (ref 11.5–15)
GFR SERPL CREATININE-BSD FRML MDRD: >60 ML/MIN/1.73M2
GLUCOSE BLD-MCNC: 105 MG/DL (ref 74–99)
GLUCOSE BLD-MCNC: 202 MG/DL (ref 74–99)
GLUCOSE BLD-MCNC: 94 MG/DL (ref 74–99)
GLUCOSE BLD-MCNC: 99 MG/DL (ref 74–99)
GLUCOSE SERPL-MCNC: 111 MG/DL (ref 74–99)
HCT VFR BLD AUTO: 30.2 % (ref 34–48)
HGB BLD-MCNC: 9.1 G/DL (ref 11.5–15.5)
IMM GRANULOCYTES # BLD AUTO: 0.12 K/UL (ref 0–0.58)
IMM GRANULOCYTES NFR BLD: 1 % (ref 0–5)
INR PPP: 1.4
LYMPHOCYTES NFR BLD: 2.86 K/UL (ref 1.5–4)
LYMPHOCYTES RELATIVE PERCENT: 23 % (ref 20–42)
MCH RBC QN AUTO: 26.5 PG (ref 26–35)
MCHC RBC AUTO-ENTMCNC: 30.1 G/DL (ref 32–34.5)
MCV RBC AUTO: 88 FL (ref 80–99.9)
MONOCYTES NFR BLD: 0.95 K/UL (ref 0.1–0.95)
MONOCYTES NFR BLD: 8 % (ref 2–12)
NEUTROPHILS NFR BLD: 61 % (ref 43–80)
NEUTS SEG NFR BLD: 7.61 K/UL (ref 1.8–7.3)
PLATELET # BLD AUTO: 283 K/UL (ref 130–450)
PMV BLD AUTO: 9.2 FL (ref 7–12)
POTASSIUM SERPL-SCNC: 3.8 MMOL/L (ref 3.5–5)
PROTHROMBIN TIME: 15.7 SEC (ref 9.3–12.4)
RBC # BLD AUTO: 3.43 M/UL (ref 3.5–5.5)
SODIUM SERPL-SCNC: 139 MMOL/L (ref 132–146)
WBC OTHER # BLD: 12.5 K/UL (ref 4.5–11.5)

## 2023-11-10 PROCEDURE — 2060000000 HC ICU INTERMEDIATE R&B

## 2023-11-10 PROCEDURE — 80048 BASIC METABOLIC PNL TOTAL CA: CPT

## 2023-11-10 PROCEDURE — 6360000002 HC RX W HCPCS

## 2023-11-10 PROCEDURE — 82962 GLUCOSE BLOOD TEST: CPT

## 2023-11-10 PROCEDURE — 2500000003 HC RX 250 WO HCPCS: Performed by: NURSE PRACTITIONER

## 2023-11-10 PROCEDURE — 85025 COMPLETE CBC W/AUTO DIFF WBC: CPT

## 2023-11-10 PROCEDURE — 2580000003 HC RX 258: Performed by: NURSE PRACTITIONER

## 2023-11-10 PROCEDURE — 36415 COLL VENOUS BLD VENIPUNCTURE: CPT

## 2023-11-10 PROCEDURE — 6370000000 HC RX 637 (ALT 250 FOR IP): Performed by: NURSE PRACTITIONER

## 2023-11-10 PROCEDURE — 6370000000 HC RX 637 (ALT 250 FOR IP): Performed by: FAMILY MEDICINE

## 2023-11-10 PROCEDURE — 85610 PROTHROMBIN TIME: CPT

## 2023-11-10 RX ADMIN — KETOROLAC TROMETHAMINE 15 MG: 30 INJECTION, SOLUTION INTRAMUSCULAR; INTRAVENOUS at 10:21

## 2023-11-10 RX ADMIN — ATORVASTATIN CALCIUM 10 MG: 10 TABLET, FILM COATED ORAL at 20:36

## 2023-11-10 RX ADMIN — CYCLOBENZAPRINE 10 MG: 10 TABLET, FILM COATED ORAL at 20:36

## 2023-11-10 RX ADMIN — HYDROCODONE BITARTRATE AND ACETAMINOPHEN 1 TABLET: 5; 325 TABLET ORAL at 21:55

## 2023-11-10 RX ADMIN — SUCRALFATE 1 G: 1 TABLET ORAL at 16:43

## 2023-11-10 RX ADMIN — METOPROLOL TARTRATE 25 MG: 25 TABLET, FILM COATED ORAL at 08:56

## 2023-11-10 RX ADMIN — INSULIN LISPRO 1 UNITS: 100 INJECTION, SOLUTION INTRAVENOUS; SUBCUTANEOUS at 11:23

## 2023-11-10 RX ADMIN — LEVOTHYROXINE SODIUM 137 MCG: 0.11 TABLET ORAL at 08:56

## 2023-11-10 RX ADMIN — SUCRALFATE 1 G: 1 TABLET ORAL at 11:15

## 2023-11-10 RX ADMIN — SUCRALFATE 1 G: 1 TABLET ORAL at 20:36

## 2023-11-10 RX ADMIN — SODIUM CHLORIDE: 9 INJECTION, SOLUTION INTRAVENOUS at 11:45

## 2023-11-10 RX ADMIN — HYDROCODONE BITARTRATE AND ACETAMINOPHEN 1 TABLET: 5; 325 TABLET ORAL at 09:21

## 2023-11-10 RX ADMIN — CYCLOBENZAPRINE 10 MG: 10 TABLET, FILM COATED ORAL at 14:29

## 2023-11-10 RX ADMIN — SERTRALINE 50 MG: 50 TABLET, FILM COATED ORAL at 08:58

## 2023-11-10 RX ADMIN — DOXYCYCLINE 100 MG: 100 INJECTION, POWDER, LYOPHILIZED, FOR SOLUTION INTRAVENOUS at 08:55

## 2023-11-10 RX ADMIN — CALCIUM CARBONATE-VITAMIN D TAB 500 MG-200 UNIT 2 TABLET: 500-200 TAB at 08:57

## 2023-11-10 RX ADMIN — CYCLOBENZAPRINE 10 MG: 10 TABLET, FILM COATED ORAL at 08:54

## 2023-11-10 RX ADMIN — MULTIVITAMIN TABLET 1 TABLET: TABLET at 08:57

## 2023-11-10 RX ADMIN — METOPROLOL TARTRATE 25 MG: 25 TABLET, FILM COATED ORAL at 20:36

## 2023-11-10 ASSESSMENT — PAIN SCALES - GENERAL
PAINLEVEL_OUTOF10: 10
PAINLEVEL_OUTOF10: 8
PAINLEVEL_OUTOF10: 5
PAINLEVEL_OUTOF10: 7
PAINLEVEL_OUTOF10: 8
PAINLEVEL_OUTOF10: 6
PAINLEVEL_OUTOF10: 6

## 2023-11-10 ASSESSMENT — PAIN DESCRIPTION - DESCRIPTORS
DESCRIPTORS: BURNING
DESCRIPTORS: ACHING;DISCOMFORT;NAGGING

## 2023-11-10 ASSESSMENT — PAIN DESCRIPTION - LOCATION
LOCATION: BACK

## 2023-11-10 ASSESSMENT — PAIN DESCRIPTION - ONSET: ONSET: ON-GOING

## 2023-11-10 ASSESSMENT — PAIN DESCRIPTION - ORIENTATION
ORIENTATION: MID;UPPER;RIGHT
ORIENTATION: RIGHT

## 2023-11-10 ASSESSMENT — PAIN - FUNCTIONAL ASSESSMENT
PAIN_FUNCTIONAL_ASSESSMENT: ACTIVITIES ARE NOT PREVENTED
PAIN_FUNCTIONAL_ASSESSMENT: ACTIVITIES ARE NOT PREVENTED

## 2023-11-10 ASSESSMENT — PAIN DESCRIPTION - PAIN TYPE: TYPE: CHRONIC PAIN

## 2023-11-10 ASSESSMENT — PAIN DESCRIPTION - FREQUENCY: FREQUENCY: CONTINUOUS

## 2023-11-10 NOTE — CARE COORDINATION
Independent from home alone, room air. Iv doxy/rocephin. For IR lung bx on Monday, need to hold asa/eliquis. Plan is home, no needs. Eileen knightRN,CM.

## 2023-11-10 NOTE — PROGRESS NOTES
Per IR lung Bx cannot be done till 11/13/23 due to aspirin and eliquis. Per IR keep aspirin and eliquis on hold.

## 2023-11-10 NOTE — PROGRESS NOTES
allopurinol  300 mg Oral Daily    [Held by provider] apixaban  5 mg Oral BID    [Held by provider] aspirin  81 mg Oral Daily    atorvastatin  10 mg Oral Nightly    oyster shell calcium w/D  2 tablet Oral Daily    metoprolol tartrate  25 mg Oral BID    multivitamin  1 tablet Oral Daily    sertraline  50 mg Oral Daily    sucralfate  1 g Oral 4x Daily    levothyroxine  137 mcg Oral Daily    insulin lispro  0-4 Units SubCUTAneous TID WC    insulin lispro  0-4 Units SubCUTAneous Nightly    cefTRIAXone (ROCEPHIN) IV  1,000 mg IntraVENous Q24H    doxycycline (VIBRAMYCIN) IV  100 mg IntraVENous Q12H    sodium chloride flush  5-40 mL IntraVENous 2 times per day       Physical Exam:  General Appearance: appears comfortable in no acute distress. HEENT: Normocephalic atraumatic without obvious abnormality   Neck: Lips, mucosa, and tongue normal.  Supple, symmetrical, trachea midline, no adenopathy;thyroid:  no enlargement/tenderness/nodules or JVD. Lung: Breath sounds CTA. Respirations   unlabored. Symmetrical expansion. Heart: RRR, normal S1, S2. No MRG  Abdomen: Soft, NT, ND. BS present x 4 quadrants. No bruit or organomegaly. Extremities: Pedal pulses 2+ symmetric b/l. Extremities normal, no cyanosis, clubbing, or edema. Musculokeletal: right-sided back pain  Neurologic: Mental status: Alert and Oriented X3 . Pertinent/ New Labs and Imaging Studies     Imaging personally reviewed:  Chest CTA 11/7/23: IMPRESSION:     1. No evidence of acute pulmonary emboli.     2.  7.3 x 6.2 cm low-density pleural base mass in the superior segment of the  right lower lobe suspicious for a lung malignancy. No definite metastatic  disease is identified. 3.  Mildly enlarged left adrenal gland suggesting possible adrenal  hyperplasia. A definitive adrenal mass is not identified. 4.  Dilation/ectasia of the infrarenal abdominal aorta measuring 2.8 cm in  transverse dimension.   Recommend abdomen/pelvis CT or MR imaging follow-up in  5 years. 5.  Otherwise no acute pathology noted. CT Lung Screening 9/24/2022:          Echo 8/27/21:   Summary   Technically adequate study. Mildly dilated left atrium. Absence of significant valvular heart disease. Grade 1 diastolic dysfunction. EF 55-60%. Labs:  Lab Results   Component Value Date/Time    WBC 12.5 11/10/2023 02:20 AM    RBC 3.43 11/10/2023 02:20 AM    HGB 9.1 11/10/2023 02:20 AM    HCT 30.2 11/10/2023 02:20 AM    MCV 88.0 11/10/2023 02:20 AM    MCH 26.5 11/10/2023 02:20 AM    MCHC 30.1 11/10/2023 02:20 AM    RDW 15.9 11/10/2023 02:20 AM     11/10/2023 02:20 AM    MPV 9.2 11/10/2023 02:20 AM     Lab Results   Component Value Date/Time     11/10/2023 02:20 AM    K 3.8 11/10/2023 02:20 AM    K 3.7 07/14/2020 05:20 AM     11/10/2023 02:20 AM    CO2 25 11/10/2023 02:20 AM    BUN 13 11/10/2023 02:20 AM    CREATININE 0.7 11/10/2023 02:20 AM    LABALBU 3.5 11/07/2023 05:45 PM    CALCIUM 9.3 11/10/2023 02:20 AM    GFRAA >60 07/14/2020 05:20 AM    LABGLOM >60 11/10/2023 02:20 AM     Lab Results   Component Value Date/Time    PROTIME 15.7 11/10/2023 04:35 AM    INR 1.4 11/10/2023 04:35 AM     Recent Labs     11/07/23  1745   PROBNP 255       No results for input(s): \"TROPONINI\" in the last 72 hours.   Recent Labs     11/08/23  0423   PROCAL 0.10*            Assessment:    Right lower lobe lung mass vs infectious process, 7.3 x 6.2 cm  Pleuritic back pain secondary to above  Leukocytosis, WBC 16.3  Atrial fibrillation, on eliquis  Former nicotine dependence    Plan:   Continue to monitor on room air, apply supplemental oxygen as needed to maintain SpO2 greater than 92%  Continue to hold eliquis   Lung mass bx in IR under CT guidance-scheduled for Monday, Follow biopsy results  Rocephin and doxy for CAP coverage  Procalcitonin 0.10, MRSA screening negative, bacterial urine antigens negative  CEA 1.8, Ca 19-9 = 8  Toradol prn for pain      This plan of care

## 2023-11-10 NOTE — PLAN OF CARE
Problem: Discharge Planning  Goal: Discharge to home or other facility with appropriate resources  11/9/2023 2157 by Ana Maria Mathews RN  Outcome: Progressing  11/9/2023 1748 by Akira Watts RN  Outcome: Progressing     Problem: Pain  Goal: Verbalizes/displays adequate comfort level or baseline comfort level  11/9/2023 2157 by Ana Maria Mathews RN  Outcome: Progressing  11/9/2023 1748 by Akira Watts RN  Outcome: Progressing     Problem: Safety - Adult  Goal: Free from fall injury  11/9/2023 2157 by Ana Maria Mathews RN  Outcome: Progressing  11/9/2023 1748 by Akira Watts RN  Outcome: Progressing     Problem: Chronic Conditions and Co-morbidities  Goal: Patient's chronic conditions and co-morbidity symptoms are monitored and maintained or improved  11/9/2023 2157 by Ana Maria Mathews RN  Outcome: Progressing  11/9/2023 1748 by Akira Watts RN  Outcome: Progressing

## 2023-11-10 NOTE — FLOWSHEET NOTE
Nolan Tejada, ALEK notified that lung biopsy would not be done until Monday due to aspirin given. If patient is discharged over weekend, biopsy can be scheduled as outpatient.

## 2023-11-11 LAB
ANION GAP SERPL CALCULATED.3IONS-SCNC: 10 MMOL/L (ref 7–16)
BASOPHILS # BLD: 0.08 K/UL (ref 0–0.2)
BASOPHILS NFR BLD: 1 % (ref 0–2)
BUN SERPL-MCNC: 12 MG/DL (ref 6–23)
CALCIUM SERPL-MCNC: 9.3 MG/DL (ref 8.6–10.2)
CHLORIDE SERPL-SCNC: 101 MMOL/L (ref 98–107)
CO2 SERPL-SCNC: 27 MMOL/L (ref 22–29)
CREAT SERPL-MCNC: 0.7 MG/DL (ref 0.5–1)
EOSINOPHIL # BLD: 0.88 K/UL (ref 0.05–0.5)
EOSINOPHILS RELATIVE PERCENT: 7 % (ref 0–6)
ERYTHROCYTE [DISTWIDTH] IN BLOOD BY AUTOMATED COUNT: 15.9 % (ref 11.5–15)
GFR SERPL CREATININE-BSD FRML MDRD: >60 ML/MIN/1.73M2
GLUCOSE BLD-MCNC: 110 MG/DL (ref 74–99)
GLUCOSE BLD-MCNC: 120 MG/DL (ref 74–99)
GLUCOSE BLD-MCNC: 122 MG/DL (ref 74–99)
GLUCOSE BLD-MCNC: 135 MG/DL (ref 74–99)
GLUCOSE SERPL-MCNC: 123 MG/DL (ref 74–99)
HCT VFR BLD AUTO: 30.6 % (ref 34–48)
HGB BLD-MCNC: 9.5 G/DL (ref 11.5–15.5)
IMM GRANULOCYTES # BLD AUTO: 0.13 K/UL (ref 0–0.58)
IMM GRANULOCYTES NFR BLD: 1 % (ref 0–5)
LYMPHOCYTES NFR BLD: 2.28 K/UL (ref 1.5–4)
LYMPHOCYTES RELATIVE PERCENT: 17 % (ref 20–42)
MCH RBC QN AUTO: 27.1 PG (ref 26–35)
MCHC RBC AUTO-ENTMCNC: 31 G/DL (ref 32–34.5)
MCV RBC AUTO: 87.2 FL (ref 80–99.9)
MONOCYTES NFR BLD: 1 K/UL (ref 0.1–0.95)
MONOCYTES NFR BLD: 7 % (ref 2–12)
NEUTROPHILS NFR BLD: 68 % (ref 43–80)
NEUTS SEG NFR BLD: 9.25 K/UL (ref 1.8–7.3)
PLATELET # BLD AUTO: 295 K/UL (ref 130–450)
PMV BLD AUTO: 9.5 FL (ref 7–12)
POTASSIUM SERPL-SCNC: 3.6 MMOL/L (ref 3.5–5)
RBC # BLD AUTO: 3.51 M/UL (ref 3.5–5.5)
SODIUM SERPL-SCNC: 138 MMOL/L (ref 132–146)
WBC OTHER # BLD: 13.6 K/UL (ref 4.5–11.5)

## 2023-11-11 PROCEDURE — 6360000002 HC RX W HCPCS: Performed by: NURSE PRACTITIONER

## 2023-11-11 PROCEDURE — 82962 GLUCOSE BLOOD TEST: CPT

## 2023-11-11 PROCEDURE — 85025 COMPLETE CBC W/AUTO DIFF WBC: CPT

## 2023-11-11 PROCEDURE — 2500000003 HC RX 250 WO HCPCS: Performed by: NURSE PRACTITIONER

## 2023-11-11 PROCEDURE — 2580000003 HC RX 258: Performed by: NURSE PRACTITIONER

## 2023-11-11 PROCEDURE — 80048 BASIC METABOLIC PNL TOTAL CA: CPT

## 2023-11-11 PROCEDURE — 36415 COLL VENOUS BLD VENIPUNCTURE: CPT

## 2023-11-11 PROCEDURE — 6370000000 HC RX 637 (ALT 250 FOR IP): Performed by: FAMILY MEDICINE

## 2023-11-11 PROCEDURE — 6360000002 HC RX W HCPCS

## 2023-11-11 PROCEDURE — 2060000000 HC ICU INTERMEDIATE R&B

## 2023-11-11 PROCEDURE — 6370000000 HC RX 637 (ALT 250 FOR IP): Performed by: NURSE PRACTITIONER

## 2023-11-11 RX ADMIN — KETOROLAC TROMETHAMINE 15 MG: 30 INJECTION, SOLUTION INTRAMUSCULAR; INTRAVENOUS at 22:43

## 2023-11-11 RX ADMIN — MULTIVITAMIN TABLET 1 TABLET: TABLET at 09:08

## 2023-11-11 RX ADMIN — SUCRALFATE 1 G: 1 TABLET ORAL at 16:44

## 2023-11-11 RX ADMIN — DOXYCYCLINE 100 MG: 100 INJECTION, POWDER, LYOPHILIZED, FOR SOLUTION INTRAVENOUS at 11:29

## 2023-11-11 RX ADMIN — WATER 1000 MG: 1 INJECTION INTRAMUSCULAR; INTRAVENOUS; SUBCUTANEOUS at 22:26

## 2023-11-11 RX ADMIN — KETOROLAC TROMETHAMINE 15 MG: 30 INJECTION, SOLUTION INTRAMUSCULAR; INTRAVENOUS at 16:44

## 2023-11-11 RX ADMIN — SUCRALFATE 1 G: 1 TABLET ORAL at 22:28

## 2023-11-11 RX ADMIN — CYCLOBENZAPRINE 10 MG: 10 TABLET, FILM COATED ORAL at 22:26

## 2023-11-11 RX ADMIN — SERTRALINE 50 MG: 50 TABLET, FILM COATED ORAL at 09:09

## 2023-11-11 RX ADMIN — METOPROLOL TARTRATE 25 MG: 25 TABLET, FILM COATED ORAL at 09:09

## 2023-11-11 RX ADMIN — SODIUM CHLORIDE, PRESERVATIVE FREE 10 ML: 5 INJECTION INTRAVENOUS at 09:38

## 2023-11-11 RX ADMIN — SUCRALFATE 1 G: 1 TABLET ORAL at 09:08

## 2023-11-11 RX ADMIN — CALCIUM CARBONATE-VITAMIN D TAB 500 MG-200 UNIT 2 TABLET: 500-200 TAB at 09:08

## 2023-11-11 RX ADMIN — CYCLOBENZAPRINE 10 MG: 10 TABLET, FILM COATED ORAL at 13:58

## 2023-11-11 RX ADMIN — CYCLOBENZAPRINE 10 MG: 10 TABLET, FILM COATED ORAL at 09:08

## 2023-11-11 RX ADMIN — METOPROLOL TARTRATE 25 MG: 25 TABLET, FILM COATED ORAL at 22:26

## 2023-11-11 RX ADMIN — SODIUM CHLORIDE, PRESERVATIVE FREE 10 ML: 5 INJECTION INTRAVENOUS at 22:26

## 2023-11-11 RX ADMIN — HYDROCODONE BITARTRATE AND ACETAMINOPHEN 1 TABLET: 5; 325 TABLET ORAL at 11:24

## 2023-11-11 RX ADMIN — ALLOPURINOL 300 MG: 300 TABLET ORAL at 09:08

## 2023-11-11 RX ADMIN — DOXYCYCLINE 100 MG: 100 INJECTION, POWDER, LYOPHILIZED, FOR SOLUTION INTRAVENOUS at 22:26

## 2023-11-11 RX ADMIN — ATORVASTATIN CALCIUM 10 MG: 10 TABLET, FILM COATED ORAL at 22:26

## 2023-11-11 RX ADMIN — SODIUM CHLORIDE: 9 INJECTION, SOLUTION INTRAVENOUS at 21:09

## 2023-11-11 RX ADMIN — LEVOTHYROXINE SODIUM 137 MCG: 0.11 TABLET ORAL at 09:08

## 2023-11-11 ASSESSMENT — PAIN DESCRIPTION - DESCRIPTORS
DESCRIPTORS: ACHING;DISCOMFORT
DESCRIPTORS: BURNING

## 2023-11-11 ASSESSMENT — PAIN - FUNCTIONAL ASSESSMENT
PAIN_FUNCTIONAL_ASSESSMENT: ACTIVITIES ARE NOT PREVENTED

## 2023-11-11 ASSESSMENT — PAIN DESCRIPTION - LOCATION
LOCATION: BACK

## 2023-11-11 ASSESSMENT — PAIN SCALES - GENERAL
PAINLEVEL_OUTOF10: 9
PAINLEVEL_OUTOF10: 10
PAINLEVEL_OUTOF10: 7

## 2023-11-11 ASSESSMENT — PAIN DESCRIPTION - PAIN TYPE: TYPE: CHRONIC PAIN

## 2023-11-11 ASSESSMENT — PAIN DESCRIPTION - ONSET: ONSET: ON-GOING

## 2023-11-11 ASSESSMENT — PAIN DESCRIPTION - ORIENTATION
ORIENTATION: RIGHT

## 2023-11-11 ASSESSMENT — PAIN DESCRIPTION - FREQUENCY: FREQUENCY: CONTINUOUS

## 2023-11-12 LAB
ANION GAP SERPL CALCULATED.3IONS-SCNC: 10 MMOL/L (ref 7–16)
BASOPHILS # BLD: 0.08 K/UL (ref 0–0.2)
BASOPHILS NFR BLD: 1 % (ref 0–2)
BUN SERPL-MCNC: 13 MG/DL (ref 6–23)
CALCIUM SERPL-MCNC: 9.6 MG/DL (ref 8.6–10.2)
CHLORIDE SERPL-SCNC: 104 MMOL/L (ref 98–107)
CO2 SERPL-SCNC: 23 MMOL/L (ref 22–29)
CREAT SERPL-MCNC: 0.8 MG/DL (ref 0.5–1)
EOSINOPHIL # BLD: 0.86 K/UL (ref 0.05–0.5)
EOSINOPHILS RELATIVE PERCENT: 7 % (ref 0–6)
ERYTHROCYTE [DISTWIDTH] IN BLOOD BY AUTOMATED COUNT: 15.9 % (ref 11.5–15)
GFR SERPL CREATININE-BSD FRML MDRD: >60 ML/MIN/1.73M2
GLUCOSE BLD-MCNC: 108 MG/DL (ref 74–99)
GLUCOSE BLD-MCNC: 110 MG/DL (ref 74–99)
GLUCOSE BLD-MCNC: 116 MG/DL (ref 74–99)
GLUCOSE BLD-MCNC: 123 MG/DL (ref 74–99)
GLUCOSE SERPL-MCNC: 106 MG/DL (ref 74–99)
HCT VFR BLD AUTO: 29.1 % (ref 34–48)
HGB BLD-MCNC: 8.8 G/DL (ref 11.5–15.5)
IMM GRANULOCYTES # BLD AUTO: 0.1 K/UL (ref 0–0.58)
IMM GRANULOCYTES NFR BLD: 1 % (ref 0–5)
LYMPHOCYTES NFR BLD: 2.33 K/UL (ref 1.5–4)
LYMPHOCYTES RELATIVE PERCENT: 20 % (ref 20–42)
MCH RBC QN AUTO: 26.7 PG (ref 26–35)
MCHC RBC AUTO-ENTMCNC: 30.2 G/DL (ref 32–34.5)
MCV RBC AUTO: 88.4 FL (ref 80–99.9)
MICROORGANISM SPEC CULT: NORMAL
MICROORGANISM SPEC CULT: NORMAL
MONOCYTES NFR BLD: 0.96 K/UL (ref 0.1–0.95)
MONOCYTES NFR BLD: 8 % (ref 2–12)
NEUTROPHILS NFR BLD: 63 % (ref 43–80)
NEUTS SEG NFR BLD: 7.45 K/UL (ref 1.8–7.3)
PLATELET # BLD AUTO: 260 K/UL (ref 130–450)
PMV BLD AUTO: 9.2 FL (ref 7–12)
POTASSIUM SERPL-SCNC: 3.9 MMOL/L (ref 3.5–5)
RBC # BLD AUTO: 3.29 M/UL (ref 3.5–5.5)
SERVICE CMNT-IMP: NORMAL
SERVICE CMNT-IMP: NORMAL
SODIUM SERPL-SCNC: 137 MMOL/L (ref 132–146)
SPECIMEN DESCRIPTION: NORMAL
SPECIMEN DESCRIPTION: NORMAL
WBC OTHER # BLD: 11.8 K/UL (ref 4.5–11.5)

## 2023-11-12 PROCEDURE — 85025 COMPLETE CBC W/AUTO DIFF WBC: CPT

## 2023-11-12 PROCEDURE — 6370000000 HC RX 637 (ALT 250 FOR IP): Performed by: FAMILY MEDICINE

## 2023-11-12 PROCEDURE — 2580000003 HC RX 258: Performed by: NURSE PRACTITIONER

## 2023-11-12 PROCEDURE — 36415 COLL VENOUS BLD VENIPUNCTURE: CPT

## 2023-11-12 PROCEDURE — 6370000000 HC RX 637 (ALT 250 FOR IP): Performed by: NURSE PRACTITIONER

## 2023-11-12 PROCEDURE — 82962 GLUCOSE BLOOD TEST: CPT

## 2023-11-12 PROCEDURE — 6360000002 HC RX W HCPCS

## 2023-11-12 PROCEDURE — 2500000003 HC RX 250 WO HCPCS: Performed by: NURSE PRACTITIONER

## 2023-11-12 PROCEDURE — 2060000000 HC ICU INTERMEDIATE R&B

## 2023-11-12 PROCEDURE — 80048 BASIC METABOLIC PNL TOTAL CA: CPT

## 2023-11-12 RX ADMIN — ALLOPURINOL 300 MG: 300 TABLET ORAL at 09:23

## 2023-11-12 RX ADMIN — KETOROLAC TROMETHAMINE 15 MG: 30 INJECTION, SOLUTION INTRAMUSCULAR; INTRAVENOUS at 07:04

## 2023-11-12 RX ADMIN — LEVOTHYROXINE SODIUM 137 MCG: 0.11 TABLET ORAL at 06:36

## 2023-11-12 RX ADMIN — SUCRALFATE 1 G: 1 TABLET ORAL at 20:39

## 2023-11-12 RX ADMIN — HYDROCODONE BITARTRATE AND ACETAMINOPHEN 1 TABLET: 5; 325 TABLET ORAL at 12:19

## 2023-11-12 RX ADMIN — CALCIUM CARBONATE-VITAMIN D TAB 500 MG-200 UNIT 2 TABLET: 500-200 TAB at 09:23

## 2023-11-12 RX ADMIN — KETOROLAC TROMETHAMINE 15 MG: 30 INJECTION, SOLUTION INTRAMUSCULAR; INTRAVENOUS at 20:39

## 2023-11-12 RX ADMIN — SODIUM CHLORIDE: 9 INJECTION, SOLUTION INTRAVENOUS at 09:24

## 2023-11-12 RX ADMIN — MULTIVITAMIN TABLET 1 TABLET: TABLET at 09:22

## 2023-11-12 RX ADMIN — ATORVASTATIN CALCIUM 10 MG: 10 TABLET, FILM COATED ORAL at 20:39

## 2023-11-12 RX ADMIN — SERTRALINE 50 MG: 50 TABLET, FILM COATED ORAL at 09:22

## 2023-11-12 RX ADMIN — CYCLOBENZAPRINE 10 MG: 10 TABLET, FILM COATED ORAL at 15:16

## 2023-11-12 RX ADMIN — SODIUM CHLORIDE, PRESERVATIVE FREE 10 ML: 5 INJECTION INTRAVENOUS at 09:23

## 2023-11-12 RX ADMIN — METOPROLOL TARTRATE 25 MG: 25 TABLET, FILM COATED ORAL at 20:39

## 2023-11-12 RX ADMIN — SUCRALFATE 1 G: 1 TABLET ORAL at 06:36

## 2023-11-12 RX ADMIN — SUCRALFATE 1 G: 1 TABLET ORAL at 11:17

## 2023-11-12 RX ADMIN — DOXYCYCLINE 100 MG: 100 INJECTION, POWDER, LYOPHILIZED, FOR SOLUTION INTRAVENOUS at 09:23

## 2023-11-12 RX ADMIN — METOPROLOL TARTRATE 25 MG: 25 TABLET, FILM COATED ORAL at 09:23

## 2023-11-12 RX ADMIN — CYCLOBENZAPRINE 10 MG: 10 TABLET, FILM COATED ORAL at 09:23

## 2023-11-12 RX ADMIN — SUCRALFATE 1 G: 1 TABLET ORAL at 15:17

## 2023-11-12 RX ADMIN — CYCLOBENZAPRINE 10 MG: 10 TABLET, FILM COATED ORAL at 20:39

## 2023-11-12 ASSESSMENT — PAIN DESCRIPTION - DESCRIPTORS
DESCRIPTORS: ACHING;DISCOMFORT
DESCRIPTORS: BURNING

## 2023-11-12 ASSESSMENT — PAIN DESCRIPTION - ONSET: ONSET: ON-GOING

## 2023-11-12 ASSESSMENT — PAIN DESCRIPTION - ORIENTATION
ORIENTATION: RIGHT
ORIENTATION: RIGHT

## 2023-11-12 ASSESSMENT — PAIN SCALES - GENERAL
PAINLEVEL_OUTOF10: 4
PAINLEVEL_OUTOF10: 9
PAINLEVEL_OUTOF10: 0
PAINLEVEL_OUTOF10: 10

## 2023-11-12 ASSESSMENT — PAIN DESCRIPTION - FREQUENCY: FREQUENCY: CONTINUOUS

## 2023-11-12 ASSESSMENT — PAIN DESCRIPTION - LOCATION
LOCATION: BACK
LOCATION: FLANK

## 2023-11-12 ASSESSMENT — PAIN DESCRIPTION - PAIN TYPE: TYPE: CHRONIC PAIN

## 2023-11-13 ENCOUNTER — APPOINTMENT (OUTPATIENT)
Dept: GENERAL RADIOLOGY | Age: 73
DRG: 195 | End: 2023-11-13
Attending: INTERNAL MEDICINE
Payer: MEDICARE

## 2023-11-13 ENCOUNTER — APPOINTMENT (OUTPATIENT)
Dept: CT IMAGING | Age: 73
DRG: 195 | End: 2023-11-13
Attending: INTERNAL MEDICINE
Payer: MEDICARE

## 2023-11-13 VITALS
BODY MASS INDEX: 38.14 KG/M2 | SYSTOLIC BLOOD PRESSURE: 127 MMHG | TEMPERATURE: 97.7 F | DIASTOLIC BLOOD PRESSURE: 61 MMHG | RESPIRATION RATE: 24 BRPM | OXYGEN SATURATION: 96 % | HEART RATE: 74 BPM | WEIGHT: 228.9 LBS | HEIGHT: 65 IN

## 2023-11-13 LAB
ANION GAP SERPL CALCULATED.3IONS-SCNC: 9 MMOL/L (ref 7–16)
BASOPHILS # BLD: 0.07 K/UL (ref 0–0.2)
BASOPHILS NFR BLD: 1 % (ref 0–2)
BUN SERPL-MCNC: 12 MG/DL (ref 6–23)
CALCIUM SERPL-MCNC: 9.3 MG/DL (ref 8.6–10.2)
CHLORIDE SERPL-SCNC: 99 MMOL/L (ref 98–107)
CO2 SERPL-SCNC: 24 MMOL/L (ref 22–29)
CREAT SERPL-MCNC: 0.7 MG/DL (ref 0.5–1)
EOSINOPHIL # BLD: 0.81 K/UL (ref 0.05–0.5)
EOSINOPHILS RELATIVE PERCENT: 7 % (ref 0–6)
ERYTHROCYTE [DISTWIDTH] IN BLOOD BY AUTOMATED COUNT: 15.9 % (ref 11.5–15)
GFR SERPL CREATININE-BSD FRML MDRD: >60 ML/MIN/1.73M2
GLUCOSE BLD-MCNC: 106 MG/DL (ref 74–99)
GLUCOSE BLD-MCNC: 124 MG/DL (ref 74–99)
GLUCOSE SERPL-MCNC: 104 MG/DL (ref 74–99)
HCT VFR BLD AUTO: 27.3 % (ref 34–48)
HGB BLD-MCNC: 8.4 G/DL (ref 11.5–15.5)
IMM GRANULOCYTES # BLD AUTO: 0.11 K/UL (ref 0–0.58)
IMM GRANULOCYTES NFR BLD: 1 % (ref 0–5)
INR PPP: 1.4
LYMPHOCYTES NFR BLD: 1.88 K/UL (ref 1.5–4)
LYMPHOCYTES RELATIVE PERCENT: 17 % (ref 20–42)
MCH RBC QN AUTO: 26.6 PG (ref 26–35)
MCHC RBC AUTO-ENTMCNC: 30.8 G/DL (ref 32–34.5)
MCV RBC AUTO: 86.4 FL (ref 80–99.9)
MICROORGANISM SPEC CULT: NORMAL
MICROORGANISM SPEC CULT: NORMAL
MONOCYTES NFR BLD: 0.88 K/UL (ref 0.1–0.95)
MONOCYTES NFR BLD: 8 % (ref 2–12)
NEUTROPHILS NFR BLD: 67 % (ref 43–80)
NEUTS SEG NFR BLD: 7.65 K/UL (ref 1.8–7.3)
PARTIAL THROMBOPLASTIN TIME: 30.3 SEC (ref 24.5–35.1)
PLATELET CONFIRMATION: NORMAL
PLATELET, FLUORESCENCE: 240 K/UL (ref 130–450)
PMV BLD AUTO: 9.9 FL (ref 7–12)
POTASSIUM SERPL-SCNC: 3.8 MMOL/L (ref 3.5–5)
PROTHROMBIN TIME: 16.4 SEC (ref 9.3–12.4)
RBC # BLD AUTO: 3.16 M/UL (ref 3.5–5.5)
SERVICE CMNT-IMP: NORMAL
SERVICE CMNT-IMP: NORMAL
SODIUM SERPL-SCNC: 132 MMOL/L (ref 132–146)
SPECIMEN DESCRIPTION: NORMAL
SPECIMEN DESCRIPTION: NORMAL
WBC OTHER # BLD: 11.4 K/UL (ref 4.5–11.5)

## 2023-11-13 PROCEDURE — 6370000000 HC RX 637 (ALT 250 FOR IP): Performed by: NURSE PRACTITIONER

## 2023-11-13 PROCEDURE — 85025 COMPLETE CBC W/AUTO DIFF WBC: CPT

## 2023-11-13 PROCEDURE — 0BBF3ZX EXCISION OF RIGHT LOWER LUNG LOBE, PERCUTANEOUS APPROACH, DIAGNOSTIC: ICD-10-PCS | Performed by: FAMILY MEDICINE

## 2023-11-13 PROCEDURE — 88305 TISSUE EXAM BY PATHOLOGIST: CPT

## 2023-11-13 PROCEDURE — 2500000003 HC RX 250 WO HCPCS: Performed by: RADIOLOGY

## 2023-11-13 PROCEDURE — 6370000000 HC RX 637 (ALT 250 FOR IP): Performed by: FAMILY MEDICINE

## 2023-11-13 PROCEDURE — 77012 CT SCAN FOR NEEDLE BIOPSY: CPT

## 2023-11-13 PROCEDURE — 80048 BASIC METABOLIC PNL TOTAL CA: CPT

## 2023-11-13 PROCEDURE — 82962 GLUCOSE BLOOD TEST: CPT

## 2023-11-13 PROCEDURE — 2709999900 CT NEEDLE BIOPSY LUNG PERCUTANEOUS W IMAGING GUIDANCE

## 2023-11-13 PROCEDURE — 71046 X-RAY EXAM CHEST 2 VIEWS: CPT

## 2023-11-13 PROCEDURE — 85730 THROMBOPLASTIN TIME PARTIAL: CPT

## 2023-11-13 PROCEDURE — 2580000003 HC RX 258: Performed by: NURSE PRACTITIONER

## 2023-11-13 PROCEDURE — 85610 PROTHROMBIN TIME: CPT

## 2023-11-13 PROCEDURE — 6360000002 HC RX W HCPCS: Performed by: RADIOLOGY

## 2023-11-13 RX ORDER — DEXAMETHASONE 4 MG/1
4 TABLET ORAL
Qty: 10 TABLET | Refills: 0 | Status: SHIPPED | OUTPATIENT
Start: 2023-11-13 | End: 2023-11-23

## 2023-11-13 RX ORDER — FENTANYL CITRATE 50 UG/ML
INJECTION, SOLUTION INTRAMUSCULAR; INTRAVENOUS PRN
Status: COMPLETED | OUTPATIENT
Start: 2023-11-13 | End: 2023-11-13

## 2023-11-13 RX ORDER — OXYCODONE AND ACETAMINOPHEN 10; 325 MG/1; MG/1
1 TABLET ORAL EVERY 8 HOURS PRN
Qty: 42 TABLET | Refills: 0 | Status: SHIPPED | OUTPATIENT
Start: 2023-11-13 | End: 2023-11-20 | Stop reason: ALTCHOICE

## 2023-11-13 RX ORDER — LIDOCAINE HYDROCHLORIDE 20 MG/ML
INJECTION, SOLUTION INFILTRATION; PERINEURAL PRN
Status: COMPLETED | OUTPATIENT
Start: 2023-11-13 | End: 2023-11-13

## 2023-11-13 RX ORDER — ALBUTEROL SULFATE 90 UG/1
2 AEROSOL, METERED RESPIRATORY (INHALATION) 4 TIMES DAILY PRN
Qty: 18 G | Refills: 0 | Status: SHIPPED | OUTPATIENT
Start: 2023-11-13

## 2023-11-13 RX ORDER — OXYCODONE HYDROCHLORIDE AND ACETAMINOPHEN 5; 325 MG/1; MG/1
1 TABLET ORAL EVERY 6 HOURS PRN
Qty: 56 TABLET | Refills: 0 | Status: SHIPPED | OUTPATIENT
Start: 2023-11-13 | End: 2023-11-20 | Stop reason: ALTCHOICE

## 2023-11-13 RX ORDER — CYCLOBENZAPRINE HCL 10 MG
10 TABLET ORAL 3 TIMES DAILY
Qty: 30 TABLET | Refills: 0 | Status: SHIPPED | OUTPATIENT
Start: 2023-11-13 | End: 2023-11-23

## 2023-11-13 RX ORDER — MIDAZOLAM HYDROCHLORIDE 2 MG/2ML
INJECTION, SOLUTION INTRAMUSCULAR; INTRAVENOUS PRN
Status: COMPLETED | OUTPATIENT
Start: 2023-11-13 | End: 2023-11-13

## 2023-11-13 RX ADMIN — LEVOTHYROXINE SODIUM 137 MCG: 0.11 TABLET ORAL at 06:02

## 2023-11-13 RX ADMIN — CALCIUM CARBONATE-VITAMIN D TAB 500 MG-200 UNIT 2 TABLET: 500-200 TAB at 11:21

## 2023-11-13 RX ADMIN — SODIUM CHLORIDE: 9 INJECTION, SOLUTION INTRAVENOUS at 01:00

## 2023-11-13 RX ADMIN — METOPROLOL TARTRATE 25 MG: 25 TABLET, FILM COATED ORAL at 11:21

## 2023-11-13 RX ADMIN — ALLOPURINOL 300 MG: 300 TABLET ORAL at 11:21

## 2023-11-13 RX ADMIN — HYDROCODONE BITARTRATE AND ACETAMINOPHEN 1 TABLET: 5; 325 TABLET ORAL at 11:20

## 2023-11-13 RX ADMIN — SUCRALFATE 1 G: 1 TABLET ORAL at 06:02

## 2023-11-13 RX ADMIN — FENTANYL CITRATE 50 MCG: 50 INJECTION, SOLUTION INTRAMUSCULAR; INTRAVENOUS at 09:53

## 2023-11-13 RX ADMIN — LIDOCAINE HYDROCHLORIDE 20 ML: 20 INJECTION, SOLUTION INFILTRATION; PERINEURAL at 09:52

## 2023-11-13 RX ADMIN — MULTIVITAMIN TABLET 1 TABLET: TABLET at 11:21

## 2023-11-13 RX ADMIN — CYCLOBENZAPRINE 10 MG: 10 TABLET, FILM COATED ORAL at 11:21

## 2023-11-13 RX ADMIN — SUCRALFATE 1 G: 1 TABLET ORAL at 11:20

## 2023-11-13 RX ADMIN — MIDAZOLAM HYDROCHLORIDE 1 MG: 1 INJECTION, SOLUTION INTRAMUSCULAR; INTRAVENOUS at 09:51

## 2023-11-13 RX ADMIN — SERTRALINE 50 MG: 50 TABLET, FILM COATED ORAL at 11:21

## 2023-11-13 ASSESSMENT — PAIN SCALES - GENERAL
PAINLEVEL_OUTOF10: 10
PAINLEVEL_OUTOF10: 10
PAINLEVEL_OUTOF10: 8

## 2023-11-13 ASSESSMENT — PAIN DESCRIPTION - LOCATION
LOCATION: OTHER (COMMENT)
LOCATION: ABDOMEN;BACK

## 2023-11-13 NOTE — PROGRESS NOTES
significant valvular heart disease. Grade 1 diastolic dysfunction. EF 55-60%. Labs:  Lab Results   Component Value Date/Time    WBC 11.4 11/13/2023 06:10 AM    RBC 3.16 11/13/2023 06:10 AM    HGB 8.4 11/13/2023 06:10 AM    HCT 27.3 11/13/2023 06:10 AM    MCV 86.4 11/13/2023 06:10 AM    MCH 26.6 11/13/2023 06:10 AM    MCHC 30.8 11/13/2023 06:10 AM    RDW 15.9 11/13/2023 06:10 AM     11/12/2023 03:39 AM    MPV 9.9 11/13/2023 06:10 AM     Lab Results   Component Value Date/Time     11/13/2023 06:10 AM    K 3.8 11/13/2023 06:10 AM    K 3.7 07/14/2020 05:20 AM    CL 99 11/13/2023 06:10 AM    CO2 24 11/13/2023 06:10 AM    BUN 12 11/13/2023 06:10 AM    CREATININE 0.7 11/13/2023 06:10 AM    LABALBU 3.5 11/07/2023 05:45 PM    CALCIUM 9.3 11/13/2023 06:10 AM    GFRAA >60 07/14/2020 05:20 AM    LABGLOM >60 11/13/2023 06:10 AM     Lab Results   Component Value Date/Time    PROTIME 16.4 11/13/2023 06:10 AM    INR 1.4 11/13/2023 06:10 AM        Assessment:    Right lower lobe lung mass vs infectious process, 7.3 x 6.2 cm  Pleuritic back pain secondary to above-s/p biopsy 11/13/23  Leukocytosis, WBC 16.3  Atrial fibrillation, on eliquis  Former nicotine dependence    Plan:   Continue to monitor on room air, apply supplemental oxygen as needed to maintain SpO2 greater than 92%  Ok to resume eliquis tomorrow  Lung bx done today-follow results  Completed rocephin and doxy  Procalcitonin 0.10, MRSA screening negative, bacterial urine antigens negative  CEA 1.8, Ca 19-9 = 8  Toradol prn for pain  Patient is ok for discharge from a pulmonary standpoint and is to follow up with Dr. Belem Hendricks in 2 weeks-routed      This plan of care was reviewed in collaboration with Dr. Zeke Adams    Electronically signed by STACI Sifuentes CNP on 11/13/2023 at 1:33 PM    I personally saw, examined, and cared for the patient. I spoke with bedside nursing, therapists and consultants.   The case was discussed in detail and plans for care were established. Review of CNP documentation was conducted and revisions were made as appropriate. I agree with the above documented exam, problem list and plan of care.     Kulwinder Hutchison MD

## 2023-11-13 NOTE — PRE SEDATION
Amaya Rosario MD   sertraline (ZOLOFT) 50 MG tablet Take 1 tablet by mouth daily    Amaya Rosario MD   metFORMIN (GLUCOPHAGE) 500 MG tablet Take 2 tablets by mouth 2 times daily (with meals)    Amaya Rosario MD   HYDROcodone-acetaminophen (NORCO) 7.5-325 MG per tablet Take 0.5-1 tablets by mouth every 8 hours as needed for Pain. Patient not taking: Reported on 11/7/2023    Amaya Rosario MD   Cholecalciferol (VITAMIN D3) 64838 UNITS CAPS Take 1 capsule by mouth See Admin Instructions 5 days a week  Patient not taking: Reported on 6/20/2023    Amaya Rosario MD   atorvastatin (LIPITOR) 10 MG tablet Take 1 tablet by mouth daily    Amaya Rosario MD   irbesartan (AVAPRO) 150 MG tablet Take 1 tablet by mouth daily Indications: hold until seeing PCP, monitor BP  Patient not taking: Reported on 6/20/2023    Amaya Rosario MD   metoprolol (LOPRESSOR) 25 MG tablet Take 1 tablet by mouth 2 times daily    Amaya Rosario MD   Calcium Carb-Cholecalciferol (CALCIUM 1000 + D PO) Take by mouth daily     Amaya Rosario MD   Elastic Bandages & Supports ([address]) Ananya Centeno by Does not apply route. 20-30 MMHG knee high     Amaya Rosario MD     Coumadin Use Last 7 Days:  no  Antiplatelet drug therapy use last 7 days: no  Other anticoagulant use last 7 days: yes - Eliquis  Additional Medication Information:  See above      Pre-Sedation Documentation and Exam:   I have reviewed the patient's history and review of systems.     Mallampati Airway Assessment:  Mallampati Class III - (soft palate & base of uvula are visible)    Prior History of Anesthesia Complications:   none    ASA Classification:  Class 3 - A patient with severe systemic disease that limits activity but is not incapacitating    Sedation/ Anesthesia Plan:   intravenous sedation    Medications Planned:   midazolam (Versed) intravenously and fentanyl intravenously    Patient is an appropriate candidate for plan of sedation: yes    Electronically signed by MIKA Hedrick on 11/13/2023 at 9:17 AM for Dr. Josemanuel Nguyễn

## 2023-11-13 NOTE — DISCHARGE SUMMARY
[facility]   Discharge summary   Patient ID:  Luc Sofia  [id number]  74 y.o.  1950    Admit date: 11/8/2023    Discharge date and time: 11/13/2023    Admission Diagnoses:   Patient Active Problem List   Diagnosis    PVD (peripheral vascular disease) with claudication ([address] [facility])    Diabetes mellitus type 2, uncontrolled    Hyperlipidemia LDL goal <100    Acquired hypothyroidism    Essential hypertension    PUD (peptic ulcer disease)    Morbid obesity with BMI of 40.0-44.9, adult ([address] [facility])    Obesity (BMI 30-39. 9)    Lung mass       Discharge Diagnoses: ***    Consults: {consultation:18877}    Procedures: ***    Hospital Course: The patient is a 74 y.o. female of Azam Polka III  with significant past medical history of *** who presents with ***    Recent Labs     11/11/23  0938 11/12/23  0339 11/13/23  0610   WBC 13.6* 11.8* 11.4   HGB 9.5* 8.8* 8.4*   HCT 30.6* 29.1* 27.3*    260  --        Recent Labs     11/11/23  0938 11/12/23  0218 11/13/23  0610    137 132   K 3.6 3.9 3.8    104 99   CO2 27 23 24   BUN 12 13 12   CREATININE 0.7 0.8 0.7   CALCIUM 9.3 9.6 9.3       No results found. Discharge Exam:    HEENT: NCAT,  PERRLA, No JVD  Heart:  RRR, no murmurs, gallops, or rubs.   Lungs:  CTA bilaterally, no wheeze, rales or rhonchi  Abd: bowel sounds present, nontender, nondistended, no masses  Extrem:  No clubbing, cyanosis, or edema    Disposition: {disposition:16307}     Patient Condition at Discharge: ***    Patient Instructions:      Medication List        CONTINUE taking these medications      Medical Compression Stockings Misc            ASK your doctor about these medications      acetaminophen 500 MG tablet  Commonly known as: TYLENOL     allopurinol 300 MG tablet  Commonly known as: ZYLOPRIM     apixaban 5 MG Tabs tablet  Commonly known as: ELIQUIS     aspirin 81 MG EC tablet     atorvastatin 10 MG tablet  Commonly known as: LIPITOR     CALCIUM 1000 + D PO

## 2023-11-13 NOTE — PLAN OF CARE
Problem: Discharge Planning  Goal: Discharge to home or other facility with appropriate resources  11/13/2023 1511 by Luzma Gamboa RN  Outcome: Completed  11/13/2023 0411 by Rachid Bellamy RN  Outcome: Progressing     Problem: Pain  Goal: Verbalizes/displays adequate comfort level or baseline comfort level  11/13/2023 1511 by Luzma Gamboa RN  Outcome: Completed  11/13/2023 0411 by Rachid Bellamy RN  Outcome: Progressing     Problem: Safety - Adult  Goal: Free from fall injury  11/13/2023 1511 by Luzma Gamboa RN  Outcome: Completed  11/13/2023 0411 by Rachid Bellamy RN  Outcome: Progressing     Problem: Chronic Conditions and Co-morbidities  Goal: Patient's chronic conditions and co-morbidity symptoms are monitored and maintained or improved  11/13/2023 1511 by Luzma Gamboa RN  Outcome: Completed  11/13/2023 0411 by Rachid Bellamy RN  Outcome: Progressing     Problem: ABCDS Injury Assessment  Goal: Absence of physical injury  11/13/2023 1511 by Luzma Gamboa RN  Outcome: Completed  11/13/2023 0411 by Rachid Bellamy RN  Outcome: Progressing     Problem: Skin/Tissue Integrity  Goal: Absence of new skin breakdown  Description: 1. Monitor for areas of redness and/or skin breakdown  2. Assess vascular access sites hourly  3. Every 4-6 hours minimum:  Change oxygen saturation probe site  4. Every 4-6 hours:  If on nasal continuous positive airway pressure, respiratory therapy assess nares and determine need for appliance change or resting period.   Outcome: Completed

## 2023-11-13 NOTE — OR NURSING
ID Band Check: Yes    ALLERGIES: Adhesive tape    LABS:   Lab Results   Component Value Date    INR 1.4 11/13/2023    PROTIME 16.4 (H) 11/13/2023           Lab Results   Component Value Date    CREATININE 0.7 11/13/2023    BUN 12 11/13/2023          Lab Results   Component Value Date    HGB 8.4 (L) 11/13/2023    HCT 27.3 (L) 11/13/2023     11/12/2023

## 2023-11-13 NOTE — PROGRESS NOTES
Physician Progress Note      PATIENT:               Jenna Carrillo  CSN #:                  [id number]  :                       1950  ADMIT DATE:       2023 12:12 AM  DISCH DATE:  RESPONDING  PROVIDER #:        Milton Laura DO          QUERY TEXT:    Dear Attending Physician,    Pt noted to have Pneumonia and placed on IV ATB. If possible, please document   in the progress notes and discharge summary if you are evaluating and/or   treating any of the following:    Note: CAP and HCAP indicate where the pneumonia was acquired, not a specific   type. The medical record reflects the following:  Risk Factors: lung mass with right sided back pain  Clinical Indicators: Per PCP ,\". Cozette Flies Cozette Flies Lung mass/PNA. ..had ct guided lung bx   done this AM, tolerated well await pathology results remains on rocephin and   doxy. Cozette Flies Cozette Flies \" Per Pulmo ,\". Cozette Flies Cozette Flies Right lower lobe lung mass?vs infectious process,   7.3 x 6.2 cm. Cozette Flies Cozette Flies Pleuritic back pain secondary to above-s/p biopsy   23. Cozette Flies Cozette Flies Leukocytosis, WBC 16.3 . Cozette Flies Cozette Flies \"  Treatment: IV ATB, nebulizers, lung biopsy mass vs pneumonia    Thank you,  Goran Mccoy RN CCDS  Clinical Documentation Improvement Specialist  Phone# [phone]  Options provided:  -- Gram negative pneumonia  -- Gram positive pneumonia  -- MRSA pneumonia  -- MSSA pneumonia  -- Bacterial pneumonia  -- Other - I will add my own diagnosis  -- Disagree - Not applicable / Not valid  -- Disagree - Clinically unable to determine / Unknown  -- Refer to Clinical Documentation Reviewer    PROVIDER RESPONSE TEXT:    This patient has bacterial pneumonia.     Query created by: Goran Mccoy on 2023 2:46 PM      Electronically signed by:  Milton Laura DO 2023 2:48 PM

## 2023-11-16 LAB — SURGICAL PATHOLOGY REPORT: NORMAL

## 2023-12-05 ENCOUNTER — APPOINTMENT (OUTPATIENT)
Dept: GENERAL RADIOLOGY | Age: 73
DRG: 872 | End: 2023-12-05
Payer: MEDICARE

## 2023-12-05 ENCOUNTER — HOSPITAL ENCOUNTER (INPATIENT)
Age: 73
LOS: 2 days | Discharge: HOME | DRG: 872 | End: 2023-12-07
Attending: EMERGENCY MEDICINE | Admitting: INTERNAL MEDICINE
Payer: MEDICARE

## 2023-12-05 DIAGNOSIS — E87.6 HYPOKALEMIA: ICD-10-CM

## 2023-12-05 DIAGNOSIS — E87.20 LACTIC ACIDOSIS: ICD-10-CM

## 2023-12-05 DIAGNOSIS — D72.829 LEUKOCYTOSIS, UNSPECIFIED TYPE: ICD-10-CM

## 2023-12-05 DIAGNOSIS — R42 DIZZINESS: Primary | ICD-10-CM

## 2023-12-05 PROBLEM — N39.0 UTI (URINARY TRACT INFECTION): Status: ACTIVE | Noted: 2023-12-05

## 2023-12-05 LAB
ALBUMIN SERPL-MCNC: 3 G/DL (ref 3.5–5.2)
ALP SERPL-CCNC: 70 U/L (ref 35–104)
ALT SERPL-CCNC: 17 U/L (ref 0–32)
ANION GAP SERPL CALCULATED.3IONS-SCNC: 16 MMOL/L (ref 7–16)
AST SERPL-CCNC: 13 U/L (ref 0–31)
BACTERIA URNS QL MICRO: ABNORMAL
BASOPHILS # BLD: 0.08 K/UL (ref 0–0.2)
BASOPHILS NFR BLD: 1 % (ref 0–2)
BILIRUB SERPL-MCNC: 0.7 MG/DL (ref 0–1.2)
BILIRUB UR QL STRIP: NEGATIVE
BUN SERPL-MCNC: 17 MG/DL (ref 6–23)
CALCIUM SERPL-MCNC: 10.5 MG/DL (ref 8.6–10.2)
CASTS #/AREA URNS LPF: ABNORMAL /LPF
CHLORIDE SERPL-SCNC: 95 MMOL/L (ref 98–107)
CLARITY UR: CLEAR
CO2 SERPL-SCNC: 23 MMOL/L (ref 22–29)
COLOR UR: YELLOW
CREAT SERPL-MCNC: 0.9 MG/DL (ref 0.5–1)
EOSINOPHIL # BLD: 1.17 K/UL (ref 0.05–0.5)
EOSINOPHILS RELATIVE PERCENT: 8 % (ref 0–6)
EPI CELLS #/AREA URNS HPF: ABNORMAL /HPF
ERYTHROCYTE [DISTWIDTH] IN BLOOD BY AUTOMATED COUNT: 16.2 % (ref 11.5–15)
GFR SERPL CREATININE-BSD FRML MDRD: >60 ML/MIN/1.73M2
GLUCOSE SERPL-MCNC: 160 MG/DL (ref 74–99)
GLUCOSE UR STRIP-MCNC: NEGATIVE MG/DL
HCT VFR BLD AUTO: 34.1 % (ref 34–48)
HGB BLD-MCNC: 10.7 G/DL (ref 11.5–15.5)
HGB UR QL STRIP.AUTO: ABNORMAL
IMM GRANULOCYTES # BLD AUTO: 0.12 K/UL (ref 0–0.58)
IMM GRANULOCYTES NFR BLD: 1 % (ref 0–5)
KETONES UR STRIP-MCNC: NEGATIVE MG/DL
LACTATE BLDV-SCNC: 2 MMOL/L (ref 0.5–2.2)
LACTATE BLDV-SCNC: 4.7 MMOL/L (ref 0.5–2.2)
LEUKOCYTE ESTERASE UR QL STRIP: ABNORMAL
LYMPHOCYTES NFR BLD: 2.51 K/UL (ref 1.5–4)
LYMPHOCYTES RELATIVE PERCENT: 16 % (ref 20–42)
MCH RBC QN AUTO: 26.2 PG (ref 26–35)
MCHC RBC AUTO-ENTMCNC: 31.4 G/DL (ref 32–34.5)
MCV RBC AUTO: 83.6 FL (ref 80–99.9)
MONOCYTES NFR BLD: 1.01 K/UL (ref 0.1–0.95)
MONOCYTES NFR BLD: 7 % (ref 2–12)
NEUTROPHILS NFR BLD: 69 % (ref 43–80)
NEUTS SEG NFR BLD: 10.74 K/UL (ref 1.8–7.3)
NITRITE UR QL STRIP: NEGATIVE
PH UR STRIP: 7 [PH] (ref 5–9)
PLATELET # BLD AUTO: 298 K/UL (ref 130–450)
PMV BLD AUTO: 9.6 FL (ref 7–12)
POTASSIUM SERPL-SCNC: 3.2 MMOL/L (ref 3.5–5)
PROT SERPL-MCNC: 6.3 G/DL (ref 6.4–8.3)
PROT UR STRIP-MCNC: NEGATIVE MG/DL
RBC # BLD AUTO: 4.08 M/UL (ref 3.5–5.5)
RBC #/AREA URNS HPF: ABNORMAL /HPF
SODIUM SERPL-SCNC: 134 MMOL/L (ref 132–146)
SP GR UR STRIP: 1.01 (ref 1–1.03)
TROPONIN I SERPL HS-MCNC: 25 NG/L (ref 0–9)
TROPONIN I SERPL HS-MCNC: 26 NG/L (ref 0–9)
UROBILINOGEN UR STRIP-ACNC: 0.2 EU/DL (ref 0–1)
WBC #/AREA URNS HPF: ABNORMAL /HPF
WBC OTHER # BLD: 15.6 K/UL (ref 4.5–11.5)

## 2023-12-05 PROCEDURE — 83605 ASSAY OF LACTIC ACID: CPT

## 2023-12-05 PROCEDURE — 71045 X-RAY EXAM CHEST 1 VIEW: CPT

## 2023-12-05 PROCEDURE — 80053 COMPREHEN METABOLIC PANEL: CPT

## 2023-12-05 PROCEDURE — 2580000003 HC RX 258

## 2023-12-05 PROCEDURE — 85025 COMPLETE CBC W/AUTO DIFF WBC: CPT

## 2023-12-05 PROCEDURE — G0378 HOSPITAL OBSERVATION PER HR: HCPCS

## 2023-12-05 PROCEDURE — 87086 URINE CULTURE/COLONY COUNT: CPT

## 2023-12-05 PROCEDURE — 93005 ELECTROCARDIOGRAM TRACING: CPT

## 2023-12-05 PROCEDURE — 96374 THER/PROPH/DIAG INJ IV PUSH: CPT

## 2023-12-05 PROCEDURE — 99285 EMERGENCY DEPT VISIT HI MDM: CPT

## 2023-12-05 PROCEDURE — 96361 HYDRATE IV INFUSION ADD-ON: CPT

## 2023-12-05 PROCEDURE — 6370000000 HC RX 637 (ALT 250 FOR IP)

## 2023-12-05 PROCEDURE — 81001 URINALYSIS AUTO W/SCOPE: CPT

## 2023-12-05 PROCEDURE — 84484 ASSAY OF TROPONIN QUANT: CPT

## 2023-12-05 PROCEDURE — 6360000002 HC RX W HCPCS

## 2023-12-05 PROCEDURE — 2060000000 HC ICU INTERMEDIATE R&B

## 2023-12-05 RX ORDER — POTASSIUM CHLORIDE 20 MEQ/1
40 TABLET, EXTENDED RELEASE ORAL ONCE
Status: COMPLETED | OUTPATIENT
Start: 2023-12-05 | End: 2023-12-05

## 2023-12-05 RX ORDER — 0.9 % SODIUM CHLORIDE 0.9 %
1000 INTRAVENOUS SOLUTION INTRAVENOUS ONCE
Status: COMPLETED | OUTPATIENT
Start: 2023-12-05 | End: 2023-12-05

## 2023-12-05 RX ADMIN — SODIUM CHLORIDE 1000 ML: 9 INJECTION, SOLUTION INTRAVENOUS at 17:32

## 2023-12-05 RX ADMIN — POTASSIUM CHLORIDE 40 MEQ: 1500 TABLET, EXTENDED RELEASE ORAL at 18:35

## 2023-12-05 RX ADMIN — WATER 2000 MG: 1 INJECTION INTRAMUSCULAR; INTRAVENOUS; SUBCUTANEOUS at 23:12

## 2023-12-05 RX ADMIN — SODIUM CHLORIDE 1000 ML: 9 INJECTION, SOLUTION INTRAVENOUS at 19:19

## 2023-12-05 ASSESSMENT — PAIN - FUNCTIONAL ASSESSMENT: PAIN_FUNCTIONAL_ASSESSMENT: NONE - DENIES PAIN

## 2023-12-05 NOTE — ED PROVIDER NOTES
Department of Emergency Medicine     Written by: Mumtaz Ledbetter DO  Patient Name: Crista Morley Date: 2023  4:45 PM  MRN: [id number]                   : 1950      HPI  Chief Complaint   Patient presents with    Dizziness     Dizziness/lightheadedness when walking that started this afternoon. Denies falls. m 151 for ems. VSS. This is a 75-year-old female with past medical history of diabetes, hyperlipidemia, hypertension, atrial fibrillation who presents to the emergency department today complaining of dizziness. Patient states that she stood up and was walking earlier this afternoon when she got dizzy and lightheaded while doing so. She states she felt she may pass out however she did not, she was able to catch herself. She states she was recently in the hospital and was discharged a few weeks ago for some abdominal pain which she is undergoing work-up by specialist for at this point. She does not feel dizzy or lightheaded still at this point. She has a history of atrial fibrillation and is on Eliquis for this. Patient denies any fever or chills, nausea or vomiting, chest pain, shortness of breath, abdominal pain, hematuria or dysuria, constipation or diarrhea. Nursing notes were all reviewed and agreed with or any disagreements were addressed in the HPI. Review of systems:    Pertinent positives and negatives mentioned in the HPI/MDM. Physical Exam  Constitutional:       General: She is not in acute distress. Appearance: She is obese. HENT:      Head: Normocephalic and atraumatic. Eyes:      Extraocular Movements: Extraocular movements intact. Pupils: Pupils are equal, round, and reactive to light. Cardiovascular:      Rate and Rhythm: Regular rhythm. Tachycardia present. Pulmonary:      Effort: Pulmonary effort is normal.      Breath sounds: Normal breath sounds. No stridor. No wheezing, rhonchi or rales. Abdominal:      General: Abdomen is flat. dictations but occasionally words are mis-transcribed.)       Virginia Bowers, DO  Resident  12/07/23 0132

## 2023-12-05 NOTE — ED NOTES
Critical Lactic of 4.7 reported to Dr. Remi Brewer, no verbal orders received.       Simon Garcia RN  12/05/23 3501

## 2023-12-06 LAB
ANION GAP SERPL CALCULATED.3IONS-SCNC: 10 MMOL/L (ref 7–16)
BUN SERPL-MCNC: 13 MG/DL (ref 6–23)
CALCIUM SERPL-MCNC: 9.8 MG/DL (ref 8.6–10.2)
CHLORIDE SERPL-SCNC: 105 MMOL/L (ref 98–107)
CO2 SERPL-SCNC: 25 MMOL/L (ref 22–29)
CREAT SERPL-MCNC: 0.8 MG/DL (ref 0.5–1)
EKG ATRIAL RATE: 108 BPM
EKG P AXIS: 73 DEGREES
EKG P-R INTERVAL: 150 MS
EKG Q-T INTERVAL: 326 MS
EKG QRS DURATION: 90 MS
EKG QTC CALCULATION (BAZETT): 436 MS
EKG R AXIS: 66 DEGREES
EKG T AXIS: 73 DEGREES
EKG VENTRICULAR RATE: 108 BPM
GFR SERPL CREATININE-BSD FRML MDRD: >60 ML/MIN/1.73M2
GLUCOSE BLD-MCNC: 102 MG/DL (ref 74–99)
GLUCOSE BLD-MCNC: 106 MG/DL (ref 74–99)
GLUCOSE BLD-MCNC: 119 MG/DL (ref 74–99)
GLUCOSE BLD-MCNC: 131 MG/DL (ref 74–99)
GLUCOSE SERPL-MCNC: 115 MG/DL (ref 74–99)
HBA1C MFR BLD: 6 % (ref 4–5.6)
POTASSIUM SERPL-SCNC: 3.6 MMOL/L (ref 3.5–5)
SODIUM SERPL-SCNC: 140 MMOL/L (ref 132–146)

## 2023-12-06 PROCEDURE — G0378 HOSPITAL OBSERVATION PER HR: HCPCS

## 2023-12-06 PROCEDURE — 83036 HEMOGLOBIN GLYCOSYLATED A1C: CPT

## 2023-12-06 PROCEDURE — 36415 COLL VENOUS BLD VENIPUNCTURE: CPT

## 2023-12-06 PROCEDURE — 97161 PT EVAL LOW COMPLEX 20 MIN: CPT

## 2023-12-06 PROCEDURE — 2580000003 HC RX 258

## 2023-12-06 PROCEDURE — 96361 HYDRATE IV INFUSION ADD-ON: CPT

## 2023-12-06 PROCEDURE — 80048 BASIC METABOLIC PNL TOTAL CA: CPT

## 2023-12-06 PROCEDURE — 6370000000 HC RX 637 (ALT 250 FOR IP)

## 2023-12-06 PROCEDURE — 82962 GLUCOSE BLOOD TEST: CPT

## 2023-12-06 PROCEDURE — 2060000000 HC ICU INTERMEDIATE R&B

## 2023-12-06 PROCEDURE — 97165 OT EVAL LOW COMPLEX 30 MIN: CPT

## 2023-12-06 PROCEDURE — 2580000003 HC RX 258: Performed by: FAMILY MEDICINE

## 2023-12-06 PROCEDURE — 93010 ELECTROCARDIOGRAM REPORT: CPT | Performed by: INTERNAL MEDICINE

## 2023-12-06 RX ORDER — ACETAMINOPHEN 325 MG/1
650 TABLET ORAL EVERY 6 HOURS PRN
Status: DISCONTINUED | OUTPATIENT
Start: 2023-12-06 | End: 2023-12-07 | Stop reason: HOSPADM

## 2023-12-06 RX ORDER — IRBESARTAN AND HYDROCHLOROTHIAZIDE 300; 12.5 MG/1; MG/1
1 TABLET, FILM COATED ORAL DAILY
Status: DISCONTINUED | OUTPATIENT
Start: 2023-12-06 | End: 2023-12-06 | Stop reason: CLARIF

## 2023-12-06 RX ORDER — INSULIN LISPRO 100 [IU]/ML
0-4 INJECTION, SOLUTION INTRAVENOUS; SUBCUTANEOUS NIGHTLY
Status: DISCONTINUED | OUTPATIENT
Start: 2023-12-06 | End: 2023-12-07 | Stop reason: HOSPADM

## 2023-12-06 RX ORDER — MULTIVITAMIN WITH IRON
1 TABLET ORAL DAILY
Status: DISCONTINUED | OUTPATIENT
Start: 2023-12-06 | End: 2023-12-07 | Stop reason: HOSPADM

## 2023-12-06 RX ORDER — SUCRALFATE 1 G/1
1 TABLET ORAL 4 TIMES DAILY
Status: DISCONTINUED | OUTPATIENT
Start: 2023-12-06 | End: 2023-12-07 | Stop reason: HOSPADM

## 2023-12-06 RX ORDER — SODIUM CHLORIDE 0.9 % (FLUSH) 0.9 %
5-40 SYRINGE (ML) INJECTION EVERY 12 HOURS SCHEDULED
Status: DISCONTINUED | OUTPATIENT
Start: 2023-12-06 | End: 2023-12-07 | Stop reason: HOSPADM

## 2023-12-06 RX ORDER — ATORVASTATIN CALCIUM 10 MG/1
10 TABLET, FILM COATED ORAL DAILY
Status: DISCONTINUED | OUTPATIENT
Start: 2023-12-06 | End: 2023-12-07 | Stop reason: HOSPADM

## 2023-12-06 RX ORDER — LOSARTAN POTASSIUM 50 MG/1
100 TABLET ORAL DAILY
Status: DISCONTINUED | OUTPATIENT
Start: 2023-12-06 | End: 2023-12-07 | Stop reason: HOSPADM

## 2023-12-06 RX ORDER — SODIUM CHLORIDE 0.9 % (FLUSH) 0.9 %
5-40 SYRINGE (ML) INJECTION PRN
Status: DISCONTINUED | OUTPATIENT
Start: 2023-12-06 | End: 2023-12-07 | Stop reason: HOSPADM

## 2023-12-06 RX ORDER — ACETAMINOPHEN 650 MG/1
650 SUPPOSITORY RECTAL EVERY 6 HOURS PRN
Status: DISCONTINUED | OUTPATIENT
Start: 2023-12-06 | End: 2023-12-07 | Stop reason: HOSPADM

## 2023-12-06 RX ORDER — ONDANSETRON 2 MG/ML
4 INJECTION INTRAMUSCULAR; INTRAVENOUS EVERY 6 HOURS PRN
Status: DISCONTINUED | OUTPATIENT
Start: 2023-12-06 | End: 2023-12-07 | Stop reason: HOSPADM

## 2023-12-06 RX ORDER — ALLOPURINOL 300 MG/1
300 TABLET ORAL DAILY
Status: DISCONTINUED | OUTPATIENT
Start: 2023-12-06 | End: 2023-12-07 | Stop reason: HOSPADM

## 2023-12-06 RX ORDER — ASPIRIN 81 MG/1
81 TABLET ORAL DAILY
Status: DISCONTINUED | OUTPATIENT
Start: 2023-12-06 | End: 2023-12-07 | Stop reason: HOSPADM

## 2023-12-06 RX ORDER — SODIUM CHLORIDE 9 MG/ML
INJECTION, SOLUTION INTRAVENOUS CONTINUOUS
Status: DISCONTINUED | OUTPATIENT
Start: 2023-12-06 | End: 2023-12-06

## 2023-12-06 RX ORDER — ONDANSETRON 4 MG/1
4 TABLET, ORALLY DISINTEGRATING ORAL EVERY 8 HOURS PRN
Status: DISCONTINUED | OUTPATIENT
Start: 2023-12-06 | End: 2023-12-07 | Stop reason: HOSPADM

## 2023-12-06 RX ORDER — INSULIN LISPRO 100 [IU]/ML
0-4 INJECTION, SOLUTION INTRAVENOUS; SUBCUTANEOUS
Status: DISCONTINUED | OUTPATIENT
Start: 2023-12-06 | End: 2023-12-07 | Stop reason: HOSPADM

## 2023-12-06 RX ORDER — SODIUM CHLORIDE, SODIUM LACTATE, POTASSIUM CHLORIDE, CALCIUM CHLORIDE 600; 310; 30; 20 MG/100ML; MG/100ML; MG/100ML; MG/100ML
INJECTION, SOLUTION INTRAVENOUS CONTINUOUS
Status: DISCONTINUED | OUTPATIENT
Start: 2023-12-06 | End: 2023-12-07 | Stop reason: HOSPADM

## 2023-12-06 RX ORDER — PANTOPRAZOLE SODIUM 40 MG/1
40 TABLET, DELAYED RELEASE ORAL DAILY
Status: DISCONTINUED | OUTPATIENT
Start: 2023-12-06 | End: 2023-12-07 | Stop reason: HOSPADM

## 2023-12-06 RX ORDER — ALBUTEROL SULFATE 2.5 MG/3ML
2.5 SOLUTION RESPIRATORY (INHALATION) EVERY 6 HOURS PRN
Status: DISCONTINUED | OUTPATIENT
Start: 2023-12-06 | End: 2023-12-07 | Stop reason: HOSPADM

## 2023-12-06 RX ORDER — HYDROCHLOROTHIAZIDE 12.5 MG/1
12.5 TABLET ORAL DAILY
Status: DISCONTINUED | OUTPATIENT
Start: 2023-12-06 | End: 2023-12-07 | Stop reason: HOSPADM

## 2023-12-06 RX ORDER — SODIUM CHLORIDE 9 MG/ML
INJECTION, SOLUTION INTRAVENOUS PRN
Status: DISCONTINUED | OUTPATIENT
Start: 2023-12-06 | End: 2023-12-07 | Stop reason: HOSPADM

## 2023-12-06 RX ORDER — DEXTROSE MONOHYDRATE 100 MG/ML
INJECTION, SOLUTION INTRAVENOUS CONTINUOUS PRN
Status: DISCONTINUED | OUTPATIENT
Start: 2023-12-06 | End: 2023-12-07 | Stop reason: HOSPADM

## 2023-12-06 RX ADMIN — SUCRALFATE 1 G: 1 TABLET ORAL at 20:25

## 2023-12-06 RX ADMIN — LOSARTAN POTASSIUM 100 MG: 50 TABLET, FILM COATED ORAL at 08:44

## 2023-12-06 RX ADMIN — ATORVASTATIN CALCIUM 10 MG: 10 TABLET, FILM COATED ORAL at 20:25

## 2023-12-06 RX ADMIN — METOPROLOL TARTRATE 25 MG: 25 TABLET, FILM COATED ORAL at 20:25

## 2023-12-06 RX ADMIN — CALCIUM CARBONATE-VITAMIN D TAB 500 MG-200 UNIT 2 TABLET: 500-200 TAB at 08:44

## 2023-12-06 RX ADMIN — METFORMIN HYDROCHLORIDE 1000 MG: 1000 TABLET ORAL at 08:44

## 2023-12-06 RX ADMIN — SUCRALFATE 1 G: 1 TABLET ORAL at 05:13

## 2023-12-06 RX ADMIN — SUCRALFATE 1 G: 1 TABLET ORAL at 11:45

## 2023-12-06 RX ADMIN — ACETAMINOPHEN 650 MG: 325 TABLET ORAL at 20:24

## 2023-12-06 RX ADMIN — METOPROLOL TARTRATE 25 MG: 25 TABLET, FILM COATED ORAL at 08:44

## 2023-12-06 RX ADMIN — PANTOPRAZOLE SODIUM 40 MG: 40 TABLET, DELAYED RELEASE ORAL at 08:44

## 2023-12-06 RX ADMIN — SODIUM CHLORIDE, PRESERVATIVE FREE 10 ML: 5 INJECTION INTRAVENOUS at 08:43

## 2023-12-06 RX ADMIN — APIXABAN 5 MG: 5 TABLET, FILM COATED ORAL at 20:25

## 2023-12-06 RX ADMIN — SODIUM CHLORIDE, POTASSIUM CHLORIDE, SODIUM LACTATE AND CALCIUM CHLORIDE: 600; 310; 30; 20 INJECTION, SOLUTION INTRAVENOUS at 11:48

## 2023-12-06 RX ADMIN — LEVOTHYROXINE SODIUM 137 MCG: 0.11 TABLET ORAL at 05:13

## 2023-12-06 RX ADMIN — ASPIRIN 81 MG: 81 TABLET, COATED ORAL at 08:45

## 2023-12-06 RX ADMIN — SODIUM CHLORIDE: 9 INJECTION, SOLUTION INTRAVENOUS at 03:25

## 2023-12-06 RX ADMIN — SODIUM CHLORIDE, PRESERVATIVE FREE 10 ML: 5 INJECTION INTRAVENOUS at 20:25

## 2023-12-06 RX ADMIN — APIXABAN 5 MG: 5 TABLET, FILM COATED ORAL at 08:44

## 2023-12-06 RX ADMIN — MULTIVITAMIN TABLET 1 TABLET: TABLET at 08:44

## 2023-12-06 RX ADMIN — HYDROCHLOROTHIAZIDE 12.5 MG: 12.5 TABLET ORAL at 08:44

## 2023-12-06 RX ADMIN — SODIUM CHLORIDE, POTASSIUM CHLORIDE, SODIUM LACTATE AND CALCIUM CHLORIDE: 600; 310; 30; 20 INJECTION, SOLUTION INTRAVENOUS at 20:27

## 2023-12-06 RX ADMIN — ALLOPURINOL 300 MG: 300 TABLET ORAL at 08:44

## 2023-12-06 RX ADMIN — ACETAMINOPHEN 650 MG: 325 TABLET ORAL at 05:22

## 2023-12-06 RX ADMIN — SERTRALINE HYDROCHLORIDE 50 MG: 50 TABLET ORAL at 08:45

## 2023-12-06 RX ADMIN — METFORMIN HYDROCHLORIDE 1000 MG: 1000 TABLET ORAL at 17:15

## 2023-12-06 NOTE — PROGRESS NOTES
Physical Therapy  Facility/Department: [address] INTERMEDIATE  Physical Therapy Initial Assessment    Name: Merry Collazo  : 1950  MRN: [id number]  Date of Service: 2023    Patient Diagnosis(es): The primary encounter diagnosis was Dizziness. Diagnoses of Lactic acidosis, Hypokalemia, and Leukocytosis, unspecified type were also pertinent to this visit. Past Medical History:  has a past medical history of A-fib ([address]), Back problem, Carotid artery stenosis, Chest pain, Diabetic neuropathy ([address]), Full dentures, Hiatal hernia, Hypertension, Hyperuricemia, Lipid disorder, Osteoarthritis, Peripheral vascular disease ([address]), Preoperative clearance, PVD (peripheral vascular disease) with claudication ([address]), Rapid palpitations, Thyroid disease, Type II or unspecified type diabetes mellitus without mention of complication, not stated as uncontrolled, and Wears glasses. Past Surgical History:  has a past surgical history that includes Appendectomy; Throat surgery (); Colonoscopy; Hysterectomy; joint replacement (Left, 12/3/2015); Total hip arthroplasty (Left, 2015); Upper gastrointestinal endoscopy (N/A, 2020); Upper gastrointestinal endoscopy (N/A, 7/15/2020); and CT NEEDLE BIOPSY LUNG PERCUTANEOUS (2023). Referring provider:  Alvina Garibay DO    PT Order:  PT eval and treat     Evaluating PT:  Wicho Tucker PT, DPT PT [id number]    Room #:  0933/7916-W  Diagnosis:  Hypokalemia [E87.6]  Lactic acidosis [E87.20]  Dizziness [R42]  UTI (urinary tract infection) [N39.0]  Leukocytosis, unspecified type [D72.829]  Precautions:  fall risk  Equipment Needs:  none. Pt reported owing a walker. SUBJECTIVE:    Pt lives alone in a 1 story apartment with 0 steps to enter. Bed and bath is on first floor. Pt ambulated with no device PTA. OBJECTIVE:   Initial Evaluation  Date:  Treatment Short Term/ Long Term   Goals   Was pt agreeable to Eval/treatment? yes     Does pt have pain?  None reported goals. Patient and or family understand(s) diagnosis, prognosis, and plan of care. yes    PHYSICAL THERAPY PLAN OF CARE:    PT POC is established based on physician order and patient diagnosis     Diagnosis:  Hypokalemia [E87.6]  Lactic acidosis [E87.20]  Dizziness [R42]  UTI (urinary tract infection) [N39.0]  Leukocytosis, unspecified type [D72.829]    Current Treatment Recommendations:     [x] Strengthening to improve independence with functional mobility   [] ROM to improve independence with functional mobility   [x] Balance Training to improve static/dynamic balance and to reduce fall risk  [x] Endurance Training to improve activity tolerance during functional mobility   [x] Transfer Training to improve safety and independence with all functional transfers   [x] Gait Training to improve gait mechanics, endurance and assess need for appropriate assistive device  [] Stair Training in preparation for safe discharge home and/or into the community   [] Positioning to prevent skin breakdown and contractures  [x] Safety and Education Training   [x] Patient/Caregiver Education   [] HEP  [] Other     PT long term treatment goals are located in above grid    Frequency of treatments: 2-5x/week x 1-2 weeks. Time in  1135  Time out  1155    Evaluation Time includes thorough review of current medical information, gathering information on past medical history/social history and prior level of function, completion of standardized testing/informal observation of tasks, assessment of data and education on plan of care and goals.     CPT codes:  [x] Low Complexity PT evaluation 06234  [] Moderate Complexity PT evaluation 02485  [] High Complexity PT evaluation 19992  [] PT Re-evaluation 45557  [] Therapeutic activities 90668 __minutes  [] Therapeutic exercises 15472 __ minutes      Juanjose Music, PT, DPT  PT [id number]

## 2023-12-06 NOTE — ACP (ADVANCE CARE PLANNING)
Advance Care Planning   Healthcare Decision Maker:    Primary Decision Maker: Jakub Ling - Child - [phone]    Primary Decision Maker: Elina John - Child - [phone]    Supplemental (Other) Decision Maker: Susanvero Narvaez - Brother/Sister - [phone]      Today we documented Decision Maker(s) consistent with Legal Next of Kin hierarchy.

## 2023-12-06 NOTE — PROGRESS NOTES
[facility] Quality Flow/Interdisciplinary Rounds Progress Note        Quality Flow Rounds held on December 6, 2023    Disciplines Attending:  Bedside Nurse, , , and Nursing Unit Leadership    Sarahy Matias was admitted on 12/5/2023  4:45 PM    Anticipated Discharge Date:  Expected Discharge Date: 12/07/23    Disposition:    Callum Score:  Callum Scale Score: 21    Readmission Risk              Risk of Unplanned Readmission:  16           Discussed patient goal for the day, patient clinical progression, and barriers to discharge.   The following Goal(s) of the Day/Commitment(s) have been identified:   await UC, iv abx, iv fluids, monitor fantasma Paez RN  December 6, 2023

## 2023-12-06 NOTE — H&P
[facility]  History and Physical      CHIEF COMPLAINT:    Chief Complaint   Patient presents with    Dizziness     Dizziness/lightheadedness when walking that started this afternoon. Denies falls. Bgm 151 for ems. VSS. Patient of Sriskyler DotsonDO brandon presents with:  UTI (urinary tract infection)    History of Present Illness:   Patient is a 49-year-old female with past medical history of atrial fibrillation on Eliquis, DM, HLD, HTN, PVD, hypothyroidism who presents to the emergency room for dizziness and lightheadedness when walking. Patient states that her dizziness and lightheadedness began a short time before arrival to emergency. A chest x-ray was obtained which was negative for anything acute. Labs on arrival revealed mild hypokalemia 3.2, lactic acidosis of 4.7, leukocytosis of 15.6 with a UA consistent for urinary tract infection. Patient is admitted to intermediate telemetry unit for further work-up and treatment. REVIEW OF SYSTEMS:  Pertinent negatives are above in HPI. 10 point ROS otherwise negative. Past Medical History:   Diagnosis Date    A-fib [facility])     Back problem     Carotid artery stenosis     Chest pain 02/22/2013    Diabetic neuropathy ([address]) 12/04/2015    Full dentures     Hiatal hernia     Hypertension     Hyperuricemia 12/04/2015    Lipid disorder 12/04/2015    Osteoarthritis     lumbosacral spine arthritis and disc disease    Peripheral vascular disease (HCC)     Preoperative clearance     08/2015 per Dr Esther Soares and 10/2015 per Dr Brittanie Martin for [facility] Dr Sobeida Aguayo.     PVD (peripheral vascular disease) with claudication ([address]) 12/13/2012    follows with Dr Stuart Morales    Rapid palpitations     follows with Dr Esther Soares    Thyroid disease     Type II or unspecified type diabetes mellitus without mention of complication, not stated as uncontrolled     Wears glasses          Past Surgical History:   Procedure Laterality Date    APPENDECTOMY      COLONOSCOPY AM    GFRAA >60 07/14/2020 05:20 AM    LABGLOM >60 12/06/2023 05:38 AM    GLUCOSE 115 12/06/2023 05:38 AM    PROT 6.3 12/05/2023 05:20 PM    LABALBU 3.0 12/05/2023 05:20 PM    CALCIUM 9.8 12/06/2023 05:38 AM    BILITOT 0.7 12/05/2023 05:20 PM    ALKPHOS 70 12/05/2023 05:20 PM    AST 13 12/05/2023 05:20 PM    ALT 17 12/05/2023 05:20 PM       Imaging:  CXR: Negative for any acute findings, mass lesion identified in early November present    EKG:  Sinus tachycardia    Telemetry:      ASSESSMENT/PLAN:  Principal Problem:    UTI (urinary tract infection)  Resolved Problems:    * No resolved hospital problems. *    Patient is a 73-year-old female admitted to [facility] for  UTI  -Monitor labs  -WBC 15.6  -IV Rocephin pending urine cultures  -Continue IVF NS at 73  -Monitor I's and O's  -PT/OT    Atrial fibrillation  -Continue home metoprolol 25 mg twice daily for rate control  -Continue Eliquis    diabetes mellitus  -Continue to monitor blood glucose levels  -Continue low-dose corrective ISS     Medication for other comorbidities continue as appropriate dose adjustment as necessary. DVT prophylaxis Eliquis   PT OT  Discharge planning      Code status: Full  Requires Inpatient level of care      I have spent a total time of 45 minutes of this patient encounter reviewing chart, labs, coordinating care with interdisciplinary teams, face to face encounter with patient, providing counseling/education to patient/family.     Electronically signed by Konrad Olivas DO on 12/7/2023 at 10:18 AM

## 2023-12-06 NOTE — ED NOTES
Patient was ambulated to the restroom. Patient stated she felt very dizzy, was shaking, and was unsteady. Patient was returned to her bed without incident. Heart rate 138 bpm and bp 135/58.      Ning Martinez RN  12/05/23 7086

## 2023-12-06 NOTE — ED NOTES
Notified floor patient will be coming up. Nurse to call back after placing a new bed in room.       Jazmine Stokes RN  12/06/23 0105

## 2023-12-06 NOTE — CARE COORDINATION
Readmission noted, pt w/lightheadedness and dizziness. Orthos +, UC pending w/pt on IVFs and IV rocephin. CM made in room visit to pt w/role of CM explained. Pt states she is home alone and independent w/o the use of any DMEs. Hx of SNF stay after a BENY at [address] [place] [address]. She attends outpt therapy at [facility] and would like to do that upon discharge. Pt states she still drives and is established w/Dr. Adonay Marie and has no issues obtaining medications. CM will continue to follow. Plan at this time is home w/outpt therapy. PT/OT evals pending.    Mena Palmer, BSN, RN  [facility] Case Management  [phone]

## 2023-12-06 NOTE — PROGRESS NOTES
Occupational Therapy    OCCUPATIONAL THERAPY INITIAL EVALUATION    [facility]'Located within [facility]   [address] [facility] [address] [facility] [address],[address]         CMJE:[id number]                                                  Patient Name: Kath Cobos    MRN: [id number]    : 1950    Room: [address] [place] [address]      Evaluating OT: Junior  OTR/L   [id number]      Referring Provider:Sarwat Vegas APRN - CN     Specific Provider Orders/Date:OT eval and treat 2023      Diagnosis:  Hypokalemia [E87.6]  Lactic acidosis [E87.20]  Dizziness [R42]  UTI (urinary tract infection) [N39.0]  Leukocytosis, unspecified type [D72.829]     Pertinent Medical History: A fib, back pain, PVD, neuropathy, OA, BENY    Precautions:  Fall Risk,      Assessment of current deficits    [x] Functional mobility  [x]ADLs  [x] Strength               []Cognition    [x] Functional transfers   [x] IADLs         [x] Safety Awareness   [x]Endurance    [] Fine Coordination              [x] Balance      [] Vision/perception   []Sensation     []Gross Motor Coordination  [] ROM  [] Delirium                   [] Motor Control     OT PLAN OF CARE   OT POC based on physician orders, patient diagnosis and results of clinical assessment    Frequency/Duration  2-3 days/wk for 2 weeks PRN   Specific OT Treatment Interventions to include:   ADL retraining/adapted techniques and AE recommendations to increase functional independence within precautions                    Energy conservation techniques to improve tolerance for selfcare routine   Functional transfer/mobility training/DME recommendations for increased independence, safety and fall prevention         Patient/family education to increase safety and functional independence             Environmental modifications for safe mobility and completion of ADLs                             Therapeutic activity to improve functional performance during ADLs.

## 2023-12-06 NOTE — PROGRESS NOTES
Attempted to call Liza Presume NP at this time for admission orders. Message left on voicemail at this time.

## 2023-12-07 VITALS
SYSTOLIC BLOOD PRESSURE: 153 MMHG | HEART RATE: 83 BPM | RESPIRATION RATE: 16 BRPM | HEIGHT: 65 IN | BODY MASS INDEX: 36.25 KG/M2 | WEIGHT: 217.6 LBS | DIASTOLIC BLOOD PRESSURE: 77 MMHG | OXYGEN SATURATION: 98 % | TEMPERATURE: 98.6 F

## 2023-12-07 LAB
ALBUMIN SERPL-MCNC: 2.8 G/DL (ref 3.5–5.2)
ALP SERPL-CCNC: 57 U/L (ref 35–104)
ALT SERPL-CCNC: 14 U/L (ref 0–32)
ANION GAP SERPL CALCULATED.3IONS-SCNC: 11 MMOL/L (ref 7–16)
AST SERPL-CCNC: 10 U/L (ref 0–31)
BASOPHILS # BLD: 0.06 K/UL (ref 0–0.2)
BASOPHILS NFR BLD: 1 % (ref 0–2)
BILIRUB SERPL-MCNC: 0.5 MG/DL (ref 0–1.2)
BUN SERPL-MCNC: 11 MG/DL (ref 6–23)
CALCIUM SERPL-MCNC: 9.4 MG/DL (ref 8.6–10.2)
CHLORIDE SERPL-SCNC: 103 MMOL/L (ref 98–107)
CO2 SERPL-SCNC: 22 MMOL/L (ref 22–29)
CREAT SERPL-MCNC: 0.8 MG/DL (ref 0.5–1)
EOSINOPHIL # BLD: 1.33 K/UL (ref 0.05–0.5)
EOSINOPHILS RELATIVE PERCENT: 11 % (ref 0–6)
ERYTHROCYTE [DISTWIDTH] IN BLOOD BY AUTOMATED COUNT: 16.2 % (ref 11.5–15)
GFR SERPL CREATININE-BSD FRML MDRD: >60 ML/MIN/1.73M2
GLUCOSE BLD-MCNC: 103 MG/DL (ref 74–99)
GLUCOSE BLD-MCNC: 123 MG/DL (ref 74–99)
GLUCOSE SERPL-MCNC: 120 MG/DL (ref 74–99)
HCT VFR BLD AUTO: 30 % (ref 34–48)
HGB BLD-MCNC: 9.1 G/DL (ref 11.5–15.5)
IMM GRANULOCYTES # BLD AUTO: 0.12 K/UL (ref 0–0.58)
IMM GRANULOCYTES NFR BLD: 1 % (ref 0–5)
LYMPHOCYTES NFR BLD: 2.43 K/UL (ref 1.5–4)
LYMPHOCYTES RELATIVE PERCENT: 20 % (ref 20–42)
MCH RBC QN AUTO: 25.9 PG (ref 26–35)
MCHC RBC AUTO-ENTMCNC: 30.3 G/DL (ref 32–34.5)
MCV RBC AUTO: 85.2 FL (ref 80–99.9)
MICROORGANISM SPEC CULT: ABNORMAL
MICROORGANISM SPEC CULT: ABNORMAL
MONOCYTES NFR BLD: 0.81 K/UL (ref 0.1–0.95)
MONOCYTES NFR BLD: 7 % (ref 2–12)
NEUTROPHILS NFR BLD: 61 % (ref 43–80)
NEUTS SEG NFR BLD: 7.48 K/UL (ref 1.8–7.3)
PLATELET # BLD AUTO: 256 K/UL (ref 130–450)
PMV BLD AUTO: 9.5 FL (ref 7–12)
POTASSIUM SERPL-SCNC: 3.6 MMOL/L (ref 3.5–5)
PROT SERPL-MCNC: 5.4 G/DL (ref 6.4–8.3)
RBC # BLD AUTO: 3.52 M/UL (ref 3.5–5.5)
SODIUM SERPL-SCNC: 136 MMOL/L (ref 132–146)
SPECIMEN DESCRIPTION: ABNORMAL
WBC OTHER # BLD: 12.2 K/UL (ref 4.5–11.5)

## 2023-12-07 PROCEDURE — G0378 HOSPITAL OBSERVATION PER HR: HCPCS

## 2023-12-07 PROCEDURE — 80053 COMPREHEN METABOLIC PANEL: CPT

## 2023-12-07 PROCEDURE — 2580000003 HC RX 258

## 2023-12-07 PROCEDURE — 96376 TX/PRO/DX INJ SAME DRUG ADON: CPT

## 2023-12-07 PROCEDURE — 85025 COMPLETE CBC W/AUTO DIFF WBC: CPT

## 2023-12-07 PROCEDURE — 6360000002 HC RX W HCPCS

## 2023-12-07 PROCEDURE — 82962 GLUCOSE BLOOD TEST: CPT

## 2023-12-07 PROCEDURE — 6370000000 HC RX 637 (ALT 250 FOR IP)

## 2023-12-07 RX ORDER — CIPROFLOXACIN 500 MG/1
500 TABLET, FILM COATED ORAL 2 TIMES DAILY
Qty: 14 TABLET | Refills: 0 | Status: SHIPPED | OUTPATIENT
Start: 2023-12-07 | End: 2023-12-14

## 2023-12-07 RX ADMIN — WATER 1000 MG: 1 INJECTION INTRAMUSCULAR; INTRAVENOUS; SUBCUTANEOUS at 00:02

## 2023-12-07 RX ADMIN — LEVOTHYROXINE SODIUM 137 MCG: 0.11 TABLET ORAL at 06:10

## 2023-12-07 RX ADMIN — PANTOPRAZOLE SODIUM 40 MG: 40 TABLET, DELAYED RELEASE ORAL at 10:23

## 2023-12-07 RX ADMIN — SUCRALFATE 1 G: 1 TABLET ORAL at 06:10

## 2023-12-07 RX ADMIN — METFORMIN HYDROCHLORIDE 1000 MG: 1000 TABLET ORAL at 10:23

## 2023-12-07 RX ADMIN — METOPROLOL TARTRATE 25 MG: 25 TABLET, FILM COATED ORAL at 10:24

## 2023-12-07 RX ADMIN — APIXABAN 5 MG: 5 TABLET, FILM COATED ORAL at 10:23

## 2023-12-07 RX ADMIN — SUCRALFATE 1 G: 1 TABLET ORAL at 10:24

## 2023-12-07 RX ADMIN — ACETAMINOPHEN 650 MG: 325 TABLET ORAL at 10:23

## 2023-12-07 RX ADMIN — CALCIUM CARBONATE-VITAMIN D TAB 500 MG-200 UNIT 2 TABLET: 500-200 TAB at 10:24

## 2023-12-07 RX ADMIN — SERTRALINE HYDROCHLORIDE 50 MG: 50 TABLET ORAL at 10:24

## 2023-12-07 RX ADMIN — ALLOPURINOL 300 MG: 300 TABLET ORAL at 10:24

## 2023-12-07 RX ADMIN — ASPIRIN 81 MG: 81 TABLET, COATED ORAL at 10:25

## 2023-12-07 RX ADMIN — MULTIVITAMIN TABLET 1 TABLET: TABLET at 10:26

## 2023-12-07 RX ADMIN — SODIUM CHLORIDE, PRESERVATIVE FREE 10 ML: 5 INJECTION INTRAVENOUS at 10:26

## 2023-12-07 RX ADMIN — LOSARTAN POTASSIUM 100 MG: 50 TABLET, FILM COATED ORAL at 10:24

## 2023-12-07 RX ADMIN — HYDROCHLOROTHIAZIDE 12.5 MG: 12.5 TABLET ORAL at 10:23

## 2023-12-07 ASSESSMENT — PAIN SCALES - GENERAL
PAINLEVEL_OUTOF10: 0
PAINLEVEL_OUTOF10: 5
PAINLEVEL_OUTOF10: 0

## 2023-12-07 ASSESSMENT — PAIN DESCRIPTION - LOCATION: LOCATION: GENERALIZED;BACK

## 2023-12-07 ASSESSMENT — PAIN DESCRIPTION - DESCRIPTORS: DESCRIPTORS: ACHING

## 2023-12-07 NOTE — DISCHARGE SUMMARY
home in stbale condition after urine culture came back for gram pos mixed organisms. Recommend f/u with cbc and UA in 7-10 at her regular pcp to ensure white count and UTI resolved. Recent Labs     12/05/23  1720 12/07/23  0323   WBC 15.6* 12.2*   HGB 10.7* 9.1*   HCT 34.1 30.0*    256       Recent Labs     12/05/23  1720 12/06/23  0538 12/07/23  0323    140 136   K 3.2* 3.6 3.6   CL 95* 105 103   CO2 23 25 22   BUN 17 13 11   CREATININE 0.9 0.8 0.8   CALCIUM 10.5* 9.8 9.4       XR CHEST PORTABLE    Result Date: 12/5/2023  EXAMINATION: ONE XRAY VIEW OF THE CHEST 12/5/2023 5:49 pm COMPARISON: Comparison studies of November 13 HISTORY: ORDERING SYSTEM PROVIDED HISTORY: dizziness TECHNOLOGIST PROVIDED HISTORY: Reason for exam:->dizziness FINDINGS: Persistent lobulated mass which projects in the right hilar region but located posteriorly as demonstrated by CT chest on November 7th. The right lungs otherwise well expanded. The left lungs well expanded. No infiltrates or consolidations in the left lung. There is no pleural effusions. Heart has normal size. Mediastinum appears unremarkable. Lesion     Mass lesion which projects posteriorly as demonstrate on CT chest of November 7th in the mid inner aspect of the right chest. No superimposed acute cardiopulmonary process. Discharge Exam:    General appearance: NAD, conversant  Eyes: Sclerae anicteric, PERRLA  HEENT: AT/NC, MMM  Neck: FROM, supple, no thyromegaly  Lymph: No cervical / supraclavicular lymphadenopathy  Lungs: diminished bilaterally, on room air  CV: RRR, no MRGs, no lower extremity edema  Abdomen: Soft, non-tender; no masses or HSM, +BS  Extremities: FROM without synovitis. No clubbing or cyanosis of the hands. Skin: no rash, induration, lesions, or ulcers  Psych: Calm and cooperative. Normal judgement and insight. Normal mood and affect. Neuro: Alert and interactive, face symmetric, speech fluent.     Disposition: home Patient Condition at Discharge: stable    Patient Instructions:      Medication List        START taking these medications      ciprofloxacin 500 MG tablet  Commonly known as: CIPRO  Take 1 tablet by mouth 2 times daily for 7 days            CONTINUE taking these medications      acetaminophen 500 MG tablet  Commonly known as: TYLENOL     albuterol sulfate  (90 Base) MCG/ACT inhaler  Commonly known as: Ventolin HFA  Inhale 2 puffs into the lungs 4 times daily as needed for Wheezing     allopurinol 300 MG tablet  Commonly known as: ZYLOPRIM     apixaban 5 MG Tabs tablet  Commonly known as: ELIQUIS     aspirin 81 MG EC tablet     atorvastatin 10 MG tablet  Commonly known as: LIPITOR     CALCIUM 1000 + D PO     irbesartan-hydroCHLOROthiazide 300-12.5 MG per tablet  Commonly known as: AVALIDE     metFORMIN 500 MG tablet  Commonly known as: GLUCOPHAGE     metoprolol tartrate 25 MG tablet  Commonly known as: LOPRESSOR     MULTI-VITAMIN DAILY PO     OZEMPIC (1 MG/DOSE) SC     pantoprazole 40 MG tablet  Commonly known as: PROTONIX     sertraline 50 MG tablet  Commonly known as: ZOLOFT     sucralfate 1 GM tablet  Commonly known as: CARAFATE     Synthroid 137 MCG tablet  Generic drug: levothyroxine               Where to Get Your Medications        These medications were sent to [address] - P [phone] [facility] [phone]  [address] [facility] [address]      Phone: [phone]   ciprofloxacin 500 MG tablet       Activity: activity as tolerated  Diet: regular diet    Pt has been advised to: Follow-up with Asha CHAIREZ III, DO in 1 week.   Follow-up with consultants as recommended by them    Note that over 30 minutes was spent in preparing discharge papers, discussing discharge with patient, medication review, etc.    Signed:  Julieta Stallworth DO  12/7/2023  12:59 PM

## 2023-12-07 NOTE — PLAN OF CARE
Problem: ABCDS Injury Assessment  Goal: Absence of physical injury  12/7/2023 1313 by Timothy Nicole RN  Outcome: Adequate for Discharge     Problem: Safety - Adult  Goal: Free from fall injury  12/7/2023 1313 by Timothy Nicole RN  Outcome: Adequate for Discharge     Problem: Chronic Conditions and Co-morbidities  Goal: Patient's chronic conditions and co-morbidity symptoms are monitored and maintained or improved  12/7/2023 1313 by Timothy Nicole RN  Outcome: Adequate for Discharge  Flowsheets (Taken 12/7/2023 0000 by Humble Crook RN)  Care Plan - Patient's Chronic Conditions and Co-Morbidity Symptoms are Monitored and Maintained or Improved: Monitor and assess patient's chronic conditions and comorbid symptoms for stability, deterioration, or improvement     Problem: Chronic Conditions and Co-morbidities  Goal: Patient's chronic conditions and co-morbidity symptoms are monitored and maintained or improved  12/7/2023 1313 by Timothy Nicole RN  Outcome: Adequate for Discharge  Flowsheets (Taken 12/7/2023 0000 by Humble Crook RN)  Care Plan - Patient's Chronic Conditions and Co-Morbidity Symptoms are Monitored and Maintained or Improved: Monitor and assess patient's chronic conditions and comorbid symptoms for stability, deterioration, or improvement     Problem: Pain  Goal: Verbalizes/displays adequate comfort level or baseline comfort level  12/7/2023 1313 by Timothy Nicole RN  Outcome: Adequate for Discharge  Flowsheets (Taken 12/7/2023 0006 by Humble Crook RN)  Verbalizes/displays adequate comfort level or baseline comfort level: Assess pain using appropriate pain scale

## 2023-12-07 NOTE — CARE COORDINATION
On IV rocephin, UC pending. PT/OT am-pacs 17/24. Pt will return home upon discharge and f/u with outpt therapy as prior. Pt is  home alone and independent w/o the use of any DMEs. Pt still drives and is established w/Dr. Laurel Ward and has no issues obtaining medications. CM will continue to follow.    DARNELL JonesN, RN  [address] Case Management  [phone]

## 2023-12-07 NOTE — PROGRESS NOTES
P Quality Flow/Interdisciplinary Rounds Progress Note        Quality Flow Rounds held on December 7, 2023    Disciplines Attending:  Bedside Nurse, , , and Nursing Unit Leadership    Oneida David was admitted on 12/5/2023  4:45 PM    Anticipated Discharge Date:  Expected Discharge Date: 12/07/23    Disposition:    Callum Score:  Callum Scale Score: 22    Readmission Risk              Risk of Unplanned Readmission:  18           Discussed patient goal for the day, patient clinical progression, and barriers to discharge.   The following Goal(s) of the Day/Commitment(s) have been identified:   iv fluids, iv abx, check for d/c       Jaz Bryan RN  December 7, 2023

## 2023-12-14 NOTE — PROGRESS NOTES
Physician Progress Note      PATIENT:               Tracy Avery  CSN #:                  [id number]  :                       1950  ADMIT DATE:       2023 4:45 PM  [address] DATE:        2023 3:52 PM  RESPONDING  PROVIDER #:        Contreras Canela DO          QUERY TEXT:    Pt admitted with dizziness. Pt noted to have UTI. If possible, please document   in the progress notes and discharge summary if you are evaluating and/or   treating any of the following: The medical record reflects the following:  Risk Factors: advanced age  Clinical Indicators: WBC 15.6, lactic 4.7, temp 99.5, , per IM   \". ..UTI. [place] \"  Treatment: IV Rocephin    Thank you,  April Linden Schwab, RN, BSN, CDIS  Clinical Documentation Integrity  [email or web address]  Options provided:  -- Sepsis, present on admission  -- UTI without Sepsis  -- Other - I will add my own diagnosis  -- Disagree - Not applicable / Not valid  -- Disagree - Clinically unable to determine / Unknown  -- Refer to Clinical Documentation Reviewer    PROVIDER RESPONSE TEXT:    This patient has sepsis which was present on admission.     Query created by: Malinda London on 2023 2:23 PM      Electronically signed by:  Contreras Canela DO 2023 5:53 PM

## 2023-12-22 NOTE — PROGRESS NOTES
[facility] PRE-ADMISSION TESTING INSTRUCTIONS    The Preadmission Testing patient is instructed accordingly using the following criteria (check applicable):    ARRIVAL INSTRUCTIONS:  [x] Parking the day of Surgery is located in the Main Entrance lot.  Upon entering the door, make an immediate right to the surgery reception desk    [x] Bring photo ID and insurance card    [] Bring in a copy of Living will or Durable Power of  papers.    [x] Please be sure to arrange for responsible adult to provide transportation to and from the hospital    [x] Please arrange for responsible adult to be with you for the 24 hour period post procedure due to having anesthesia    [x] If you awake am of surgery not feeling well or have temperature >100 please call [phone]    GENERAL INSTRUCTIONS:    [x] Nothing by mouth after midnight, including gum, candy, mints or water    [x] You may brush your teeth, but do not swallow any water    [x] Take medications as instructed with 1-2 oz of water    [x] Stop herbal supplements and vitamins 5 days prior to procedure    [x] Follow preop dosing of blood thinners per physician instructions    [] Take 1/2 dose of evening insulin, but no insulin after midnight    [x] No oral diabetic medications after midnight    [x] If diabetic and have low blood sugar or feel symptomatic, take 1-2oz apple juice only    [x] Bring inhalers day of surgery    [] Bring C-PAP/ Bi-Pap day of surgery    [] Bring urine specimen day of surgery    [x] Shower or bath with soap, lather and rinse well, AM of Surgery, no lotion, powders or creams to surgical site    [] Follow bowel prep as instructed per surgeon    [x] No tobacco products within 24 hours of surgery     [x] No alcohol or illegal drug use within 24 hours of surgery.    [x] Jewelry, body piercing's, eyeglasses, contact lenses and dentures are not permitted into surgery (bring cases)      [x] Please do not wear any nail

## 2023-12-26 ENCOUNTER — HOSPITAL ENCOUNTER (EMERGENCY)
Age: 73
Discharge: HOME | End: 2023-12-26
Attending: EMERGENCY MEDICINE
Payer: MEDICARE

## 2023-12-26 ENCOUNTER — APPOINTMENT (OUTPATIENT)
Dept: GENERAL RADIOLOGY | Age: 73
End: 2023-12-26
Payer: MEDICARE

## 2023-12-26 ENCOUNTER — APPOINTMENT (OUTPATIENT)
Dept: CT IMAGING | Age: 73
End: 2023-12-26
Payer: MEDICARE

## 2023-12-26 VITALS
RESPIRATION RATE: 19 BRPM | WEIGHT: 209 LBS | HEART RATE: 98 BPM | SYSTOLIC BLOOD PRESSURE: 143 MMHG | TEMPERATURE: 97.5 F | DIASTOLIC BLOOD PRESSURE: 68 MMHG | BODY MASS INDEX: 34.82 KG/M2 | HEIGHT: 65 IN | OXYGEN SATURATION: 99 %

## 2023-12-26 DIAGNOSIS — R91.8 RIGHT LOWER LOBE LUNG MASS: Primary | ICD-10-CM

## 2023-12-26 LAB
ALBUMIN SERPL-MCNC: 3.3 G/DL (ref 3.5–5.2)
ALP SERPL-CCNC: 72 U/L (ref 35–104)
ALT SERPL-CCNC: 13 U/L (ref 0–32)
ANION GAP SERPL CALCULATED.3IONS-SCNC: 13 MMOL/L (ref 7–16)
AST SERPL-CCNC: 24 U/L (ref 0–31)
BASOPHILS # BLD: 0.09 K/UL (ref 0–0.2)
BASOPHILS NFR BLD: 1 % (ref 0–2)
BILIRUB DIRECT SERPL-MCNC: <0.2 MG/DL (ref 0–0.3)
BILIRUB INDIRECT SERPL-MCNC: ABNORMAL MG/DL (ref 0–1)
BILIRUB SERPL-MCNC: 0.4 MG/DL (ref 0–1.2)
BNP SERPL-MCNC: 235 PG/ML (ref 0–450)
BUN SERPL-MCNC: 20 MG/DL (ref 6–23)
CALCIUM SERPL-MCNC: 9.9 MG/DL (ref 8.6–10.2)
CHLORIDE SERPL-SCNC: 101 MMOL/L (ref 98–107)
CO2 SERPL-SCNC: 23 MMOL/L (ref 22–29)
CREAT SERPL-MCNC: 0.7 MG/DL (ref 0.5–1)
EOSINOPHIL # BLD: 1.05 K/UL (ref 0.05–0.5)
EOSINOPHILS RELATIVE PERCENT: 6 % (ref 0–6)
ERYTHROCYTE [DISTWIDTH] IN BLOOD BY AUTOMATED COUNT: 16.7 % (ref 11.5–15)
GFR SERPL CREATININE-BSD FRML MDRD: >60 ML/MIN/1.73M2
GLUCOSE SERPL-MCNC: 132 MG/DL (ref 74–99)
HCT VFR BLD AUTO: 33.5 % (ref 34–48)
HGB BLD-MCNC: 10.3 G/DL (ref 11.5–15.5)
IMM GRANULOCYTES # BLD AUTO: 0.23 K/UL (ref 0–0.58)
IMM GRANULOCYTES NFR BLD: 1 % (ref 0–5)
INR PPP: 1.4
LACTATE BLDV-SCNC: 1.9 MMOL/L (ref 0.5–2.2)
LIPASE SERPL-CCNC: 10 U/L (ref 13–60)
LIPASE SERPL-CCNC: 11 U/L (ref 13–60)
LYMPHOCYTES NFR BLD: 1.99 K/UL (ref 1.5–4)
LYMPHOCYTES RELATIVE PERCENT: 12 % (ref 20–42)
MCH RBC QN AUTO: 25.6 PG (ref 26–35)
MCHC RBC AUTO-ENTMCNC: 30.7 G/DL (ref 32–34.5)
MCV RBC AUTO: 83.1 FL (ref 80–99.9)
MONOCYTES NFR BLD: 0.95 K/UL (ref 0.1–0.95)
MONOCYTES NFR BLD: 6 % (ref 2–12)
NEUTROPHILS NFR BLD: 75 % (ref 43–80)
NEUTS SEG NFR BLD: 12.9 K/UL (ref 1.8–7.3)
PLATELET # BLD AUTO: 462 K/UL (ref 130–450)
PMV BLD AUTO: 9.6 FL (ref 7–12)
POTASSIUM SERPL-SCNC: 4 MMOL/L (ref 3.5–5)
PROT SERPL-MCNC: 6.4 G/DL (ref 6.4–8.3)
PROTHROMBIN TIME: 15.7 SEC (ref 9.3–12.4)
RBC # BLD AUTO: 4.03 M/UL (ref 3.5–5.5)
SODIUM SERPL-SCNC: 137 MMOL/L (ref 132–146)
TROPONIN I SERPL HS-MCNC: 17 NG/L (ref 0–9)
TROPONIN I SERPL HS-MCNC: 18 NG/L (ref 0–9)
WBC OTHER # BLD: 17.2 K/UL (ref 4.5–11.5)

## 2023-12-26 PROCEDURE — 96374 THER/PROPH/DIAG INJ IV PUSH: CPT

## 2023-12-26 PROCEDURE — 83880 ASSAY OF NATRIURETIC PEPTIDE: CPT

## 2023-12-26 PROCEDURE — 85025 COMPLETE CBC W/AUTO DIFF WBC: CPT

## 2023-12-26 PROCEDURE — 71275 CT ANGIOGRAPHY CHEST: CPT

## 2023-12-26 PROCEDURE — 84484 ASSAY OF TROPONIN QUANT: CPT

## 2023-12-26 PROCEDURE — 6360000004 HC RX CONTRAST MEDICATION: Performed by: RADIOLOGY

## 2023-12-26 PROCEDURE — 93005 ELECTROCARDIOGRAM TRACING: CPT | Performed by: STUDENT IN AN ORGANIZED HEALTH CARE EDUCATION/TRAINING PROGRAM

## 2023-12-26 PROCEDURE — 2580000003 HC RX 258: Performed by: STUDENT IN AN ORGANIZED HEALTH CARE EDUCATION/TRAINING PROGRAM

## 2023-12-26 PROCEDURE — 80053 COMPREHEN METABOLIC PANEL: CPT

## 2023-12-26 PROCEDURE — 6360000002 HC RX W HCPCS: Performed by: STUDENT IN AN ORGANIZED HEALTH CARE EDUCATION/TRAINING PROGRAM

## 2023-12-26 PROCEDURE — 83605 ASSAY OF LACTIC ACID: CPT

## 2023-12-26 PROCEDURE — 74177 CT ABD & PELVIS W/CONTRAST: CPT

## 2023-12-26 PROCEDURE — 71045 X-RAY EXAM CHEST 1 VIEW: CPT

## 2023-12-26 PROCEDURE — 85610 PROTHROMBIN TIME: CPT

## 2023-12-26 PROCEDURE — 82248 BILIRUBIN DIRECT: CPT

## 2023-12-26 PROCEDURE — 83690 ASSAY OF LIPASE: CPT

## 2023-12-26 PROCEDURE — 99285 EMERGENCY DEPT VISIT HI MDM: CPT

## 2023-12-26 RX ORDER — 0.9 % SODIUM CHLORIDE 0.9 %
1000 INTRAVENOUS SOLUTION INTRAVENOUS ONCE
Status: COMPLETED | OUTPATIENT
Start: 2023-12-26 | End: 2023-12-26

## 2023-12-26 RX ORDER — OXYCODONE HYDROCHLORIDE AND ACETAMINOPHEN 5; 325 MG/1; MG/1
1 TABLET ORAL EVERY 6 HOURS PRN
Qty: 12 TABLET | Refills: 0 | Status: SHIPPED | OUTPATIENT
Start: 2023-12-26 | End: 2023-12-29

## 2023-12-26 RX ORDER — FENTANYL CITRATE 50 UG/ML
75 INJECTION, SOLUTION INTRAMUSCULAR; INTRAVENOUS ONCE
Status: COMPLETED | OUTPATIENT
Start: 2023-12-26 | End: 2023-12-26

## 2023-12-26 RX ADMIN — IOPAMIDOL 75 ML: 755 INJECTION, SOLUTION INTRAVENOUS at 11:33

## 2023-12-26 RX ADMIN — FENTANYL CITRATE 75 MCG: 50 INJECTION, SOLUTION INTRAMUSCULAR; INTRAVENOUS at 11:01

## 2023-12-26 RX ADMIN — SODIUM CHLORIDE 1000 ML: 9 INJECTION, SOLUTION INTRAVENOUS at 11:01

## 2023-12-26 NOTE — ED PROVIDER NOTES
[address] [facility]      Pt Name: Drew Rosa  MRN: [id number]  [address] [place] 1950  Date of evaluation: 12/26/2023  Provider: Willow Singleton DO  PCP: Wenceslao Romero III, DO  Note Started: 9:54 AM EST 12/26/23    CHIEF COMPLAINT       Chief Complaint   Patient presents with    Back Pain     Middle R    Rib Pain     R sided pain from Axilla radiating down to R hip x 3 months. Denies injury       HISTORY OF PRESENT ILLNESS: 1 or more Elements   History From: Patient  Limitations to history : None    Drew Rosa is a 74 y.o. female who presents to the ED due to back pain and rib pain. Patient states that she has a right lower lung mass that is currently being followed by Dr. Bernice Lanza. She notes that she had a recent biopsy which was inconclusive and has a follow-up bicep CT scheduled for January 2. She endorses pain to her right upper chest and back area. Says that the symptoms have been progressively worsening over the past 3 months since the mass was found. She says that she also has some right upper quadrant abdominal pain associated with the symptoms. She says that she has spasms that make it difficult to breathe. Denies any significant lower extremity edema. She states that she is on Eliquis for intermittent atrial fibrillation. Denies any cough, fever or chills associated with the symptoms. Nursing Notes were all reviewed and agreed with or any disagreements were addressed in the HPI. REVIEW OF SYSTEMS :    Positives and Pertinent negatives as per HPI.      PAST MEDICAL HISTORY/Chronic Conditions Affecting Care    has a past medical history of A-fib [facility]), Back problem, Carotid artery stenosis, Diabetic neuropathy ([address]) (12/04/2015), Full dentures, Hiatal hernia, Hypertension, Hyperuricemia (12/04/2015), Lung mass, Osteoarthritis, PVD (peripheral vascular disease) with claudication ([address]) (12/13/2012), Thyroid

## 2023-12-27 LAB
EKG ATRIAL RATE: 92 BPM
EKG P AXIS: 70 DEGREES
EKG P-R INTERVAL: 144 MS
EKG Q-T INTERVAL: 354 MS
EKG QRS DURATION: 78 MS
EKG QTC CALCULATION (BAZETT): 437 MS
EKG R AXIS: 59 DEGREES
EKG T AXIS: 53 DEGREES
EKG VENTRICULAR RATE: 92 BPM

## 2023-12-27 PROCEDURE — 93010 ELECTROCARDIOGRAM REPORT: CPT | Performed by: INTERNAL MEDICINE

## 2024-01-01 ENCOUNTER — ANESTHESIA EVENT (OUTPATIENT)
Dept: OPERATING ROOM | Age: 74
End: 2024-01-01
Payer: MEDICARE

## 2024-01-02 ENCOUNTER — ANESTHESIA (OUTPATIENT)
Dept: OPERATING ROOM | Age: 74
End: 2024-01-02
Payer: MEDICARE

## 2024-01-02 ENCOUNTER — HOSPITAL ENCOUNTER (OUTPATIENT)
Age: 74
Setting detail: OUTPATIENT SURGERY
Discharge: HOME | End: 2024-01-02
Attending: INTERNAL MEDICINE | Admitting: INTERNAL MEDICINE
Payer: MEDICARE

## 2024-01-02 ENCOUNTER — APPOINTMENT (OUTPATIENT)
Dept: GENERAL RADIOLOGY | Age: 74
End: 2024-01-02
Attending: INTERNAL MEDICINE
Payer: MEDICARE

## 2024-01-02 ENCOUNTER — HOSPITAL ENCOUNTER (OUTPATIENT)
Dept: GENERAL RADIOLOGY | Age: 74
Setting detail: OUTPATIENT SURGERY
Discharge: HOME | End: 2024-01-04
Attending: INTERNAL MEDICINE
Payer: MEDICARE

## 2024-01-02 VITALS
OXYGEN SATURATION: 93 % | SYSTOLIC BLOOD PRESSURE: 126 MMHG | TEMPERATURE: 97 F | HEIGHT: 65 IN | BODY MASS INDEX: 34.82 KG/M2 | DIASTOLIC BLOOD PRESSURE: 70 MMHG | RESPIRATION RATE: 18 BRPM | HEART RATE: 78 BPM | WEIGHT: 209 LBS

## 2024-01-02 DIAGNOSIS — R91.8 LUNG MASS: ICD-10-CM

## 2024-01-02 DIAGNOSIS — R52 PAIN: ICD-10-CM

## 2024-01-02 LAB
ANION GAP SERPL CALCULATED.3IONS-SCNC: 13 MMOL/L (ref 7–16)
BUN SERPL-MCNC: 17 MG/DL (ref 6–23)
CALCIUM SERPL-MCNC: 9.7 MG/DL (ref 8.6–10.2)
CHLORIDE SERPL-SCNC: 100 MMOL/L (ref 98–107)
CO2 SERPL-SCNC: 24 MMOL/L (ref 22–29)
CREAT SERPL-MCNC: 0.7 MG/DL (ref 0.5–1)
ERYTHROCYTE [DISTWIDTH] IN BLOOD BY AUTOMATED COUNT: 16.9 % (ref 11.5–15)
GFR SERPL CREATININE-BSD FRML MDRD: >60 ML/MIN/1.73M2
GLUCOSE SERPL-MCNC: 119 MG/DL (ref 74–99)
HCT VFR BLD AUTO: 33.1 % (ref 34–48)
HGB BLD-MCNC: 9.9 G/DL (ref 11.5–15.5)
MCH RBC QN AUTO: 24.5 PG (ref 26–35)
MCHC RBC AUTO-ENTMCNC: 29.9 G/DL (ref 32–34.5)
MCV RBC AUTO: 81.9 FL (ref 80–99.9)
PLATELET # BLD AUTO: 385 K/UL (ref 130–450)
PMV BLD AUTO: 9.4 FL (ref 7–12)
POTASSIUM SERPL-SCNC: 3.6 MMOL/L (ref 3.5–5)
RBC # BLD AUTO: 4.04 M/UL (ref 3.5–5.5)
SODIUM SERPL-SCNC: 137 MMOL/L (ref 132–146)
WBC OTHER # BLD: 15.7 K/UL (ref 4.5–11.5)

## 2024-01-02 PROCEDURE — 6360000002 HC RX W HCPCS: Performed by: NURSE ANESTHETIST, CERTIFIED REGISTERED

## 2024-01-02 PROCEDURE — 87205 SMEAR GRAM STAIN: CPT

## 2024-01-02 PROCEDURE — 3700000001 HC ADD 15 MINUTES (ANESTHESIA): Performed by: INTERNAL MEDICINE

## 2024-01-02 PROCEDURE — 88305 TISSUE EXAM BY PATHOLOGIST: CPT

## 2024-01-02 PROCEDURE — 88173 CYTOPATH EVAL FNA REPORT: CPT

## 2024-01-02 PROCEDURE — 3700000000 HC ANESTHESIA ATTENDED CARE: Performed by: INTERNAL MEDICINE

## 2024-01-02 PROCEDURE — 87070 CULTURE OTHR SPECIMN AEROBIC: CPT

## 2024-01-02 PROCEDURE — 89051 BODY FLUID CELL COUNT: CPT

## 2024-01-02 PROCEDURE — 80048 BASIC METABOLIC PNL TOTAL CA: CPT

## 2024-01-02 PROCEDURE — 71045 X-RAY EXAM CHEST 1 VIEW: CPT

## 2024-01-02 PROCEDURE — 2709999900 HC NON-CHARGEABLE SUPPLY: Performed by: INTERNAL MEDICINE

## 2024-01-02 PROCEDURE — 2580000003 HC RX 258: Performed by: NURSE ANESTHETIST, CERTIFIED REGISTERED

## 2024-01-02 PROCEDURE — 87206 SMEAR FLUORESCENT/ACID STAI: CPT

## 2024-01-02 PROCEDURE — 87102 FUNGUS ISOLATION CULTURE: CPT

## 2024-01-02 PROCEDURE — 7100000010 HC PHASE II RECOVERY - FIRST 15 MIN: Performed by: INTERNAL MEDICINE

## 2024-01-02 PROCEDURE — 7100000001 HC PACU RECOVERY - ADDTL 15 MIN: Performed by: INTERNAL MEDICINE

## 2024-01-02 PROCEDURE — 7100000011 HC PHASE II RECOVERY - ADDTL 15 MIN: Performed by: INTERNAL MEDICINE

## 2024-01-02 PROCEDURE — 87015 SPECIMEN INFECT AGNT CONCNTJ: CPT

## 2024-01-02 PROCEDURE — 2500000003 HC RX 250 WO HCPCS: Performed by: NURSE ANESTHETIST, CERTIFIED REGISTERED

## 2024-01-02 PROCEDURE — 3600007512: Performed by: INTERNAL MEDICINE

## 2024-01-02 PROCEDURE — 87077 CULTURE AEROBIC IDENTIFY: CPT

## 2024-01-02 PROCEDURE — 36415 COLL VENOUS BLD VENIPUNCTURE: CPT

## 2024-01-02 PROCEDURE — 88112 CYTOPATH CELL ENHANCE TECH: CPT

## 2024-01-02 PROCEDURE — 85027 COMPLETE CBC AUTOMATED: CPT

## 2024-01-02 PROCEDURE — 87116 MYCOBACTERIA CULTURE: CPT

## 2024-01-02 PROCEDURE — 7100000000 HC PACU RECOVERY - FIRST 15 MIN: Performed by: INTERNAL MEDICINE

## 2024-01-02 PROCEDURE — 3600007502: Performed by: INTERNAL MEDICINE

## 2024-01-02 RX ORDER — SODIUM CHLORIDE 0.9 % (FLUSH) 0.9 %
5-40 SYRINGE (ML) INJECTION EVERY 12 HOURS SCHEDULED
Status: DISCONTINUED | OUTPATIENT
Start: 2024-01-02 | End: 2024-01-02 | Stop reason: HOSPADM

## 2024-01-02 RX ORDER — LABETALOL HYDROCHLORIDE 5 MG/ML
5 INJECTION, SOLUTION INTRAVENOUS
Status: DISCONTINUED | OUTPATIENT
Start: 2024-01-02 | End: 2024-01-02 | Stop reason: HOSPADM

## 2024-01-02 RX ORDER — DIPHENHYDRAMINE HYDROCHLORIDE 50 MG/ML
12.5 INJECTION INTRAMUSCULAR; INTRAVENOUS
Status: DISCONTINUED | OUTPATIENT
Start: 2024-01-02 | End: 2024-01-02 | Stop reason: HOSPADM

## 2024-01-02 RX ORDER — PHENYLEPHRINE HCL IN 0.9% NACL 1 MG/10 ML
SYRINGE (ML) INTRAVENOUS PRN
Status: DISCONTINUED | OUTPATIENT
Start: 2024-01-02 | End: 2024-01-02 | Stop reason: SDUPTHER

## 2024-01-02 RX ORDER — ONDANSETRON 2 MG/ML
INJECTION INTRAMUSCULAR; INTRAVENOUS PRN
Status: DISCONTINUED | OUTPATIENT
Start: 2024-01-02 | End: 2024-01-02 | Stop reason: SDUPTHER

## 2024-01-02 RX ORDER — DIPHENHYDRAMINE HYDROCHLORIDE 50 MG/ML
INJECTION INTRAMUSCULAR; INTRAVENOUS PRN
Status: DISCONTINUED | OUTPATIENT
Start: 2024-01-02 | End: 2024-01-02 | Stop reason: SDUPTHER

## 2024-01-02 RX ORDER — FENTANYL CITRATE 50 UG/ML
25 INJECTION, SOLUTION INTRAMUSCULAR; INTRAVENOUS EVERY 5 MIN PRN
Status: DISCONTINUED | OUTPATIENT
Start: 2024-01-02 | End: 2024-01-02 | Stop reason: HOSPADM

## 2024-01-02 RX ORDER — SODIUM CHLORIDE 9 MG/ML
INJECTION, SOLUTION INTRAVENOUS PRN
Status: DISCONTINUED | OUTPATIENT
Start: 2024-01-02 | End: 2024-01-02 | Stop reason: HOSPADM

## 2024-01-02 RX ORDER — PROCHLORPERAZINE EDISYLATE 5 MG/ML
5 INJECTION INTRAMUSCULAR; INTRAVENOUS
Status: DISCONTINUED | OUTPATIENT
Start: 2024-01-02 | End: 2024-01-02 | Stop reason: HOSPADM

## 2024-01-02 RX ORDER — HYDRALAZINE HYDROCHLORIDE 20 MG/ML
5 INJECTION INTRAMUSCULAR; INTRAVENOUS
Status: DISCONTINUED | OUTPATIENT
Start: 2024-01-02 | End: 2024-01-02 | Stop reason: HOSPADM

## 2024-01-02 RX ORDER — SUCCINYLCHOLINE/SOD CL,ISO/PF 200MG/10ML
SYRINGE (ML) INTRAVENOUS PRN
Status: DISCONTINUED | OUTPATIENT
Start: 2024-01-02 | End: 2024-01-02 | Stop reason: SDUPTHER

## 2024-01-02 RX ORDER — SODIUM CHLORIDE 0.9 % (FLUSH) 0.9 %
5-40 SYRINGE (ML) INJECTION PRN
Status: DISCONTINUED | OUTPATIENT
Start: 2024-01-02 | End: 2024-01-02 | Stop reason: HOSPADM

## 2024-01-02 RX ORDER — LIDOCAINE HYDROCHLORIDE 20 MG/ML
INJECTION, SOLUTION INFILTRATION; PERINEURAL PRN
Status: DISCONTINUED | OUTPATIENT
Start: 2024-01-02 | End: 2024-01-02 | Stop reason: SDUPTHER

## 2024-01-02 RX ORDER — OXYCODONE AND ACETAMINOPHEN 7.5; 325 MG/1; MG/1
1 TABLET ORAL EVERY 6 HOURS PRN
COMMUNITY

## 2024-01-02 RX ORDER — FENTANYL CITRATE 50 UG/ML
INJECTION, SOLUTION INTRAMUSCULAR; INTRAVENOUS PRN
Status: DISCONTINUED | OUTPATIENT
Start: 2024-01-02 | End: 2024-01-02 | Stop reason: SDUPTHER

## 2024-01-02 RX ORDER — MEPERIDINE HYDROCHLORIDE 25 MG/ML
12.5 INJECTION INTRAMUSCULAR; INTRAVENOUS; SUBCUTANEOUS ONCE AS NEEDED
Status: DISCONTINUED | OUTPATIENT
Start: 2024-01-02 | End: 2024-01-02 | Stop reason: HOSPADM

## 2024-01-02 RX ORDER — PROPOFOL 10 MG/ML
INJECTION, EMULSION INTRAVENOUS PRN
Status: DISCONTINUED | OUTPATIENT
Start: 2024-01-02 | End: 2024-01-02 | Stop reason: SDUPTHER

## 2024-01-02 RX ORDER — DEXAMETHASONE SODIUM PHOSPHATE 4 MG/ML
INJECTION, SOLUTION INTRA-ARTICULAR; INTRALESIONAL; INTRAMUSCULAR; INTRAVENOUS; SOFT TISSUE PRN
Status: DISCONTINUED | OUTPATIENT
Start: 2024-01-02 | End: 2024-01-02 | Stop reason: SDUPTHER

## 2024-01-02 RX ORDER — SODIUM CHLORIDE 9 MG/ML
INJECTION, SOLUTION INTRAVENOUS CONTINUOUS PRN
Status: DISCONTINUED | OUTPATIENT
Start: 2024-01-02 | End: 2024-01-02 | Stop reason: SDUPTHER

## 2024-01-02 RX ADMIN — ONDANSETRON 4 MG: 2 INJECTION INTRAMUSCULAR; INTRAVENOUS at 13:13

## 2024-01-02 RX ADMIN — Medication 100 MG: at 13:09

## 2024-01-02 RX ADMIN — LIDOCAINE HYDROCHLORIDE 100 MG: 20 INJECTION, SOLUTION INFILTRATION; PERINEURAL at 13:09

## 2024-01-02 RX ADMIN — Medication 200 MCG: at 13:16

## 2024-01-02 RX ADMIN — PROPOFOL 90 MG: 10 INJECTION, EMULSION INTRAVENOUS at 13:09

## 2024-01-02 RX ADMIN — DIPHENHYDRAMINE HYDROCHLORIDE 12.5 MG: 50 INJECTION, SOLUTION INTRAMUSCULAR; INTRAVENOUS at 13:13

## 2024-01-02 RX ADMIN — SODIUM CHLORIDE: 9 INJECTION, SOLUTION INTRAVENOUS at 12:35

## 2024-01-02 RX ADMIN — FENTANYL CITRATE 50 MCG: 50 INJECTION, SOLUTION INTRAMUSCULAR; INTRAVENOUS at 13:09

## 2024-01-02 RX ADMIN — DEXAMETHASONE SODIUM PHOSPHATE 8 MG: 4 INJECTION, SOLUTION INTRAMUSCULAR; INTRAVENOUS at 13:13

## 2024-01-02 RX ADMIN — FENTANYL CITRATE 50 MCG: 50 INJECTION, SOLUTION INTRAMUSCULAR; INTRAVENOUS at 13:22

## 2024-01-02 ASSESSMENT — ENCOUNTER SYMPTOMS
EYES NEGATIVE: 1
VOMITING: 0
COLOR CHANGE: 0
RESPIRATORY NEGATIVE: 1
NAUSEA: 0
ABDOMINAL PAIN: 0

## 2024-01-02 ASSESSMENT — LIFESTYLE VARIABLES: SMOKING_STATUS: 0

## 2024-01-02 ASSESSMENT — PAIN - FUNCTIONAL ASSESSMENT: PAIN_FUNCTIONAL_ASSESSMENT: 0-10

## 2024-01-02 ASSESSMENT — PAIN DESCRIPTION - DESCRIPTORS: DESCRIPTORS: ACHING;STABBING

## 2024-01-02 NOTE — ANESTHESIA POSTPROCEDURE EVALUATION
Department of Anesthesiology  Postprocedure Note    Patient: Maria Victoria Elliott  MRN: [id number]  YOB: 1950  Date of evaluation: 1/2/2024    Procedure Summary       Date: 01/02/24 Room / Location: [address]    Anesthesia Start: 1304 Anesthesia Stop:     Procedure: BRONCHOSCOPY WITH BIOPSY        (ENDO 2 RNS AND ANESTHESIA NOTIFIED) Diagnosis:       Lung mass      (Lung mass [R91.8])    Surgeons: Enrique Gaytan DO Responsible Provider: Gui Morris DO    Anesthesia Type: general ASA Status: 4            Anesthesia Type: No value filed.    Lane Phase I: Lane Score: 10    Lane Phase II:      Anesthesia Post Evaluation    Patient location during evaluation: PACU  Patient participation: complete - patient participated  Level of consciousness: awake  Pain score: 1  Airway patency: patent  Nausea & Vomiting: no nausea and no vomiting  Cardiovascular status: hemodynamically stable  Respiratory status: acceptable  Hydration status: euvolemic  Comments: Seen and examined.  Progressing as expected.  Pain management: adequate      No notable events documented.

## 2024-01-02 NOTE — PROGRESS NOTES
CXR resulted. Dr. Gaytan updated. Okay for discharge. Instructions given to patient and son. Questions answered and verbalizes understanding.

## 2024-01-02 NOTE — H&P
Bob Vigil M.D.  Donaldo Boston D.O.  Jared Perez M.D.  Ernestina Burt M.D.  Enrique Gaytan D.O.      Patient:  Maria Victoria Elliott 73 y.o. female MRN: [id number]     Date of Service: 1/2/2024      H&P      No chief complaint on file.        Code Status: Full Code        SUBJECTIVE:    HPI:  This is a 73-year-old female with history of lung mass, A-fib, DM, tobacco use that presents for outpatient bronchoscopy.  Patient previously hospitalized found to have right lower lobe mass.  She had CT-guided biopsy showing only necrotic material.  Treated with antibiotics.  Presented outpatient for follow-up reporting to feel well.  She did have repeat CT imaging, however that showed worsening right lower lobe mass.  She presents now for bronchoscopy.      Past Medical History:   Diagnosis Date    A-fib (HCC)     Back problem     Carotid artery stenosis     Diabetic neuropathy (HCC) 12/04/2015    Full dentures     Hiatal hernia     Hypertension     Hyperuricemia 12/04/2015    Lung mass     Osteoarthritis     lumbosacral spine arthritis and disc disease    PVD (peripheral vascular disease) with claudication ([facility]) 12/13/2012    follows with Dr Rajput    Thyroid disease     Type II or unspecified type diabetes mellitus without mention of complication, not stated as uncontrolled     Wears glasses      Past Surgical History:   Procedure Laterality Date    APPENDECTOMY      COLONOSCOPY      CT NEEDLE BIOPSY LUNG PERCUTANEOUS  11/13/2023    CT NEEDLE BIOPSY LUNG PERCUTANEOUS 11/13/2023 [facility] CT    HYSTERECTOMY (CERVIX STATUS UNKNOWN)      with appendectomy    JOINT REPLACEMENT Left 12/3/2015    total hip    THROAT SURGERY  2002    benign    TOTAL HIP ARTHROPLASTY Left 12/2015    UPPER GASTROINTESTINAL ENDOSCOPY N/A 7/7/2020    EGD BIOPSY performed by Osvaldo Martínez MD at [facility] ENDOSCOPY    UPPER GASTROINTESTINAL ENDOSCOPY N/A 7/15/2020    EGD performed by Osvaldo Martínez MD at [facility] ENDOSCOPY     Family History   Problem  Tenderness: There is no abdominal tenderness.   Musculoskeletal:         General: No swelling or deformity.      Cervical back: Neck supple.   Lymphadenopathy:      Cervical: No cervical adenopathy.   Skin:     General: Skin is warm.      Findings: No rash.   Neurological:      General: No focal deficit present.      Mental Status: She is alert and oriented to person, place, and time.   Psychiatric:         Behavior: Behavior normal.         Pulmonary Function Testing personally reviewed and interpreted.    PERTINENT LAB RESULTS: Labs reviewed.      DIAGNOSTICS: Pertinent imaging reviewed.        ASSESSMENT:     Right lower lobe mass  Tobacco use      PLAN:     Presents for outpatient bronchoscopy.  Discussed risk, benefits alternatives.  Patient verbalized understanding agrees to proceed.  See procedure note for details.        Thank you for allowing me to participate in the care of Maria Victoria Elliott.   Please feel free to call with questions.       Electronically signed by nErique Gaytan DO on 1/2/2024 at 2:00 PM

## 2024-01-02 NOTE — ANESTHESIA PRE PROCEDURE
Department of Anesthesiology  Preprocedure Note       Name:  Maria Victoria Elliott   Age:  73 y.o.  :  1950                                          MRN:  [id number]         Date:  2024      Surgeon: Surgeon(s):  Enrique Gaytan DO    Procedure: Procedure(s):  BRONCHOSCOPY WITH BIOPSY        (ENDO 2 RNS AND ANESTHESIA NOTIFIED)    Medications prior to admission:   Prior to Admission medications    Medication Sig Start Date End Date Taking? Authorizing Provider   albuterol sulfate HFA (VENTOLIN HFA) 108 (90 Base) MCG/ACT inhaler Inhale 2 puffs into the lungs 4 times daily as needed for Wheezing 23   Serina Montez DO   pantoprazole (PROTONIX) 40 MG tablet Take 1 tablet by mouth daily    Amaya Rosario MD   apixaban (ELIQUIS) 5 MG TABS tablet Take by mouth 2 times daily    Amaya Rosario MD   irbesartan-hydroCHLOROthiazide (AVALIDE) 300-12.5 MG per tablet Take 1 tablet by mouth daily    Amaya Rosario MD   Multiple Vitamin (MULTI-VITAMIN DAILY PO) Take by mouth daily    Amaya Rosario MD   Semaglutide (OZEMPIC, 1 MG/DOSE, SC) Inject into the skin once a week     Amaya Rosario MD   aspirin 81 MG EC tablet Take 1 tablet by mouth daily    Amaya Rosario MD   acetaminophen (TYLENOL) 500 MG tablet Take 1 tablet by mouth every 6 hours as needed for Pain    Amaya Rosario MD   allopurinol (ZYLOPRIM) 300 MG tablet Take 1 tablet by mouth daily 3/24/18   Amaya Rosario MD   SYNTHROID 137 MCG tablet Take 1 tablet by mouth daily 18   Amaya Rosario MD   sucralfate (CARAFATE) 1 GM tablet Take 1 tablet by mouth 4 times daily    Amaya Rosario MD   sertraline (ZOLOFT) 50 MG tablet Take 1 tablet by mouth daily    Amaya Rosario MD   metFORMIN (GLUCOPHAGE) 500 MG tablet Take 1 tablet by mouth 2 times daily (with meals)    Amaya Rosario MD   atorvastatin (LIPITOR) 10 MG tablet Take 1 tablet by mouth daily    Abraham

## 2024-01-03 LAB
APPEARANCE BRONCH: NORMAL
CLOT CHECK: NORMAL
COLOR BRONCH: NORMAL
EOSINOPHILS BAL: 9 %
LYMPHOCYTES, BAL: 8 %
MACROPHAGES, BAL: 20 %
RBC, BAL: 9600 CELLS/UL
SEGMENTED NEUTROPHILS, BAL: 63 %
SPECIMEN TYPE: NORMAL
TOTAL CELLS COUNTED BRONCH: 475 CELLS/UL

## 2024-01-04 PROBLEM — N39.0 UTI (URINARY TRACT INFECTION): Status: RESOLVED | Noted: 2023-12-05 | Resolved: 2024-01-04

## 2024-01-04 LAB
MICROORGANISM SPEC CULT: NORMAL
MICROORGANISM/AGENT SPEC: NORMAL
NON-GYN CYTOLOGY REPORT: NORMAL
SPECIMEN DESCRIPTION: NORMAL

## 2024-01-05 LAB — SURGICAL PATHOLOGY REPORT: NORMAL

## 2024-01-06 LAB
MICROORGANISM SPEC CULT: NORMAL
MICROORGANISM SPEC CULT: NORMAL
MICROORGANISM/AGENT SPEC: NORMAL
MICROORGANISM/AGENT SPEC: NORMAL
SPECIMEN DESCRIPTION: NORMAL
SPECIMEN DESCRIPTION: NORMAL

## 2024-01-11 LAB
MICROORGANISM SPEC CULT: NORMAL
MICROORGANISM/AGENT SPEC: NORMAL
SPECIMEN DESCRIPTION: NORMAL

## 2024-01-14 LAB
MICROORGANISM SPEC CULT: NORMAL
MICROORGANISM/AGENT SPEC: NORMAL
SPECIMEN DESCRIPTION: NORMAL

## 2024-01-15 ENCOUNTER — HOSPITAL ENCOUNTER (INPATIENT)
Age: 74
LOS: 2 days | Discharge: ANOTHER HOSPITAL | End: 2024-01-17
Attending: EMERGENCY MEDICINE | Admitting: INTERNAL MEDICINE
Payer: MEDICARE

## 2024-01-15 ENCOUNTER — APPOINTMENT (OUTPATIENT)
Dept: CT IMAGING | Age: 74
End: 2024-01-15
Payer: MEDICARE

## 2024-01-15 DIAGNOSIS — R10.84 GENERALIZED ABDOMINAL PAIN: ICD-10-CM

## 2024-01-15 DIAGNOSIS — K62.5 RECTAL BLEEDING: Primary | ICD-10-CM

## 2024-01-15 DIAGNOSIS — R91.8 RIGHT LOWER LOBE LUNG MASS: ICD-10-CM

## 2024-01-15 PROBLEM — Z87.09 HISTORY OF LUNG ABSCESS: Status: ACTIVE | Noted: 2024-01-15

## 2024-01-15 LAB
ABO + RH BLD: NORMAL
ALBUMIN SERPL-MCNC: 3.3 G/DL (ref 3.5–5.2)
ALP SERPL-CCNC: 123 U/L (ref 35–104)
ALT SERPL-CCNC: 125 U/L (ref 0–32)
ANION GAP SERPL CALCULATED.3IONS-SCNC: 15 MMOL/L (ref 7–16)
ARM BAND NUMBER: NORMAL
AST SERPL-CCNC: 56 U/L (ref 0–31)
BASOPHILS # BLD: 0.06 K/UL (ref 0–0.2)
BASOPHILS NFR BLD: 0 % (ref 0–2)
BILIRUB SERPL-MCNC: 0.6 MG/DL (ref 0–1.2)
BLOOD BANK SAMPLE EXPIRATION: NORMAL
BLOOD GROUP ANTIBODIES SERPL: NEGATIVE
BUN SERPL-MCNC: 15 MG/DL (ref 6–23)
CALCIUM SERPL-MCNC: 10.2 MG/DL (ref 8.6–10.2)
CHLORIDE SERPL-SCNC: 94 MMOL/L (ref 98–107)
CO2 SERPL-SCNC: 23 MMOL/L (ref 22–29)
CREAT SERPL-MCNC: 0.7 MG/DL (ref 0.5–1)
EOSINOPHIL # BLD: 0.19 K/UL (ref 0.05–0.5)
EOSINOPHILS RELATIVE PERCENT: 1 % (ref 0–6)
ERYTHROCYTE [DISTWIDTH] IN BLOOD BY AUTOMATED COUNT: 16.4 % (ref 11.5–15)
GFR SERPL CREATININE-BSD FRML MDRD: >60 ML/MIN/1.73M2
GLUCOSE BLD-MCNC: 159 MG/DL (ref 74–99)
GLUCOSE SERPL-MCNC: 219 MG/DL (ref 74–99)
HCT VFR BLD AUTO: 36.2 % (ref 34–48)
HGB BLD-MCNC: 11.1 G/DL (ref 11.5–15.5)
IMM GRANULOCYTES # BLD AUTO: 0.22 K/UL (ref 0–0.58)
IMM GRANULOCYTES NFR BLD: 1 % (ref 0–5)
LACTATE BLDV-SCNC: 2.3 MMOL/L (ref 0.5–2.2)
LACTATE BLDV-SCNC: 2.7 MMOL/L (ref 0.5–2.2)
LYMPHOCYTES NFR BLD: 0.88 K/UL (ref 1.5–4)
LYMPHOCYTES RELATIVE PERCENT: 5 % (ref 20–42)
MCH RBC QN AUTO: 23.9 PG (ref 26–35)
MCHC RBC AUTO-ENTMCNC: 30.7 G/DL (ref 32–34.5)
MCV RBC AUTO: 78 FL (ref 80–99.9)
MONOCYTES NFR BLD: 0.72 K/UL (ref 0.1–0.95)
MONOCYTES NFR BLD: 4 % (ref 2–12)
NEUTROPHILS NFR BLD: 89 % (ref 43–80)
NEUTS SEG NFR BLD: 17.59 K/UL (ref 1.8–7.3)
PLATELET # BLD AUTO: 503 K/UL (ref 130–450)
PMV BLD AUTO: 9.3 FL (ref 7–12)
POTASSIUM SERPL-SCNC: 3.6 MMOL/L (ref 3.5–5)
PROT SERPL-MCNC: 7 G/DL (ref 6.4–8.3)
RBC # BLD AUTO: 4.64 M/UL (ref 3.5–5.5)
SODIUM SERPL-SCNC: 132 MMOL/L (ref 132–146)
WBC OTHER # BLD: 19.7 K/UL (ref 4.5–11.5)

## 2024-01-15 PROCEDURE — 82962 GLUCOSE BLOOD TEST: CPT

## 2024-01-15 PROCEDURE — 6360000002 HC RX W HCPCS: Performed by: EMERGENCY MEDICINE

## 2024-01-15 PROCEDURE — 87040 BLOOD CULTURE FOR BACTERIA: CPT

## 2024-01-15 PROCEDURE — 2580000003 HC RX 258

## 2024-01-15 PROCEDURE — 80053 COMPREHEN METABOLIC PANEL: CPT

## 2024-01-15 PROCEDURE — 86850 RBC ANTIBODY SCREEN: CPT

## 2024-01-15 PROCEDURE — 2580000003 HC RX 258: Performed by: EMERGENCY MEDICINE

## 2024-01-15 PROCEDURE — 6360000004 HC RX CONTRAST MEDICATION: Performed by: RADIOLOGY

## 2024-01-15 PROCEDURE — 6370000000 HC RX 637 (ALT 250 FOR IP)

## 2024-01-15 PROCEDURE — 74177 CT ABD & PELVIS W/CONTRAST: CPT

## 2024-01-15 PROCEDURE — 83605 ASSAY OF LACTIC ACID: CPT

## 2024-01-15 PROCEDURE — 86901 BLOOD TYPING SEROLOGIC RH(D): CPT

## 2024-01-15 PROCEDURE — 86900 BLOOD TYPING SEROLOGIC ABO: CPT

## 2024-01-15 PROCEDURE — 99285 EMERGENCY DEPT VISIT HI MDM: CPT

## 2024-01-15 PROCEDURE — 2060000000 HC ICU INTERMEDIATE R&B

## 2024-01-15 PROCEDURE — 85025 COMPLETE CBC W/AUTO DIFF WBC: CPT

## 2024-01-15 RX ORDER — SODIUM CHLORIDE 0.9 % (FLUSH) 0.9 %
10 SYRINGE (ML) INJECTION PRN
Status: DISCONTINUED | OUTPATIENT
Start: 2024-01-15 | End: 2024-01-17 | Stop reason: HOSPADM

## 2024-01-15 RX ORDER — IRBESARTAN AND HYDROCHLOROTHIAZIDE 300; 12.5 MG/1; MG/1
1 TABLET, FILM COATED ORAL DAILY
Status: DISCONTINUED | OUTPATIENT
Start: 2024-01-16 | End: 2024-01-15 | Stop reason: CLARIF

## 2024-01-15 RX ORDER — ALLOPURINOL 300 MG/1
300 TABLET ORAL DAILY
Status: DISCONTINUED | OUTPATIENT
Start: 2024-01-16 | End: 2024-01-17 | Stop reason: HOSPADM

## 2024-01-15 RX ORDER — HYDROCHLOROTHIAZIDE 12.5 MG/1
12.5 TABLET ORAL DAILY
Status: DISCONTINUED | OUTPATIENT
Start: 2024-01-16 | End: 2024-01-17 | Stop reason: HOSPADM

## 2024-01-15 RX ORDER — ACETAMINOPHEN 325 MG/1
650 TABLET ORAL EVERY 6 HOURS PRN
Status: DISCONTINUED | OUTPATIENT
Start: 2024-01-15 | End: 2024-01-17 | Stop reason: HOSPADM

## 2024-01-15 RX ORDER — SUCRALFATE 1 G/1
1 TABLET ORAL 4 TIMES DAILY
Status: DISCONTINUED | OUTPATIENT
Start: 2024-01-15 | End: 2024-01-17 | Stop reason: HOSPADM

## 2024-01-15 RX ORDER — ALBUTEROL SULFATE 2.5 MG/3ML
2.5 SOLUTION RESPIRATORY (INHALATION) EVERY 4 HOURS PRN
Status: DISCONTINUED | OUTPATIENT
Start: 2024-01-15 | End: 2024-01-17 | Stop reason: HOSPADM

## 2024-01-15 RX ORDER — ASPIRIN 81 MG/1
81 TABLET ORAL DAILY
Status: DISCONTINUED | OUTPATIENT
Start: 2024-01-16 | End: 2024-01-17 | Stop reason: HOSPADM

## 2024-01-15 RX ORDER — SODIUM CHLORIDE 9 MG/ML
INJECTION, SOLUTION INTRAVENOUS PRN
Status: DISCONTINUED | OUTPATIENT
Start: 2024-01-15 | End: 2024-01-17 | Stop reason: HOSPADM

## 2024-01-15 RX ORDER — IPRATROPIUM BROMIDE AND ALBUTEROL SULFATE 2.5; .5 MG/3ML; MG/3ML
1 SOLUTION RESPIRATORY (INHALATION)
Status: DISCONTINUED | OUTPATIENT
Start: 2024-01-16 | End: 2024-01-17 | Stop reason: HOSPADM

## 2024-01-15 RX ORDER — ATORVASTATIN CALCIUM 10 MG/1
10 TABLET, FILM COATED ORAL DAILY
Status: DISCONTINUED | OUTPATIENT
Start: 2024-01-16 | End: 2024-01-17 | Stop reason: HOSPADM

## 2024-01-15 RX ORDER — ONDANSETRON 4 MG/1
4 TABLET, ORALLY DISINTEGRATING ORAL EVERY 8 HOURS PRN
Status: DISCONTINUED | OUTPATIENT
Start: 2024-01-15 | End: 2024-01-17 | Stop reason: HOSPADM

## 2024-01-15 RX ORDER — INSULIN LISPRO 100 [IU]/ML
0-4 INJECTION, SOLUTION INTRAVENOUS; SUBCUTANEOUS NIGHTLY
Status: DISCONTINUED | OUTPATIENT
Start: 2024-01-15 | End: 2024-01-17 | Stop reason: HOSPADM

## 2024-01-15 RX ORDER — SODIUM CHLORIDE 0.9 % (FLUSH) 0.9 %
5-40 SYRINGE (ML) INJECTION EVERY 12 HOURS SCHEDULED
Status: DISCONTINUED | OUTPATIENT
Start: 2024-01-15 | End: 2024-01-17 | Stop reason: HOSPADM

## 2024-01-15 RX ORDER — PANTOPRAZOLE SODIUM 40 MG/1
40 TABLET, DELAYED RELEASE ORAL DAILY
Status: DISCONTINUED | OUTPATIENT
Start: 2024-01-16 | End: 2024-01-17 | Stop reason: HOSPADM

## 2024-01-15 RX ORDER — DOCUSATE SODIUM 100 MG/1
100 CAPSULE, LIQUID FILLED ORAL 2 TIMES DAILY PRN
Status: DISCONTINUED | OUTPATIENT
Start: 2024-01-15 | End: 2024-01-17 | Stop reason: HOSPADM

## 2024-01-15 RX ORDER — 0.9 % SODIUM CHLORIDE 0.9 %
1000 INTRAVENOUS SOLUTION INTRAVENOUS ONCE
Status: COMPLETED | OUTPATIENT
Start: 2024-01-15 | End: 2024-01-16

## 2024-01-15 RX ORDER — ACETAMINOPHEN 500 MG
500 TABLET ORAL EVERY 6 HOURS PRN
Status: DISCONTINUED | OUTPATIENT
Start: 2024-01-15 | End: 2024-01-15 | Stop reason: DRUGHIGH

## 2024-01-15 RX ORDER — DEXTROSE MONOHYDRATE 100 MG/ML
INJECTION, SOLUTION INTRAVENOUS CONTINUOUS PRN
Status: DISCONTINUED | OUTPATIENT
Start: 2024-01-15 | End: 2024-01-17 | Stop reason: HOSPADM

## 2024-01-15 RX ORDER — ACETAMINOPHEN 650 MG/1
650 SUPPOSITORY RECTAL EVERY 6 HOURS PRN
Status: DISCONTINUED | OUTPATIENT
Start: 2024-01-15 | End: 2024-01-17 | Stop reason: HOSPADM

## 2024-01-15 RX ORDER — INSULIN LISPRO 100 [IU]/ML
0-8 INJECTION, SOLUTION INTRAVENOUS; SUBCUTANEOUS
Status: DISCONTINUED | OUTPATIENT
Start: 2024-01-16 | End: 2024-01-17 | Stop reason: HOSPADM

## 2024-01-15 RX ORDER — 0.9 % SODIUM CHLORIDE 0.9 %
1000 INTRAVENOUS SOLUTION INTRAVENOUS ONCE
Status: COMPLETED | OUTPATIENT
Start: 2024-01-15 | End: 2024-01-15

## 2024-01-15 RX ORDER — ONDANSETRON 2 MG/ML
4 INJECTION INTRAMUSCULAR; INTRAVENOUS EVERY 6 HOURS PRN
Status: DISCONTINUED | OUTPATIENT
Start: 2024-01-15 | End: 2024-01-17 | Stop reason: HOSPADM

## 2024-01-15 RX ORDER — LOSARTAN POTASSIUM 50 MG/1
100 TABLET ORAL DAILY
Status: DISCONTINUED | OUTPATIENT
Start: 2024-01-16 | End: 2024-01-17 | Stop reason: HOSPADM

## 2024-01-15 RX ORDER — SODIUM CHLORIDE 9 MG/ML
INJECTION, SOLUTION INTRAVENOUS CONTINUOUS
Status: DISCONTINUED | OUTPATIENT
Start: 2024-01-15 | End: 2024-01-17

## 2024-01-15 RX ADMIN — Medication 10 ML: at 23:11

## 2024-01-15 RX ADMIN — SODIUM CHLORIDE 1000 ML: 9 INJECTION, SOLUTION INTRAVENOUS at 23:19

## 2024-01-15 RX ADMIN — SODIUM CHLORIDE 1000 ML: 9 INJECTION, SOLUTION INTRAVENOUS at 21:33

## 2024-01-15 RX ADMIN — SUCRALFATE 1 G: 1 TABLET ORAL at 23:10

## 2024-01-15 RX ADMIN — SODIUM CHLORIDE 3000 MG: 9 INJECTION, SOLUTION INTRAVENOUS at 21:32

## 2024-01-15 RX ADMIN — IOPAMIDOL 75 ML: 755 INJECTION, SOLUTION INTRAVENOUS at 17:58

## 2024-01-15 RX ADMIN — METOPROLOL TARTRATE 25 MG: 25 TABLET, FILM COATED ORAL at 23:10

## 2024-01-15 ASSESSMENT — PAIN - FUNCTIONAL ASSESSMENT: PAIN_FUNCTIONAL_ASSESSMENT: NONE - DENIES PAIN

## 2024-01-15 ASSESSMENT — LIFESTYLE VARIABLES
HOW MANY STANDARD DRINKS CONTAINING ALCOHOL DO YOU HAVE ON A TYPICAL DAY: PATIENT DOES NOT DRINK
HOW OFTEN DO YOU HAVE A DRINK CONTAINING ALCOHOL: NEVER

## 2024-01-15 NOTE — ED PROVIDER NOTES
HPI:  1/15/24, Time: 3:25 PM CARIDAD Elliott is a 73 y.o. female presenting to the ED for Constipation no BM for 8 days,Felt hard stools this am with some BRBR Upper abdominal pain nad nausea  beginning 8 days ago.  The complaint has been persistent, mild in severity, and worsened by nothing.  Patient is currently on aspirin and Plavix for atrial fibrillation.  She is also taking Augmentin for a necrotic left lower lobe lung mass.  Patient had bronchoscopy and needle guided biopsy which showed necrotic tissue and bacteria.  She has lung abscess.  She was discharged home and she has been on Augmentin for several days.  Patient states she is not feeling any better.  She is running some night sweats subjective fevers and chills.    Review of Systems:   A complete review of systems was performed and pertinent positives and negatives are stated within HPI, all other systems reviewed and are negative.    --------------------------------------------- PAST HISTORY ---------------------------------------------  Past Medical History:  has a past medical history of A-fib (HCC), Back problem, Carotid artery stenosis, Diabetic neuropathy (HCC), Full dentures, Hiatal hernia, Hypertension, Hyperuricemia, Lung mass, Osteoarthritis, PVD (peripheral vascular disease) with claudication (HCC), Thyroid disease, Type II or unspecified type diabetes mellitus without mention of complication, not stated as uncontrolled, and Wears glasses.    Past Surgical History:  has a past surgical history that includes Appendectomy; Throat surgery (2002); Colonoscopy; Hysterectomy; joint replacement (Left, 12/3/2015); Total hip arthroplasty (Left, 12/2015); Upper gastrointestinal endoscopy (N/A, 7/7/2020); Upper gastrointestinal endoscopy (N/A, 7/15/2020); CT NEEDLE BIOPSY LUNG PERCUTANEOUS (11/13/2023); and bronchoscopy (N/A, 1/2/2024).    Social History:  reports that she quit smoking about 16 years ago. Her smoking use included  Determinants affecting Dx or Tx: None    Chronic Conditions: Patient recently diagnosed with lung mass/abscess.  History of diverticulosis and diverticulitis.  Atrial fibrillation on aspirin and Plavix.    Records Reviewed: Reviewed the cultures from bronchoscopy and biopsies.  Patient did have  polymorphic leukocytes.  She was negative for Legionella strep.  Negative for TB.  There were no organisms on Gram stain.  MRSA was negative.        I am the Primary Clinician of Record.    DISPOSITION: will admit to telemtry     ED COURSE:  ED Course as of 01/15/24 2122   Mon [place] 15, 2024   2108 Consultation patient was made with Dr. Mars's TEREZA [facility] to admit to telemetry. [JN]      ED Course User Index  [JN] Crow Young DO       Counseling:   The emergency provider has spoken with the patient and discussed today’s results, in addition to providing specific details for the plan of care and counseling regarding the diagnosis and prognosis.  Questions are answered at this time and they are agreeable with the plan.      --------------------------------- IMPRESSION AND DISPOSITION ---------------------------------    IMPRESSION  1. Rectal bleeding    2. Right lower lobe lung mass    3. Generalized abdominal pain        DISPOSITION  Disposition: Admit to telemetry  Patient condition is serious      NOTE: This report was transcribed using voice recognition software. Every effort was made to ensure accuracy; however, inadvertent computerized transcription errors may be present       Crow Young DO  01/15/24 2122

## 2024-01-16 LAB
ALBUMIN SERPL-MCNC: 2.5 G/DL (ref 3.5–5.2)
ALP SERPL-CCNC: 112 U/L (ref 35–104)
ALT SERPL-CCNC: 97 U/L (ref 0–32)
ANION GAP SERPL CALCULATED.3IONS-SCNC: 12 MMOL/L (ref 7–16)
AST SERPL-CCNC: 53 U/L (ref 0–31)
BASOPHILS # BLD: 0.08 K/UL (ref 0–0.2)
BASOPHILS NFR BLD: 1 % (ref 0–2)
BILIRUB SERPL-MCNC: 0.4 MG/DL (ref 0–1.2)
BNP SERPL-MCNC: 590 PG/ML (ref 0–450)
BUN SERPL-MCNC: 15 MG/DL (ref 6–23)
CALCIUM SERPL-MCNC: 9.3 MG/DL (ref 8.6–10.2)
CHLORIDE SERPL-SCNC: 100 MMOL/L (ref 98–107)
CO2 SERPL-SCNC: 22 MMOL/L (ref 22–29)
CREAT SERPL-MCNC: 0.7 MG/DL (ref 0.5–1)
CRP SERPL HS-MCNC: 127 MG/L (ref 0–5)
EOSINOPHIL # BLD: 0.73 K/UL (ref 0.05–0.5)
EOSINOPHILS RELATIVE PERCENT: 4 % (ref 0–6)
ERYTHROCYTE [DISTWIDTH] IN BLOOD BY AUTOMATED COUNT: 16.8 % (ref 11.5–15)
GFR SERPL CREATININE-BSD FRML MDRD: >60 ML/MIN/1.73M2
GLUCOSE BLD-MCNC: 161 MG/DL (ref 74–99)
GLUCOSE BLD-MCNC: 164 MG/DL (ref 74–99)
GLUCOSE BLD-MCNC: 164 MG/DL (ref 74–99)
GLUCOSE BLD-MCNC: 224 MG/DL (ref 74–99)
GLUCOSE SERPL-MCNC: 148 MG/DL (ref 74–99)
HCT VFR BLD AUTO: 33.1 % (ref 34–48)
HGB BLD-MCNC: 9.4 G/DL (ref 11.5–15.5)
IMM GRANULOCYTES # BLD AUTO: 0.16 K/UL (ref 0–0.58)
IMM GRANULOCYTES NFR BLD: 1 % (ref 0–5)
LYMPHOCYTES NFR BLD: 1.2 K/UL (ref 1.5–4)
LYMPHOCYTES RELATIVE PERCENT: 7 % (ref 20–42)
MCH RBC QN AUTO: 23.8 PG (ref 26–35)
MCHC RBC AUTO-ENTMCNC: 28.4 G/DL (ref 32–34.5)
MCV RBC AUTO: 83.8 FL (ref 80–99.9)
MONOCYTES NFR BLD: 0.73 K/UL (ref 0.1–0.95)
MONOCYTES NFR BLD: 4 % (ref 2–12)
NEUTROPHILS NFR BLD: 83 % (ref 43–80)
NEUTS SEG NFR BLD: 13.78 K/UL (ref 1.8–7.3)
PHOSPHATE SERPL-MCNC: 2.8 MG/DL (ref 2.5–4.5)
PLATELET # BLD AUTO: 331 K/UL (ref 130–450)
PMV BLD AUTO: 10.5 FL (ref 7–12)
POTASSIUM SERPL-SCNC: 3.8 MMOL/L (ref 3.5–5)
PROCALCITONIN SERPL-MCNC: 0.14 NG/ML (ref 0–0.08)
PROT SERPL-MCNC: 5.9 G/DL (ref 6.4–8.3)
RBC # BLD AUTO: 3.95 M/UL (ref 3.5–5.5)
RBC # BLD: ABNORMAL 10*6/UL
SODIUM SERPL-SCNC: 134 MMOL/L (ref 132–146)
WBC OTHER # BLD: 16.7 K/UL (ref 4.5–11.5)

## 2024-01-16 PROCEDURE — 85652 RBC SED RATE AUTOMATED: CPT

## 2024-01-16 PROCEDURE — 6370000000 HC RX 637 (ALT 250 FOR IP): Performed by: FAMILY MEDICINE

## 2024-01-16 PROCEDURE — 1200000000 HC SEMI PRIVATE

## 2024-01-16 PROCEDURE — 6370000000 HC RX 637 (ALT 250 FOR IP)

## 2024-01-16 PROCEDURE — 83880 ASSAY OF NATRIURETIC PEPTIDE: CPT

## 2024-01-16 PROCEDURE — 97161 PT EVAL LOW COMPLEX 20 MIN: CPT

## 2024-01-16 PROCEDURE — 94664 DEMO&/EVAL PT USE INHALER: CPT

## 2024-01-16 PROCEDURE — 97165 OT EVAL LOW COMPLEX 30 MIN: CPT

## 2024-01-16 PROCEDURE — 94640 AIRWAY INHALATION TREATMENT: CPT

## 2024-01-16 PROCEDURE — 2580000003 HC RX 258

## 2024-01-16 PROCEDURE — 84100 ASSAY OF PHOSPHORUS: CPT

## 2024-01-16 PROCEDURE — 82962 GLUCOSE BLOOD TEST: CPT

## 2024-01-16 PROCEDURE — 36415 COLL VENOUS BLD VENIPUNCTURE: CPT

## 2024-01-16 PROCEDURE — 83605 ASSAY OF LACTIC ACID: CPT

## 2024-01-16 PROCEDURE — 6370000000 HC RX 637 (ALT 250 FOR IP): Performed by: INTERNAL MEDICINE

## 2024-01-16 PROCEDURE — 85025 COMPLETE CBC W/AUTO DIFF WBC: CPT

## 2024-01-16 PROCEDURE — 80053 COMPREHEN METABOLIC PANEL: CPT

## 2024-01-16 PROCEDURE — 86140 C-REACTIVE PROTEIN: CPT

## 2024-01-16 PROCEDURE — 6360000002 HC RX W HCPCS

## 2024-01-16 PROCEDURE — 94669 MECHANICAL CHEST WALL OSCILL: CPT

## 2024-01-16 PROCEDURE — 84145 PROCALCITONIN (PCT): CPT

## 2024-01-16 RX ORDER — IPRATROPIUM BROMIDE AND ALBUTEROL SULFATE 2.5; .5 MG/3ML; MG/3ML
1 SOLUTION RESPIRATORY (INHALATION)
Status: CANCELLED | OUTPATIENT
Start: 2024-01-16

## 2024-01-16 RX ORDER — ASPIRIN 81 MG/1
81 TABLET ORAL DAILY
Status: CANCELLED | OUTPATIENT
Start: 2024-01-17

## 2024-01-16 RX ORDER — LOSARTAN POTASSIUM 50 MG/1
100 TABLET ORAL DAILY
Status: CANCELLED | OUTPATIENT
Start: 2024-01-17

## 2024-01-16 RX ORDER — MORPHINE SULFATE 2 MG/ML
2 INJECTION, SOLUTION INTRAMUSCULAR; INTRAVENOUS EVERY 4 HOURS PRN
Status: DISCONTINUED | OUTPATIENT
Start: 2024-01-16 | End: 2024-01-17 | Stop reason: HOSPADM

## 2024-01-16 RX ORDER — SODIUM CHLORIDE 9 MG/ML
INJECTION, SOLUTION INTRAVENOUS PRN
Status: CANCELLED | OUTPATIENT
Start: 2024-01-16

## 2024-01-16 RX ORDER — HYDROCHLOROTHIAZIDE 12.5 MG/1
12.5 TABLET ORAL DAILY
Status: CANCELLED | OUTPATIENT
Start: 2024-01-17

## 2024-01-16 RX ORDER — ALBUTEROL SULFATE 2.5 MG/3ML
2.5 SOLUTION RESPIRATORY (INHALATION) EVERY 4 HOURS PRN
Status: CANCELLED | OUTPATIENT
Start: 2024-01-16

## 2024-01-16 RX ORDER — HYDROCODONE BITARTRATE AND ACETAMINOPHEN 10; 325 MG/1; MG/1
1 TABLET ORAL EVERY 6 HOURS PRN
Status: DISCONTINUED | OUTPATIENT
Start: 2024-01-16 | End: 2024-01-17 | Stop reason: HOSPADM

## 2024-01-16 RX ORDER — ACETAMINOPHEN 650 MG/1
650 SUPPOSITORY RECTAL EVERY 6 HOURS PRN
Status: CANCELLED | OUTPATIENT
Start: 2024-01-16

## 2024-01-16 RX ORDER — LACTULOSE 10 G/15ML
20 SOLUTION ORAL ONCE
Status: COMPLETED | OUTPATIENT
Start: 2024-01-16 | End: 2024-01-16

## 2024-01-16 RX ORDER — ALLOPURINOL 300 MG/1
300 TABLET ORAL DAILY
Status: CANCELLED | OUTPATIENT
Start: 2024-01-17

## 2024-01-16 RX ORDER — MORPHINE SULFATE 2 MG/ML
2 INJECTION, SOLUTION INTRAMUSCULAR; INTRAVENOUS EVERY 4 HOURS PRN
Status: CANCELLED | OUTPATIENT
Start: 2024-01-16

## 2024-01-16 RX ORDER — SUCRALFATE 1 G/1
1 TABLET ORAL 4 TIMES DAILY
Status: CANCELLED | OUTPATIENT
Start: 2024-01-16

## 2024-01-16 RX ORDER — SODIUM CHLORIDE 0.9 % (FLUSH) 0.9 %
10 SYRINGE (ML) INJECTION PRN
Status: CANCELLED | OUTPATIENT
Start: 2024-01-16

## 2024-01-16 RX ORDER — DOCUSATE SODIUM 100 MG/1
100 CAPSULE, LIQUID FILLED ORAL 2 TIMES DAILY PRN
Status: CANCELLED | OUTPATIENT
Start: 2024-01-16

## 2024-01-16 RX ORDER — PANTOPRAZOLE SODIUM 40 MG/1
40 TABLET, DELAYED RELEASE ORAL DAILY
Status: CANCELLED | OUTPATIENT
Start: 2024-01-17

## 2024-01-16 RX ORDER — POLYETHYLENE GLYCOL 3350 17 G/17G
17 POWDER, FOR SOLUTION ORAL DAILY
Status: DISCONTINUED | OUTPATIENT
Start: 2024-01-16 | End: 2024-01-17 | Stop reason: HOSPADM

## 2024-01-16 RX ORDER — ONDANSETRON 4 MG/1
4 TABLET, ORALLY DISINTEGRATING ORAL EVERY 8 HOURS PRN
Status: CANCELLED | OUTPATIENT
Start: 2024-01-16

## 2024-01-16 RX ORDER — ONDANSETRON 2 MG/ML
4 INJECTION INTRAMUSCULAR; INTRAVENOUS EVERY 6 HOURS PRN
Status: CANCELLED | OUTPATIENT
Start: 2024-01-16

## 2024-01-16 RX ORDER — INSULIN LISPRO 100 [IU]/ML
0-4 INJECTION, SOLUTION INTRAVENOUS; SUBCUTANEOUS NIGHTLY
Status: CANCELLED | OUTPATIENT
Start: 2024-01-16

## 2024-01-16 RX ORDER — POLYETHYLENE GLYCOL 3350 17 G/17G
17 POWDER, FOR SOLUTION ORAL DAILY
Status: CANCELLED | OUTPATIENT
Start: 2024-01-17

## 2024-01-16 RX ORDER — INSULIN LISPRO 100 [IU]/ML
0-8 INJECTION, SOLUTION INTRAVENOUS; SUBCUTANEOUS
Status: CANCELLED | OUTPATIENT
Start: 2024-01-17

## 2024-01-16 RX ORDER — SODIUM CHLORIDE 0.9 % (FLUSH) 0.9 %
5-40 SYRINGE (ML) INJECTION EVERY 12 HOURS SCHEDULED
Status: CANCELLED | OUTPATIENT
Start: 2024-01-16

## 2024-01-16 RX ORDER — GUAIFENESIN 400 MG/1
400 TABLET ORAL 3 TIMES DAILY
Status: DISCONTINUED | OUTPATIENT
Start: 2024-01-16 | End: 2024-01-17 | Stop reason: HOSPADM

## 2024-01-16 RX ORDER — DEXTROSE MONOHYDRATE 100 MG/ML
INJECTION, SOLUTION INTRAVENOUS CONTINUOUS PRN
Status: CANCELLED | OUTPATIENT
Start: 2024-01-16

## 2024-01-16 RX ORDER — HYDROCODONE BITARTRATE AND ACETAMINOPHEN 10; 325 MG/1; MG/1
1 TABLET ORAL EVERY 6 HOURS PRN
Status: CANCELLED | OUTPATIENT
Start: 2024-01-16

## 2024-01-16 RX ORDER — GUAIFENESIN 600 MG/1
600 TABLET, EXTENDED RELEASE ORAL 2 TIMES DAILY
Status: DISCONTINUED | OUTPATIENT
Start: 2024-01-16 | End: 2024-01-16

## 2024-01-16 RX ORDER — SODIUM CHLORIDE 9 MG/ML
INJECTION, SOLUTION INTRAVENOUS CONTINUOUS
Status: CANCELLED | OUTPATIENT
Start: 2024-01-16

## 2024-01-16 RX ORDER — GUAIFENESIN 400 MG/1
400 TABLET ORAL 3 TIMES DAILY
Status: CANCELLED | OUTPATIENT
Start: 2024-01-16

## 2024-01-16 RX ORDER — ACETAMINOPHEN 325 MG/1
650 TABLET ORAL EVERY 6 HOURS PRN
Status: CANCELLED | OUTPATIENT
Start: 2024-01-16

## 2024-01-16 RX ORDER — ATORVASTATIN CALCIUM 10 MG/1
10 TABLET, FILM COATED ORAL DAILY
Status: CANCELLED | OUTPATIENT
Start: 2024-01-17

## 2024-01-16 RX ADMIN — INSULIN LISPRO 2 UNITS: 100 INJECTION, SOLUTION INTRAVENOUS; SUBCUTANEOUS at 15:34

## 2024-01-16 RX ADMIN — IPRATROPIUM BROMIDE AND ALBUTEROL SULFATE 1 DOSE: .5; 2.5 SOLUTION RESPIRATORY (INHALATION) at 13:26

## 2024-01-16 RX ADMIN — Medication 10 ML: at 21:00

## 2024-01-16 RX ADMIN — SUCRALFATE 1 G: 1 TABLET ORAL at 15:34

## 2024-01-16 RX ADMIN — IPRATROPIUM BROMIDE AND ALBUTEROL SULFATE 1 DOSE: .5; 2.5 SOLUTION RESPIRATORY (INHALATION) at 20:31

## 2024-01-16 RX ADMIN — GUAIFENESIN 400 MG: 400 TABLET ORAL at 14:13

## 2024-01-16 RX ADMIN — METOPROLOL TARTRATE 25 MG: 25 TABLET, FILM COATED ORAL at 09:52

## 2024-01-16 RX ADMIN — SUCRALFATE 1 G: 1 TABLET ORAL at 09:52

## 2024-01-16 RX ADMIN — ACETAMINOPHEN 650 MG: 325 TABLET ORAL at 00:52

## 2024-01-16 RX ADMIN — NALOXEGOL OXALATE 25 MG: 12.5 TABLET, FILM COATED ORAL at 15:23

## 2024-01-16 RX ADMIN — ATORVASTATIN CALCIUM 10 MG: 10 TABLET, FILM COATED ORAL at 09:52

## 2024-01-16 RX ADMIN — AMPICILLIN SODIUM AND SULBACTAM SODIUM 3000 MG: 2; 1 INJECTION, POWDER, FOR SOLUTION INTRAMUSCULAR; INTRAVENOUS at 03:14

## 2024-01-16 RX ADMIN — ACETAMINOPHEN 650 MG: 325 TABLET ORAL at 14:13

## 2024-01-16 RX ADMIN — SERTRALINE HYDROCHLORIDE 50 MG: 50 TABLET ORAL at 09:52

## 2024-01-16 RX ADMIN — LEVOTHYROXINE SODIUM 137 MCG: 0.11 TABLET ORAL at 09:51

## 2024-01-16 RX ADMIN — AMPICILLIN SODIUM AND SULBACTAM SODIUM 3000 MG: 2; 1 INJECTION, POWDER, FOR SOLUTION INTRAMUSCULAR; INTRAVENOUS at 20:41

## 2024-01-16 RX ADMIN — LACTULOSE 20 G: 20 SOLUTION ORAL at 14:16

## 2024-01-16 RX ADMIN — LOSARTAN POTASSIUM 100 MG: 50 TABLET, FILM COATED ORAL at 10:00

## 2024-01-16 RX ADMIN — AMPICILLIN SODIUM AND SULBACTAM SODIUM 3000 MG: 2; 1 INJECTION, POWDER, FOR SOLUTION INTRAMUSCULAR; INTRAVENOUS at 15:27

## 2024-01-16 RX ADMIN — SUCRALFATE 1 G: 1 TABLET ORAL at 14:13

## 2024-01-16 RX ADMIN — METOPROLOL TARTRATE 25 MG: 25 TABLET, FILM COATED ORAL at 21:00

## 2024-01-16 RX ADMIN — SODIUM CHLORIDE: 9 INJECTION, SOLUTION INTRAVENOUS at 20:37

## 2024-01-16 RX ADMIN — POLYETHYLENE GLYCOL 3350 17 G: 17 POWDER, FOR SOLUTION ORAL at 09:51

## 2024-01-16 RX ADMIN — ACETAMINOPHEN 650 MG: 325 TABLET ORAL at 23:18

## 2024-01-16 RX ADMIN — SUCRALFATE 1 G: 1 TABLET ORAL at 21:00

## 2024-01-16 RX ADMIN — ALLOPURINOL 300 MG: 300 TABLET ORAL at 09:52

## 2024-01-16 RX ADMIN — IPRATROPIUM BROMIDE AND ALBUTEROL SULFATE 1 DOSE: .5; 2.5 SOLUTION RESPIRATORY (INHALATION) at 09:34

## 2024-01-16 RX ADMIN — AMPICILLIN SODIUM AND SULBACTAM SODIUM 3000 MG: 2; 1 INJECTION, POWDER, FOR SOLUTION INTRAMUSCULAR; INTRAVENOUS at 09:53

## 2024-01-16 RX ADMIN — PANTOPRAZOLE SODIUM 40 MG: 40 TABLET, DELAYED RELEASE ORAL at 09:52

## 2024-01-16 RX ADMIN — HYDROCHLOROTHIAZIDE 12.5 MG: 12.5 TABLET ORAL at 09:52

## 2024-01-16 RX ADMIN — Medication 10 ML: at 09:52

## 2024-01-16 RX ADMIN — SODIUM CHLORIDE: 9 INJECTION, SOLUTION INTRAVENOUS at 00:49

## 2024-01-16 RX ADMIN — IPRATROPIUM BROMIDE AND ALBUTEROL SULFATE 1 DOSE: .5; 2.5 SOLUTION RESPIRATORY (INHALATION) at 17:04

## 2024-01-16 RX ADMIN — GUAIFENESIN 400 MG: 400 TABLET ORAL at 21:00

## 2024-01-16 ASSESSMENT — ENCOUNTER SYMPTOMS
COUGH: 1
EYES NEGATIVE: 1
CONSTIPATION: 1
VOMITING: 0
BLOOD IN STOOL: 1
NAUSEA: 0
ABDOMINAL PAIN: 0
COLOR CHANGE: 0

## 2024-01-16 ASSESSMENT — PAIN DESCRIPTION - DESCRIPTORS: DESCRIPTORS: ACHING;DISCOMFORT;GNAWING

## 2024-01-16 ASSESSMENT — PAIN SCALES - GENERAL: PAINLEVEL_OUTOF10: 7

## 2024-01-16 ASSESSMENT — PAIN DESCRIPTION - LOCATION: LOCATION: BACK

## 2024-01-16 ASSESSMENT — PAIN DESCRIPTION - ORIENTATION: ORIENTATION: LOWER

## 2024-01-16 NOTE — H&P
[facility]  History and Physical      CHIEF COMPLAINT:    Chief Complaint   Patient presents with    Rectal Bleeding     States she is constipated- rectal bleeding when she wipes- on asa and eliquis         Patient of Phill Wadsworth III, DO presents with:  History of lung abscess    History of Present Illness:   Patient is a 73-year-old female with past medical history of atrial fibrillation on Eliquis, HTN, osteoarthritis, PVD, hypothyroidism, DM who presents to the emergency room for rectal bleeding.  Patient states that she is constipated and began having rectal bleeding prompting her to come to the emergency room for further evaluation.  On arrival labs were obtained and patient was found to have a lactic acid of 2.7, mildly elevated LFTs, leukocytosis of 19.7, H&H of 11.1/36.2.  CT abdomen pelvis revealed no acute findings in the abdomen however a partially necrotic mass in the right lower lobe.  Patient is admitted to intermediate telemetry and for further workup and treatment.      REVIEW OF SYSTEMS:  Pertinent negatives are above in HPI.  10 point ROS otherwise negative.      Past Medical History:   Diagnosis Date    A-fib (HCC)     Back problem     Carotid artery stenosis     Diabetic neuropathy (HCC) 12/04/2015    Full dentures     Hiatal hernia     Hypertension     Hyperuricemia 12/04/2015    Lung mass     Osteoarthritis     lumbosacral spine arthritis and disc disease    PVD (peripheral vascular disease) with claudication (HCC) 12/13/2012    follows with Dr Rajput    Thyroid disease     Type II or unspecified type diabetes mellitus without mention of complication, not stated as uncontrolled     Wears glasses          Past Surgical History:   Procedure Laterality Date    APPENDECTOMY      BRONCHOSCOPY N/A 1/2/2024    BRONCHOSCOPY WITH BIOPSY performed by Enrique Gaytan DO at [facility] OR    COLONOSCOPY      CT NEEDLE BIOPSY LUNG PERCUTANEOUS  11/13/2023    CT NEEDLE BIOPSY LUNG

## 2024-01-16 NOTE — ED NOTES
Patients blood cultures have been drawn,  one set from the left and one set from the right arm.  She was placed onto her left side, and the soap suds enema was administered. She was able to tolerate it well, and afterwards went to the bathroom.  She reports there was very little stool released.  Her IV fluids, and medications have been given PER MAR.  Report called to unit.

## 2024-01-16 NOTE — CARE COORDINATION
Transition of Care-Met with patient, she lives alone in a ground floor apartment. She uses a walker at times, however is mostly independent. PCP is Dr. Wadsworth, preferred pharmacy is [facility] [place]. Admitted for rectal bleeding-Gen Surg and Pulmonary following. Discussed home care-patient was unsure if she wanted it or needed it. History at [facility]. Discharge plan is to return to her apartment.    Eunice BUSTAMANTE, RN  [facility]

## 2024-01-16 NOTE — PROGRESS NOTES
Occupational Therapy  OCCUPATIONAL THERAPY INITIAL EVALUATION  [facility]  [address] [place], [place]    Date: 2024     Patient Name: Maria Victoria Elliott  MRN: [id number]  : 1950  Room: [address] [place] [address]    Evaluating OT: Marium Navarro, OTR/L - [id number]    Referring Provider: Bridget Goel APRN - CNP   Specific Provider Orders/Date: \"OT eval and treat\" - 1/15/2024    Diagnosis: Rectal bleeding [K62.5]  Generalized abdominal pain [R10.84]  History of lung abscess [Z87.09]  Right lower lobe lung mass [R91.8]      Pertinent Medical History: A fib, diabetic neuropathy, HTN, OA, PVD, DM, L BENY     Precautions: fall risk    Assessment of Current Deficits:    [x] Functional mobility   [x]ADLs  [x] Strength               []Cognition   [x] Functional transfers   [x] IADLs         [x] Safety Awareness   [x]Endurance   [] Fine Coordination              [x] Balance      [] Vision/perception   []Sensation    []Gross Motor Coordination  [] ROM  [] Delirium                   [] Motor Control     OT PLAN OF CARE   OT POC is based on physician orders, patient diagnosis, and results of clinical assessment.  Frequency/Duration 2-5 days/week for 2 weeks PRN   Specific OT Treatment Interventions to Include:   * Instruction/training on adapted ADL techniques and AE recommendations to increase functional independence within precautions       * Training on energy conservation strategies, correct breathing pattern and techniques to improve independence/tolerance for self-care routine  * Functional transfer/mobility training/DME recommendations for increased independence, safety, and fall prevention  * Patient/Family education to increase follow through with safety techniques and functional independence  * Recommendation of environmental modifications for increased safety with functional transfers/mobility and ADLs  * Therapeutic exercise to improve motor endurance, ROM,

## 2024-01-16 NOTE — PROGRESS NOTES
Reached out to General Surgery.  Awaiting response.    1/16/24 10:20 AM   [phone] Intermediate From: Jyoti CHILDS RE: RUPERTO DALEY RM: [id number] Good morning. I see there's no plan for scope/surgery. Is there a diet you would recommend for her? Or should I keep her NPO  Unread     Surgery okay with diet.  Dr. Montez contacted.

## 2024-01-16 NOTE — CONSULTS
GENERAL SURGERY  CONSULT NOTE  1/16/2024    Physician Consulted: Dr. Watts  Reason for Consult: Hemoccult+    HPI  Maria Victoria Elliott is a 73 y.o. female who presents for evaluation of constipation and no BM for 8 days. Says she had a BM this am that was very hard and had some blood on the stool. Patient has been having rectal bleeding intermittently since October. She has been very constipated recently and strained prior to having a bowel movement. Denies any fevers or chills. Denies any nausea or vomiting. Not having abdominal pain. Denies any anticoagulation, but is supposed to be on Eliquis for her a-fib. Also being worked up this admission for a necrotic appearing lung mass, previously biopsied w indeterminate path.     Recent scopes - last EGD '20 w gastritis, Cscope polyps x2 11/23. Hx notable for appy    Past Medical History:   Diagnosis Date    A-fib (HCC)     Back problem     Carotid artery stenosis     Diabetic neuropathy (HCC) 12/04/2015    Full dentures     Hiatal hernia     Hypertension     Hyperuricemia 12/04/2015    Lung mass     Osteoarthritis     lumbosacral spine arthritis and disc disease    PVD (peripheral vascular disease) with claudication (HCC) 12/13/2012    follows with Dr Rajput    Thyroid disease     Type II or unspecified type diabetes mellitus without mention of complication, not stated as uncontrolled     Wears glasses        Past Surgical History:   Procedure Laterality Date    APPENDECTOMY      BRONCHOSCOPY N/A 1/2/2024    BRONCHOSCOPY WITH BIOPSY performed by Enrique Gaytan DO at [facility] OR    COLONOSCOPY      CT NEEDLE BIOPSY LUNG PERCUTANEOUS  11/13/2023    CT NEEDLE BIOPSY LUNG PERCUTANEOUS 11/13/2023 [facility] CT    HYSTERECTOMY (CERVIX STATUS UNKNOWN)      with appendectomy    JOINT REPLACEMENT Left 12/3/2015    total hip    THROAT SURGERY  2002    benign    TOTAL HIP ARTHROPLASTY Left 12/2015    UPPER GASTROINTESTINAL ENDOSCOPY N/A 7/7/2020    EGD BIOPSY performed by Osvalod  MD Mauricio at [facility] ENDOSCOPY    UPPER GASTROINTESTINAL ENDOSCOPY N/A 7/15/2020    EGD performed by Osvaldo Martínez MD at [facility] ENDOSCOPY       Medications Prior to Admission:    Prior to Admission medications    Medication Sig Start Date End Date Taking? Authorizing Provider   amoxicillin-clavulanate (AUGMENTIN) 875-125 MG per tablet Take 1 tablet by mouth 2 times daily 1/11/24 2/22/24  Enrique Gaytan, DO   albuterol sulfate HFA (VENTOLIN HFA) 108 (90 Base) MCG/ACT inhaler Inhale 2 puffs into the lungs 4 times daily as needed for Wheezing 11/13/23   Serina Montez,    pantoprazole (PROTONIX) 40 MG tablet Take 1 tablet by mouth daily    Amaya Rosario MD   apixaban (ELIQUIS) 5 MG TABS tablet Take by mouth 2 times daily    Amaya Rosario MD   irbesartan-hydroCHLOROthiazide (AVALIDE) 300-12.5 MG per tablet Take 1 tablet by mouth daily    Amaya Rosario MD   Multiple Vitamin (MULTI-VITAMIN DAILY PO) Take by mouth daily    Amaya Rosario MD   Semaglutide (OZEMPIC, 1 MG/DOSE, SC) Inject into the skin once a week On Tues    Amaya Rosario MD   aspirin 81 MG EC tablet Take 1 tablet by mouth daily    Amaya Rosario MD   acetaminophen (TYLENOL) 500 MG tablet Take 1 tablet by mouth every 6 hours as needed for Pain    Amaya Rosario MD   allopurinol (ZYLOPRIM) 300 MG tablet Take 1 tablet by mouth daily 3/24/18   Amaya Rosario MD   SYNTHROID 137 MCG tablet Take 1 tablet by mouth daily 1/16/18   Amaya Rosario MD   sucralfate (CARAFATE) 1 GM tablet Take 1 tablet by mouth 4 times daily    Amaya Rosario MD   sertraline (ZOLOFT) 50 MG tablet Take 1 tablet by mouth daily    Amaya Rosario MD   metFORMIN (GLUCOPHAGE) 500 MG tablet Take 1 tablet by mouth 2 times daily (with meals)    Amaya Rosario MD   atorvastatin (LIPITOR) 10 MG tablet Take 1 tablet by mouth daily    Amaya Rosario MD   metoprolol (LOPRESSOR) 25 MG tablet Take 1

## 2024-01-16 NOTE — PLAN OF CARE
Problem: Discharge Planning  Goal: Discharge to home or other facility with appropriate resources  1/16/2024 0135 by Don Purvis RN  Outcome: Progressing  1/16/2024 0102 by Don Purvis RN  Outcome: Progressing     Problem: ABCDS Injury Assessment  Goal: Absence of physical injury  1/16/2024 0135 by Don Purvis RN  Outcome: Progressing  1/16/2024 0102 by Don Purvis RN  Outcome: Progressing     Problem: Safety - Adult  Goal: Free from fall injury  1/16/2024 0135 by Don Purvis RN  Outcome: Progressing  1/16/2024 0102 by Don Purvis RN  Outcome: Progressing

## 2024-01-16 NOTE — ED NOTES
ED to Inpatient Handoff Report    Notified [facility] that electronic handoff available and patient ready for transport to room 611.    Safety Risks: None identified    Patient in Restraints: no    Constant Observer or Patient : no    Telemetry Monitoring Ordered :No           Order to transfer to unit without monitor:N/A    Last MEWS:   Time completed: 2154      Deterioration Index Score:   Predictive Model Details          27 (Normal)  Factor Value    Calculated 1/15/2024 21:50 45% Age 73 years old    Deterioration Index Model 20% Respiratory rate 20     14% WBC count abnormal (19.7 k/uL)     8% Systolic 148     5% Pulse 96     4% Sodium 132 mmol/L     2% Potassium 3.6 mmol/L     2% Platelet count abnormal (503 k/uL)     0% Pulse oximetry 97 %     0% Temperature 97.6 °F (36.4 °C)     0% Hematocrit 36.2 %        Vitals:    01/15/24 1531 01/15/24 1943 01/15/24 2028   BP: 137/86 (!) 148/84    Pulse: (!) 113 96    Resp: 20 20    Temp:   97.6 °F (36.4 °C)   TempSrc:  Oral Oral   SpO2: 98% 97%    Weight:  94.3 kg (208 lb)    Height:  1.651 m (5' 5\")          Opportunity for questions and clarification was provided.

## 2024-01-16 NOTE — PLAN OF CARE
Problem: Discharge Planning  Goal: Discharge to home or other facility with appropriate resources  Outcome: Progressing     Problem: ABCDS Injury Assessment  Goal: Absence of physical injury  Outcome: Progressing     Problem: Safety - Adult  Goal: Free from fall injury  Outcome: Progressing

## 2024-01-16 NOTE — CONSULTS
[facility]  [facility]    Consultation Note     Admit Date: 1/15/2024  3:14 PM    Reason for Consult:   Lung abscess    Attending Physician:  Serina Montez DO     Chief Complaint: constipation    HISTORY OF PRESENT ILLNESS:   The patient is a 73 y.o.  female not known to the Infectious Diseases service. The patient had history of multiple admissions since November.  She was admitted from 11/8 till 11/13 patient was treated with IV ceftriaxone and doxycycline.  Patient underwent KENZIE right lower lobe mass lung biopsy the pathology showed cores of necrotic tissue insufficient for further diagnosis.  Patient underwent bronchoscopy with biopsy on 1/2/2024 which showed benign bronchial mucosa negative for neoplasm.  BAL culture from 1/2 showed normal respiratory guru.  Fungal and AFB cultures were negative.    Patient presented to the ER yesterday with abdominal pain nausea beginning 8 days ago and bright red blood per rectum. Patient smoked for long time (could not tell me exactly) , quit 22 years ago. She had only dry cough. She has loss of appetite and weight loss of 50 pounds in last 3 months. No fever or chills.      Patient is also on Augmentin for lung mass since last Thursday.  Labs showed white count of 19 improved to 16 today hemoglobin is 9.4 decreased from 11.1 yesterday platelet count was 500 today 331 LFTs are abnormal CRP is 127 Pro-Salvador is negative BMP is normal blood cultures are in process.  CT abdomen pelvis showed PARTIALLY NECROTIC MASS in the right lower lobe is partially included in  the field view of this study. It measures approximately 6.1 x 4.9 cm in the  maximal axial plane.  No metastatic disease identified.  Patient is on IV Unasyn I got consult further recommendations.      Past Medical History:        Diagnosis Date    A-fib (HCC)     Back problem     Carotid artery stenosis     Diabetic neuropathy (HCC) 12/04/2015    Full dentures     Hiatal hernia      transbronchial biopsy showed only benign mucosa and no alveolar parenchyma.   Leucocytosis: fluctuating between 15-19 for the past 2 weeks.     Plan:    Spoke to Dr. Gaytan: we need proper lung biopsy for diagnosis if IR or CT surgery.   Continue IV unasyn for now  Monitor labs  Will follow with you    Thank you for having us see this patient in consultation. I will be discussing this case with the treating physicians.      Electronically signed by EDINSON PAYAN MD on 1/16/2024 at 2:04 PM

## 2024-01-16 NOTE — PROGRESS NOTES
Physical Therapy  Facility/Department: [address] MED SURG  Physical Therapy Initial Assessment    Name: Maria Victoria Elliott  : 1950  MRN: [id number]  Date of Service: 2024          Patient Diagnosis(es): The primary encounter diagnosis was Rectal bleeding. Diagnoses of Right lower lobe lung mass and Generalized abdominal pain were also pertinent to this visit.  Past Medical History:  has a past medical history of A-fib (HCC), Back problem, Carotid artery stenosis, Diabetic neuropathy (HCC), Full dentures, Hiatal hernia, Hypertension, Hyperuricemia, Lung mass, Osteoarthritis, PVD (peripheral vascular disease) with claudication (HCC), Thyroid disease, Type II or unspecified type diabetes mellitus without mention of complication, not stated as uncontrolled, and Wears glasses.  Past Surgical History:  has a past surgical history that includes Appendectomy; Throat surgery (); Colonoscopy; Hysterectomy; joint replacement (Left, 12/3/2015); Total hip arthroplasty (Left, 2015); Upper gastrointestinal endoscopy (N/A, 2020); Upper gastrointestinal endoscopy (N/A, 7/15/2020); CT NEEDLE BIOPSY LUNG PERCUTANEOUS (2023); and bronchoscopy (N/A, 2024).         Requires PT Follow-Up: Yes     Evaluating Therapist: Amy Marie PT     Referring Provider:      Bridget Goel APRN - CNP       PT order : PT eval and treat     Room #: 611  DIAGNOSIS: The primary encounter diagnosis was Rectal bleeding. Diagnoses of Right lower lobe lung mass and Generalized abdominal pain were also pertinent to this visit.    PRECAUTIONS: falls     Social:  Pt lives alone  in a  1  floor plan  no  steps to enter.  Prior to admission pt walked with  no AD or 22 PRN. Has cane, w/ned      Initial Evaluation  Date:  2024 Treatment      Short Term/ Long Term   Goals   Was pt agreeable to Eval/treatment?  Yes      Does pt have pain?  Chronic back pain      Bed Mobility  Rolling:  independent   Supine to sit:  independent   Sit

## 2024-01-16 NOTE — PROGRESS NOTES
Spoke w/ dr Gaytan at 545pm. Reviewed ID note. Patient to be transferred to [facility] for CTS consult/surgery input. Transfer initiated via transfer center. Pt accepted, awaiting bed. Will place dc/readmit orders pending bed availability. For now continue current plan of care.    Electronically signed by Serina Montez DO on 1/16/2024 at 5:53 PM

## 2024-01-16 NOTE — PROGRESS NOTES
4 Eyes Skin Assessment     NAME:  Maria Victoria Elliott  YOB: 1950  MEDICAL RECORD NUMBER:  [id number]    The patient is being assessed for  Admission    I agree that at least one RN has performed a thorough Head to Toe Skin Assessment on the patient. ALL assessment sites listed below have been assessed.      Areas assessed by both nurses:    Head, Face, Ears, Shoulders, Back, Chest, Arms, Elbows, Hands, Sacrum. Buttock, Coccyx, Ischium, Legs. Feet and Heels, and Under Medical Devices         Does the Patient have a Wound? No noted wound(s)       Callum Prevention initiated by RN: No  Wound Care Orders initiated by RN: No    Pressure Injury (Stage 3,4, Unstageable, DTI, NWPT, and Complex wounds) if present, place Wound referral order by RN under : No    New Ostomies, if present place, Ostomy referral order under : No     Nurse 1 eSignature: Electronically signed by Don Purvis RN on 1/16/24 at 1:31 AM EST    **SHARE this note so that the co-signing nurse can place an eSignature**    Nurse 2 eSignature: Electronically signed by MARTIN COATS RN on 1/16/24 at 1:57 AM EST

## 2024-01-16 NOTE — CONSULTS
Worried About Running Out of Food in the Last Year: Never true     Ran Out of Food in the Last Year: Never true   Transportation Needs: No Transportation Needs (1/15/2024)    PRAPARE - Transportation     Lack of Transportation (Medical): No     Lack of Transportation (Non-Medical): No   Physical Activity: Not on file   Stress: Not on file   Social Connections: Not on file   Intimate Partner Violence: Not on file   Housing Stability: Low Risk  (1/15/2024)    Housing Stability Vital Sign     Unable to Pay for Housing in the Last Year: No     Number of Places Lived in the Last Year: 1     Unstable Housing in the Last Year: No         Vaccines:    Immunization History   Administered Date(s) Administered    COVID-19, MODERNA BLUE border, Primary or Immunocompromised, (age 12y+), IM, 100 mcg/0.5mL 03/11/2021, 04/08/2021, 12/03/2021    COVID-19, MODERNA Bivalent, (age 12y+), IM, 50 mcg/0.5 mL 10/20/2022    COVID-19, PFIZER, (2023-24 formula), (age 12y+), IM, 30mcg/0.3mL 10/02/2023    Influenza, FLUCELVAX, (age 6 mo+), MDCK, PF, 0.5mL 10/09/2019    Pneumococcal, PCV-13, PREVNAR 13, (age 6w+), IM, 0.5mL 10/04/2017    TD 5LF, TENIVAC, (age 7y+), IM, 0.5mL 08/15/2023    TDaP, ADACEL (age 10y-64y), BOOSTRIX (age 10y+), IM, 0.5mL 10/04/2017    Zoster Live (Zostavax) 05/20/2016, 09/06/2017        Home Meds: Medications Prior to Admission: amoxicillin-clavulanate (AUGMENTIN) 875-125 MG per tablet, Take 1 tablet by mouth 2 times daily  albuterol sulfate HFA (VENTOLIN HFA) 108 (90 Base) MCG/ACT inhaler, Inhale 2 puffs into the lungs 4 times daily as needed for Wheezing  pantoprazole (PROTONIX) 40 MG tablet, Take 1 tablet by mouth daily  apixaban (ELIQUIS) 5 MG TABS tablet, Take by mouth 2 times daily  irbesartan-hydroCHLOROthiazide (AVALIDE) 300-12.5 MG per tablet, Take 1 tablet by mouth daily  Multiple Vitamin (MULTI-VITAMIN DAILY PO), Take by mouth daily  Semaglutide (OZEMPIC, 1 MG/DOSE, SC), Inject into the skin once a week On  Tues  aspirin 81 MG EC tablet, Take 1 tablet by mouth daily  acetaminophen (TYLENOL) 500 MG tablet, Take 1 tablet by mouth every 6 hours as needed for Pain  allopurinol (ZYLOPRIM) 300 MG tablet, Take 1 tablet by mouth daily  SYNTHROID 137 MCG tablet, Take 1 tablet by mouth daily  sucralfate (CARAFATE) 1 GM tablet, Take 1 tablet by mouth 4 times daily  sertraline (ZOLOFT) 50 MG tablet, Take 1 tablet by mouth daily  metFORMIN (GLUCOPHAGE) 500 MG tablet, Take 1 tablet by mouth 2 times daily (with meals)  atorvastatin (LIPITOR) 10 MG tablet, Take 1 tablet by mouth daily  metoprolol (LOPRESSOR) 25 MG tablet, Take 1 tablet by mouth 2 times daily  Calcium Carb-Cholecalciferol (CALCIUM 1000 + D PO), Take by mouth daily     CURRENT MEDS :  Scheduled Meds:   polyethylene glycol  17 g Oral Daily    allopurinol  300 mg Oral Daily    [Held by provider] apixaban  5 mg Oral BID    [Held by provider] aspirin  81 mg Oral Daily    atorvastatin  10 mg Oral Daily    metoprolol tartrate  25 mg Oral BID    pantoprazole  40 mg Oral Daily    sertraline  50 mg Oral Daily    sucralfate  1 g Oral 4x Daily    levothyroxine  137 mcg Oral Daily    insulin lispro  0-8 Units SubCUTAneous TID WC    insulin lispro  0-4 Units SubCUTAneous Nightly    sodium chloride flush  5-40 mL IntraVENous 2 times per day    ipratropium 0.5 mg-albuterol 2.5 mg  1 Dose Inhalation Q4H WA RT    ampicillin-sulbactam  3,000 mg IntraVENous Q6H    losartan  100 mg Oral Daily    And    hydroCHLOROthiazide  12.5 mg Oral Daily       Continuous Infusions:   sodium chloride 75 mL/hr at 01/16/24 0049    sodium chloride      dextrose         Allergies   Allergen Reactions    Adhesive Tape Other (See Comments)     Regular tape and bandaids. Needs paper tape.  Pulls skin       REVIEW OF SYSTEMS:  REVIEW OF SYMPTOMS:  Review of Systems   Constitutional:  Negative for chills, fatigue and fever.   HENT: Negative.     Eyes: Negative.    Respiratory:  Positive for cough.

## 2024-01-16 NOTE — ED NOTES
Patient sitting on the side of the bed, and complains of discomfort.  She is asking if any results are back.  Her family is in the room with her.  Advised patient that doctor would be in once the CT scan results were back.  Vitals were taken. Call light within reach.  Patient has no further needs at this time.

## 2024-01-17 ENCOUNTER — HOSPITAL ENCOUNTER (INPATIENT)
Age: 74
LOS: 8 days | Discharge: HOME | DRG: 178 | End: 2024-01-25
Attending: INTERNAL MEDICINE
Payer: MEDICARE

## 2024-01-17 VITALS
TEMPERATURE: 98.4 F | HEIGHT: 65 IN | DIASTOLIC BLOOD PRESSURE: 70 MMHG | SYSTOLIC BLOOD PRESSURE: 158 MMHG | RESPIRATION RATE: 18 BRPM | BODY MASS INDEX: 33.61 KG/M2 | HEART RATE: 107 BPM | WEIGHT: 201.72 LBS | OXYGEN SATURATION: 95 %

## 2024-01-17 DIAGNOSIS — G89.3 PAIN DUE TO NEOPLASM: ICD-10-CM

## 2024-01-17 DIAGNOSIS — Z51.5 PALLIATIVE CARE ENCOUNTER: ICD-10-CM

## 2024-01-17 DIAGNOSIS — R91.8 LUNG MASS: Primary | ICD-10-CM

## 2024-01-17 LAB
ALBUMIN SERPL-MCNC: 2.6 G/DL (ref 3.5–5.2)
ALP SERPL-CCNC: 93 U/L (ref 35–104)
ALT SERPL-CCNC: 76 U/L (ref 0–32)
ANION GAP SERPL CALCULATED.3IONS-SCNC: 10 MMOL/L (ref 7–16)
AST SERPL-CCNC: 32 U/L (ref 0–31)
BASOPHILS # BLD: 0.04 K/UL (ref 0–0.2)
BASOPHILS NFR BLD: 0 % (ref 0–2)
BILIRUB SERPL-MCNC: 0.4 MG/DL (ref 0–1.2)
BUN SERPL-MCNC: 13 MG/DL (ref 6–23)
CALCIUM SERPL-MCNC: 9.1 MG/DL (ref 8.6–10.2)
CHLORIDE SERPL-SCNC: 101 MMOL/L (ref 98–107)
CO2 SERPL-SCNC: 25 MMOL/L (ref 22–29)
CREAT SERPL-MCNC: 0.7 MG/DL (ref 0.5–1)
EOSINOPHIL # BLD: 1.13 K/UL (ref 0.05–0.5)
EOSINOPHILS RELATIVE PERCENT: 6 % (ref 0–6)
ERYTHROCYTE [DISTWIDTH] IN BLOOD BY AUTOMATED COUNT: 16.5 % (ref 11.5–15)
GFR SERPL CREATININE-BSD FRML MDRD: >60 ML/MIN/1.73M2
GLUCOSE BLD-MCNC: 125 MG/DL (ref 74–99)
GLUCOSE BLD-MCNC: 132 MG/DL (ref 74–99)
GLUCOSE BLD-MCNC: 135 MG/DL (ref 74–99)
GLUCOSE SERPL-MCNC: 133 MG/DL (ref 74–99)
HCT VFR BLD AUTO: 29.4 % (ref 34–48)
HGB BLD-MCNC: 8.6 G/DL (ref 11.5–15.5)
IMM GRANULOCYTES # BLD AUTO: 0.19 K/UL (ref 0–0.58)
IMM GRANULOCYTES NFR BLD: 1 % (ref 0–5)
LYMPHOCYTES NFR BLD: 1.67 K/UL (ref 1.5–4)
LYMPHOCYTES RELATIVE PERCENT: 9 % (ref 20–42)
MCH RBC QN AUTO: 23.7 PG (ref 26–35)
MCHC RBC AUTO-ENTMCNC: 29.3 G/DL (ref 32–34.5)
MCV RBC AUTO: 81 FL (ref 80–99.9)
MONOCYTES NFR BLD: 0.97 K/UL (ref 0.1–0.95)
MONOCYTES NFR BLD: 5 % (ref 2–12)
NEUTROPHILS NFR BLD: 78 % (ref 43–80)
NEUTS SEG NFR BLD: 13.93 K/UL (ref 1.8–7.3)
PLATELET # BLD AUTO: 362 K/UL (ref 130–450)
PMV BLD AUTO: 9.4 FL (ref 7–12)
POTASSIUM SERPL-SCNC: 3.1 MMOL/L (ref 3.5–5)
PROT SERPL-MCNC: 5.6 G/DL (ref 6.4–8.3)
RBC # BLD AUTO: 3.63 M/UL (ref 3.5–5.5)
SODIUM SERPL-SCNC: 136 MMOL/L (ref 132–146)
WBC OTHER # BLD: 17.9 K/UL (ref 4.5–11.5)

## 2024-01-17 PROCEDURE — 85025 COMPLETE CBC W/AUTO DIFF WBC: CPT

## 2024-01-17 PROCEDURE — 6370000000 HC RX 637 (ALT 250 FOR IP): Performed by: INTERNAL MEDICINE

## 2024-01-17 PROCEDURE — 2580000003 HC RX 258

## 2024-01-17 PROCEDURE — 2140000000 HC CCU INTERMEDIATE R&B

## 2024-01-17 PROCEDURE — 2580000003 HC RX 258: Performed by: FAMILY MEDICINE

## 2024-01-17 PROCEDURE — 6370000000 HC RX 637 (ALT 250 FOR IP)

## 2024-01-17 PROCEDURE — 6370000000 HC RX 637 (ALT 250 FOR IP): Performed by: FAMILY MEDICINE

## 2024-01-17 PROCEDURE — 80053 COMPREHEN METABOLIC PANEL: CPT

## 2024-01-17 PROCEDURE — 82962 GLUCOSE BLOOD TEST: CPT

## 2024-01-17 PROCEDURE — 6360000002 HC RX W HCPCS

## 2024-01-17 RX ORDER — ACETAMINOPHEN 325 MG/1
650 TABLET ORAL EVERY 6 HOURS PRN
Status: DISCONTINUED | OUTPATIENT
Start: 2024-01-17 | End: 2024-01-25 | Stop reason: HOSPADM

## 2024-01-17 RX ORDER — INSULIN LISPRO 100 [IU]/ML
0-8 INJECTION, SOLUTION INTRAVENOUS; SUBCUTANEOUS
Status: DISCONTINUED | OUTPATIENT
Start: 2024-01-18 | End: 2024-01-25 | Stop reason: HOSPADM

## 2024-01-17 RX ORDER — LACTULOSE 10 G/15ML
20 SOLUTION ORAL DAILY
Status: DISCONTINUED | OUTPATIENT
Start: 2024-01-17 | End: 2024-01-17 | Stop reason: HOSPADM

## 2024-01-17 RX ORDER — HYDROCODONE BITARTRATE AND ACETAMINOPHEN 10; 325 MG/1; MG/1
1 TABLET ORAL EVERY 6 HOURS PRN
Status: DISCONTINUED | OUTPATIENT
Start: 2024-01-17 | End: 2024-01-19

## 2024-01-17 RX ORDER — ATORVASTATIN CALCIUM 10 MG/1
10 TABLET, FILM COATED ORAL DAILY
Status: DISCONTINUED | OUTPATIENT
Start: 2024-01-18 | End: 2024-01-25 | Stop reason: HOSPADM

## 2024-01-17 RX ORDER — LEVOTHYROXINE SODIUM 137 UG/1
137 TABLET ORAL DAILY
Status: DISCONTINUED | OUTPATIENT
Start: 2024-01-18 | End: 2024-01-25 | Stop reason: HOSPADM

## 2024-01-17 RX ORDER — DEXTROSE MONOHYDRATE 100 MG/ML
INJECTION, SOLUTION INTRAVENOUS CONTINUOUS PRN
Status: DISCONTINUED | OUTPATIENT
Start: 2024-01-17 | End: 2024-01-25 | Stop reason: HOSPADM

## 2024-01-17 RX ORDER — DOCUSATE SODIUM 100 MG/1
100 CAPSULE, LIQUID FILLED ORAL 2 TIMES DAILY PRN
Status: DISCONTINUED | OUTPATIENT
Start: 2024-01-17 | End: 2024-01-25 | Stop reason: HOSPADM

## 2024-01-17 RX ORDER — ONDANSETRON 4 MG/1
4 TABLET, ORALLY DISINTEGRATING ORAL EVERY 8 HOURS PRN
Status: DISCONTINUED | OUTPATIENT
Start: 2024-01-17 | End: 2024-01-25 | Stop reason: HOSPADM

## 2024-01-17 RX ORDER — SODIUM CHLORIDE 0.9 % (FLUSH) 0.9 %
10 SYRINGE (ML) INJECTION PRN
Status: DISCONTINUED | OUTPATIENT
Start: 2024-01-17 | End: 2024-01-25 | Stop reason: HOSPADM

## 2024-01-17 RX ORDER — ALBUTEROL SULFATE 2.5 MG/3ML
2.5 SOLUTION RESPIRATORY (INHALATION) EVERY 4 HOURS PRN
Status: DISCONTINUED | OUTPATIENT
Start: 2024-01-17 | End: 2024-01-25 | Stop reason: HOSPADM

## 2024-01-17 RX ORDER — ALLOPURINOL 100 MG/1
300 TABLET ORAL DAILY
Status: DISCONTINUED | OUTPATIENT
Start: 2024-01-18 | End: 2024-01-25 | Stop reason: HOSPADM

## 2024-01-17 RX ORDER — SODIUM CHLORIDE 0.9 % (FLUSH) 0.9 %
5-40 SYRINGE (ML) INJECTION EVERY 12 HOURS SCHEDULED
Status: DISCONTINUED | OUTPATIENT
Start: 2024-01-17 | End: 2024-01-25 | Stop reason: HOSPADM

## 2024-01-17 RX ORDER — LOSARTAN POTASSIUM 50 MG/1
100 TABLET ORAL DAILY
Status: DISCONTINUED | OUTPATIENT
Start: 2024-01-18 | End: 2024-01-25 | Stop reason: HOSPADM

## 2024-01-17 RX ORDER — INSULIN LISPRO 100 [IU]/ML
0-4 INJECTION, SOLUTION INTRAVENOUS; SUBCUTANEOUS NIGHTLY
Status: DISCONTINUED | OUTPATIENT
Start: 2024-01-17 | End: 2024-01-25 | Stop reason: HOSPADM

## 2024-01-17 RX ORDER — SODIUM CHLORIDE 9 MG/ML
INJECTION, SOLUTION INTRAVENOUS PRN
Status: DISCONTINUED | OUTPATIENT
Start: 2024-01-17 | End: 2024-01-25 | Stop reason: HOSPADM

## 2024-01-17 RX ORDER — POLYETHYLENE GLYCOL 3350 17 G/17G
17 POWDER, FOR SOLUTION ORAL DAILY
Status: DISCONTINUED | OUTPATIENT
Start: 2024-01-17 | End: 2024-01-25 | Stop reason: HOSPADM

## 2024-01-17 RX ORDER — SUCRALFATE 1 G/1
1 TABLET ORAL 4 TIMES DAILY
Status: DISCONTINUED | OUTPATIENT
Start: 2024-01-18 | End: 2024-01-25 | Stop reason: HOSPADM

## 2024-01-17 RX ORDER — ASPIRIN 81 MG/1
81 TABLET ORAL DAILY
Status: DISCONTINUED | OUTPATIENT
Start: 2024-01-18 | End: 2024-01-25 | Stop reason: HOSPADM

## 2024-01-17 RX ORDER — IPRATROPIUM BROMIDE AND ALBUTEROL SULFATE 2.5; .5 MG/3ML; MG/3ML
1 SOLUTION RESPIRATORY (INHALATION)
Status: DISCONTINUED | OUTPATIENT
Start: 2024-01-18 | End: 2024-01-25 | Stop reason: HOSPADM

## 2024-01-17 RX ORDER — GUAIFENESIN 400 MG/1
400 TABLET ORAL 3 TIMES DAILY
Status: DISCONTINUED | OUTPATIENT
Start: 2024-01-17 | End: 2024-01-25 | Stop reason: HOSPADM

## 2024-01-17 RX ORDER — ONDANSETRON 2 MG/ML
4 INJECTION INTRAMUSCULAR; INTRAVENOUS EVERY 6 HOURS PRN
Status: DISCONTINUED | OUTPATIENT
Start: 2024-01-17 | End: 2024-01-25 | Stop reason: HOSPADM

## 2024-01-17 RX ORDER — MORPHINE SULFATE 2 MG/ML
2 INJECTION, SOLUTION INTRAMUSCULAR; INTRAVENOUS EVERY 4 HOURS PRN
Status: DISCONTINUED | OUTPATIENT
Start: 2024-01-17 | End: 2024-01-25 | Stop reason: HOSPADM

## 2024-01-17 RX ORDER — PANTOPRAZOLE SODIUM 40 MG/1
40 TABLET, DELAYED RELEASE ORAL DAILY
Status: DISCONTINUED | OUTPATIENT
Start: 2024-01-18 | End: 2024-01-25 | Stop reason: HOSPADM

## 2024-01-17 RX ORDER — ACETAMINOPHEN 650 MG/1
650 SUPPOSITORY RECTAL EVERY 6 HOURS PRN
Status: DISCONTINUED | OUTPATIENT
Start: 2024-01-17 | End: 2024-01-25 | Stop reason: HOSPADM

## 2024-01-17 RX ORDER — HYDROCHLOROTHIAZIDE 25 MG/1
12.5 TABLET ORAL DAILY
Status: DISCONTINUED | OUTPATIENT
Start: 2024-01-18 | End: 2024-01-25 | Stop reason: HOSPADM

## 2024-01-17 RX ADMIN — AMPICILLIN SODIUM AND SULBACTAM SODIUM 3000 MG: 2; 1 INJECTION, POWDER, FOR SOLUTION INTRAMUSCULAR; INTRAVENOUS at 08:53

## 2024-01-17 RX ADMIN — ATORVASTATIN CALCIUM 10 MG: 10 TABLET, FILM COATED ORAL at 08:47

## 2024-01-17 RX ADMIN — ALLOPURINOL 300 MG: 300 TABLET ORAL at 08:46

## 2024-01-17 RX ADMIN — AMPICILLIN SODIUM AND SULBACTAM SODIUM 3000 MG: 2; 1 INJECTION, POWDER, FOR SOLUTION INTRAMUSCULAR; INTRAVENOUS at 03:27

## 2024-01-17 RX ADMIN — ACETAMINOPHEN 650 MG: 325 TABLET ORAL at 06:19

## 2024-01-17 RX ADMIN — POLYETHYLENE GLYCOL 3350 17 G: 17 POWDER, FOR SOLUTION ORAL at 22:27

## 2024-01-17 RX ADMIN — ACETAMINOPHEN 650 MG: 325 TABLET ORAL at 16:07

## 2024-01-17 RX ADMIN — LOSARTAN POTASSIUM 100 MG: 50 TABLET, FILM COATED ORAL at 08:46

## 2024-01-17 RX ADMIN — PANTOPRAZOLE SODIUM 40 MG: 40 TABLET, DELAYED RELEASE ORAL at 08:47

## 2024-01-17 RX ADMIN — NALOXEGOL OXALATE 25 MG: 12.5 TABLET, FILM COATED ORAL at 06:15

## 2024-01-17 RX ADMIN — METOPROLOL TARTRATE 25 MG: 25 TABLET, FILM COATED ORAL at 22:25

## 2024-01-17 RX ADMIN — METOPROLOL TARTRATE 25 MG: 25 TABLET, FILM COATED ORAL at 08:47

## 2024-01-17 RX ADMIN — ACETAMINOPHEN 650 MG: 325 TABLET ORAL at 23:15

## 2024-01-17 RX ADMIN — SUCRALFATE 1 G: 1 TABLET ORAL at 15:51

## 2024-01-17 RX ADMIN — LEVOTHYROXINE SODIUM 137 MCG: 0.11 TABLET ORAL at 08:49

## 2024-01-17 RX ADMIN — SERTRALINE HYDROCHLORIDE 50 MG: 50 TABLET ORAL at 08:47

## 2024-01-17 RX ADMIN — HYDROCHLOROTHIAZIDE 12.5 MG: 12.5 TABLET ORAL at 08:46

## 2024-01-17 RX ADMIN — GUAIFENESIN 400 MG: 400 TABLET ORAL at 22:25

## 2024-01-17 RX ADMIN — GUAIFENESIN 400 MG: 400 TABLET ORAL at 15:51

## 2024-01-17 RX ADMIN — SUCRALFATE 1 G: 1 TABLET ORAL at 18:45

## 2024-01-17 RX ADMIN — SODIUM CHLORIDE, PRESERVATIVE FREE 10 ML: 5 INJECTION INTRAVENOUS at 22:25

## 2024-01-17 RX ADMIN — SUCRALFATE 1 G: 1 TABLET ORAL at 08:47

## 2024-01-17 RX ADMIN — LACTULOSE 20 G: 20 SOLUTION ORAL at 15:52

## 2024-01-17 RX ADMIN — GUAIFENESIN 400 MG: 400 TABLET ORAL at 08:47

## 2024-01-17 ASSESSMENT — PAIN DESCRIPTION - DESCRIPTORS
DESCRIPTORS: ACHING;DISCOMFORT;SORE
DESCRIPTORS: ACHING;DISCOMFORT;SORE

## 2024-01-17 ASSESSMENT — PAIN DESCRIPTION - PAIN TYPE: TYPE: CHRONIC PAIN

## 2024-01-17 ASSESSMENT — PAIN DESCRIPTION - ORIENTATION
ORIENTATION: MID
ORIENTATION: MID

## 2024-01-17 ASSESSMENT — PAIN SCALES - GENERAL
PAINLEVEL_OUTOF10: 3
PAINLEVEL_OUTOF10: 3

## 2024-01-17 ASSESSMENT — PAIN DESCRIPTION - LOCATION
LOCATION: BACK
LOCATION: BACK

## 2024-01-17 ASSESSMENT — PAIN - FUNCTIONAL ASSESSMENT: PAIN_FUNCTIONAL_ASSESSMENT: ACTIVITIES ARE NOT PREVENTED

## 2024-01-17 NOTE — CARE COORDINATION
Transition of Care-transfer to [facility] for CTS evaluation for RLL lobectomy. Unasyn stopped-anticoagulants on hold for upcoming surgery. Awaiting bed.    Eunice PATRICKN, RN  [facility]

## 2024-01-17 NOTE — PROGRESS NOTES
Call received from [facility] with bed assignment for [facility] Room number 6506, nurse to nurse report can be called [phone]. Transport arranged by the [facility] with [facility] with ETA [phone]. Bedside nurse aware.

## 2024-01-17 NOTE — PROGRESS NOTES
tablet Take 1 tablet by mouth daily    Amaya Rosario MD   Multiple Vitamin (MULTI-VITAMIN DAILY PO) Take by mouth daily    Amaya Rosario MD   Semaglutide (OZEMPIC, 1 MG/DOSE, SC) Inject into the skin once a week On Tues    Amaya Rosario MD   aspirin 81 MG EC tablet Take 1 tablet by mouth daily    Amaya Rosario MD   acetaminophen (TYLENOL) 500 MG tablet Take 1 tablet by mouth every 6 hours as needed for Pain    Amaya Rosario MD   allopurinol (ZYLOPRIM) 300 MG tablet Take 1 tablet by mouth daily 3/24/18   Amaya Rosario MD   SYNTHROID 137 MCG tablet Take 1 tablet by mouth daily 1/16/18   Amaya Rosario MD   sucralfate (CARAFATE) 1 GM tablet Take 1 tablet by mouth 4 times daily    Amaya Rosario MD   sertraline (ZOLOFT) 50 MG tablet Take 1 tablet by mouth daily    Amaya Rosario MD   metFORMIN (GLUCOPHAGE) 500 MG tablet Take 1 tablet by mouth 2 times daily (with meals)    Amaya Rosario MD   atorvastatin (LIPITOR) 10 MG tablet Take 1 tablet by mouth daily    Amaya Rosario MD   metoprolol (LOPRESSOR) 25 MG tablet Take 1 tablet by mouth 2 times daily    Amaya Rosario MD   Calcium Carb-Cholecalciferol (CALCIUM 1000 + D PO) Take by mouth daily     Amaya Rosario MD        ROS:  As mentioned in subjective, all other systems negative      BP (!) 117/56   Pulse 74   Temp 97.5 °F (36.4 °C) (Oral)   Resp 18   Ht 1.651 m (5' 5\")   Wt 91.5 kg (201 lb 11.5 oz)   SpO2 96%   BMI 33.57 kg/m²     Physical Exam  Constitutional: The patient is awake, alert, and oriented.   Skin: Warm and dry. No rashes were noted. No jaundice.  HEENT: Eyes show round, and reactive pupils. Moist mucous membranes, no ulcerations, no thrush. Has dentures upper , no teeth lower.   Neck: Supple to movements. No lymphadenopathy.   Chest: No use of accessory muscles to breathe. Symmetrical expansion. Auscultation reveals no wheezing, crackles, or rhonchi.    Cardiovascular: S1 and S2 are rhythmic and regular. No murmurs appreciated.   Abdomen: soft. Non tender  Extremities: No clubbing, no cyanosis, no edema.  Musculoskeletal: no gross focal abnormalities  Neurological: awake alert   Lines: peripheral     Labs, Cultures reviewed  Radiology and other consultants notes reviewed    Microbiology:  Blood cx 1/15: negative    Assessment:  Right lung mass. This is likely malignancy. Less likely lung abscess.   First biopsy in November showed only necrotic tissue. Not enough sample.   Second transbronchial biopsy showed only benign mucosa and no alveolar parenchyma.   Leucocytosis: fluctuating between 15-19 for the past 2 weeks.      Plan:    Stop antibiotics  Pending transfer to [facility] for CT surgery.  Monitor labs  Will follow with you         Electronically signed by EDINSON PAYAN MD on 1/17/2024 at 3:20 PM

## 2024-01-17 NOTE — PROGRESS NOTES
[facility]   Progress note      Subjective:    The patient is awake and alert.  Lying in bed. Comfortable. Awaiting transfer to [facility] for lung resection of RLL. States she can feel her bowels moving, but hasn't had a BM yet.  No acute events overnight.    Denies chest pain, angina, SOB     Objective:    BP (!) 153/62   Pulse 92   Temp 97.3 °F (36.3 °C) (Oral)   Resp 16   Ht 1.651 m (5' 5\")   Wt 91.5 kg (201 lb 11.5 oz)   SpO2 97%   BMI 33.57 kg/m²     In: 180 [P.O.:180]  Out: -   In: 180   Out: -     General appearance: NAD, conversant  HEENT: AT/NC, MMM  Neck: FROM, supple  Lungs: Clear to auscultation  CV: RRR, no MRGs  Vasc: Radial pulses 2+  Abdomen: Soft, non-tender; no masses or HSM  Extremities: No peripheral edema or digital cyanosis  Skin: no rash, lesions or ulcers  Psych: Alert and oriented to person, place and time  Neuro: Alert and interactive     Recent Labs     01/15/24  1616 01/16/24  0801 01/17/24  0440   WBC 19.7* 16.7* 17.9*   HGB 11.1* 9.4* 8.6*   HCT 36.2 33.1* 29.4*   * 331 362       Recent Labs     01/15/24  1616 01/16/24  0801 01/17/24  0440    134 136   K 3.6 3.8 3.1*   CL 94* 100 101   CO2 23 22 25   BUN 15 15 13   CREATININE 0.7 0.7 0.7   CALCIUM 10.2 9.3 9.1       Assessment:    Principal Problem:    History of lung abscess  Resolved Problems:    * No resolved hospital problems. *      Plan:      Patient is a 73-year-old female admitted to [facility] for  History of lung abscess  -Monitor labs  -Lactic acid 2.7  -Check procalcitonin  -Check CRP and sed rate  -WBC 19.7  -Pulmonology consulted  -IV Unasyn  -Continue scheduled breathing treatments  -Currently on room air satting 94%     Constipation  -General surgery following  -+ hemoccult  -Eliquis and ASA held  -11.1/36.2 > 9.4/33.1  -Recommend aggressive bowel regimen  -No plans for scopes at this time     1/17/2024  --transfer to [facility] intitiated for cts evaluation for RLL lobectomy  SURGEON:  Rita Chandler MD    PREOPERATIVE DIAGNOSIS:  Visually significant combined cataract, small pupil, s/p LASIK Right  eye.  POSTOPERATIVE DIAGNOSIS:  Visually significant combined cataract,  Small pupil, s/p LASIK Right  eye.    PROCEDURE PERFORMED:  Phacoemulsification of cataract with posterior chamber lens implantation, Right  eye. Use of Malyugin ring, Use of Ora intraoperative aberrometry    ANESTHESIA:  Local with monitored anesthesia care.     ASSISTANT:  ANJELICA Claudio    Post-operative co-management optometrist: none       DESCRIPTION OF PROCEDURE:  While in the preoperative holding area, the pupil of the operative eye was pharmacologically dilated.  Tetracaine eye drops were placed on the ocular surface twice before surgery.  The patient was properly positioned and transported to the operating room.  The periorbital area was thoroughly prepped with Betadine and draped in the usual fashion.     A wire lid speculum was then placed to separate the lids.  The operating microscope was brought into position. The surgery was performed from the temporal side. A 1 mm stab incision was placed through the limbal cornea.  Viscoelastic was then instilled to deepen the anterior chamber.  A 2.5 mm metal keratome was inserted into the temporal clear limbal cornea to create a self-sealing incision with entry into the anterior chamber.  A 6.25mm Malyugin ring was inserted into anterior chamber and 4 eye-lets engaged to  Pupil margin. A central capsulorrhexis was fashioned and balanced salt solution then placed to hydrodissect and hydrodelineate the lens material.  The lens nucleus was then rotated.  The phaco-emulsifier was introduced into the anterior chamber, the nucleus was removed with standard horizontal chop fashion, requiring a total ultrasound time of 1:39 and an EPT of 49.  The remaining cortical material was then aspirated from the fornices of the capsular bag.  The posterior capsule was cleaned and polished and  the bag filled with BSS to reach IOP about 20. Ora system was used to get aphakic IOL measurement. ORA called MARILYN ZCB00 20.5 to give POR SE -0.38.  The intraocular lens listed below, was then placed in the posterior chamber and delivered into the bag and centered.  Then the Malyugin ring was disengaged and removed from anterior chamber without complication.  The remaining Viscoelastic was then evacuated from the anterior chamber and from beneath the intraocular lens optic.  Additional balanced salt solution was placed to obtain physiological intraocular pressure.  The corneal wound was hydrated and found to be watertight. The lid speculum and drapes were removed.  Vigamox and 5% of betadine were placed on the eye.  A protective shield was placed, and the patient then returned to recovery in good condition.          Implant Name Type Inv. Item Serial No.  Lot No. LRB No. Used Action   LENS IOL TECNIS PROTEC +20.5 D C HPT [id number] 1 PC - [id number] Lens LENS IOL TECNIS PROTEC +20.5 D C HPT BCNVX 1  [id number] [employer]  Right 1 Implanted

## 2024-01-17 NOTE — PROGRESS NOTES
Notified Dr. Montez via [employer] message r/t patients AM K+ being 3.1, awaiting orders at this time.

## 2024-01-17 NOTE — PROGRESS NOTES
Bob Vigil M.D.,[facility]  Donaldo Boston D.O., F.CONTRERAS.TED.OLauraI., [facility]  Jared Perez M.D.  Ernestina Burt M.D.   ROLF Millan.O.        Daily Pulmonary Progress Note    Patient:  Maria Victoria Elliott 73 y.o. female MRN: [id number]     Date of Service: 1/17/2024      Synopsis     We are following patient for lung abscess    \"CC\" rectal bleeding    Code status: Full code      Subjective      Patient was seen and examined lying in bed comfortable on room air.  She is not in any distress.  Infectious disease has her on Unasyn and her lungs are clear on exam.  She is awaiting treatments for 2 main campus for CTS evaluation for lobectomy.    Review of Systems:  Constitutional: Denies fever, weight loss, night sweats, and fatigue  Skin: Denies pigmentation, dark lesions, and rashes   HEENT: Denies hearing loss, tinnitus, ear drainage, epistaxis, sore throat, and hoarseness.  Cardiovascular: Denies palpitations, chest pain, and chest pressure.  Respiratory: Cough  Gastrointestinal: Constipation, blood in stool  Genitourinary: Denies dysuria, frequency, urgency or hematuria  Musculoskeletal: Denies myalgias, muscle weakness, and bone pain  Neurological: Denies dizziness, vertigo, headache, and focal weakness  Psychological: Denies anxiety and depression  Endocrine: Denies heat intolerance and cold intolerance  Hematopoietic/Lymphatic: Denies bleeding problems and blood transfusions    24-hour events:  No new events    Objective   OBJECTIVE:   BP (!) 153/62   Pulse 92   Temp 97.3 °F (36.3 °C) (Oral)   Resp 16   Ht 1.651 m (5' 5\")   Wt 91.5 kg (201 lb 11.5 oz)   SpO2 97%   BMI 33.57 kg/m²   SpO2 Readings from Last 1 Encounters:   01/16/24 97%        I/O:  No intake or output data in the 24 hours ending 01/17/24 1007                   CURRENT MEDS :  Scheduled Meds:   polyethylene glycol  17 g Oral Daily    guaiFENesin  400 mg Oral TID    naloxegol  25 mg Oral QAM AC    allopurinol  300 mg Oral Daily    [Held  as appropriate. I agree with the above documented exam, problem list and plan of care.    Ernestina Burt MD

## 2024-01-18 ENCOUNTER — APPOINTMENT (OUTPATIENT)
Dept: CT IMAGING | Age: 74
DRG: 178 | End: 2024-01-18
Attending: INTERNAL MEDICINE
Payer: MEDICARE

## 2024-01-18 LAB
ALBUMIN SERPL-MCNC: 2.8 G/DL (ref 3.5–5.2)
ALP SERPL-CCNC: 95 U/L (ref 35–104)
ALT SERPL-CCNC: 68 U/L (ref 0–32)
ANION GAP SERPL CALCULATED.3IONS-SCNC: 12 MMOL/L (ref 7–16)
AST SERPL-CCNC: 26 U/L (ref 0–31)
BASOPHILS # BLD: 0.04 K/UL (ref 0–0.2)
BASOPHILS NFR BLD: 0 % (ref 0–2)
BILIRUB SERPL-MCNC: 0.4 MG/DL (ref 0–1.2)
BUN SERPL-MCNC: 15 MG/DL (ref 6–23)
CALCIUM SERPL-MCNC: 8.9 MG/DL (ref 8.6–10.2)
CHLORIDE SERPL-SCNC: 102 MMOL/L (ref 98–107)
CO2 SERPL-SCNC: 22 MMOL/L (ref 22–29)
CREAT SERPL-MCNC: 0.7 MG/DL (ref 0.5–1)
EOSINOPHIL # BLD: 1.37 K/UL (ref 0.05–0.5)
EOSINOPHILS RELATIVE PERCENT: 7 % (ref 0–6)
ERYTHROCYTE [DISTWIDTH] IN BLOOD BY AUTOMATED COUNT: 16.7 % (ref 11.5–15)
GFR SERPL CREATININE-BSD FRML MDRD: >60 ML/MIN/1.73M2
GLUCOSE BLD-MCNC: 116 MG/DL (ref 74–99)
GLUCOSE BLD-MCNC: 150 MG/DL (ref 74–99)
GLUCOSE BLD-MCNC: 158 MG/DL (ref 74–99)
GLUCOSE BLD-MCNC: 196 MG/DL (ref 74–99)
GLUCOSE BLD-MCNC: 271 MG/DL (ref 74–99)
GLUCOSE SERPL-MCNC: 131 MG/DL (ref 74–99)
HCT VFR BLD AUTO: 29.8 % (ref 34–48)
HGB BLD-MCNC: 9 G/DL (ref 11.5–15.5)
IMM GRANULOCYTES # BLD AUTO: 0.18 K/UL (ref 0–0.58)
IMM GRANULOCYTES NFR BLD: 1 % (ref 0–5)
LACTATE BLDV-SCNC: 0.9 MMOL/L (ref 0.5–1.9)
LYMPHOCYTES NFR BLD: 2.09 K/UL (ref 1.5–4)
LYMPHOCYTES RELATIVE PERCENT: 10 % (ref 20–42)
MCH RBC QN AUTO: 23.8 PG (ref 26–35)
MCHC RBC AUTO-ENTMCNC: 30.2 G/DL (ref 32–34.5)
MCV RBC AUTO: 78.8 FL (ref 80–99.9)
MICROORGANISM SPEC CULT: NORMAL
MICROORGANISM/AGENT SPEC: NORMAL
MONOCYTES NFR BLD: 0.98 K/UL (ref 0.1–0.95)
MONOCYTES NFR BLD: 5 % (ref 2–12)
NEUTROPHILS NFR BLD: 77 % (ref 43–80)
NEUTS SEG NFR BLD: 15.64 K/UL (ref 1.8–7.3)
PLATELET # BLD AUTO: 428 K/UL (ref 130–450)
PMV BLD AUTO: 9.5 FL (ref 7–12)
POTASSIUM SERPL-SCNC: 2.8 MMOL/L (ref 3.5–5)
POTASSIUM SERPL-SCNC: 3 MMOL/L (ref 3.5–5)
PROT SERPL-MCNC: 5.9 G/DL (ref 6.4–8.3)
RBC # BLD AUTO: 3.78 M/UL (ref 3.5–5.5)
SODIUM SERPL-SCNC: 136 MMOL/L (ref 132–146)
SPECIMEN DESCRIPTION: NORMAL
WBC OTHER # BLD: 20.3 K/UL (ref 4.5–11.5)

## 2024-01-18 PROCEDURE — 94726 PLETHYSMOGRAPHY LUNG VOLUMES: CPT

## 2024-01-18 PROCEDURE — 83605 ASSAY OF LACTIC ACID: CPT

## 2024-01-18 PROCEDURE — 94729 DIFFUSING CAPACITY: CPT

## 2024-01-18 PROCEDURE — 6370000000 HC RX 637 (ALT 250 FOR IP): Performed by: FAMILY MEDICINE

## 2024-01-18 PROCEDURE — 84132 ASSAY OF SERUM POTASSIUM: CPT

## 2024-01-18 PROCEDURE — 82962 GLUCOSE BLOOD TEST: CPT

## 2024-01-18 PROCEDURE — 85025 COMPLETE CBC W/AUTO DIFF WBC: CPT

## 2024-01-18 PROCEDURE — 2140000000 HC CCU INTERMEDIATE R&B

## 2024-01-18 PROCEDURE — 94669 MECHANICAL CHEST WALL OSCILL: CPT

## 2024-01-18 PROCEDURE — 71250 CT THORAX DX C-: CPT

## 2024-01-18 PROCEDURE — 94640 AIRWAY INHALATION TREATMENT: CPT

## 2024-01-18 PROCEDURE — 80053 COMPREHEN METABOLIC PANEL: CPT

## 2024-01-18 PROCEDURE — 94060 EVALUATION OF WHEEZING: CPT

## 2024-01-18 PROCEDURE — 6370000000 HC RX 637 (ALT 250 FOR IP): Performed by: INTERNAL MEDICINE

## 2024-01-18 PROCEDURE — 36415 COLL VENOUS BLD VENIPUNCTURE: CPT

## 2024-01-18 PROCEDURE — 2580000003 HC RX 258: Performed by: FAMILY MEDICINE

## 2024-01-18 RX ORDER — POTASSIUM CHLORIDE 750 MG/1
40 TABLET, EXTENDED RELEASE ORAL 2 TIMES DAILY
Status: DISCONTINUED | OUTPATIENT
Start: 2024-01-18 | End: 2024-01-25 | Stop reason: HOSPADM

## 2024-01-18 RX ADMIN — PANTOPRAZOLE SODIUM 40 MG: 40 TABLET, DELAYED RELEASE ORAL at 06:05

## 2024-01-18 RX ADMIN — IPRATROPIUM BROMIDE AND ALBUTEROL SULFATE 1 DOSE: .5; 2.5 SOLUTION RESPIRATORY (INHALATION) at 13:20

## 2024-01-18 RX ADMIN — HYDROCHLOROTHIAZIDE 12.5 MG: 25 TABLET ORAL at 09:01

## 2024-01-18 RX ADMIN — IPRATROPIUM BROMIDE AND ALBUTEROL SULFATE 1 DOSE: .5; 2.5 SOLUTION RESPIRATORY (INHALATION) at 16:31

## 2024-01-18 RX ADMIN — GUAIFENESIN 400 MG: 400 TABLET ORAL at 21:06

## 2024-01-18 RX ADMIN — SUCRALFATE 1 G: 1 TABLET ORAL at 09:01

## 2024-01-18 RX ADMIN — SODIUM CHLORIDE, PRESERVATIVE FREE 10 ML: 5 INJECTION INTRAVENOUS at 09:02

## 2024-01-18 RX ADMIN — IPRATROPIUM BROMIDE AND ALBUTEROL SULFATE 1 DOSE: .5; 2.5 SOLUTION RESPIRATORY (INHALATION) at 08:03

## 2024-01-18 RX ADMIN — ACETAMINOPHEN 650 MG: 325 TABLET ORAL at 23:17

## 2024-01-18 RX ADMIN — LOSARTAN POTASSIUM 100 MG: 50 TABLET, FILM COATED ORAL at 09:01

## 2024-01-18 RX ADMIN — ACETAMINOPHEN 650 MG: 325 TABLET ORAL at 17:07

## 2024-01-18 RX ADMIN — POTASSIUM CHLORIDE 40 MEQ: 750 TABLET, EXTENDED RELEASE ORAL at 09:25

## 2024-01-18 RX ADMIN — SERTRALINE HYDROCHLORIDE 50 MG: 50 TABLET ORAL at 09:01

## 2024-01-18 RX ADMIN — GUAIFENESIN 400 MG: 400 TABLET ORAL at 09:01

## 2024-01-18 RX ADMIN — POTASSIUM CHLORIDE 40 MEQ: 750 TABLET, EXTENDED RELEASE ORAL at 21:06

## 2024-01-18 RX ADMIN — SODIUM CHLORIDE, PRESERVATIVE FREE 10 ML: 5 INJECTION INTRAVENOUS at 21:06

## 2024-01-18 RX ADMIN — GUAIFENESIN 400 MG: 400 TABLET ORAL at 13:06

## 2024-01-18 RX ADMIN — METOPROLOL TARTRATE 25 MG: 25 TABLET, FILM COATED ORAL at 09:01

## 2024-01-18 RX ADMIN — INSULIN LISPRO 4 UNITS: 100 INJECTION, SOLUTION INTRAVENOUS; SUBCUTANEOUS at 17:08

## 2024-01-18 RX ADMIN — ACETAMINOPHEN 650 MG: 325 TABLET ORAL at 09:02

## 2024-01-18 RX ADMIN — ATORVASTATIN CALCIUM 10 MG: 10 TABLET, FILM COATED ORAL at 09:01

## 2024-01-18 RX ADMIN — ALLOPURINOL 300 MG: 100 TABLET ORAL at 09:01

## 2024-01-18 RX ADMIN — POLYETHYLENE GLYCOL 3350 17 G: 17 POWDER, FOR SOLUTION ORAL at 09:01

## 2024-01-18 RX ADMIN — SUCRALFATE 1 G: 1 TABLET ORAL at 13:06

## 2024-01-18 RX ADMIN — SUCRALFATE 1 G: 1 TABLET ORAL at 21:06

## 2024-01-18 RX ADMIN — NALOXEGOL OXALATE 25 MG: 25 TABLET, FILM COATED ORAL at 06:04

## 2024-01-18 RX ADMIN — METOPROLOL TARTRATE 25 MG: 25 TABLET, FILM COATED ORAL at 21:06

## 2024-01-18 RX ADMIN — SUCRALFATE 1 G: 1 TABLET ORAL at 17:08

## 2024-01-18 RX ADMIN — LEVOTHYROXINE SODIUM 137 MCG: 0.14 TABLET ORAL at 06:04

## 2024-01-18 ASSESSMENT — PAIN DESCRIPTION - PAIN TYPE
TYPE: CHRONIC PAIN
TYPE: CHRONIC PAIN

## 2024-01-18 ASSESSMENT — PAIN DESCRIPTION - ORIENTATION
ORIENTATION: MID
ORIENTATION: RIGHT

## 2024-01-18 ASSESSMENT — PAIN SCALES - GENERAL
PAINLEVEL_OUTOF10: 9
PAINLEVEL_OUTOF10: 8
PAINLEVEL_OUTOF10: 9

## 2024-01-18 ASSESSMENT — PAIN DESCRIPTION - ONSET
ONSET: ON-GOING
ONSET: ON-GOING

## 2024-01-18 ASSESSMENT — PAIN DESCRIPTION - LOCATION
LOCATION: BACK
LOCATION: BACK

## 2024-01-18 ASSESSMENT — PAIN DESCRIPTION - DESCRIPTORS
DESCRIPTORS: ACHING;DISCOMFORT;SORE
DESCRIPTORS: ACHING;DISCOMFORT;SORE

## 2024-01-18 ASSESSMENT — PAIN DESCRIPTION - FREQUENCY
FREQUENCY: CONTINUOUS
FREQUENCY: CONTINUOUS

## 2024-01-18 NOTE — CARE COORDINATION
1/18/24  Spoke with patient regarding transition of care.  Patient was a transfer from [facility] for CTS consult which is pending.  Patient noted to have a mass of her right lung.  ID is on and following off ATB pending CT surgery eval. Patient is for a CT of the chest.  Patient states she lives alone and is independent with all ADL's.  Patient owns a walker, cane, wheelchair ,tub bench and grab bars.  Patient states she only uses the walker if she feels weak. Patient has no active home care support.  PCP is Dr. Phill Wadsworth and pharmacy choice is [facility].  Discharge goal is to return home when medically stable.  /CM will await course of treatment to assess for post discharge needs.    Electronically signed by VIRGEN Davenport on 1/18/2024 at 3:51 PM     Case Management Assessment  Initial Evaluation    Date/Time of Evaluation: 1/18/2024 3:51 PM  Assessment Completed by: VIRGEN Davenport    If patient is discharged prior to next notation, then this note serves as note for discharge by case management.    Patient Name: Maria Victoria Elliott                   YOB: 1950  Diagnosis: Mass of right lung [R91.8]                   Date / Time: 1/17/2024  8:21 PM    Patient Admission Status: Inpatient   Readmission Risk (Low < 19, Mod (19-27), High > 27): Readmission Risk Score: 26    Current PCP: Phill Wadsworth III, DO  PCP verified by ? Yes    Chart Reviewed: Yes      History Provided by: Patient  Patient Orientation: Alert and Oriented, Person, Place, Situation    Patient Cognition: Alert    Hospitalization in the last 30 days (Readmission):  Yes    If yes, Readmission Assessment in  Navigator will be completed.    Advance Directives:      Code Status: Full Code   Patient's Primary Decision Maker is:      Primary Decision Maker (Active): Jean Gr M - Child - [phone]    Secondary Decision Maker: Jana Gr S - Child - [phone]

## 2024-01-18 NOTE — CONSULTS
[facility]  [facility]    Consultation Note     Admit Date: 1/17/2024  8:21 PM    Reason for Consult:   Suspected necrotic lung in the right lower lobe    Attending Physician:  Hilda Abarca MD     Chief Complaint: Abdominal pain, nausea, bright red blood per rectum    HISTORY OF PRESENT ILLNESS:   73-year-old female with past medical history of A-fib, HTN, hyperuricemia, lung mass, osteoarthritis, thyroid disease, DM, multiple admissions related to pneumonia and right lower lobe mass, status post KENZIE in the right lower lobe mass biopsy with pathology showed cores of necrotic tissue insufficient for further diagnosis, status post bronchoscopy and BAL showed normal respiratory guru, fungal and AFB cultures negative, presented to [place] ER with abdominal pain and nausea and bright red blood per rectum.  CT of the abdomen pelvis shows partially necrotic mass in the right lower lobe.  Transferred to [facility] for CT surgery evaluation.  ID consulted for suspected necrotic right lower lobe mass.    Past Medical History:        Diagnosis Date    A-fib (HCC)     Back problem     Carotid artery stenosis     Diabetic neuropathy (HCC) 12/04/2015    Full dentures     Hiatal hernia     Hypertension     Hyperuricemia 12/04/2015    Lung mass     Osteoarthritis     lumbosacral spine arthritis and disc disease    PVD (peripheral vascular disease) with claudication (HCC) 12/13/2012    follows with Dr Rajput    Thyroid disease     Type II or unspecified type diabetes mellitus without mention of complication, not stated as uncontrolled     Wears glasses      Past Surgical History:        Procedure Laterality Date    APPENDECTOMY      BRONCHOSCOPY N/A 1/2/2024    BRONCHOSCOPY WITH BIOPSY performed by Enrique Gaytan DO at [facility] OR    COLONOSCOPY      CT NEEDLE BIOPSY LUNG PERCUTANEOUS  11/13/2023    CT NEEDLE BIOPSY LUNG PERCUTANEOUS 11/13/2023 [facility] CT    HYSTERECTOMY (CERVIX STATUS UNKNOWN)      with  appendectomy    JOINT REPLACEMENT Left 12/3/2015    total hip    THROAT SURGERY  2002    benign    TOTAL HIP ARTHROPLASTY Left 2015    UPPER GASTROINTESTINAL ENDOSCOPY N/A 2020    EGD BIOPSY performed by Osvaldo Martínez MD at [facility] ENDOSCOPY    UPPER GASTROINTESTINAL ENDOSCOPY N/A 7/15/2020    EGD performed by Osvaldo Martínez MD at [facility] ENDOSCOPY     Current Medications:   Scheduled Meds:   potassium chloride  40 mEq Oral BID    allopurinol  300 mg Oral Daily    apixaban  5 mg Oral BID    aspirin  81 mg Oral Daily    atorvastatin  10 mg Oral Daily    metoprolol tartrate  25 mg Oral BID    pantoprazole  40 mg Oral Daily    sertraline  50 mg Oral Daily    sucralfate  1 g Oral 4x Daily    levothyroxine  137 mcg Oral Daily    insulin lispro  0-8 Units SubCUTAneous TID WC    insulin lispro  0-4 Units SubCUTAneous Nightly    sodium chloride flush  5-40 mL IntraVENous 2 times per day    ipratropium 0.5 mg-albuterol 2.5 mg  1 Dose Inhalation Q4H WA RT    losartan  100 mg Oral Daily    And    hydroCHLOROthiazide  12.5 mg Oral Daily    polyethylene glycol  17 g Oral Daily    guaiFENesin  400 mg Oral TID    naloxegol  25 mg Oral QAM AC     Continuous Infusions:   sodium chloride      dextrose       PRN Meds:albuterol, sodium chloride flush, sodium chloride, ondansetron **OR** ondansetron, magnesium hydroxide, acetaminophen **OR** acetaminophen, docusate sodium, dextrose bolus **OR** dextrose bolus, glucagon (rDNA), dextrose, Glucose, morphine, HYDROcodone-acetaminophen    Allergies:  Adhesive tape    Social History:   Social History     Socioeconomic History    Marital status:    Tobacco Use    Smoking status: Former     Current packs/day: 0.00     Average packs/day: 1 pack/day for 41.9 years (41.9 ttl pk-yrs)     Types: Cigarettes     Start date:      Quit date: 2007     Years since quittin.1    Smokeless tobacco: Never    Tobacco comments:     Quit smoking 16 yrs.ago.   Vaping Use    Vaping Use:

## 2024-01-18 NOTE — PROGRESS NOTES
Bob Vigil M.D.,[facility]  Donaldo Boston D.O., F.CONTRERAS.TED.JUDE., [facility]  Jared Perez M.D.  Ernestina Burt M.D.   Dr Enrique Gaytan, MELODYO.        Daily Pulmonary Progress Note    Patient:  Maria Victoria Elliott 73 y.o. female MRN: [id number]     Date of Service: 1/18/2024      Synopsis     We are following patient for lung mass/abscess, prior tobacco abuse    \"CC\" shortness of breath    Code status: Full    Known to our service, transferred from [facility] no new consult needed.  Subjective      Patient was seen and examined.  Bronchoscopy 1/2/2024 biopsy negative for malignancy.  Culture showing normal respiratory guru.  Monitor off abx per ID.  Await CT surgery eval. Possible R LL lobectomy. PFTs normal lung function.  Slight improvement in constipation.  Still receiving Eliquis. Will place on hold. Await dedicated CT chest to be completed, pending.      Review of Systems:  Constitutional: Denies fever, weight loss, night sweats, and fatigue  Skin: Denies pigmentation, dark lesions, and rashes   HEENT: Denies hearing loss, tinnitus, ear drainage, epistaxis, sore throat, and hoarseness.  Cardiovascular: Denies palpitations, chest pain, and chest pressure.  Respiratory: Cough, shortness of breath  Gastrointestinal: Denies nausea, vomiting, poor appetite, diarrhea, heartburn or reflux  Genitourinary: Denies dysuria, frequency, urgency or hematuria  Musculoskeletal: Denies myalgias, muscle weakness, and bone pain  Neurological: Denies dizziness, vertigo, headache, and focal weakness  Psychological: Denies anxiety and depression  Endocrine: Denies heat intolerance and cold intolerance  Hematopoietic/Lymphatic: Denies bleeding problems and blood transfusions    24-hour events:  Transferred to [facility] for CT surgery evaluation    Objective   OBJECTIVE:   BP (!) 140/67   Pulse 78   Temp 98.6 °F (37 °C) (Temporal)   Resp 16   Ht 1.651 m (5' 5\")   Wt 92.5 kg (204 lb)   SpO2 93%   BMI 33.95 kg/m²

## 2024-01-18 NOTE — ACP (ADVANCE CARE PLANNING)
Advance Care Planning   Healthcare Decision Maker:    Primary Decision Maker (Active): Jean Gr M - Child - [phone]    Secondary Decision Maker: Jana Gr S - Child - [phone]    Supplemental (Other) Decision Maker: Reuben Kiser Jr. - Brother/Sister - [phone]    Click here to complete Healthcare Decision Makers including selection of the Healthcare Decision Maker Relationship (ie \"Primary\").          Son is POA of  second contact is a dtr-in-law

## 2024-01-18 NOTE — PROGRESS NOTES
4 Eyes Skin Assessment     NAME:  Maria Victoria Elliott  YOB: 1950  MEDICAL RECORD NUMBER:  [id number]    The patient is being assessed for  Admission    I agree that at least one RN has performed a thorough Head to Toe Skin Assessment on the patient. ALL assessment sites listed below have been assessed.      Areas assessed by both nurses:    Head, Face, Ears, Shoulders, Back, Chest, Arms, Elbows, Hands, Sacrum. Buttock, Coccyx, Ischium, Legs. Feet and Heels, and Under Medical Devices         Does the Patient have a Wound? No noted wound(s)       Callum Prevention initiated by RN: No  Wound Care Orders initiated by RN: No    Pressure Injury (Stage 3,4, Unstageable, DTI, NWPT, and Complex wounds) if present, place Wound referral order by RN under : No    New Ostomies, if present place, Ostomy referral order under : No     Nurse 1 eSignature: Electronically signed by Remedios Durán RN on 1/18/24 at 12:51 AM EST    **SHARE this note so that the co-signing nurse can place an eSignature**    Nurse 2 eSignature: {Esignature:[id number]}

## 2024-01-18 NOTE — PROGRESS NOTES
Comprehensive Nutrition Assessment    Type and Reason for Visit:  Initial, Positive Nutrition Screen    Nutrition Recommendations/Plan:   Continue current diet  Start glucerna BID to promote oral intake  Will monitor     Malnutrition Assessment:  Malnutrition Status:  At risk for malnutrition (Comment) (01/18/24 1048)    Context:  Acute Illness     Findings of the 6 clinical characteristics of malnutrition:  Energy Intake:  Mild decrease in energy intake (Comment)  Weight Loss:  Unable to assess (d/t lack of wt hx per EMR)     Body Fat Loss:  Unable to assess (pt in [facility] PFT)     Muscle Mass Loss:  Unable to assess (pt in [facility] PFT)    Fluid Accumulation:  No significant fluid accumulation     Strength:  Not Performed    Nutrition Assessment:    pt adm d/t necrotic R-lung mass s/p bronchoscopy & chronic constipation w/ rectal bleeding; PMhx of DM,AFIB,PVD; pt transf to [facility] for RLL resection; pt w/ poor oral intake per EMR review- will modify ONS to promote oral intake and monitor.    Nutrition Related Findings:    RENITA I/O; no edema; A&Ox4; U dentures; active  BS; constipation Wound Type: None       Current Nutrition Intake & Therapies:    Average Meal Intake: 26-50%  Average Supplements Intake: Unable to assess  ADULT DIET; Regular  ADULT ORAL NUTRITION SUPPLEMENT; Breakfast, Lunch; Diabetic Oral Supplement    Anthropometric Measures:  Height: 165.1 cm (5' 5\")  Ideal Body Weight (IBW): 125 lbs (57 kg)    Admission Body Weight: 92.5 kg (203 lb 14.8 oz) (1/17-BS)  Current Body Weight: 92.5 kg (203 lb 14.8 oz) (1/17-BS), 163.1 % IBW. Weight Source: Bed Scale  Current BMI (kg/m2): 33.9  Usual Body Weight:  (UTO d/t lack of wt hx per EMR in adequate time frame for assessment)     Weight Adjustment For: No Adjustment                 BMI Categories: Obese Class 1 (BMI 30.0-34.9)    Estimated Daily Nutrient Needs:  Energy Requirements Based On: Formula  Weight Used for Energy Requirements: Current  Energy (kcal/day):

## 2024-01-18 NOTE — PATIENT CARE CONFERENCE
P Quality Flow/Interdisciplinary Rounds Progress Note        Quality Flow Rounds held on January 18, 2024    Disciplines Attending:  Bedside Nurse, , , and Nursing Unit Leadership    Maria Victoria Elliott was admitted on 1/17/2024  8:21 PM    Anticipated Discharge Date:       Disposition:    Callum Score:  Callum Scale Score: 20    Readmission Risk              Risk of Unplanned Readmission:  24           Discussed patient goal for the day, patient clinical progression, and barriers to discharge.  The following Goal(s) of the Day/Commitment(s) have been identified:  Report labs/diagnostics       Kamryn Bright RN  January 18, 2024

## 2024-01-18 NOTE — PLAN OF CARE
Problem: Safety - Adult  Goal: Free from fall injury  Outcome: Progressing     Problem: Chronic Conditions and Co-morbidities  Goal: Patient's chronic conditions and co-morbidity symptoms are monitored and maintained or improved  Outcome: Progressing     Problem: Discharge Planning  Goal: Discharge to home or other facility with appropriate resources  Outcome: Progressing     Problem: Pain  Goal: Verbalizes/displays adequate comfort level or baseline comfort level  Outcome: Progressing     Problem: ABCDS Injury Assessment  Goal: Absence of physical injury  Outcome: Progressing

## 2024-01-18 NOTE — H&P
[facility]  History and Physical      CHIEF COMPLAINT:    No chief complaint on file.       Patient of Phill Wadsworth III,  presents with:  Mass of right lung    History of Present Illness:   jessica is a 73-year-old female with past medical history of atrial fibrillation on Eliquis, HTN, osteoarthritis, PVD, hypothyroidism, DM who presents to the emergency room for rectal bleeding.  Patient states that she is constipated and began having rectal bleeding prompting her to come to the emergency room for further evaluation.  On arrival labs were obtained and patient was found to have a lactic acid of 2.7, mildly elevated LFTs, leukocytosis of 19.7, H&H of 11.1/36.2.  CT abdomen pelvis revealed no acute findings in the abdomen however a partially necrotic mass in the right lower lobe.  Patient is admitted to [facility] telemetry and for further workup and treatment.  Patient was seen and evaluated by pulmonology and infectious disease who recommended patient transfer to [facility] for cardiothoracic surgery evaluation.  On evaluation she is resting comfortably in no acute distress.  She is quite frustrated and aggravated about the fact that she got transferred over here and \" things still are not being done\".  Apart from feeling weak, she denies any other acute complaints.  She is anxious to see cardiothoracic surgery.    REVIEW OF SYSTEMS:  Pertinent negatives are above in HPI.  10 point ROS otherwise negative.      Past Medical History:   Diagnosis Date    A-fib ([facility])     Back problem     Carotid artery stenosis     Diabetic neuropathy ([facility]) 12/04/2015    Full dentures     Hiatal hernia     Hypertension     Hyperuricemia 12/04/2015    Lung mass     Osteoarthritis     lumbosacral spine arthritis and disc disease    PVD (peripheral vascular disease) with claudication ([facility]) 12/13/2012    follows with Dr Rajput    Thyroid disease     Type II or unspecified type diabetes mellitus without mention  lobectomy  -CT chest without contrast  -ABX DC'd at [facility] prior to transfer over by infectious disease, ID to continue to follow-Case discussed with Dr. Sifuentes -  -K+ 2.8, replace recheck this afternoon-proved only to 3.0-continue to monitor on telemetry  -Worsening leukocytosis, 20.3-may be a infectious superimposed component  -If white count continues to rise, will reconsult infectious disease here  -Son at bedside-also equally aggravated  -Reassurance provided to patient and son in regards to her current transfer, course expected during hospitalization here    Code status: Full  Requires Inpatient level of care      I have spent a total time of 70 minutes of this patient encounter reviewing chart, labs, coordinating care with interdisciplinary teams, face to face encounter with patient, providing counseling/education to patient/family.      Hilda Abarca MD  1/18/2024

## 2024-01-18 NOTE — PROGRESS NOTES
Notified JOHNNY Victoria of repeat potassium 3.0 - order to transfer to m/s if repeat K >3.5.    1736: No additional potassium to the scheduled BID.

## 2024-01-18 NOTE — FLOWSHEET NOTE
01/17/24 2232   Belongings   Dental Appliances Uppers   Vision - Corrective Lenses Eyeglasses   Hearing Aid None   Clothing Pants;Shirt;Footwear  (walker)   Jewelry None   Electronic Devices Cell Phone;   Weapons (Notify Protective Services/Security) None   Home Medications Kept at bedside   Valuables Given To Patient   Provide Name(s) of Who Valuable(s) Were Given To Maria Victoria Elliott

## 2024-01-19 PROBLEM — Z71.89 GOALS OF CARE, COUNSELING/DISCUSSION: Status: ACTIVE | Noted: 2024-01-19

## 2024-01-19 PROBLEM — Z51.5 PALLIATIVE CARE ENCOUNTER: Status: ACTIVE | Noted: 2024-01-19

## 2024-01-19 LAB
ALBUMIN SERPL-MCNC: 2.5 G/DL (ref 3.5–5.2)
ALP SERPL-CCNC: 90 U/L (ref 35–104)
ALT SERPL-CCNC: 62 U/L (ref 0–32)
ANION GAP SERPL CALCULATED.3IONS-SCNC: 15 MMOL/L (ref 7–16)
AST SERPL-CCNC: 21 U/L (ref 0–31)
BASOPHILS # BLD: 0.06 K/UL (ref 0–0.2)
BASOPHILS NFR BLD: 0 % (ref 0–2)
BILIRUB SERPL-MCNC: 0.4 MG/DL (ref 0–1.2)
BUN SERPL-MCNC: 19 MG/DL (ref 6–23)
CALCIUM SERPL-MCNC: 8.9 MG/DL (ref 8.6–10.2)
CHLORIDE SERPL-SCNC: 103 MMOL/L (ref 98–107)
CO2 SERPL-SCNC: 20 MMOL/L (ref 22–29)
CREAT SERPL-MCNC: 0.7 MG/DL (ref 0.5–1)
EOSINOPHIL # BLD: 1.68 K/UL (ref 0.05–0.5)
EOSINOPHILS RELATIVE PERCENT: 9 % (ref 0–6)
ERYTHROCYTE [DISTWIDTH] IN BLOOD BY AUTOMATED COUNT: 16.7 % (ref 11.5–15)
FERRITIN SERPL-MCNC: 188 NG/ML
FOLATE SERPL-MCNC: 18.3 NG/ML (ref 4.8–24.2)
GFR SERPL CREATININE-BSD FRML MDRD: >60 ML/MIN/1.73M2
GLUCOSE BLD-MCNC: 130 MG/DL (ref 74–99)
GLUCOSE BLD-MCNC: 148 MG/DL (ref 74–99)
GLUCOSE BLD-MCNC: 220 MG/DL (ref 74–99)
GLUCOSE BLD-MCNC: 260 MG/DL (ref 74–99)
GLUCOSE SERPL-MCNC: 128 MG/DL (ref 74–99)
HCT VFR BLD AUTO: 31.7 % (ref 34–48)
HGB BLD-MCNC: 9.3 G/DL (ref 11.5–15.5)
IMM GRANULOCYTES # BLD AUTO: 0.23 K/UL (ref 0–0.58)
IMM GRANULOCYTES NFR BLD: 1 % (ref 0–5)
IMM RETICS NFR: 28.9 % (ref 3–15.9)
IRON SATN MFR SERPL: 15 % (ref 15–50)
IRON SERPL-MCNC: 26 UG/DL (ref 37–145)
LYMPHOCYTES NFR BLD: 1.63 K/UL (ref 1.5–4)
LYMPHOCYTES RELATIVE PERCENT: 9 % (ref 20–42)
MCH RBC QN AUTO: 23.9 PG (ref 26–35)
MCHC RBC AUTO-ENTMCNC: 29.3 G/DL (ref 32–34.5)
MCV RBC AUTO: 81.5 FL (ref 80–99.9)
MONOCYTES NFR BLD: 0.88 K/UL (ref 0.1–0.95)
MONOCYTES NFR BLD: 5 % (ref 2–12)
NEUTROPHILS NFR BLD: 75 % (ref 43–80)
NEUTS SEG NFR BLD: 13.46 K/UL (ref 1.8–7.3)
PLATELET # BLD AUTO: 377 K/UL (ref 130–450)
PMV BLD AUTO: 9.6 FL (ref 7–12)
POTASSIUM SERPL-SCNC: 3.7 MMOL/L (ref 3.5–5)
PROT SERPL-MCNC: 5.8 G/DL (ref 6.4–8.3)
RBC # BLD AUTO: 3.89 M/UL (ref 3.5–5.5)
RETIC HEMOGLOBIN: 22.8 PG (ref 28.2–36.6)
RETICS # AUTO: 0.07 M/UL
RETICS/RBC NFR AUTO: 2 % (ref 0.4–1.9)
SODIUM SERPL-SCNC: 138 MMOL/L (ref 132–146)
TIBC SERPL-MCNC: 170 UG/DL (ref 250–450)
VIT B12 SERPL-MCNC: 708 PG/ML (ref 211–946)
WBC OTHER # BLD: 17.9 K/UL (ref 4.5–11.5)

## 2024-01-19 PROCEDURE — 6370000000 HC RX 637 (ALT 250 FOR IP): Performed by: FAMILY MEDICINE

## 2024-01-19 PROCEDURE — 99223 1ST HOSP IP/OBS HIGH 75: CPT | Performed by: NURSE PRACTITIONER

## 2024-01-19 PROCEDURE — 83540 ASSAY OF IRON: CPT

## 2024-01-19 PROCEDURE — 83550 IRON BINDING TEST: CPT

## 2024-01-19 PROCEDURE — 82962 GLUCOSE BLOOD TEST: CPT

## 2024-01-19 PROCEDURE — 2140000000 HC CCU INTERMEDIATE R&B

## 2024-01-19 PROCEDURE — 80053 COMPREHEN METABOLIC PANEL: CPT

## 2024-01-19 PROCEDURE — 94640 AIRWAY INHALATION TREATMENT: CPT

## 2024-01-19 PROCEDURE — 2580000003 HC RX 258: Performed by: FAMILY MEDICINE

## 2024-01-19 PROCEDURE — 6370000000 HC RX 637 (ALT 250 FOR IP): Performed by: NURSE PRACTITIONER

## 2024-01-19 PROCEDURE — 85045 AUTOMATED RETICULOCYTE COUNT: CPT

## 2024-01-19 PROCEDURE — 6370000000 HC RX 637 (ALT 250 FOR IP): Performed by: INTERNAL MEDICINE

## 2024-01-19 PROCEDURE — 82607 VITAMIN B-12: CPT

## 2024-01-19 PROCEDURE — 85025 COMPLETE CBC W/AUTO DIFF WBC: CPT

## 2024-01-19 PROCEDURE — 82746 ASSAY OF FOLIC ACID SERUM: CPT

## 2024-01-19 PROCEDURE — 82728 ASSAY OF FERRITIN: CPT

## 2024-01-19 PROCEDURE — 6360000002 HC RX W HCPCS: Performed by: FAMILY MEDICINE

## 2024-01-19 PROCEDURE — 36415 COLL VENOUS BLD VENIPUNCTURE: CPT

## 2024-01-19 PROCEDURE — 99222 1ST HOSP IP/OBS MODERATE 55: CPT | Performed by: THORACIC SURGERY (CARDIOTHORACIC VASCULAR SURGERY)

## 2024-01-19 RX ORDER — OXYCODONE HYDROCHLORIDE AND ACETAMINOPHEN 5; 325 MG/1; MG/1
2 TABLET ORAL EVERY 6 HOURS PRN
Status: DISCONTINUED | OUTPATIENT
Start: 2024-01-19 | End: 2024-01-20

## 2024-01-19 RX ORDER — SENNA AND DOCUSATE SODIUM 50; 8.6 MG/1; MG/1
1 TABLET, FILM COATED ORAL DAILY
Status: DISCONTINUED | OUTPATIENT
Start: 2024-01-19 | End: 2024-01-25 | Stop reason: HOSPADM

## 2024-01-19 RX ORDER — OXYCODONE HYDROCHLORIDE AND ACETAMINOPHEN 5; 325 MG/1; MG/1
1 TABLET ORAL EVERY 6 HOURS PRN
Status: DISCONTINUED | OUTPATIENT
Start: 2024-01-19 | End: 2024-01-20

## 2024-01-19 RX ORDER — DOCUSATE SODIUM 100 MG/1
100 CAPSULE, LIQUID FILLED ORAL 2 TIMES DAILY
Status: DISCONTINUED | OUTPATIENT
Start: 2024-01-19 | End: 2024-01-19

## 2024-01-19 RX ADMIN — SODIUM CHLORIDE, PRESERVATIVE FREE 10 ML: 5 INJECTION INTRAVENOUS at 18:27

## 2024-01-19 RX ADMIN — IPRATROPIUM BROMIDE AND ALBUTEROL SULFATE 1 DOSE: .5; 2.5 SOLUTION RESPIRATORY (INHALATION) at 09:14

## 2024-01-19 RX ADMIN — POLYETHYLENE GLYCOL 3350 17 G: 17 POWDER, FOR SOLUTION ORAL at 09:32

## 2024-01-19 RX ADMIN — POTASSIUM CHLORIDE 40 MEQ: 750 TABLET, EXTENDED RELEASE ORAL at 20:01

## 2024-01-19 RX ADMIN — ALLOPURINOL 300 MG: 100 TABLET ORAL at 09:30

## 2024-01-19 RX ADMIN — MORPHINE SULFATE 2 MG: 2 INJECTION, SOLUTION INTRAMUSCULAR; INTRAVENOUS at 12:02

## 2024-01-19 RX ADMIN — NALOXEGOL OXALATE 25 MG: 25 TABLET, FILM COATED ORAL at 06:33

## 2024-01-19 RX ADMIN — SENNOSIDES AND DOCUSATE SODIUM 1 TABLET: 50; 8.6 TABLET ORAL at 16:17

## 2024-01-19 RX ADMIN — SERTRALINE HYDROCHLORIDE 50 MG: 50 TABLET ORAL at 09:31

## 2024-01-19 RX ADMIN — METOPROLOL TARTRATE 25 MG: 25 TABLET, FILM COATED ORAL at 09:31

## 2024-01-19 RX ADMIN — OXYCODONE AND ACETAMINOPHEN 2 TABLET: 5; 325 TABLET ORAL at 16:16

## 2024-01-19 RX ADMIN — GUAIFENESIN 400 MG: 400 TABLET ORAL at 14:33

## 2024-01-19 RX ADMIN — SUCRALFATE 1 G: 1 TABLET ORAL at 20:01

## 2024-01-19 RX ADMIN — IPRATROPIUM BROMIDE AND ALBUTEROL SULFATE 1 DOSE: .5; 2.5 SOLUTION RESPIRATORY (INHALATION) at 16:37

## 2024-01-19 RX ADMIN — INSULIN LISPRO 2 UNITS: 100 INJECTION, SOLUTION INTRAVENOUS; SUBCUTANEOUS at 12:03

## 2024-01-19 RX ADMIN — MORPHINE SULFATE 2 MG: 2 INJECTION, SOLUTION INTRAMUSCULAR; INTRAVENOUS at 18:27

## 2024-01-19 RX ADMIN — LOSARTAN POTASSIUM 100 MG: 50 TABLET, FILM COATED ORAL at 09:31

## 2024-01-19 RX ADMIN — POTASSIUM CHLORIDE 40 MEQ: 750 TABLET, EXTENDED RELEASE ORAL at 09:32

## 2024-01-19 RX ADMIN — DOCUSATE SODIUM 100 MG: 100 CAPSULE, LIQUID FILLED ORAL at 12:03

## 2024-01-19 RX ADMIN — GUAIFENESIN 400 MG: 400 TABLET ORAL at 09:31

## 2024-01-19 RX ADMIN — PANTOPRAZOLE SODIUM 40 MG: 40 TABLET, DELAYED RELEASE ORAL at 06:33

## 2024-01-19 RX ADMIN — SUCRALFATE 1 G: 1 TABLET ORAL at 14:33

## 2024-01-19 RX ADMIN — HYDROCHLOROTHIAZIDE 12.5 MG: 25 TABLET ORAL at 09:31

## 2024-01-19 RX ADMIN — IPRATROPIUM BROMIDE AND ALBUTEROL SULFATE 1 DOSE: .5; 2.5 SOLUTION RESPIRATORY (INHALATION) at 12:47

## 2024-01-19 RX ADMIN — GUAIFENESIN 400 MG: 400 TABLET ORAL at 20:01

## 2024-01-19 RX ADMIN — SUCRALFATE 1 G: 1 TABLET ORAL at 09:31

## 2024-01-19 RX ADMIN — SODIUM CHLORIDE, PRESERVATIVE FREE 10 ML: 5 INJECTION INTRAVENOUS at 09:32

## 2024-01-19 RX ADMIN — METOPROLOL TARTRATE 25 MG: 25 TABLET, FILM COATED ORAL at 20:01

## 2024-01-19 RX ADMIN — SODIUM CHLORIDE, PRESERVATIVE FREE 10 ML: 5 INJECTION INTRAVENOUS at 12:02

## 2024-01-19 RX ADMIN — IPRATROPIUM BROMIDE AND ALBUTEROL SULFATE 1 DOSE: .5; 2.5 SOLUTION RESPIRATORY (INHALATION) at 19:55

## 2024-01-19 RX ADMIN — ATORVASTATIN CALCIUM 10 MG: 10 TABLET, FILM COATED ORAL at 09:30

## 2024-01-19 RX ADMIN — ACETAMINOPHEN 650 MG: 325 TABLET ORAL at 09:30

## 2024-01-19 RX ADMIN — LEVOTHYROXINE SODIUM 137 MCG: 0.14 TABLET ORAL at 06:33

## 2024-01-19 RX ADMIN — OXYCODONE AND ACETAMINOPHEN 2 TABLET: 5; 325 TABLET ORAL at 22:21

## 2024-01-19 RX ADMIN — SUCRALFATE 1 G: 1 TABLET ORAL at 16:17

## 2024-01-19 RX ADMIN — SODIUM CHLORIDE, PRESERVATIVE FREE 10 ML: 5 INJECTION INTRAVENOUS at 20:02

## 2024-01-19 ASSESSMENT — PAIN DESCRIPTION - ORIENTATION
ORIENTATION: RIGHT
ORIENTATION: RIGHT;POSTERIOR

## 2024-01-19 ASSESSMENT — PAIN SCALES - GENERAL
PAINLEVEL_OUTOF10: 10
PAINLEVEL_OUTOF10: 9
PAINLEVEL_OUTOF10: 0
PAINLEVEL_OUTOF10: 7
PAINLEVEL_OUTOF10: 8
PAINLEVEL_OUTOF10: 10
PAINLEVEL_OUTOF10: 6

## 2024-01-19 ASSESSMENT — PAIN DESCRIPTION - DESCRIPTORS
DESCRIPTORS: ACHING;DISCOMFORT;SORE
DESCRIPTORS: SHARP;STABBING;SHOOTING
DESCRIPTORS: ACHING;DISCOMFORT;SORE

## 2024-01-19 ASSESSMENT — PAIN DESCRIPTION - ONSET
ONSET: ON-GOING
ONSET: ON-GOING
ONSET: GRADUAL
ONSET: GRADUAL

## 2024-01-19 ASSESSMENT — PAIN DESCRIPTION - PAIN TYPE
TYPE: CHRONIC PAIN

## 2024-01-19 ASSESSMENT — PAIN DESCRIPTION - FREQUENCY
FREQUENCY: CONTINUOUS

## 2024-01-19 ASSESSMENT — PAIN DESCRIPTION - LOCATION
LOCATION: BACK;RIB CAGE
LOCATION: BACK

## 2024-01-19 NOTE — PROCEDURES
[facility]                  [address] [place] [address]                               PULMONARY FUNCTION    PATIENT NAME: RUPERTO DALEY                   :        1950  MED REC NO:   [id number]                            ROOM:       6506  ACCOUNT NO:   [id number]                           ADMIT DATE: 2024  PROVIDER:     Enrique Fair DO    DATE OF PROCEDURE:  2024    INTERPRETATION:  Pulmonary function test completed with good effort and  cooperation.    SPIROMETRY:  Expiratory flow rates are within normal limits.  Forced  vital capacity is 3.14 L, which is 113% of predicted; postbronchodilator  is 3.09 L, which is 111%.  FEV1 is 2.30 L, which is 108%;  postbronchodilator is 2.20 L, which is 103%.  FEV1/FVC ratio is 73%.   AXE34-65% is 101%.  MVV is 57 L/min, which is 67%.    LUNG VOLUMES:  Total lung capacity is 5.37 L, which is 103%.  Residual  volume is 2.62 L, which is 111%.  RV/TLC ratio is 49%.    DIFFUSION:  DLCO corrected for hemoglobin is 17.61 mL/min per mmHg,  which is 113% is normal.    PFT INTERPRETATION:  1.  Normal spirometry.  2.  Normal total lung capacity with mildly increased RV/TLC indicating  air trapping.  3.  Normal diffusion.    CONCLUSION:  Essentially normal pulmonary function test.    Clinical correlation is advised.        ENRIQUE FAIR DO    D: 2024 18:12:00       T: 2024 22:41:23     [id number]  Job#: [id number]     Doc#: [id number]    CC:

## 2024-01-19 NOTE — CARE COORDINATION
1/19/24  Transition of Care Update.  Patient admitted for mass of her right lung and noted to have suspected mets to the ribs, spine and adrenal gland.  Patient is on room air and continues to pend CTS consult.  PFT's are normal.  Patient states she spoke with CTS and they are planning on surgery Monday 1/22 to remove the mass.  Patient was choiced for home care with no preference other than someone in network with her insurance.  Call placed to [facility] with referral given and pending.  Discharge goal is to return home when medically stable. HOLGER/MIA to follow.    Electronically signed by VIRGEN Davenport on 1/19/2024 at 2:50 PM

## 2024-01-19 NOTE — PROGRESS NOTES
Bob Vigil M.D.,[facility]  Donaldo Boston D.O., F.A.C.O.I., [facility]  Jared Perez M.D.  Ernestina Burt M.D.   MELODY MillanO.        Daily Pulmonary Progress Note    Patient:  Maria Victoria Elliott 73 y.o. female MRN: [id number]     Date of Service: 1/19/2024      Synopsis     We are following patient for lung mass/abscess, prior tobacco abuse    \"CC\" shortness of breath    Code status: Full    Known to our service, transferred from [facility] no new consult needed.  Subjective      Patient was seen and examined.  CT chest completed.  Evidence of increasing right lung mass with involvement to ribs and thoracic spine, possible adrenal mets.  No plan for CT surgery will speak to patient regarding CT-guided biopsy for tissue diagnosis.      Review of Systems:  Constitutional: Denies fever, weight loss, night sweats, and fatigue  Skin: Denies pigmentation, dark lesions, and rashes   HEENT: Denies hearing loss, tinnitus, ear drainage, epistaxis, sore throat, and hoarseness.  Cardiovascular: Denies palpitations, chest pain, and chest pressure.  Respiratory: Cough, shortness of breath  Gastrointestinal: Denies nausea, vomiting, poor appetite, diarrhea, heartburn or reflux  Genitourinary: Denies dysuria, frequency, urgency or hematuria  Musculoskeletal: Denies myalgias, muscle weakness, and bone pain  Neurological: Denies dizziness, vertigo, headache, and focal weakness  Psychological: Denies anxiety and depression  Endocrine: Denies heat intolerance and cold intolerance  Hematopoietic/Lymphatic: Denies bleeding problems and blood transfusions    24-hour events:  CT chest completed, increasing size of right lung mass with metastatic involvement and bone and adrenal gland    Objective   OBJECTIVE:   BP (!) 128/54   Pulse 70   Temp 97.1 °F (36.2 °C) (Temporal)   Resp 20   Ht 1.651 m (5' 5\")   Wt 92.5 kg (204 lb)   SpO2 95%   BMI 33.95 kg/m²   SpO2 Readings from Last 1 Encounters:   01/19/24 95%

## 2024-01-19 NOTE — CONSULTS
CTS Consult    Patient name: Maria Victoria Elliott    Reason for consult: Lung mass    Referring Physician: Dr. Gaytan    Primary Care Physician: Phill Wadsworth III, DO    Date of service: 1/19/2024    Chief Complaint: Back pain    HPI: 73 year old female admitted in November and found to have a lung mass.  CT guided and bronchoscopy biopsies have not revealed an answer.  She is referred here for surgery.  She complains of excruciating back pain and right flank pain.  She denies SOB at rest, N/V, F/C, orthopnea, PND and syncope.    Allergies:   Allergies   Allergen Reactions    Adhesive Tape Other (See Comments)     Regular tape and bandaids. Needs paper tape.  Pulls skin       Home medications:    Current Facility-Administered Medications   Medication Dose Route Frequency Provider Last Rate Last Admin    docusate sodium (COLACE) capsule 100 mg  100 mg Oral BID Hilda Abarca MD   100 mg at 01/19/24 1203    potassium chloride (KLOR-CON M) extended release tablet 40 mEq  40 mEq Oral BID Hilda Abarca MD   40 mEq at 01/19/24 0932    albuterol (PROVENTIL) (2.5 MG/3ML) 0.083% nebulizer solution 2.5 mg  2.5 mg Nebulization Q4H PRN Serina Montez E, DO        allopurinol (ZYLOPRIM) tablet 300 mg  300 mg Oral Daily Serina Montez, DO   300 mg at 01/19/24 0930    [Held by provider] apixaban (ELIQUIS) tablet 5 mg  5 mg Oral BID Serina Montez, DO        aspirin EC tablet 81 mg  81 mg Oral Daily Serina Montez, DO        atorvastatin (LIPITOR) tablet 10 mg  10 mg Oral Daily Serina Montez E, DO   10 mg at 01/19/24 0930    metoprolol tartrate (LOPRESSOR) tablet 25 mg  25 mg Oral BID Serina Montez, DO   25 mg at 01/19/24 0931    pantoprazole (PROTONIX) tablet 40 mg  40 mg Oral Daily Serina Montez, DO   40 mg at 01/19/24 0633    sertraline (ZOLOFT) tablet 50 mg  50 mg Oral Daily Serina Montez, DO   50 mg at 01/19/24 0931    sucralfate (CARAFATE) tablet 1 g  1 g Oral 4x Daily Serina Montez, DO    N/A 7/15/2020    EGD performed by Osvaldo Martínez MD at [facility] ENDOSCOPY       Social History:  Social History     Socioeconomic History    Marital status:      Spouse name: Not on file    Number of children: Not on file    Years of education: Not on file    Highest education level: Not on file   Occupational History    Not on file   Tobacco Use    Smoking status: Former     Current packs/day: 0.00     Average packs/day: 1 pack/day for 41.9 years (41.9 ttl pk-yrs)     Types: Cigarettes     Start date:      Quit date: 2007     Years since quittin.1    Smokeless tobacco: Never    Tobacco comments:     Quit smoking 16 yrs.ago.   Vaping Use    Vaping Use: Never used   Substance and Sexual Activity    Alcohol use: Not Currently    Drug use: Never    Sexual activity: Not on file   Other Topics Concern    Not on file   Social History Narrative    Drinks occ tea.  Drinks 3 cups of decaf coffee daily.      Social Determinants of Health     Financial Resource Strain: Not on file   Food Insecurity: No Food Insecurity (2024)    Hunger Vital Sign     Worried About Running Out of Food in the Last Year: Never true     Ran Out of Food in the Last Year: Never true   Transportation Needs: No Transportation Needs (2024)    PRAPARE - Transportation     Lack of Transportation (Medical): No     Lack of Transportation (Non-Medical): No   Physical Activity: Not on file   Stress: Not on file   Social Connections: Not on file   Intimate Partner Violence: Not on file   Housing Stability: Low Risk  (2024)    Housing Stability Vital Sign     Unable to Pay for Housing in the Last Year: No     Number of Places Lived in the Last Year: 1     Unstable Housing in the Last Year: No       Family History:  Family History   Problem Relation Age of Onset    Kidney Disease Mother     Cancer Father     Heart Disease Maternal Grandmother        Review of Systems:  Constitutional: Denies fevers, chills, or weight

## 2024-01-19 NOTE — PLAN OF CARE
Problem: Safety - Adult  Goal: Free from fall injury  1/19/2024 1257 by Sho Perez RN  Outcome: Progressing  1/19/2024 0322 by Remedios Durán RN  Outcome: Progressing     Problem: Chronic Conditions and Co-morbidities  Goal: Patient's chronic conditions and co-morbidity symptoms are monitored and maintained or improved  1/19/2024 1257 by Sho Perez RN  Outcome: Progressing  1/19/2024 0322 by Remedios Durán RN  Outcome: Progressing     Problem: Discharge Planning  Goal: Discharge to home or other facility with appropriate resources  1/19/2024 1257 by Sho Perez RN  Outcome: Progressing  1/19/2024 0322 by Remedios Durán RN  Outcome: Progressing     Problem: ABCDS Injury Assessment  Goal: Absence of physical injury  1/19/2024 1257 by Sho Perez RN  Outcome: Progressing  1/19/2024 0322 by Remedios Durán RN  Outcome: Progressing     Problem: Nutrition Deficit:  Goal: Optimize nutritional status  1/19/2024 1257 by Sho Perez RN  Outcome: Progressing  1/19/2024 0322 by Remedios Durán RN  Outcome: Progressing     Problem: Pain  Goal: Verbalizes/displays adequate comfort level or baseline comfort level  1/19/2024 1257 by Sho Perez RN  Outcome: Not Progressing  1/19/2024 0322 by Remedios Durán RN  Outcome: Progressing

## 2024-01-19 NOTE — PLAN OF CARE
Problem: Safety - Adult  Goal: Free from fall injury  Outcome: Progressing     Problem: Chronic Conditions and Co-morbidities  Goal: Patient's chronic conditions and co-morbidity symptoms are monitored and maintained or improved  Outcome: Progressing     Problem: Discharge Planning  Goal: Discharge to home or other facility with appropriate resources  Outcome: Progressing     Problem: Pain  Goal: Verbalizes/displays adequate comfort level or baseline comfort level  Outcome: Progressing     Problem: ABCDS Injury Assessment  Goal: Absence of physical injury  Outcome: Progressing     Problem: Nutrition Deficit:  Goal: Optimize nutritional status  Outcome: Progressing

## 2024-01-19 NOTE — PROGRESS NOTES
[facility]  [facility]    Consultation Note     Admit Date: 1/17/2024  8:21 PM    Reason for Consult:   Suspected necrotic lung in the right lower lobe    Attending Physician:  Hilda Abarca MD     Chief Complaint: Abdominal pain, nausea, bright red blood per rectum    HISTORY OF PRESENT ILLNESS:   73-year-old female with past medical history of A-fib, HTN, hyperuricemia, lung mass, osteoarthritis, thyroid disease, DM, multiple admissions related to pneumonia and right lower lobe mass, status post KENZIE in the right lower lobe mass biopsy with pathology showed cores of necrotic tissue insufficient for further diagnosis, status post bronchoscopy and BAL showed normal respiratory guru, fungal and AFB cultures negative, presented to [place] ER with abdominal pain and nausea and bright red blood per rectum.  CT of the abdomen pelvis shows partially necrotic mass in the right lower lobe.  Transferred to [facility] for CT surgery evaluation.  ID consulted for suspected necrotic right lower lobe mass.    Past Medical History:        Diagnosis Date    A-fib (HCC)     Back problem     Carotid artery stenosis     Diabetic neuropathy (HCC) 12/04/2015    Full dentures     Hiatal hernia     Hypertension     Hyperuricemia 12/04/2015    Lung mass     Osteoarthritis     lumbosacral spine arthritis and disc disease    PVD (peripheral vascular disease) with claudication (HCC) 12/13/2012    follows with Dr Rajput    Thyroid disease     Type II or unspecified type diabetes mellitus without mention of complication, not stated as uncontrolled     Wears glasses      Past Surgical History:        Procedure Laterality Date    APPENDECTOMY      BRONCHOSCOPY N/A 1/2/2024    BRONCHOSCOPY WITH BIOPSY performed by Enrique Gaytan DO at [facility] OR    COLONOSCOPY      CT NEEDLE BIOPSY LUNG PERCUTANEOUS  11/13/2023    CT NEEDLE BIOPSY LUNG PERCUTANEOUS 11/13/2023 [facility] CT    HYSTERECTOMY (CERVIX STATUS UNKNOWN)      with

## 2024-01-19 NOTE — CONSULTS
Palliative Care Department  [phone]  Palliative Care Initial Consult  Provider Mary Kay Joe APRN - CNP     Maria Victoria Elliott  [id number]  Hospital Day: 3  Date of Initial Consult: 1/19/2024  Referring Provider:  Enrique Gaytan DO  Palliative Medicine was consulted for assistance with: overwhelming symptoms    HPI:   Maria Victoria Elliott is a 73 y.o. with a medical history of atrial fibrillation on Eliquis, HTN, osteoarthritis, PVD, hypothyroidism, DM who was admitted on 1/17/2024 from home with a CHIEF COMPLAINT of rectal bleeding. Patient was a transfer from [facility] for CTA evaluation. Patient states that she is constipated and began having rectal bleeding prompting her to come to the emergency room for further evaluation.  On arrival labs were obtained and patient was found to have a lactic acid of 2.7, mildly elevated LFTs, leukocytosis of 19.7, H&H of 11.1/36.2.  CT abdomen pelvis revealed no acute findings in the abdomen however a partially necrotic mass in the right lower lobe. Hospitalized in November 2023 found to have a lung mass which was biopsied by pulmonology showing necrotic tissue insufficient for further diagnosis. Then underwent a bronchoscopy on 1/2/2024 with transbronchial biopsy showing benign bronchial mucosa negative for neoplasm.  She was to continue to follow with pulmonology and treat for pulmonary abscess with close CT follow-up in 4 weeks post antibiotics.  She was also referred to see Dr. Hallman in November 2023 however does not appear to have made an appointment.  Pulmnology, ID, CTS, oncology following. She is for possible RLL lobectomy. Palliative medicine consulted for further assistance.   ASSESSMENT/PLAN:     Pertinent Hospital Diagnoses     Right lung mass  History of lung abscess  Constipation    Palliative Care Encounter / Counseling Regarding Goals of Care  Please see detailed goals of care discussion as below  At this time, Maria Victoria Elliott, Does have

## 2024-01-19 NOTE — PLAN OF CARE
Problem: Safety - Adult  Goal: Free from fall injury  1/19/2024 0322 by Remedios Durán RN  Outcome: Progressing  1/18/2024 1923 by Sho Perez RN  Outcome: Progressing     Problem: Chronic Conditions and Co-morbidities  Goal: Patient's chronic conditions and co-morbidity symptoms are monitored and maintained or improved  1/19/2024 0322 by Remedios Durán RN  Outcome: Progressing  1/18/2024 1923 by Sho Perez RN  Outcome: Progressing     Problem: Discharge Planning  Goal: Discharge to home or other facility with appropriate resources  1/19/2024 0322 by Remedios Durán RN  Outcome: Progressing  1/18/2024 1923 by Sho Perez RN  Outcome: Progressing     Problem: Pain  Goal: Verbalizes/displays adequate comfort level or baseline comfort level  1/19/2024 0322 by Remedios Durán RN  Outcome: Progressing  1/18/2024 1923 by Sho Perez RN  Outcome: Progressing     Problem: ABCDS Injury Assessment  Goal: Absence of physical injury  1/19/2024 0322 by Remedios Durán RN  Outcome: Progressing  1/18/2024 1923 by Sho Perez RN  Outcome: Progressing     Problem: Nutrition Deficit:  Goal: Optimize nutritional status  1/19/2024 0322 by Remedios Durán RN  Outcome: Progressing  1/18/2024 1923 by Sho Perez RN  Outcome: Progressing

## 2024-01-19 NOTE — CONSULTS
[facility]  Hematology/Oncology  Consult      Patient Name: Maria Victoria Elliott  YOB: 1950  PCP: Phill Wadsworth III, DO   Referring Provider: [address] [facility] [address] [place] [address]     Reason for Consultation: No chief complaint on file.       History of Present Illness: This is a 73-year-old female patient with a PMH of A-fib on Eliquis, CAD, DM II, HTN HTN, HLD, PVD who was hospitalized in November 2023 found to have a lung mass which was biopsied by pulmonology showing necrotic tissue insufficient for further diagnosis.  She then underwent a bronchoscopy on 1/2/2024 with transbronchial biopsy showing benign bronchial mucosa negative for neoplasm.  She was to continue to follow with pulmonology and treat for pulmonary abscess with close CT follow-up in 4 weeks post antibiotics.  She was referred to see Dr. Hallman in November 2023 however does not appear to have made an appointment. Patient presented to the ED for evaluation of rectal bleeding. General surgery was consulted with no plans for surgical intervention as hemoglobin was stable at baseline.  CT A/P revealed no acute findings however showed a partially necrotic mass in the right lower lobe.  CT chest without contrast showed marked increase in size of the right lung mass now measuring 8.8 x 9.0 x 12.6 cm previously 10 x 7 x 8.2 cm.  Pulmonology following.  Cardiothoracic surgery consulted for right lower lobe lobectomy.  Patient is scheduled 1/22/2024 with Dr. Caballero for right thoracotomy with lobectomy.  ID following and monitoring off antibiotics for now.  Blood cultures with no growth so far.  Leukocytosis likely due to malignancy.  CMP unremarkable.  LFTs with ALT 62.  CBC with WBC 17.9, ANC 13.46, Hgb 9.3, platelets WNL.  Consultation for lung mass.      Diagnostic Data:     Past Medical History:   Diagnosis Date    A-fib (HCC)     Back problem     Carotid artery stenosis     Diabetic neuropathy (HCC) 12/04/2015  tablet by mouth 2 times daily    ProviderAmaya MD   Calcium Carb-Cholecalciferol (CALCIUM 1000 + D PO) Take by mouth daily     ProviderAmaya MD       Allergies  Allergies   Allergen Reactions    Adhesive Tape Other (See Comments)     Regular tape and bandaids. Needs paper tape.  Pulls skin       Review of Systems:    As in HPI above otherwise negative.    Objective  BP (!) 128/54   Pulse 70   Temp 97.1 °F (36.2 °C) (Temporal)   Resp 16   Ht 1.651 m (5' 5\")   Wt 92.5 kg (204 lb)   SpO2 93%   BMI 33.95 kg/m²     Physical Exam:     General: AAO to person, place, time, in no acute distress,   Head and neck : PERRLA, EOMI . Sclera non icteric.  Oropharynx : Clear  Neck: no JVD,  no adenopathy  Heart: Regular rate and regular rhythm,   Lungs: Decreased BS right base  Extremities: No edema,no cyanosis, no clubbing.   Abdomen: Soft, non-tender;no masses, no organomegaly  Skin:  No rash.  Neurologic:Cranial nerves grossly intact. No focal motor or sensory deficits .    Recent Laboratory Data-   Lab Results   Component Value Date    WBC 17.9 (H) 01/19/2024    HGB 9.3 (L) 01/19/2024    HCT 31.7 (L) 01/19/2024    MCV 81.5 01/19/2024     01/19/2024    LYMPHOPCT 9 (L) 01/19/2024    RBC 3.89 01/19/2024    MCH 23.9 (L) 01/19/2024    MCHC 29.3 (L) 01/19/2024    RDW 16.7 (H) 01/19/2024    NEUTOPHILPCT 75 01/19/2024    MONOPCT 5 01/19/2024    BASOPCT 0 01/19/2024    NEUTROABS 13.46 (H) 01/19/2024    LYMPHSABS 1.63 01/19/2024    MONOSABS 0.88 01/19/2024    EOSABS 1.68 (H) 01/19/2024    BASOSABS 0.06 01/19/2024       Lab Results   Component Value Date     01/19/2024    K 3.7 01/19/2024     01/19/2024    CO2 20 (L) 01/19/2024    BUN 19 01/19/2024    CREATININE 0.7 01/19/2024    GLUCOSE 128 (H) 01/19/2024    CALCIUM 8.9 01/19/2024    PROT 5.8 (L) 01/19/2024    LABALBU 2.5 (L) 01/19/2024    BILITOT 0.4 01/19/2024    ALKPHOS 90 01/19/2024    AST 21 01/19/2024    ALT 62 (H) 01/19/2024    LABGLOM >60

## 2024-01-19 NOTE — PROGRESS NOTES
[facility]                                Progress note    Subjective:    The patient is awake and alert.    Sitting up in bed, continues to complain of right-sided pain in her back  Concerned about constipation with narcotics  No acute complaints otherwise, resting comfortably    Objective:    BP (!) 128/54   Pulse 70   Temp 97.1 °F (36.2 °C) (Temporal)   Resp 20   Ht 1.651 m (5' 5\")   Wt 92.5 kg (204 lb)   SpO2 95%   BMI 33.95 kg/m²     In: 420 [P.O.:420]  Out: -   In: 420   Out: -     General appearance: NAD, conversant  HEENT: AT/NC, MMM  Neck: FROM, supple  Lungs: Clear to auscultation  CV: RRR, no MRGs  Vasc: Radial pulses 2+  Abdomen: Soft, non-tender; no masses or HSM  Extremities: No peripheral edema or digital cyanosis  Skin: no rash, lesions or ulcers  Psych: Alert and oriented to person, place and time  Neuro: Alert and interactive     Recent Labs     01/17/24  0440 01/18/24  0705 01/19/24  0805   WBC 17.9* 20.3* 17.9*   HGB 8.6* 9.0* 9.3*   HCT 29.4* 29.8* 31.7*    428 377       Recent Labs     01/17/24  0440 01/18/24  0706 01/18/24  1431 01/19/24  0805    136  --  138   K 3.1* 2.8* 3.0* 3.7    102  --  103   CO2 25 22  --  20*   BUN 13 15  --  19   CREATININE 0.7 0.7  --  0.7   CALCIUM 9.1 8.9  --  8.9       Assessment:    Principal Problem:    Mass of right lung  Resolved Problems:    * No resolved hospital problems. *      Plan:  Patient is a 73-year-old female admitted to [facility] for  History of lung abscess  -Monitor labs  -Lactic acid 2.7  -Check procalcitonin  -Check CRP and sed rate  -WBC 19.7  -Pulmonology consulted  -IV Unasyn  -Continue scheduled breathing treatments  -Currently on room air satting 94%     Constipation  -General surgery following  -+ hemoccult  -Eliquis and ASA held  -11.1/36.2 > 9.4/33.1  -Recommend aggressive bowel regimen  -No plans for scopes at this time     1/17/2024  --transfer to [facility] intitiated for cts evaluation for

## 2024-01-20 LAB
ALBUMIN SERPL-MCNC: 2.6 G/DL (ref 3.5–5.2)
ALP SERPL-CCNC: 91 U/L (ref 35–104)
ALT SERPL-CCNC: 49 U/L (ref 0–32)
ANION GAP SERPL CALCULATED.3IONS-SCNC: 9 MMOL/L (ref 7–16)
AST SERPL-CCNC: 15 U/L (ref 0–31)
BASOPHILS # BLD: 0 K/UL (ref 0–0.2)
BASOPHILS NFR BLD: 0 % (ref 0–2)
BILIRUB SERPL-MCNC: 0.3 MG/DL (ref 0–1.2)
BUN SERPL-MCNC: 18 MG/DL (ref 6–23)
CALCIUM SERPL-MCNC: 9.1 MG/DL (ref 8.6–10.2)
CHLORIDE SERPL-SCNC: 105 MMOL/L (ref 98–107)
CO2 SERPL-SCNC: 24 MMOL/L (ref 22–29)
CREAT SERPL-MCNC: 0.7 MG/DL (ref 0.5–1)
EOSINOPHIL # BLD: 2.32 K/UL (ref 0.05–0.5)
EOSINOPHILS RELATIVE PERCENT: 13 % (ref 0–6)
ERYTHROCYTE [DISTWIDTH] IN BLOOD BY AUTOMATED COUNT: 16.8 % (ref 11.5–15)
GFR SERPL CREATININE-BSD FRML MDRD: >60 ML/MIN/1.73M2
GLUCOSE BLD-MCNC: 129 MG/DL (ref 74–99)
GLUCOSE BLD-MCNC: 144 MG/DL (ref 74–99)
GLUCOSE BLD-MCNC: 170 MG/DL (ref 74–99)
GLUCOSE BLD-MCNC: 190 MG/DL (ref 74–99)
GLUCOSE SERPL-MCNC: 124 MG/DL (ref 74–99)
HCT VFR BLD AUTO: 29.2 % (ref 34–48)
HGB BLD-MCNC: 8.4 G/DL (ref 11.5–15.5)
LYMPHOCYTES NFR BLD: 2.01 K/UL (ref 1.5–4)
LYMPHOCYTES RELATIVE PERCENT: 11 % (ref 20–42)
MCH RBC QN AUTO: 23.6 PG (ref 26–35)
MCHC RBC AUTO-ENTMCNC: 28.8 G/DL (ref 32–34.5)
MCV RBC AUTO: 82 FL (ref 80–99.9)
MONOCYTES NFR BLD: 0.46 K/UL (ref 0.1–0.95)
MONOCYTES NFR BLD: 3 % (ref 2–12)
NEUTROPHILS NFR BLD: 73 % (ref 43–80)
NEUTS SEG NFR BLD: 13 K/UL (ref 1.8–7.3)
PLATELET # BLD AUTO: 406 K/UL (ref 130–450)
PMV BLD AUTO: 9.7 FL (ref 7–12)
POTASSIUM SERPL-SCNC: 4.2 MMOL/L (ref 3.5–5)
PROT SERPL-MCNC: 5.6 G/DL (ref 6.4–8.3)
RBC # BLD AUTO: 3.56 M/UL (ref 3.5–5.5)
RBC # BLD: ABNORMAL 10*6/UL
SODIUM SERPL-SCNC: 138 MMOL/L (ref 132–146)
WBC OTHER # BLD: 17.8 K/UL (ref 4.5–11.5)

## 2024-01-20 PROCEDURE — 97165 OT EVAL LOW COMPLEX 30 MIN: CPT

## 2024-01-20 PROCEDURE — 99232 SBSQ HOSP IP/OBS MODERATE 35: CPT | Performed by: NURSE PRACTITIONER

## 2024-01-20 PROCEDURE — 6360000002 HC RX W HCPCS: Performed by: INTERNAL MEDICINE

## 2024-01-20 PROCEDURE — 6360000002 HC RX W HCPCS: Performed by: FAMILY MEDICINE

## 2024-01-20 PROCEDURE — 82962 GLUCOSE BLOOD TEST: CPT

## 2024-01-20 PROCEDURE — 80053 COMPREHEN METABOLIC PANEL: CPT

## 2024-01-20 PROCEDURE — 2140000000 HC CCU INTERMEDIATE R&B

## 2024-01-20 PROCEDURE — 6370000000 HC RX 637 (ALT 250 FOR IP): Performed by: NURSE PRACTITIONER

## 2024-01-20 PROCEDURE — 36415 COLL VENOUS BLD VENIPUNCTURE: CPT

## 2024-01-20 PROCEDURE — 6370000000 HC RX 637 (ALT 250 FOR IP): Performed by: FAMILY MEDICINE

## 2024-01-20 PROCEDURE — 2580000003 HC RX 258: Performed by: FAMILY MEDICINE

## 2024-01-20 PROCEDURE — 6370000000 HC RX 637 (ALT 250 FOR IP): Performed by: INTERNAL MEDICINE

## 2024-01-20 PROCEDURE — 97535 SELF CARE MNGMENT TRAINING: CPT

## 2024-01-20 PROCEDURE — 94640 AIRWAY INHALATION TREATMENT: CPT

## 2024-01-20 PROCEDURE — 94669 MECHANICAL CHEST WALL OSCILL: CPT

## 2024-01-20 PROCEDURE — 85025 COMPLETE CBC W/AUTO DIFF WBC: CPT

## 2024-01-20 RX ORDER — OXYCODONE HYDROCHLORIDE 10 MG/1
10 TABLET ORAL EVERY 4 HOURS PRN
Status: DISCONTINUED | OUTPATIENT
Start: 2024-01-20 | End: 2024-01-23

## 2024-01-20 RX ORDER — OXYCODONE HYDROCHLORIDE 5 MG/1
5 TABLET ORAL EVERY 4 HOURS PRN
Status: DISCONTINUED | OUTPATIENT
Start: 2024-01-20 | End: 2024-01-23

## 2024-01-20 RX ORDER — KETOROLAC TROMETHAMINE 30 MG/ML
15 INJECTION, SOLUTION INTRAMUSCULAR; INTRAVENOUS EVERY 6 HOURS PRN
Status: DISCONTINUED | OUTPATIENT
Start: 2024-01-20 | End: 2024-01-25 | Stop reason: HOSPADM

## 2024-01-20 RX ADMIN — PANTOPRAZOLE SODIUM 40 MG: 40 TABLET, DELAYED RELEASE ORAL at 06:43

## 2024-01-20 RX ADMIN — HYDROCHLOROTHIAZIDE 12.5 MG: 25 TABLET ORAL at 09:39

## 2024-01-20 RX ADMIN — ATORVASTATIN CALCIUM 10 MG: 10 TABLET, FILM COATED ORAL at 09:34

## 2024-01-20 RX ADMIN — OXYCODONE HYDROCHLORIDE 10 MG: 10 TABLET ORAL at 21:38

## 2024-01-20 RX ADMIN — IPRATROPIUM BROMIDE AND ALBUTEROL SULFATE 1 DOSE: .5; 2.5 SOLUTION RESPIRATORY (INHALATION) at 08:56

## 2024-01-20 RX ADMIN — POLYETHYLENE GLYCOL 3350 17 G: 17 POWDER, FOR SOLUTION ORAL at 09:34

## 2024-01-20 RX ADMIN — LOSARTAN POTASSIUM 100 MG: 50 TABLET, FILM COATED ORAL at 09:34

## 2024-01-20 RX ADMIN — OXYCODONE AND ACETAMINOPHEN 2 TABLET: 5; 325 TABLET ORAL at 11:19

## 2024-01-20 RX ADMIN — METOPROLOL TARTRATE 25 MG: 25 TABLET, FILM COATED ORAL at 19:50

## 2024-01-20 RX ADMIN — OXYCODONE AND ACETAMINOPHEN 2 TABLET: 5; 325 TABLET ORAL at 05:01

## 2024-01-20 RX ADMIN — NALOXEGOL OXALATE 25 MG: 25 TABLET, FILM COATED ORAL at 06:43

## 2024-01-20 RX ADMIN — IPRATROPIUM BROMIDE AND ALBUTEROL SULFATE 1 DOSE: .5; 2.5 SOLUTION RESPIRATORY (INHALATION) at 16:05

## 2024-01-20 RX ADMIN — GUAIFENESIN 400 MG: 400 TABLET ORAL at 19:50

## 2024-01-20 RX ADMIN — GUAIFENESIN 400 MG: 400 TABLET ORAL at 09:34

## 2024-01-20 RX ADMIN — GUAIFENESIN 400 MG: 400 TABLET ORAL at 14:03

## 2024-01-20 RX ADMIN — LEVOTHYROXINE SODIUM 137 MCG: 0.14 TABLET ORAL at 06:43

## 2024-01-20 RX ADMIN — SUCRALFATE 1 G: 1 TABLET ORAL at 09:34

## 2024-01-20 RX ADMIN — SUCRALFATE 1 G: 1 TABLET ORAL at 19:50

## 2024-01-20 RX ADMIN — ALLOPURINOL 300 MG: 100 TABLET ORAL at 09:35

## 2024-01-20 RX ADMIN — KETOROLAC TROMETHAMINE 15 MG: 30 INJECTION, SOLUTION INTRAMUSCULAR; INTRAVENOUS at 20:44

## 2024-01-20 RX ADMIN — KETOROLAC TROMETHAMINE 15 MG: 30 INJECTION, SOLUTION INTRAMUSCULAR; INTRAVENOUS at 14:17

## 2024-01-20 RX ADMIN — SUCRALFATE 1 G: 1 TABLET ORAL at 12:35

## 2024-01-20 RX ADMIN — POTASSIUM CHLORIDE 40 MEQ: 750 TABLET, EXTENDED RELEASE ORAL at 19:50

## 2024-01-20 RX ADMIN — SODIUM CHLORIDE, PRESERVATIVE FREE 10 ML: 5 INJECTION INTRAVENOUS at 19:50

## 2024-01-20 RX ADMIN — SUCRALFATE 1 G: 1 TABLET ORAL at 17:31

## 2024-01-20 RX ADMIN — SODIUM CHLORIDE, PRESERVATIVE FREE 10 ML: 5 INJECTION INTRAVENOUS at 09:34

## 2024-01-20 RX ADMIN — MORPHINE SULFATE 2 MG: 2 INJECTION, SOLUTION INTRAMUSCULAR; INTRAVENOUS at 12:35

## 2024-01-20 RX ADMIN — IPRATROPIUM BROMIDE AND ALBUTEROL SULFATE 1 DOSE: .5; 2.5 SOLUTION RESPIRATORY (INHALATION) at 13:03

## 2024-01-20 RX ADMIN — SENNOSIDES AND DOCUSATE SODIUM 1 TABLET: 50; 8.6 TABLET ORAL at 09:35

## 2024-01-20 RX ADMIN — IPRATROPIUM BROMIDE AND ALBUTEROL SULFATE 1 DOSE: .5; 2.5 SOLUTION RESPIRATORY (INHALATION) at 20:14

## 2024-01-20 RX ADMIN — METOPROLOL TARTRATE 25 MG: 25 TABLET, FILM COATED ORAL at 09:34

## 2024-01-20 RX ADMIN — MORPHINE SULFATE 2 MG: 2 INJECTION, SOLUTION INTRAMUSCULAR; INTRAVENOUS at 19:55

## 2024-01-20 RX ADMIN — POTASSIUM CHLORIDE 40 MEQ: 750 TABLET, EXTENDED RELEASE ORAL at 09:35

## 2024-01-20 RX ADMIN — MORPHINE SULFATE 2 MG: 2 INJECTION, SOLUTION INTRAMUSCULAR; INTRAVENOUS at 06:43

## 2024-01-20 RX ADMIN — SERTRALINE HYDROCHLORIDE 50 MG: 50 TABLET ORAL at 09:34

## 2024-01-20 RX ADMIN — OXYCODONE AND ACETAMINOPHEN 1 TABLET: 5; 325 TABLET ORAL at 17:30

## 2024-01-20 ASSESSMENT — PAIN SCALES - GENERAL
PAINLEVEL_OUTOF10: 9
PAINLEVEL_OUTOF10: 0
PAINLEVEL_OUTOF10: 5
PAINLEVEL_OUTOF10: 9
PAINLEVEL_OUTOF10: 10
PAINLEVEL_OUTOF10: 8
PAINLEVEL_OUTOF10: 4
PAINLEVEL_OUTOF10: 0
PAINLEVEL_OUTOF10: 9
PAINLEVEL_OUTOF10: 10
PAINLEVEL_OUTOF10: 4
PAINLEVEL_OUTOF10: 5
PAINLEVEL_OUTOF10: 9
PAINLEVEL_OUTOF10: 2
PAINLEVEL_OUTOF10: 10
PAINLEVEL_OUTOF10: 7
PAINLEVEL_OUTOF10: 10
PAINLEVEL_OUTOF10: 6
PAINLEVEL_OUTOF10: 0

## 2024-01-20 ASSESSMENT — PAIN - FUNCTIONAL ASSESSMENT
PAIN_FUNCTIONAL_ASSESSMENT: PREVENTS OR INTERFERES SOME ACTIVE ACTIVITIES AND ADLS

## 2024-01-20 ASSESSMENT — PAIN DESCRIPTION - DESCRIPTORS
DESCRIPTORS: ACHING;DISCOMFORT;THROBBING
DESCRIPTORS: SHARP;SORE;SHOOTING
DESCRIPTORS: SHARP;SHOOTING;SORE
DESCRIPTORS: SHARP;SHOOTING;TENDER
DESCRIPTORS: ACHING;DISCOMFORT;THROBBING
DESCRIPTORS: SHARP;SHOOTING;THROBBING
DESCRIPTORS: SHARP;STABBING;TENDER

## 2024-01-20 ASSESSMENT — PAIN DESCRIPTION - ORIENTATION
ORIENTATION: RIGHT
ORIENTATION: RIGHT;MID
ORIENTATION: RIGHT
ORIENTATION: RIGHT;MID
ORIENTATION: RIGHT
ORIENTATION: RIGHT
ORIENTATION: RIGHT;MID;POSTERIOR
ORIENTATION: RIGHT
ORIENTATION: RIGHT

## 2024-01-20 ASSESSMENT — PAIN DESCRIPTION - LOCATION
LOCATION: BACK;FLANK;RIB CAGE
LOCATION: BACK;RIB CAGE
LOCATION: BACK;FLANK;RIB CAGE
LOCATION: BACK;RIB CAGE
LOCATION: BACK;RIB CAGE;FLANK
LOCATION: RIB CAGE;BACK
LOCATION: BACK;RIB CAGE
LOCATION: RIB CAGE
LOCATION: BACK;RIB CAGE

## 2024-01-20 NOTE — PROGRESS NOTES
[facility]                                Progress note    Subjective:    The patient is awake and alert.    Continues to be in severe pain    Objective:    /85   Pulse 70   Temp 98.1 °F (36.7 °C) (Temporal)   Resp 18   Ht 1.651 m (5' 5\")   Wt 92.5 kg (204 lb)   SpO2 98%   BMI 33.95 kg/m²     In: 540 [P.O.:540]  Out: -   In: 540   Out: -     General appearance: NAD, conversant  HEENT: AT/NC, MMM  Neck: FROM, supple  Lungs: Clear to auscultation  CV: RRR, no MRGs  Vasc: Radial pulses 2+  Abdomen: Soft, non-tender; no masses or HSM  Extremities: No peripheral edema or digital cyanosis  Skin: no rash, lesions or ulcers  Psych: Alert and oriented to person, place and time  Neuro: Alert and interactive     Recent Labs     01/18/24  0705 01/19/24  0805 01/20/24  0606   WBC 20.3* 17.9* 17.8*   HGB 9.0* 9.3* 8.4*   HCT 29.8* 31.7* 29.2*    377 406       Recent Labs     01/18/24  0706 01/18/24  1431 01/19/24  0805 01/20/24  0606     --  138 138   K 2.8* 3.0* 3.7 4.2     --  103 105   CO2 22  --  20* 24   BUN 15  --  19 18   CREATININE 0.7  --  0.7 0.7   CALCIUM 8.9  --  8.9 9.1       Assessment:    Principal Problem:    Mass of right lung  Active Problems:    Palliative care encounter    Goals of care, counseling/discussion  Resolved Problems:    * No resolved hospital problems. *      Plan:  Patient is a 73-year-old female admitted to [facility] for  History of lung abscess  -Monitor labs  -Lactic acid 2.7  -Check procalcitonin  -Check CRP and sed rate  -WBC 19.7  -Pulmonology consulted  -IV Unasyn  -Continue scheduled breathing treatments  -Currently on room air satting 94%     Constipation  -General surgery following  -+ hemoccult  -Eliquis and ASA held  -11.1/36.2 > 9.4/33.1  -Recommend aggressive bowel regimen  -No plans for scopes at this time     1/17/2024  --transfer to [facility] intitiated for cts evaluation for RLL lobectomy potentially  --bowel regimen is helping,

## 2024-01-20 NOTE — PROGRESS NOTES
Palliative Care Department  [phone]  Palliative Care Initial Consult  Provider Bob Nicole, APRN - CNP     Maria Victoria Elliott  [id number]  Hospital Day: 4  Date of Initial Consult: 1/19/2024  Referring Provider:  Enrique Gaytan DO  Palliative Medicine was consulted for assistance with: overwhelming symptoms    HPI:   Maria Victoria Elliott is a 73 y.o. with a medical history of atrial fibrillation on Eliquis, HTN, osteoarthritis, PVD, hypothyroidism, DM who was admitted on 1/17/2024 from home with a CHIEF COMPLAINT of rectal bleeding. Patient was a transfer from [facility] for CTA evaluation. Patient states that she is constipated and began having rectal bleeding prompting her to come to the emergency room for further evaluation.  On arrival labs were obtained and patient was found to have a lactic acid of 2.7, mildly elevated LFTs, leukocytosis of 19.7, H&H of 11.1/36.2.  CT abdomen pelvis revealed no acute findings in the abdomen however a partially necrotic mass in the right lower lobe. Hospitalized in November 2023 found to have a lung mass which was biopsied by pulmonology showing necrotic tissue insufficient for further diagnosis. Then underwent a bronchoscopy on 1/2/2024 with transbronchial biopsy showing benign bronchial mucosa negative for neoplasm.  She was to continue to follow with pulmonology and treat for pulmonary abscess with close CT follow-up in 4 weeks post antibiotics.  She was also referred to see Dr. Hallman in November 2023 however does not appear to have made an appointment.  Pulmnology, ID, CTS, oncology following. She is for possible RLL lobectomy. Palliative medicine consulted for further assistance.   ASSESSMENT/PLAN:     Pertinent Hospital Diagnoses     Right lung mass  History of lung abscess  Constipation    Palliative Care Encounter / Counseling Regarding Goals of Care  Please see detailed goals of care discussion as below  At this time, Maria Victoria Elliott, Does have

## 2024-01-20 NOTE — PROGRESS NOTES
[facility]  [facility]    Consultation Note     Admit Date: 1/17/2024  8:21 PM    Reason for Consult:   Suspected necrotic lung in the right lower lobe    Attending Physician:  Hilda Abarca MD     Chief Complaint: Abdominal pain, nausea, bright red blood per rectum    HISTORY OF PRESENT ILLNESS:   73-year-old female with past medical history of A-fib, HTN, hyperuricemia, lung mass, osteoarthritis, thyroid disease, DM, multiple admissions related to pneumonia and right lower lobe mass, status post KENZIE in the right lower lobe mass biopsy with pathology showed cores of necrotic tissue insufficient for further diagnosis, status post bronchoscopy and BAL showed normal respiratory guru, fungal and AFB cultures negative, presented to [place] ER with abdominal pain and nausea and bright red blood per rectum.  CT of the abdomen pelvis shows partially necrotic mass in the right lower lobe.  Transferred to [facility] for CT surgery evaluation.  ID consulted for suspected necrotic right lower lobe mass.    Past Medical History:        Diagnosis Date    A-fib (HCC)     Back problem     Carotid artery stenosis     Diabetic neuropathy (HCC) 12/04/2015    Full dentures     Hiatal hernia     Hypertension     Hyperuricemia 12/04/2015    Lung mass     Osteoarthritis     lumbosacral spine arthritis and disc disease    PVD (peripheral vascular disease) with claudication (HCC) 12/13/2012    follows with Dr Rajput    Thyroid disease     Type II or unspecified type diabetes mellitus without mention of complication, not stated as uncontrolled     Wears glasses      Past Surgical History:        Procedure Laterality Date    APPENDECTOMY      BRONCHOSCOPY N/A 1/2/2024    BRONCHOSCOPY WITH BIOPSY performed by Enrique Gaytan DO at [facility] OR    COLONOSCOPY      CT NEEDLE BIOPSY LUNG PERCUTANEOUS  11/13/2023    CT NEEDLE BIOPSY LUNG PERCUTANEOUS 11/13/2023 [facility] CT    HYSTERECTOMY (CERVIX STATUS UNKNOWN)      with  hours.  No results for input(s): \"LP1UAG\" in the last 72 hours.  No results for input(s): \"ASO\" in the last 72 hours.  No results for input(s): \"CULTRESP\" in the last 72 hours.    Assessment:  Right lower lobe necrotic mass likely malignancy  Previous 2 biopsies done which shows only necrotic tissue/benign mucosa  Leukocytosis likely related to malignancy  Blood cultures no growth so far    Plan:    Monitor off antibiotics  Pending CT surgery evaluation  ID will continue to follow   A lot has happened in 24 hours  not a surgical candidate  She was seen by hem-onc   now chemoimmunotherapy  Continue to observe off antibiotics     Thank you for having us see this patient in consultation. I will be discussing this case with the treating physicians.      Electronically signed by Love Vaughan MD on 1/20/2024 at 10:50 AM

## 2024-01-20 NOTE — PLAN OF CARE
Problem: Safety - Adult  Goal: Free from fall injury  1/19/2024 2013 by Carley Marte RN  Outcome: Progressing  1/19/2024 1257 by Sho Perez RN  Outcome: Progressing     Problem: Chronic Conditions and Co-morbidities  Goal: Patient's chronic conditions and co-morbidity symptoms are monitored and maintained or improved  1/19/2024 2013 by Carley Marte RN  Outcome: Progressing  1/19/2024 1257 by Sho Perez RN  Outcome: Progressing     Problem: Discharge Planning  Goal: Discharge to home or other facility with appropriate resources  1/19/2024 2013 by Carley Marte RN  Outcome: Progressing  1/19/2024 1257 by Sho Perez RN  Outcome: Progressing     Problem: Pain  Goal: Verbalizes/displays adequate comfort level or baseline comfort level  1/19/2024 2013 by Carley Marte RN  Outcome: Progressing  1/19/2024 1257 by Sho Perez RN  Outcome: Not Progressing     Problem: ABCDS Injury Assessment  Goal: Absence of physical injury  1/19/2024 2013 by Carley Marte RN  Outcome: Progressing  1/19/2024 1257 by Sho Perez RN  Outcome: Progressing     Problem: Nutrition Deficit:  Goal: Optimize nutritional status  1/19/2024 2013 by Carley Marte RN  Outcome: Progressing  1/19/2024 1257 by Sho Perez RN  Outcome: Progressing     Problem: Pain  Goal: Verbalizes/displays adequate comfort level or baseline comfort level  1/19/2024 2013 by Carley Marte RN  Outcome: Progressing  1/19/2024 1257 by Sho Perez RN  Outcome: Not Progressing

## 2024-01-20 NOTE — PROGRESS NOTES
Occupational Therapy  [facility]  [facility]upational Therapy Initial Assessment    Name: Maria Victoria Elliott  : 1950  MRN: [id number]  Date of Service: 2024  Room: 6506A    Evaluating OT: Remedios Flores OTR/L [id number]  Referring Provider: MD Zay  Specific Provider Orders: 24  Recommended Adaptive Equipment: Owns- rw & SPC; recommend indwelling tub seat with a back    Diagnosis: lung mass & possible METS, R flank pain   Pertinent Medical History:  HTN, OA, PVD, DM, hx LTHA, a-fib, chronic back pain, thyroid disease & diabetic neuropathy  Precautions:  Fall Risk, adult diet 4 carb choices & tape allergy (paper tape recommended)    Assessment of current deficits   [x] Functional mobility  [x]ADLs  [] Strength               []Cognition   [x] Functional transfers   [x] IADLs         [] Safety Awareness   [x]Endurance   [] Fine Coordination              [x] Balance      [] Vision/perception   []Sensation    []Gross Motor Coordination  [] ROM  [] Delirium                   [] Motor Control     OT PLAN OF CARE   OT POC based on physician orders, patient diagnosis and results of clinical assessment    Frequency/Duration: 1-3 days/wk for 5-7 days PRN   Specific OT Treatment to include:   * Functional transfer/mobility training/DME recommendations for increased independence, safety, and fall prevention  * Patient/Family education to increase follow through with safety techniques and functional independence  * Therapeutic exercise to improve motor endurance, ROM, and functional strength for ADLs/functional transfers  * Therapeutic activities to facilitate/challenge dynamic balance, stand tolerance for increased safety and independence with ADLs  * Therapeutic activities to facilitate gross/fine motor skills for increased independence with ADLs    Modified [place] Scale (MRS)  Score     Description  0             No symptoms  1             No significant disability despite symptoms  2

## 2024-01-21 LAB
ALBUMIN SERPL-MCNC: 2.6 G/DL (ref 3.5–5.2)
ALP SERPL-CCNC: 81 U/L (ref 35–104)
ALT SERPL-CCNC: 35 U/L (ref 0–32)
ANION GAP SERPL CALCULATED.3IONS-SCNC: 9 MMOL/L (ref 7–16)
AST SERPL-CCNC: 9 U/L (ref 0–31)
BASOPHILS # BLD: 0.14 K/UL (ref 0–0.2)
BASOPHILS NFR BLD: 1 % (ref 0–2)
BILIRUB SERPL-MCNC: 0.3 MG/DL (ref 0–1.2)
BUN SERPL-MCNC: 20 MG/DL (ref 6–23)
CALCIUM SERPL-MCNC: 9.1 MG/DL (ref 8.6–10.2)
CHLORIDE SERPL-SCNC: 105 MMOL/L (ref 98–107)
CO2 SERPL-SCNC: 23 MMOL/L (ref 22–29)
CREAT SERPL-MCNC: 0.8 MG/DL (ref 0.5–1)
EOSINOPHIL # BLD: 2.05 K/UL (ref 0.05–0.5)
EOSINOPHILS RELATIVE PERCENT: 13 % (ref 0–6)
ERYTHROCYTE [DISTWIDTH] IN BLOOD BY AUTOMATED COUNT: 16.9 % (ref 11.5–15)
GFR SERPL CREATININE-BSD FRML MDRD: >60 ML/MIN/1.73M2
GLUCOSE BLD-MCNC: 129 MG/DL (ref 74–99)
GLUCOSE BLD-MCNC: 153 MG/DL (ref 74–99)
GLUCOSE BLD-MCNC: 160 MG/DL (ref 74–99)
GLUCOSE BLD-MCNC: 164 MG/DL (ref 74–99)
GLUCOSE SERPL-MCNC: 113 MG/DL (ref 74–99)
HCT VFR BLD AUTO: 29.5 % (ref 34–48)
HGB BLD-MCNC: 8.5 G/DL (ref 11.5–15.5)
LYMPHOCYTES NFR BLD: 2.05 K/UL (ref 1.5–4)
LYMPHOCYTES RELATIVE PERCENT: 13 % (ref 20–42)
MCH RBC QN AUTO: 23.4 PG (ref 26–35)
MCHC RBC AUTO-ENTMCNC: 28.8 G/DL (ref 32–34.5)
MCV RBC AUTO: 81 FL (ref 80–99.9)
METAMYELOCYTES ABSOLUTE COUNT: 0.14 K/UL (ref 0–0.12)
METAMYELOCYTES: 1 % (ref 0–1)
MICROORGANISM SPEC CULT: NORMAL
MICROORGANISM/AGENT SPEC: NORMAL
MONOCYTES NFR BLD: 0.41 K/UL (ref 0.1–0.95)
MONOCYTES NFR BLD: 3 % (ref 2–12)
NEUTROPHILS NFR BLD: 70 % (ref 43–80)
NEUTS SEG NFR BLD: 10.92 K/UL (ref 1.8–7.3)
NUCLEATED RED BLOOD CELLS: 1 PER 100 WBC
PLATELET # BLD AUTO: 363 K/UL (ref 130–450)
PMV BLD AUTO: 9.7 FL (ref 7–12)
POTASSIUM SERPL-SCNC: 4.4 MMOL/L (ref 3.5–5)
PROT SERPL-MCNC: 5.7 G/DL (ref 6.4–8.3)
RBC # BLD AUTO: 3.64 M/UL (ref 3.5–5.5)
RBC # BLD: ABNORMAL 10*6/UL
SERVICE CMNT-IMP: NORMAL
SERVICE CMNT-IMP: NORMAL
SODIUM SERPL-SCNC: 137 MMOL/L (ref 132–146)
SPECIMEN DESCRIPTION: NORMAL
WBC OTHER # BLD: 15.7 K/UL (ref 4.5–11.5)

## 2024-01-21 PROCEDURE — 6370000000 HC RX 637 (ALT 250 FOR IP): Performed by: INTERNAL MEDICINE

## 2024-01-21 PROCEDURE — 2580000003 HC RX 258: Performed by: FAMILY MEDICINE

## 2024-01-21 PROCEDURE — 6360000002 HC RX W HCPCS: Performed by: INTERNAL MEDICINE

## 2024-01-21 PROCEDURE — 82962 GLUCOSE BLOOD TEST: CPT

## 2024-01-21 PROCEDURE — 94640 AIRWAY INHALATION TREATMENT: CPT

## 2024-01-21 PROCEDURE — 80053 COMPREHEN METABOLIC PANEL: CPT

## 2024-01-21 PROCEDURE — 87449 NOS EACH ORGANISM AG IA: CPT

## 2024-01-21 PROCEDURE — 6370000000 HC RX 637 (ALT 250 FOR IP): Performed by: FAMILY MEDICINE

## 2024-01-21 PROCEDURE — 87899 AGENT NOS ASSAY W/OPTIC: CPT

## 2024-01-21 PROCEDURE — 2140000000 HC CCU INTERMEDIATE R&B

## 2024-01-21 PROCEDURE — 36415 COLL VENOUS BLD VENIPUNCTURE: CPT

## 2024-01-21 PROCEDURE — 6360000002 HC RX W HCPCS: Performed by: FAMILY MEDICINE

## 2024-01-21 PROCEDURE — 6370000000 HC RX 637 (ALT 250 FOR IP): Performed by: NURSE PRACTITIONER

## 2024-01-21 PROCEDURE — 85025 COMPLETE CBC W/AUTO DIFF WBC: CPT

## 2024-01-21 RX ADMIN — POLYETHYLENE GLYCOL 3350 17 G: 17 POWDER, FOR SOLUTION ORAL at 08:22

## 2024-01-21 RX ADMIN — IPRATROPIUM BROMIDE AND ALBUTEROL SULFATE 1 DOSE: .5; 2.5 SOLUTION RESPIRATORY (INHALATION) at 12:41

## 2024-01-21 RX ADMIN — SUCRALFATE 1 G: 1 TABLET ORAL at 16:59

## 2024-01-21 RX ADMIN — IPRATROPIUM BROMIDE AND ALBUTEROL SULFATE 1 DOSE: .5; 2.5 SOLUTION RESPIRATORY (INHALATION) at 15:41

## 2024-01-21 RX ADMIN — OXYCODONE HYDROCHLORIDE 10 MG: 10 TABLET ORAL at 08:21

## 2024-01-21 RX ADMIN — SODIUM CHLORIDE, PRESERVATIVE FREE 10 ML: 5 INJECTION INTRAVENOUS at 08:23

## 2024-01-21 RX ADMIN — GUAIFENESIN 400 MG: 400 TABLET ORAL at 20:45

## 2024-01-21 RX ADMIN — KETOROLAC TROMETHAMINE 15 MG: 30 INJECTION, SOLUTION INTRAMUSCULAR; INTRAVENOUS at 20:44

## 2024-01-21 RX ADMIN — METOPROLOL TARTRATE 25 MG: 25 TABLET, FILM COATED ORAL at 08:22

## 2024-01-21 RX ADMIN — SODIUM CHLORIDE, PRESERVATIVE FREE 10 ML: 5 INJECTION INTRAVENOUS at 20:45

## 2024-01-21 RX ADMIN — PANTOPRAZOLE SODIUM 40 MG: 40 TABLET, DELAYED RELEASE ORAL at 06:37

## 2024-01-21 RX ADMIN — POTASSIUM CHLORIDE 40 MEQ: 750 TABLET, EXTENDED RELEASE ORAL at 20:44

## 2024-01-21 RX ADMIN — GUAIFENESIN 400 MG: 400 TABLET ORAL at 14:48

## 2024-01-21 RX ADMIN — OXYCODONE HYDROCHLORIDE 10 MG: 10 TABLET ORAL at 03:58

## 2024-01-21 RX ADMIN — LEVOTHYROXINE SODIUM 137 MCG: 0.14 TABLET ORAL at 06:37

## 2024-01-21 RX ADMIN — METOPROLOL TARTRATE 25 MG: 25 TABLET, FILM COATED ORAL at 20:45

## 2024-01-21 RX ADMIN — MORPHINE SULFATE 2 MG: 2 INJECTION, SOLUTION INTRAMUSCULAR; INTRAVENOUS at 22:24

## 2024-01-21 RX ADMIN — LOSARTAN POTASSIUM 100 MG: 50 TABLET, FILM COATED ORAL at 08:22

## 2024-01-21 RX ADMIN — SENNOSIDES AND DOCUSATE SODIUM 1 TABLET: 50; 8.6 TABLET ORAL at 08:22

## 2024-01-21 RX ADMIN — IPRATROPIUM BROMIDE AND ALBUTEROL SULFATE 1 DOSE: .5; 2.5 SOLUTION RESPIRATORY (INHALATION) at 20:28

## 2024-01-21 RX ADMIN — SUCRALFATE 1 G: 1 TABLET ORAL at 20:45

## 2024-01-21 RX ADMIN — SERTRALINE HYDROCHLORIDE 50 MG: 50 TABLET ORAL at 08:22

## 2024-01-21 RX ADMIN — OXYCODONE HYDROCHLORIDE 10 MG: 10 TABLET ORAL at 14:50

## 2024-01-21 RX ADMIN — ALLOPURINOL 300 MG: 100 TABLET ORAL at 08:21

## 2024-01-21 RX ADMIN — SUCRALFATE 1 G: 1 TABLET ORAL at 08:22

## 2024-01-21 RX ADMIN — OXYCODONE HYDROCHLORIDE 10 MG: 10 TABLET ORAL at 19:21

## 2024-01-21 RX ADMIN — IPRATROPIUM BROMIDE AND ALBUTEROL SULFATE 1 DOSE: .5; 2.5 SOLUTION RESPIRATORY (INHALATION) at 08:57

## 2024-01-21 RX ADMIN — GUAIFENESIN 400 MG: 400 TABLET ORAL at 08:23

## 2024-01-21 RX ADMIN — POTASSIUM CHLORIDE 40 MEQ: 750 TABLET, EXTENDED RELEASE ORAL at 08:22

## 2024-01-21 RX ADMIN — MORPHINE SULFATE 2 MG: 2 INJECTION, SOLUTION INTRAMUSCULAR; INTRAVENOUS at 11:56

## 2024-01-21 RX ADMIN — ATORVASTATIN CALCIUM 10 MG: 10 TABLET, FILM COATED ORAL at 08:22

## 2024-01-21 RX ADMIN — HYDROCHLOROTHIAZIDE 12.5 MG: 25 TABLET ORAL at 08:23

## 2024-01-21 RX ADMIN — SUCRALFATE 1 G: 1 TABLET ORAL at 11:52

## 2024-01-21 RX ADMIN — MORPHINE SULFATE 2 MG: 2 INJECTION, SOLUTION INTRAMUSCULAR; INTRAVENOUS at 16:59

## 2024-01-21 RX ADMIN — KETOROLAC TROMETHAMINE 15 MG: 30 INJECTION, SOLUTION INTRAMUSCULAR; INTRAVENOUS at 10:34

## 2024-01-21 RX ADMIN — NALOXEGOL OXALATE 25 MG: 25 TABLET, FILM COATED ORAL at 06:37

## 2024-01-21 ASSESSMENT — PAIN DESCRIPTION - LOCATION
LOCATION: BACK;RIB CAGE;FLANK
LOCATION: BACK;RIB CAGE
LOCATION: BACK;RIB CAGE
LOCATION: FLANK;BACK;RIB CAGE
LOCATION: BACK;RIB CAGE;FLANK
LOCATION: BACK;RIB CAGE

## 2024-01-21 ASSESSMENT — PAIN SCALES - GENERAL
PAINLEVEL_OUTOF10: 4
PAINLEVEL_OUTOF10: 9
PAINLEVEL_OUTOF10: 5
PAINLEVEL_OUTOF10: 2
PAINLEVEL_OUTOF10: 9
PAINLEVEL_OUTOF10: 9
PAINLEVEL_OUTOF10: 8
PAINLEVEL_OUTOF10: 8
PAINLEVEL_OUTOF10: 4
PAINLEVEL_OUTOF10: 2
PAINLEVEL_OUTOF10: 9
PAINLEVEL_OUTOF10: 9
PAINLEVEL_OUTOF10: 6
PAINLEVEL_OUTOF10: 6
PAINLEVEL_OUTOF10: 4
PAINLEVEL_OUTOF10: 9
PAINLEVEL_OUTOF10: 4
PAINLEVEL_OUTOF10: 8

## 2024-01-21 ASSESSMENT — PAIN DESCRIPTION - DESCRIPTORS
DESCRIPTORS: SHARP;SHOOTING;STABBING
DESCRIPTORS: SHARP;SHOOTING;STABBING
DESCRIPTORS: ACHING;DISCOMFORT;THROBBING
DESCRIPTORS: SHARP;SHOOTING;STABBING
DESCRIPTORS: ACHING;DISCOMFORT;THROBBING
DESCRIPTORS: ACHING;DISCOMFORT;THROBBING

## 2024-01-21 ASSESSMENT — PAIN DESCRIPTION - ORIENTATION
ORIENTATION: RIGHT;MID
ORIENTATION: RIGHT;MID
ORIENTATION: MID;RIGHT
ORIENTATION: RIGHT;MID
ORIENTATION: RIGHT
ORIENTATION: RIGHT;POSTERIOR
ORIENTATION: RIGHT
ORIENTATION: RIGHT;MID
ORIENTATION: RIGHT;MID

## 2024-01-21 ASSESSMENT — PAIN - FUNCTIONAL ASSESSMENT
PAIN_FUNCTIONAL_ASSESSMENT: PREVENTS OR INTERFERES SOME ACTIVE ACTIVITIES AND ADLS

## 2024-01-21 NOTE — PLAN OF CARE
Problem: Safety - Adult  Goal: Free from fall injury  1/21/2024 0735 by Iza Cornelius RN  Outcome: Progressing     Problem: Chronic Conditions and Co-morbidities  Goal: Patient's chronic conditions and co-morbidity symptoms are monitored and maintained or improved  1/21/2024 0735 by Iza Cornelius RN  Outcome: Progressing     Problem: Discharge Planning  Goal: Discharge to home or other facility with appropriate resources  1/21/2024 0735 by Iza Cornelius RN  Outcome: Progressing     Problem: Pain  Goal: Verbalizes/displays adequate comfort level or baseline comfort level  1/21/2024 0735 by Iza Cornelius RN  Outcome: Progressing

## 2024-01-21 NOTE — PROGRESS NOTES
[facility]  [facility]    Consultation Note     Admit Date: 1/17/2024  8:21 PM    Reason for Consult:   Suspected necrotic lung in the right lower lobe    Attending Physician:  Hilda Abarca MD     Chief Complaint: Abdominal pain, nausea, bright red blood per rectum    HISTORY OF PRESENT ILLNESS:   73-year-old female with past medical history of A-fib, HTN, hyperuricemia, lung mass, osteoarthritis, thyroid disease, DM, multiple admissions related to pneumonia and right lower lobe mass, status post KENZIE in the right lower lobe mass biopsy with pathology showed cores of necrotic tissue insufficient for further diagnosis, status post bronchoscopy and BAL showed normal respiratory guru, fungal and AFB cultures negative, presented to [place] ER with abdominal pain and nausea and bright red blood per rectum.  CT of the abdomen pelvis shows partially necrotic mass in the right lower lobe.  Transferred to [facility] for CT surgery evaluation.  ID consulted for suspected necrotic right lower lobe mass.    Past Medical History:        Diagnosis Date    A-fib (HCC)     Back problem     Carotid artery stenosis     Diabetic neuropathy (HCC) 12/04/2015    Full dentures     Hiatal hernia     Hypertension     Hyperuricemia 12/04/2015    Lung mass     Osteoarthritis     lumbosacral spine arthritis and disc disease    PVD (peripheral vascular disease) with claudication (HCC) 12/13/2012    follows with Dr Rajput    Thyroid disease     Type II or unspecified type diabetes mellitus without mention of complication, not stated as uncontrolled     Wears glasses      Past Surgical History:        Procedure Laterality Date    APPENDECTOMY      BRONCHOSCOPY N/A 1/2/2024    BRONCHOSCOPY WITH BIOPSY performed by Enrique Gaytan DO at [facility] OR    COLONOSCOPY      CT NEEDLE BIOPSY LUNG PERCUTANEOUS  11/13/2023    CT NEEDLE BIOPSY LUNG PERCUTANEOUS 11/13/2023 [facility] CT    HYSTERECTOMY (CERVIX STATUS UNKNOWN)      with

## 2024-01-21 NOTE — PROGRESS NOTES
potentially  --bowel regimen is helping, movantik and lactulose  --on unasyn per ID for lung abscess, but there is concern to mass, biopsies have been noncontributory/nondiagnostic of anything really except inflammation  --bph  --bronchodilators via nebs  --anticoags held for possible procedure  --ok for diet at this time  --ok to stop fluids as she is eating and drinking on her own  --encourage ambulation, spirometry, flutter valve     1/18/24:  -CT surgery consulted for lower right lobectomy  -CT chest without contrast  -ABX DC'd at [place] prior to transfer over by infectious disease, ID to continue to follow-Case discussed with Dr. Sifuentes -  -K+ 2.8, replace recheck this afternoon-proved only to 3.0-continue to monitor on telemetry  -Worsening leukocytosis, 20.3-may be a infectious superimposed component  -If white count continues to rise, will reconsult infectious disease here  -Son at bedside-also equally aggravated  -Reassurance provided to patient and son in regards to her current transfer, course expected during hospitalization here    1/19/24:  -K+ 3.7, continue to monitor BMP  -WBC 17.9  -ID with no plans for antibiotics  -Await CT surgery input-plan for cardiothoracic intervention on Monday  -PFTs completed yesterday, essentially normal  -Currently 95% on room air with stable vital signs  -Ambulate as tolerated  Morphine for pain control from necrotic mass  Stool softener to keep from getting constipated Colace 100 p.o. twice daily    1/20/2024  She continues to complain of significant pain  No surgical intervention planned as this is considered to be a malignancy  Palliative radiation is the plan  Hematology oncology following-input appreciated  Palliative care following for pain control  Okay for transfer to general medical floor  Toradol to adjunct current pain medication, pending palliative care evaluation    1/21/2024  Awaiting radiation oncology input  Oncology input reviewed-plan for chemo  immunotherapy after biopsy  Patient and family continues to ask when biopsy will be done-I have told him this will have to be discussed with pulmonology  Until pulmonology input tomorrow, will continue to monitor  Not on antibiotics  No other acute issues  Family at bedside    Code status: Full  Consultants: Pulm, ID, CTS      DVT Prophylaxis Eliquis (held)  PT/OT  Discharge planning       I have spent a total time of 30 minutes of this patient encounter reviewing chart, labs, coordinating care with interdisciplinary teams, face to face encounter with patient, providing counseling/education to patient/family.      Hilda Abarca MD  1/120/2024

## 2024-01-22 ENCOUNTER — APPOINTMENT (OUTPATIENT)
Dept: CT IMAGING | Age: 74
DRG: 178 | End: 2024-01-22
Attending: INTERNAL MEDICINE
Payer: MEDICARE

## 2024-01-22 LAB
ALBUMIN SERPL-MCNC: 2.7 G/DL (ref 3.5–5.2)
ALP SERPL-CCNC: 80 U/L (ref 35–104)
ALT SERPL-CCNC: 27 U/L (ref 0–32)
ANION GAP SERPL CALCULATED.3IONS-SCNC: 12 MMOL/L (ref 7–16)
AST SERPL-CCNC: 9 U/L (ref 0–31)
BASOPHILS # BLD: 0.07 K/UL (ref 0–0.2)
BASOPHILS NFR BLD: 0 % (ref 0–2)
BILIRUB SERPL-MCNC: 0.4 MG/DL (ref 0–1.2)
BUN SERPL-MCNC: 16 MG/DL (ref 6–23)
CALCIUM SERPL-MCNC: 9.3 MG/DL (ref 8.6–10.2)
CHLORIDE SERPL-SCNC: 103 MMOL/L (ref 98–107)
CO2 SERPL-SCNC: 21 MMOL/L (ref 22–29)
CREAT SERPL-MCNC: 0.8 MG/DL (ref 0.5–1)
EOSINOPHIL # BLD: 1.36 K/UL (ref 0.05–0.5)
EOSINOPHILS RELATIVE PERCENT: 9 % (ref 0–6)
ERYTHROCYTE [DISTWIDTH] IN BLOOD BY AUTOMATED COUNT: 16.9 % (ref 11.5–15)
GFR SERPL CREATININE-BSD FRML MDRD: >60 ML/MIN/1.73M2
GLUCOSE BLD-MCNC: 136 MG/DL (ref 74–99)
GLUCOSE BLD-MCNC: 143 MG/DL (ref 74–99)
GLUCOSE BLD-MCNC: 168 MG/DL (ref 74–99)
GLUCOSE BLD-MCNC: 194 MG/DL (ref 74–99)
GLUCOSE SERPL-MCNC: 123 MG/DL (ref 74–99)
HCT VFR BLD AUTO: 29.8 % (ref 34–48)
HGB BLD-MCNC: 8.6 G/DL (ref 11.5–15.5)
IMM GRANULOCYTES # BLD AUTO: 0.18 K/UL (ref 0–0.58)
IMM GRANULOCYTES NFR BLD: 1 % (ref 0–5)
L PNEUMO1 AG UR QL IA.RAPID: NEGATIVE
LYMPHOCYTES NFR BLD: 1.63 K/UL (ref 1.5–4)
LYMPHOCYTES RELATIVE PERCENT: 10 % (ref 20–42)
MCH RBC QN AUTO: 23.5 PG (ref 26–35)
MCHC RBC AUTO-ENTMCNC: 28.9 G/DL (ref 32–34.5)
MCV RBC AUTO: 81.4 FL (ref 80–99.9)
MONOCYTES NFR BLD: 0.75 K/UL (ref 0.1–0.95)
MONOCYTES NFR BLD: 5 % (ref 2–12)
NEUTROPHILS NFR BLD: 75 % (ref 43–80)
NEUTS SEG NFR BLD: 11.99 K/UL (ref 1.8–7.3)
PLATELET # BLD AUTO: 377 K/UL (ref 130–450)
PMV BLD AUTO: 9.7 FL (ref 7–12)
POTASSIUM SERPL-SCNC: 4.6 MMOL/L (ref 3.5–5)
PROT SERPL-MCNC: 6 G/DL (ref 6.4–8.3)
RBC # BLD AUTO: 3.66 M/UL (ref 3.5–5.5)
RBC # BLD: ABNORMAL 10*6/UL
S PNEUM AG SPEC QL: NEGATIVE
SODIUM SERPL-SCNC: 136 MMOL/L (ref 132–146)
SPECIMEN SOURCE: NORMAL
WBC OTHER # BLD: 16 K/UL (ref 4.5–11.5)

## 2024-01-22 PROCEDURE — 97530 THERAPEUTIC ACTIVITIES: CPT

## 2024-01-22 PROCEDURE — 36415 COLL VENOUS BLD VENIPUNCTURE: CPT

## 2024-01-22 PROCEDURE — 88341 IMHCHEM/IMCYTCHM EA ADD ANTB: CPT

## 2024-01-22 PROCEDURE — 2140000000 HC CCU INTERMEDIATE R&B

## 2024-01-22 PROCEDURE — 85025 COMPLETE CBC W/AUTO DIFF WBC: CPT

## 2024-01-22 PROCEDURE — 6370000000 HC RX 637 (ALT 250 FOR IP): Performed by: FAMILY MEDICINE

## 2024-01-22 PROCEDURE — 6370000000 HC RX 637 (ALT 250 FOR IP): Performed by: INTERNAL MEDICINE

## 2024-01-22 PROCEDURE — 32408 CORE NDL BX LNG/MED PERQ: CPT

## 2024-01-22 PROCEDURE — 2580000003 HC RX 258: Performed by: FAMILY MEDICINE

## 2024-01-22 PROCEDURE — 2500000003 HC RX 250 WO HCPCS: Performed by: RADIOLOGY

## 2024-01-22 PROCEDURE — 80053 COMPREHEN METABOLIC PANEL: CPT

## 2024-01-22 PROCEDURE — 94640 AIRWAY INHALATION TREATMENT: CPT

## 2024-01-22 PROCEDURE — 6360000002 HC RX W HCPCS: Performed by: FAMILY MEDICINE

## 2024-01-22 PROCEDURE — 6370000000 HC RX 637 (ALT 250 FOR IP): Performed by: NURSE PRACTITIONER

## 2024-01-22 PROCEDURE — 82962 GLUCOSE BLOOD TEST: CPT

## 2024-01-22 PROCEDURE — 2709999900 CT NEEDLE BIOPSY LUNG PERCUTANEOUS W IMAGING GUIDANCE

## 2024-01-22 PROCEDURE — 88342 IMHCHEM/IMCYTCHM 1ST ANTB: CPT

## 2024-01-22 PROCEDURE — 0BBC3ZX EXCISION OF RIGHT UPPER LUNG LOBE, PERCUTANEOUS APPROACH, DIAGNOSTIC: ICD-10-PCS | Performed by: RADIOLOGY

## 2024-01-22 PROCEDURE — 99232 SBSQ HOSP IP/OBS MODERATE 35: CPT | Performed by: NURSE PRACTITIONER

## 2024-01-22 PROCEDURE — 88305 TISSUE EXAM BY PATHOLOGIST: CPT

## 2024-01-22 PROCEDURE — 97161 PT EVAL LOW COMPLEX 20 MIN: CPT

## 2024-01-22 PROCEDURE — 6360000002 HC RX W HCPCS: Performed by: INTERNAL MEDICINE

## 2024-01-22 RX ORDER — BENZONATATE 100 MG/1
100 CAPSULE ORAL 3 TIMES DAILY PRN
Status: DISCONTINUED | OUTPATIENT
Start: 2024-01-22 | End: 2024-01-25 | Stop reason: HOSPADM

## 2024-01-22 RX ORDER — LIDOCAINE HYDROCHLORIDE AND EPINEPHRINE BITARTRATE 20; .01 MG/ML; MG/ML
INJECTION, SOLUTION SUBCUTANEOUS PRN
Status: COMPLETED | OUTPATIENT
Start: 2024-01-22 | End: 2024-01-22

## 2024-01-22 RX ORDER — LIDOCAINE HYDROCHLORIDE 20 MG/ML
INJECTION, SOLUTION INFILTRATION; PERINEURAL PRN
Status: COMPLETED | OUTPATIENT
Start: 2024-01-22 | End: 2024-01-22

## 2024-01-22 RX ADMIN — OXYCODONE HYDROCHLORIDE 10 MG: 10 TABLET ORAL at 14:21

## 2024-01-22 RX ADMIN — POTASSIUM CHLORIDE 40 MEQ: 750 TABLET, EXTENDED RELEASE ORAL at 08:52

## 2024-01-22 RX ADMIN — GUAIFENESIN 400 MG: 400 TABLET ORAL at 19:43

## 2024-01-22 RX ADMIN — SERTRALINE HYDROCHLORIDE 50 MG: 50 TABLET ORAL at 08:50

## 2024-01-22 RX ADMIN — ALLOPURINOL 300 MG: 100 TABLET ORAL at 08:51

## 2024-01-22 RX ADMIN — KETOROLAC TROMETHAMINE 15 MG: 30 INJECTION, SOLUTION INTRAMUSCULAR; INTRAVENOUS at 05:07

## 2024-01-22 RX ADMIN — KETOROLAC TROMETHAMINE 15 MG: 30 INJECTION, SOLUTION INTRAMUSCULAR; INTRAVENOUS at 19:43

## 2024-01-22 RX ADMIN — OXYCODONE HYDROCHLORIDE 10 MG: 10 TABLET ORAL at 02:39

## 2024-01-22 RX ADMIN — SODIUM CHLORIDE, PRESERVATIVE FREE 10 ML: 5 INJECTION INTRAVENOUS at 08:50

## 2024-01-22 RX ADMIN — ASPIRIN 81 MG: 81 TABLET, COATED ORAL at 08:51

## 2024-01-22 RX ADMIN — POLYETHYLENE GLYCOL 3350 17 G: 17 POWDER, FOR SOLUTION ORAL at 08:50

## 2024-01-22 RX ADMIN — OXYCODONE HYDROCHLORIDE 10 MG: 10 TABLET ORAL at 18:43

## 2024-01-22 RX ADMIN — IPRATROPIUM BROMIDE AND ALBUTEROL SULFATE 1 DOSE: .5; 2.5 SOLUTION RESPIRATORY (INHALATION) at 16:31

## 2024-01-22 RX ADMIN — GUAIFENESIN 400 MG: 400 TABLET ORAL at 12:39

## 2024-01-22 RX ADMIN — METOPROLOL TARTRATE 25 MG: 25 TABLET, FILM COATED ORAL at 08:52

## 2024-01-22 RX ADMIN — OXYCODONE HYDROCHLORIDE 10 MG: 10 TABLET ORAL at 23:03

## 2024-01-22 RX ADMIN — METOPROLOL TARTRATE 25 MG: 25 TABLET, FILM COATED ORAL at 19:43

## 2024-01-22 RX ADMIN — SUCRALFATE 1 G: 1 TABLET ORAL at 12:39

## 2024-01-22 RX ADMIN — BENZONATATE 100 MG: 100 CAPSULE ORAL at 15:42

## 2024-01-22 RX ADMIN — SENNOSIDES AND DOCUSATE SODIUM 1 TABLET: 50; 8.6 TABLET ORAL at 08:52

## 2024-01-22 RX ADMIN — IPRATROPIUM BROMIDE AND ALBUTEROL SULFATE 1 DOSE: .5; 2.5 SOLUTION RESPIRATORY (INHALATION) at 13:10

## 2024-01-22 RX ADMIN — LIDOCAINE HYDROCHLORIDE 5 ML: 20 INJECTION, SOLUTION INFILTRATION; PERINEURAL at 09:59

## 2024-01-22 RX ADMIN — OXYCODONE HYDROCHLORIDE 10 MG: 10 TABLET ORAL at 08:51

## 2024-01-22 RX ADMIN — LOSARTAN POTASSIUM 100 MG: 50 TABLET, FILM COATED ORAL at 08:52

## 2024-01-22 RX ADMIN — GUAIFENESIN 400 MG: 400 TABLET ORAL at 08:52

## 2024-01-22 RX ADMIN — SUCRALFATE 1 G: 1 TABLET ORAL at 08:52

## 2024-01-22 RX ADMIN — POTASSIUM CHLORIDE 40 MEQ: 750 TABLET, EXTENDED RELEASE ORAL at 19:42

## 2024-01-22 RX ADMIN — MORPHINE SULFATE 2 MG: 2 INJECTION, SOLUTION INTRAMUSCULAR; INTRAVENOUS at 15:45

## 2024-01-22 RX ADMIN — HYDROCHLOROTHIAZIDE 12.5 MG: 25 TABLET ORAL at 08:50

## 2024-01-22 RX ADMIN — LIDOCAINE HYDROCHLORIDE,EPINEPHRINE BITARTRATE 5 ML: 20; .01 INJECTION, SOLUTION INFILTRATION; PERINEURAL at 10:00

## 2024-01-22 RX ADMIN — MORPHINE SULFATE 2 MG: 2 INJECTION, SOLUTION INTRAMUSCULAR; INTRAVENOUS at 10:24

## 2024-01-22 RX ADMIN — IPRATROPIUM BROMIDE AND ALBUTEROL SULFATE 1 DOSE: .5; 2.5 SOLUTION RESPIRATORY (INHALATION) at 08:41

## 2024-01-22 RX ADMIN — KETOROLAC TROMETHAMINE 15 MG: 30 INJECTION, SOLUTION INTRAMUSCULAR; INTRAVENOUS at 12:42

## 2024-01-22 RX ADMIN — SUCRALFATE 1 G: 1 TABLET ORAL at 19:43

## 2024-01-22 RX ADMIN — MORPHINE SULFATE 2 MG: 2 INJECTION, SOLUTION INTRAMUSCULAR; INTRAVENOUS at 20:58

## 2024-01-22 RX ADMIN — PANTOPRAZOLE SODIUM 40 MG: 40 TABLET, DELAYED RELEASE ORAL at 06:18

## 2024-01-22 RX ADMIN — SODIUM CHLORIDE, PRESERVATIVE FREE 10 ML: 5 INJECTION INTRAVENOUS at 19:44

## 2024-01-22 RX ADMIN — NALOXEGOL OXALATE 25 MG: 25 TABLET, FILM COATED ORAL at 06:18

## 2024-01-22 RX ADMIN — LEVOTHYROXINE SODIUM 137 MCG: 0.14 TABLET ORAL at 06:18

## 2024-01-22 RX ADMIN — ATORVASTATIN CALCIUM 10 MG: 10 TABLET, FILM COATED ORAL at 08:52

## 2024-01-22 RX ADMIN — SUCRALFATE 1 G: 1 TABLET ORAL at 15:42

## 2024-01-22 ASSESSMENT — PAIN DESCRIPTION - DESCRIPTORS
DESCRIPTORS: ACHING;DISCOMFORT;SORE
DESCRIPTORS: ACHING;DISCOMFORT;SORE
DESCRIPTORS: SHARP;SHOOTING;STABBING
DESCRIPTORS: ACHING;DISCOMFORT;SORE
DESCRIPTORS: TENDER;THROBBING;SHOOTING
DESCRIPTORS: TENDER;THROBBING;SORE
DESCRIPTORS: ACHING;DISCOMFORT;SORE
DESCRIPTORS: SHOOTING;STABBING;SHARP
DESCRIPTORS: DISCOMFORT;SORE;THROBBING

## 2024-01-22 ASSESSMENT — PAIN DESCRIPTION - LOCATION
LOCATION: BACK;RIB CAGE
LOCATION: BACK;RIB CAGE
LOCATION: BACK;RIB CAGE;FLANK
LOCATION: BACK;RIB CAGE;FLANK
LOCATION: BACK
LOCATION: BACK;RIB CAGE;FLANK
LOCATION: BACK;RIB CAGE
LOCATION: BACK;RIB CAGE;FLANK
LOCATION: BACK
LOCATION: BACK
LOCATION: BACK;RIB CAGE;FLANK

## 2024-01-22 ASSESSMENT — PAIN DESCRIPTION - ORIENTATION
ORIENTATION: RIGHT
ORIENTATION: RIGHT
ORIENTATION: LOWER
ORIENTATION: RIGHT
ORIENTATION: LOWER
ORIENTATION: RIGHT
ORIENTATION: LOWER

## 2024-01-22 ASSESSMENT — PAIN DESCRIPTION - FREQUENCY
FREQUENCY: CONTINUOUS

## 2024-01-22 ASSESSMENT — PAIN DESCRIPTION - PAIN TYPE
TYPE: ACUTE PAIN
TYPE: ACUTE PAIN
TYPE: ACUTE PAIN;SURGICAL PAIN
TYPE: ACUTE PAIN;SURGICAL PAIN
TYPE: ACUTE PAIN
TYPE: ACUTE PAIN;SURGICAL PAIN

## 2024-01-22 ASSESSMENT — PAIN SCALES - GENERAL
PAINLEVEL_OUTOF10: 0
PAINLEVEL_OUTOF10: 10
PAINLEVEL_OUTOF10: 0
PAINLEVEL_OUTOF10: 9
PAINLEVEL_OUTOF10: 5
PAINLEVEL_OUTOF10: 0
PAINLEVEL_OUTOF10: 10
PAINLEVEL_OUTOF10: 7
PAINLEVEL_OUTOF10: 10
PAINLEVEL_OUTOF10: 10
PAINLEVEL_OUTOF10: 0
PAINLEVEL_OUTOF10: 10
PAINLEVEL_OUTOF10: 10
PAINLEVEL_OUTOF10: 0
PAINLEVEL_OUTOF10: 10
PAINLEVEL_OUTOF10: 9
PAINLEVEL_OUTOF10: 10
PAINLEVEL_OUTOF10: 10

## 2024-01-22 ASSESSMENT — PAIN - FUNCTIONAL ASSESSMENT
PAIN_FUNCTIONAL_ASSESSMENT: ACTIVITIES ARE NOT PREVENTED
PAIN_FUNCTIONAL_ASSESSMENT: PREVENTS OR INTERFERES SOME ACTIVE ACTIVITIES AND ADLS
PAIN_FUNCTIONAL_ASSESSMENT: ACTIVITIES ARE NOT PREVENTED
PAIN_FUNCTIONAL_ASSESSMENT: PREVENTS OR INTERFERES SOME ACTIVE ACTIVITIES AND ADLS
PAIN_FUNCTIONAL_ASSESSMENT: PREVENTS OR INTERFERES SOME ACTIVE ACTIVITIES AND ADLS
PAIN_FUNCTIONAL_ASSESSMENT: ACTIVITIES ARE NOT PREVENTED
PAIN_FUNCTIONAL_ASSESSMENT: PREVENTS OR INTERFERES SOME ACTIVE ACTIVITIES AND ADLS

## 2024-01-22 ASSESSMENT — PAIN DESCRIPTION - ONSET
ONSET: ON-GOING

## 2024-01-22 NOTE — PLAN OF CARE
Problem: Safety - Adult  Goal: Free from fall injury  1/22/2024 1149 by Kira Howard RN  Outcome: Progressing     Problem: Chronic Conditions and Co-morbidities  Goal: Patient's chronic conditions and co-morbidity symptoms are monitored and maintained or improved  1/22/2024 1149 by Kira Howard RN  Outcome: Progressing     Problem: Discharge Planning  Goal: Discharge to home or other facility with appropriate resources  1/22/2024 1149 by Kira Howard RN  Outcome: Progressing     Problem: Pain  Goal: Verbalizes/displays adequate comfort level or baseline comfort level  1/22/2024 1149 by Kira Howard RN  Outcome: Progressing     Problem: ABCDS Injury Assessment  Goal: Absence of physical injury  1/22/2024 1149 by Kira Howard RN  Outcome: Progressing     Problem: Nutrition Deficit:  Goal: Optimize nutritional status  Outcome: Progressing

## 2024-01-22 NOTE — PROGRESS NOTES
[facility]  [facility]    Consultation Note         HISTORY OF PRESENT ILLNESS:   73-year-old female with past medical history of A-fib, HTN, hyperuricemia, lung mass, osteoarthritis, thyroid disease, DM, multiple admissions related to pneumonia and right lower lobe mass, status post KENZIE in the right lower lobe mass biopsy with pathology showed cores of necrotic tissue insufficient for further diagnosis, status post bronchoscopy and BAL showed normal respiratory guru, fungal and AFB cultures negative, presented to [place] ER with abdominal pain and nausea and bright red blood per rectum.  CT of the abdomen pelvis shows partially necrotic mass in the right lower lobe.  Transferred to [facility] for CT surgery evaluation.  ID consulted for suspected necrotic right lower lobe mass.    Subjective:    Past Medical History:        Diagnosis Date    A-fib (HCC)     Back problem     Carotid artery stenosis     Diabetic neuropathy (HCC) 12/04/2015    Full dentures     Hiatal hernia     Hypertension     Hyperuricemia 12/04/2015    Lung mass     Osteoarthritis     lumbosacral spine arthritis and disc disease    PVD (peripheral vascular disease) with claudication (HCC) 12/13/2012    follows with Dr Rajput    Thyroid disease     Type II or unspecified type diabetes mellitus without mention of complication, not stated as uncontrolled     Wears glasses      Past Surgical History:        Procedure Laterality Date    APPENDECTOMY      BRONCHOSCOPY N/A 1/2/2024    BRONCHOSCOPY WITH BIOPSY performed by Enrique Gaytan DO at [facility] OR    COLONOSCOPY      CT NEEDLE BIOPSY LUNG PERCUTANEOUS  11/13/2023    CT NEEDLE BIOPSY LUNG PERCUTANEOUS 11/13/2023 [facility] CT    HYSTERECTOMY (CERVIX STATUS UNKNOWN)      with appendectomy    JOINT REPLACEMENT Left 12/3/2015    total hip    THROAT SURGERY  2002    benign    TOTAL HIP ARTHROPLASTY Left 12/2015    UPPER GASTROINTESTINAL ENDOSCOPY N/A 7/7/2020    EGD BIOPSY  01/22/2024 06:30 AM       Lab Results   Component Value Date/Time    PROTIME 15.7 12/26/2023 10:15 AM    INR 1.4 12/26/2023 10:15 AM       Lab Results   Component Value Date/Time    TSH 6.030 07/14/2020 05:20 AM       Lab Results   Component Value Date/Time    COLORU Yellow 12/05/2023 09:54 PM    PHUR 7.0 12/05/2023 09:54 PM    WBCUA 10 TO 20 12/05/2023 09:54 PM    RBCUA 0 TO 2 12/05/2023 09:54 PM    BACTERIA 1+ 12/05/2023 09:54 PM    CLARITYU Clear 07/13/2020 10:13 PM    SPECGRAV 1.010 12/05/2023 09:54 PM    LEUKOCYTESUR MODERATE 12/05/2023 09:54 PM    UROBILINOGEN 0.2 12/05/2023 09:54 PM    BILIRUBINUR NEGATIVE 12/05/2023 09:54 PM    BLOODU MODERATE 07/13/2020 10:13 PM    GLUCOSEU NEGATIVE 12/05/2023 09:54 PM       No results found for: \"GNZ3ZMX\", \"BEART\", \"N8NSLFPU\", \"PHART\", \"THGBART\", \"CRL8OXL\", \"PO2ART\", \"BKV1WTA\"  Radiology:  CT CHEST WO CONTRAST   Final Result   1. Marked increase in size of right lung mass now measuring 8.8 x 9.0 x 12.6   cm, previously 10 x 7 x 8.2 cm.   2. Increased tumor extension through the chest wall to reach the right   posterior paraspinous space.   3. New pathologic fractures of the right posterior-medial 7th through 9th   ribs adjacent to the mass, with bony destruction most evidence in the 7th rib.   4. The tumor is again seen entering the right T7-8 neural foramen and now   fills the foramen.   5. Increasing fullness of the left adrenal gland, worrisome for metastatic   disease.   6. Stable L1 compression fracture.         CT NEEDLE BIOPSY LUNG/MEDIASTINUM PERCUTANEOUS    (Results Pending)       Microbiology:  Pending  No results for input(s): \"BC\" in the last 72 hours.  No results for input(s): \"ORG\" in the last 72 hours.  No results for input(s): \"BLOODCULT2\" in the last 72 hours.  No results for input(s): \"STREPNEUMAGU\" in the last 72 hours.  No results for input(s): \"LP1UAG\" in the last 72 hours.  No results for input(s): \"ASO\" in the last 72 hours.  No results for input(s):

## 2024-01-22 NOTE — PROGRESS NOTES
Physical Therapy  Physical Therapy Initial Assessment    Name: Maria Victoria Elliott  : 1950  MRN: [id number]      Date of Service: 2024    Evaluating PT:  Chago Escalona PT, DPT [id number]    Room #:  6506/6506-A  Diagnosis:  Mass of right lung [R91.8]  PMHx/PSHx:   has a past medical history of A-fib (HCC), Back problem, Carotid artery stenosis, Diabetic neuropathy (HCC), Full dentures, Hiatal hernia, Hypertension, Hyperuricemia, Lung mass, Osteoarthritis, PVD (peripheral vascular disease) with claudication (HCC), Thyroid disease, Type II or unspecified type diabetes mellitus without mention of complication, not stated as uncontrolled, and Wears glasses.   has a past surgical history that includes Appendectomy; Throat surgery (); Colonoscopy; Hysterectomy; joint replacement (Left, 12/3/2015); Total hip arthroplasty (Left, 2015); Upper gastrointestinal endoscopy (N/A, 2020); Upper gastrointestinal endoscopy (N/A, 7/15/2020); CT NEEDLE BIOPSY LUNG PERCUTANEOUS (2023); and bronchoscopy (N/A, 2024).  Procedure/Surgery:  PFT study (2024); CT guided lung biopsy (2024)  Precautions:  Falls, no lifting over 10 lbs for 24 hours post biopsy  Equipment Owned:  FWW, cane, wheelchair  Equipment Needs:  None    SUBJECTIVE:    Pt lives alone in a 1st floor apartment with 0 stair(s) to enter.  Pt ambulated with FWW as needed prior to admission.    OBJECTIVE:   Initial Evaluation  Date: 2024 Treatment Short Term/ Long Term   Goals   AM-PAC 6 Clicks      Was pt agreeable to Eval/treatment? Yes     Does pt have pain? 9/10 posterior R side     Bed Mobility  Rolling: NT  Supine to sit: Independent  Sit to supine: NT  Scooting: NT  Rolling: Independent  Supine to sit: Independent  Sit to supine: Independent  Scooting: Independent   Transfers Sit to stand: SBA  Stand to sit: SBA  Stand pivot: NT  Sit to stand: Mod I  Stand to sit: Mod I  Stand pivot: Mod I with AAD   Ambulation    180 feet

## 2024-01-22 NOTE — PROGRESS NOTES
[facility]  Hematology/Oncology  Consult      Patient Name: Maria Victoria Elliott  YOB: 1950  PCP: Phill Wadsworth III, DO   Referring Provider: [address] [place] [address]     Reason for Consultation: No chief complaint on file.       History of Present Illness: This is a 73-year-old female patient with a PMH of A-fib on Eliquis, CAD, DM II, HTN HTN, HLD, PVD who was hospitalized in November 2023 found to have a lung mass which was biopsied by pulmonology showing necrotic tissue insufficient for further diagnosis.  She then underwent a bronchoscopy on 1/2/2024 with transbronchial biopsy showing benign bronchial mucosa negative for neoplasm.  She was to continue to follow with pulmonology and treat for pulmonary abscess with close CT follow-up in 4 weeks post antibiotics.  She was referred to see Dr. Hallman in November 2023 however does not appear to have made an appointment. Patient presented to the ED for evaluation of rectal bleeding. General surgery was consulted with no plans for surgical intervention as hemoglobin was stable at baseline.  CT A/P revealed no acute findings however showed a partially necrotic mass in the right lower lobe.  CT chest without contrast showed marked increase in size of the right lung mass now measuring 8.8 x 9.0 x 12.6 cm previously 10 x 7 x 8.2 cm.  Pulmonology following.  Cardiothoracic surgery consulted for right lower lobe lobectomy.  Patient is scheduled 1/22/2024 with Dr. Caballero for right thoracotomy with lobectomy.  ID following and monitoring off antibiotics for now.  Blood cultures with no growth so far.  Leukocytosis likely due to malignancy.  CMP unremarkable.  LFTs with ALT 62.  CBC with WBC 17.9, ANC 13.46, Hgb 9.3, platelets WNL.  Consultation for lung mass.      Diagnostic Data:     Past Medical History:   Diagnosis Date    A-fib (HCC)     Back problem     Carotid artery stenosis     Diabetic neuropathy (HCC) 12/04/2015

## 2024-01-22 NOTE — CARE COORDINATION
1/22/24  Transition of Care update.  Patient planned for a right lung biopsy today.  Patient is not a surgical candidate per CTS.  ID is monitoring off ATB.  Hematology/Oncology recommending neoadjuvant chemoimmunotherapy with or without palliative radiation.  Palliative is following for pain management.   A referral was made to [facility] who is following.  PT eval is still pending.   Discharge goal is to return home when medically stable. SW/CM to follow.     Electronically signed by VIRGEN Davenport on 1/22/2024 at 12:39 PM    never

## 2024-01-22 NOTE — PROCEDURES
Patient arrived to Radiology department for Right lung biopsy. Allergies, home medications, H&P and fasting instructions reviewed with patient. Blood sample sent to lab for ordered tests.  Procedural instructions given, questions answered, understanding expressed and consent signed. Patient given fluoroscopy education, no questions at this time.

## 2024-01-22 NOTE — PROGRESS NOTES
Palliative Care Department  [phone]  Palliative Care Progress Note  Provider Mary Kay Joe, APRN - CNP     Maria Victoria Elliott  [id number]  Hospital Day: 6  Date of Initial Consult: 1/19/2024  Referring Provider:  Enrique Gaytan DO  Palliative Medicine was consulted for assistance with: overwhelming symptoms    HPI:   Maria Victoria Elliott is a 73 y.o. with a medical history of atrial fibrillation on Eliquis, HTN, osteoarthritis, PVD, hypothyroidism, DM who was admitted on 1/17/2024 from home with a CHIEF COMPLAINT of rectal bleeding. Patient was a transfer from [facility] for CTA evaluation. Patient states that she is constipated and began having rectal bleeding prompting her to come to the emergency room for further evaluation.  On arrival labs were obtained and patient was found to have a lactic acid of 2.7, mildly elevated LFTs, leukocytosis of 19.7, H&H of 11.1/36.2.  CT abdomen pelvis revealed no acute findings in the abdomen however a partially necrotic mass in the right lower lobe. Hospitalized in November 2023 found to have a lung mass which was biopsied by pulmonology showing necrotic tissue insufficient for further diagnosis. Then underwent a bronchoscopy on 1/2/2024 with transbronchial biopsy showing benign bronchial mucosa negative for neoplasm.  She was to continue to follow with pulmonology and treat for pulmonary abscess with close CT follow-up in 4 weeks post antibiotics.  She was also referred to see Dr. Hallman in November 2023 however does not appear to have made an appointment.  Pulmnology, ID, CTS, oncology following. She is for possible RLL lobectomy. Palliative medicine consulted for further assistance.   ASSESSMENT/PLAN:     Pertinent Hospital Diagnoses     Right lung mass  History of lung abscess  Constipation    Palliative Care Encounter / Counseling Regarding Goals of Care  Please see detailed goals of care discussion as below  At this time, Maria Victoria Elliott, Does have capacity  for medical decision-making.  Capacity is time limited and situation/question specific  During encounter there was no surrogate medical decision-maker needed  Outcome of goals of care meeting:   Pain control  Continue full code  Code status Full Code  Advanced Directives: POA or living will in [facility]  Surrogate/Legal NOK:  Jean Gr (child/POA) [phone]  Reuben Kiser Jr. (Sibling) [phone]      Pain r/t lung mass:   -  Oxycodone 5-10 mg Q 4 hours as needed   -  Morphine 2 mg IV every 4 hours as needed    Bowel regimen:   -  Senokot-s 1 tab daily   -  Movantik 25 mg daily   -  PEG 17 g daily    Spiritual assessment: no spiritual distress identified  Bereavement and grief: to be determined  Referrals to: none today  SUBJECTIVE:   Details of Conversation:    Patient seen resting in bed, alert and oriented and able to hold meaningful conversation.  No family present at bedside.  Patient states plan is to have right lung biopsy this morning.  States pain is controlled with current regimen.  Reports bowel movement this morning.  Denies any other complaints or palliative medicine needs at this time.  Emotional support given and all questions addressed.  Will continue to follow.    OBJECTIVE:   Prognosis: Guarded    Physical Exam:  /65   Pulse 74   Temp 97.7 °F (36.5 °C)   Resp 16   Ht 1.651 m (5' 5\")   Wt 92.5 kg (204 lb)   SpO2 93%   BMI 33.95 kg/m²   Constitutional:  NAD, awake, alert  Lungs:  Diminished as bases bilaterally  Heart: RRR, distant heart tones, no murmur, rub, or gallop noted during exam  Abd:  Soft, non tender, non distended, bowel sounds present  Neuro:   Alert, grossly nonfocal; following commands    Objective data reviewed: labs, images, records, medication use, vitals, and chart    Discussed patient and the plan of care with the other IDT members: Floor Nurse and Patient    Time/Communication  Greater than 50% of time spent, total 35 minutes in counseling and coordination of

## 2024-01-22 NOTE — PROGRESS NOTES
Bob Vigil M.D.,[facility]  Donaldo Boston D.O., FTHALIA., [facility]  Jared Perez M.D.  Ernestina Burt M.D.   ROLF Millan.O.        Daily Pulmonary Progress Note    Patient:  Maria Victoria Elliott 73 y.o. female MRN: [id number]     Date of Service: 1/22/2024      Synopsis     We are following patient for lung mass/abscess, prior tobacco abuse    \"CC\" shortness of breath    Code status: Full    Known to our service, transferred from [facility] no new consult needed.  Subjective      Patient was seen and examined.  Tolerated CT guided biopsy of lung mass today . Path pending.  Mild discomfort from procedure. Oxygen level 89% on RA at rest. Will check ambulatory ox testing prior to dc.       Review of Systems:  Constitutional: Denies fever, weight loss, night sweats, and fatigue  Skin: Denies pigmentation, dark lesions, and rashes   HEENT: Denies hearing loss, tinnitus, ear drainage, epistaxis, sore throat, and hoarseness.  Cardiovascular: Denies palpitations, chest pain, and chest pressure.  Respiratory: Cough, shortness of breath  Gastrointestinal: Denies nausea, vomiting, poor appetite, diarrhea, heartburn or reflux  Genitourinary: Denies dysuria, frequency, urgency or hematuria  Musculoskeletal: Denies myalgias, muscle weakness, and bone pain  Neurological: Denies dizziness, vertigo, headache, and focal weakness  Psychological: Denies anxiety and depression  Endocrine: Denies heat intolerance and cold intolerance  Hematopoietic/Lymphatic: Denies bleeding problems and blood transfusions    24-hour events:  Ct guided biopsy R lung mass     Objective   OBJECTIVE:   /63   Pulse 84   Temp 96.9 °F (36.1 °C) (Infrared)   Resp 18   Ht 1.651 m (5' 5\")   Wt 92.5 kg (204 lb)   SpO2 93%   BMI 33.95 kg/m²   SpO2 Readings from Last 1 Encounters:   01/22/24 93%        I/O:    Intake/Output Summary (Last 24 hours) at 1/22/2024 1550  Last data filed at 1/22/2024 0833  Gross per 24 hour   Intake

## 2024-01-22 NOTE — PROGRESS NOTES
Hospitalist Progress Note      PCP: Phill Wadsworth III,     Date of Admission: 1/17/2024        Hospital Course:   PRESENTED WITH RIGHT LUNG MASS, THOUGHT TO BE OPERABLE, SEEN BY CTS AND IT IS NOT.  SHE IS TO START CHEMO AND RADIATION        Subjective:  COUGHING           Medications:  Reviewed    Infusion Medications    sodium chloride      dextrose       Scheduled Medications    sennosides-docusate sodium  1 tablet Oral Daily    potassium chloride  40 mEq Oral BID    allopurinol  300 mg Oral Daily    [Held by provider] apixaban  5 mg Oral BID    aspirin  81 mg Oral Daily    atorvastatin  10 mg Oral Daily    metoprolol tartrate  25 mg Oral BID    pantoprazole  40 mg Oral Daily    sertraline  50 mg Oral Daily    sucralfate  1 g Oral 4x Daily    levothyroxine  137 mcg Oral Daily    insulin lispro  0-8 Units SubCUTAneous TID WC    insulin lispro  0-4 Units SubCUTAneous Nightly    sodium chloride flush  5-40 mL IntraVENous 2 times per day    ipratropium 0.5 mg-albuterol 2.5 mg  1 Dose Inhalation Q4H WA RT    losartan  100 mg Oral Daily    And    hydroCHLOROthiazide  12.5 mg Oral Daily    polyethylene glycol  17 g Oral Daily    guaiFENesin  400 mg Oral TID    naloxegol  25 mg Oral QAM AC     PRN Meds: benzonatate, ketorolac, oxyCODONE **OR** oxyCODONE, albuterol, sodium chloride flush, sodium chloride, ondansetron **OR** ondansetron, magnesium hydroxide, acetaminophen **OR** acetaminophen, docusate sodium, dextrose bolus **OR** dextrose bolus, glucagon (rDNA), dextrose, Glucose, morphine      Intake/Output Summary (Last 24 hours) at 1/22/2024 1529  Last data filed at 1/22/2024 0833  Gross per 24 hour   Intake 360 ml   Output 700 ml   Net -340 ml       Exam:    /63   Pulse 84   Temp 96.9 °F (36.1 °C) (Infrared)   Resp 18   Ht 1.651 m (5' 5\")   Wt 92.5 kg (204 lb)   SpO2 93%   BMI 33.95 kg/m²           Gen: WELL DEVELOPED  HEENT: NC/AT, moist mucous membranes, no oropharyngeal erythema or  exudate  Neck: supple, trachea midline, no anterior cervical or SC LAD  Heart:  Normal s1/s2, RRR,  Lungs:  DIMINISHED ON THE RIGHT,  Abd: bowel sounds present, soft, nontender, nondistended, no masses  Extrem:  No clubbing, cyanosis,   NO edema  Skin: no rashes or lesions  Psych: A & O x3  Neuro: grossly intact, moves all four extremities.                  Labs:   Recent Labs     01/20/24 0606 01/21/24 0603 01/22/24  0630   WBC 17.8* 15.7* 16.0*   HGB 8.4* 8.5* 8.6*   HCT 29.2* 29.5* 29.8*    363 377     Recent Labs     01/20/24  0606 01/21/24  0603 01/22/24  0630    137 136   K 4.2 4.4 4.6    105 103   CO2 24 23 21*   BUN 18 20 16   CREATININE 0.7 0.8 0.8   CALCIUM 9.1 9.1 9.3     Recent Labs     01/20/24  0606 01/21/24  0603 01/22/24  0630   AST 15 9 9   ALT 49* 35* 27   BILITOT 0.3 0.3 0.4   ALKPHOS 91 81 80     No results for input(s): \"INR\" in the last 72 hours.  No results for input(s): \"CKTOTAL\", \"TROPONINI\" in the last 72 hours.  Recent Labs     01/20/24  0606 01/21/24  0603 01/22/24  0630   AST 15 9 9   ALT 49* 35* 27   BILITOT 0.3 0.3 0.4   ALKPHOS 91 81 80     No results for input(s): \"LACTA\" in the last 72 hours.  No results found for: \"LABURIC\", \"URICACID\"  No results found for: \"AMMONIA\"    Assessment:    Active Hospital Problems    Diagnosis Date Noted    Palliative care encounter [Z51.5] 01/19/2024    Goals of care, counseling/discussion [Z71.89] 01/19/2024    Mass of right lung [R91.8] 01/17/2024   PAF   HTN   II DM  COUGH   HYPOTHYROID    PLAN  ELIQUIS   COZAAR   TESSALON    SYNTHROID   ZOLOFT       DVT Prophylaxis: ELIQUIS  Diet: ADULT DIET; Regular; 4 carb choices (60 gm/meal)  ADULT ORAL NUTRITION SUPPLEMENT; Breakfast, Lunch; Diabetic Oral Supplement  Code Status: Full Code    PT/OT Eval Status:  ORDERED    Dispo -  HOME      Electronically signed by Eliseo Chen DO on 1/22/2024 at 3:29 PM FACOI

## 2024-01-22 NOTE — PATIENT CARE CONFERENCE
[facility] Quality Flow/Interdisciplinary Rounds Progress Note        Quality Flow Rounds held on January 22, 2024    Disciplines Attending:  Bedside Nurse, , , and Nursing Unit Leadership    Maria Victoria Elliott was admitted on 1/17/2024  8:21 PM    Anticipated Discharge Date:       Disposition:    Callum Score:  Callum Scale Score: 20    Readmission Risk              Risk of Unplanned Readmission:  18           Discussed patient goal for the day, patient clinical progression, and barriers to discharge.  The following Goal(s) of the Day/Commitment(s) have been identified:   attempt to get patient a bed on a general floor      Katie Ling RN  January 22, 2024

## 2024-01-23 LAB
ALBUMIN SERPL-MCNC: 2.6 G/DL (ref 3.5–5.2)
ALP SERPL-CCNC: 79 U/L (ref 35–104)
ALT SERPL-CCNC: 24 U/L (ref 0–32)
ANION GAP SERPL CALCULATED.3IONS-SCNC: 8 MMOL/L (ref 7–16)
AST SERPL-CCNC: 12 U/L (ref 0–31)
BASOPHILS # BLD: 0.07 K/UL (ref 0–0.2)
BASOPHILS NFR BLD: 0 % (ref 0–2)
BILIRUB SERPL-MCNC: 0.5 MG/DL (ref 0–1.2)
BUN SERPL-MCNC: 19 MG/DL (ref 6–23)
CALCIUM SERPL-MCNC: 9.5 MG/DL (ref 8.6–10.2)
CHLORIDE SERPL-SCNC: 101 MMOL/L (ref 98–107)
CO2 SERPL-SCNC: 23 MMOL/L (ref 22–29)
CREAT SERPL-MCNC: 0.9 MG/DL (ref 0.5–1)
EOSINOPHIL # BLD: 1.26 K/UL (ref 0.05–0.5)
EOSINOPHILS RELATIVE PERCENT: 7 % (ref 0–6)
ERYTHROCYTE [DISTWIDTH] IN BLOOD BY AUTOMATED COUNT: 17.1 % (ref 11.5–15)
GFR SERPL CREATININE-BSD FRML MDRD: >60 ML/MIN/1.73M2
GLUCOSE BLD-MCNC: 132 MG/DL (ref 74–99)
GLUCOSE BLD-MCNC: 144 MG/DL (ref 74–99)
GLUCOSE BLD-MCNC: 146 MG/DL (ref 74–99)
GLUCOSE BLD-MCNC: 168 MG/DL (ref 74–99)
GLUCOSE SERPL-MCNC: 140 MG/DL (ref 74–99)
HCT VFR BLD AUTO: 28.6 % (ref 34–48)
HGB BLD-MCNC: 8.3 G/DL (ref 11.5–15.5)
IMM GRANULOCYTES # BLD AUTO: 0.19 K/UL (ref 0–0.58)
IMM GRANULOCYTES NFR BLD: 1 % (ref 0–5)
LYMPHOCYTES NFR BLD: 1.74 K/UL (ref 1.5–4)
LYMPHOCYTES RELATIVE PERCENT: 10 % (ref 20–42)
MCH RBC QN AUTO: 23.6 PG (ref 26–35)
MCHC RBC AUTO-ENTMCNC: 29 G/DL (ref 32–34.5)
MCV RBC AUTO: 81.5 FL (ref 80–99.9)
MONOCYTES NFR BLD: 0.93 K/UL (ref 0.1–0.95)
MONOCYTES NFR BLD: 5 % (ref 2–12)
NEUTROPHILS NFR BLD: 76 % (ref 43–80)
NEUTS SEG NFR BLD: 13.16 K/UL (ref 1.8–7.3)
PLATELET # BLD AUTO: 365 K/UL (ref 130–450)
PMV BLD AUTO: 9.7 FL (ref 7–12)
POTASSIUM SERPL-SCNC: 4.8 MMOL/L (ref 3.5–5)
PROT SERPL-MCNC: 5.8 G/DL (ref 6.4–8.3)
RBC # BLD AUTO: 3.51 M/UL (ref 3.5–5.5)
SODIUM SERPL-SCNC: 132 MMOL/L (ref 132–146)
WBC OTHER # BLD: 17.4 K/UL (ref 4.5–11.5)

## 2024-01-23 PROCEDURE — 36415 COLL VENOUS BLD VENIPUNCTURE: CPT

## 2024-01-23 PROCEDURE — 94669 MECHANICAL CHEST WALL OSCILL: CPT

## 2024-01-23 PROCEDURE — 6370000000 HC RX 637 (ALT 250 FOR IP): Performed by: INTERNAL MEDICINE

## 2024-01-23 PROCEDURE — 6360000002 HC RX W HCPCS: Performed by: INTERNAL MEDICINE

## 2024-01-23 PROCEDURE — 99233 SBSQ HOSP IP/OBS HIGH 50: CPT | Performed by: NURSE PRACTITIONER

## 2024-01-23 PROCEDURE — 85025 COMPLETE CBC W/AUTO DIFF WBC: CPT

## 2024-01-23 PROCEDURE — 2580000003 HC RX 258: Performed by: FAMILY MEDICINE

## 2024-01-23 PROCEDURE — 82962 GLUCOSE BLOOD TEST: CPT

## 2024-01-23 PROCEDURE — 6360000002 HC RX W HCPCS: Performed by: NURSE PRACTITIONER

## 2024-01-23 PROCEDURE — 6370000000 HC RX 637 (ALT 250 FOR IP): Performed by: NURSE PRACTITIONER

## 2024-01-23 PROCEDURE — 6370000000 HC RX 637 (ALT 250 FOR IP): Performed by: FAMILY MEDICINE

## 2024-01-23 PROCEDURE — 80053 COMPREHEN METABOLIC PANEL: CPT

## 2024-01-23 PROCEDURE — 6360000002 HC RX W HCPCS: Performed by: FAMILY MEDICINE

## 2024-01-23 PROCEDURE — 94640 AIRWAY INHALATION TREATMENT: CPT

## 2024-01-23 PROCEDURE — 2140000000 HC CCU INTERMEDIATE R&B

## 2024-01-23 RX ORDER — MORPHINE SULFATE 2 MG/ML
2 INJECTION, SOLUTION INTRAMUSCULAR; INTRAVENOUS ONCE
Status: COMPLETED | OUTPATIENT
Start: 2024-01-23 | End: 2024-01-23

## 2024-01-23 RX ORDER — OXYCODONE HYDROCHLORIDE 5 MG/1
5 TABLET ORAL EVERY 6 HOURS PRN
Qty: 12 TABLET | Refills: 0 | Status: SHIPPED | OUTPATIENT
Start: 2024-01-23 | End: 2024-01-26

## 2024-01-23 RX ORDER — OXYCODONE HYDROCHLORIDE 10 MG/1
10 TABLET ORAL EVERY 4 HOURS PRN
Status: DISCONTINUED | OUTPATIENT
Start: 2024-01-23 | End: 2024-01-25

## 2024-01-23 RX ORDER — GUAIFENESIN 400 MG/1
400 TABLET ORAL 3 TIMES DAILY
Qty: 56 TABLET | Refills: 0 | Status: SHIPPED | OUTPATIENT
Start: 2024-01-23

## 2024-01-23 RX ORDER — OXYCODONE HYDROCHLORIDE 15 MG/1
15 TABLET ORAL EVERY 4 HOURS PRN
Status: DISCONTINUED | OUTPATIENT
Start: 2024-01-23 | End: 2024-01-25

## 2024-01-23 RX ORDER — BENZONATATE 100 MG/1
100 CAPSULE ORAL 3 TIMES DAILY PRN
Qty: 21 CAPSULE | Refills: 0 | Status: SHIPPED | OUTPATIENT
Start: 2024-01-23 | End: 2024-01-30

## 2024-01-23 RX ADMIN — ATORVASTATIN CALCIUM 10 MG: 10 TABLET, FILM COATED ORAL at 07:48

## 2024-01-23 RX ADMIN — METOPROLOL TARTRATE 25 MG: 25 TABLET, FILM COATED ORAL at 20:26

## 2024-01-23 RX ADMIN — LOSARTAN POTASSIUM 100 MG: 50 TABLET, FILM COATED ORAL at 07:47

## 2024-01-23 RX ADMIN — ASPIRIN 81 MG: 81 TABLET, COATED ORAL at 07:47

## 2024-01-23 RX ADMIN — KETOROLAC TROMETHAMINE 15 MG: 30 INJECTION, SOLUTION INTRAMUSCULAR; INTRAVENOUS at 04:42

## 2024-01-23 RX ADMIN — METOPROLOL TARTRATE 25 MG: 25 TABLET, FILM COATED ORAL at 07:48

## 2024-01-23 RX ADMIN — SERTRALINE HYDROCHLORIDE 50 MG: 50 TABLET ORAL at 07:48

## 2024-01-23 RX ADMIN — SENNOSIDES AND DOCUSATE SODIUM 1 TABLET: 50; 8.6 TABLET ORAL at 07:48

## 2024-01-23 RX ADMIN — SODIUM CHLORIDE, PRESERVATIVE FREE 10 ML: 5 INJECTION INTRAVENOUS at 07:47

## 2024-01-23 RX ADMIN — SUCRALFATE 1 G: 1 TABLET ORAL at 20:26

## 2024-01-23 RX ADMIN — BENZONATATE 100 MG: 100 CAPSULE ORAL at 13:39

## 2024-01-23 RX ADMIN — IPRATROPIUM BROMIDE AND ALBUTEROL SULFATE 1 DOSE: .5; 2.5 SOLUTION RESPIRATORY (INHALATION) at 16:58

## 2024-01-23 RX ADMIN — SUCRALFATE 1 G: 1 TABLET ORAL at 17:03

## 2024-01-23 RX ADMIN — GUAIFENESIN 400 MG: 400 TABLET ORAL at 20:26

## 2024-01-23 RX ADMIN — MORPHINE SULFATE 2 MG: 2 INJECTION, SOLUTION INTRAMUSCULAR; INTRAVENOUS at 19:20

## 2024-01-23 RX ADMIN — IPRATROPIUM BROMIDE AND ALBUTEROL SULFATE 1 DOSE: .5; 2.5 SOLUTION RESPIRATORY (INHALATION) at 09:08

## 2024-01-23 RX ADMIN — LEVOTHYROXINE SODIUM 137 MCG: 0.14 TABLET ORAL at 05:49

## 2024-01-23 RX ADMIN — MORPHINE SULFATE 2 MG: 2 INJECTION, SOLUTION INTRAMUSCULAR; INTRAVENOUS at 13:39

## 2024-01-23 RX ADMIN — BENZONATATE 100 MG: 100 CAPSULE ORAL at 07:48

## 2024-01-23 RX ADMIN — OXYCODONE HYDROCHLORIDE 10 MG: 10 TABLET ORAL at 07:48

## 2024-01-23 RX ADMIN — KETOROLAC TROMETHAMINE 15 MG: 30 INJECTION, SOLUTION INTRAMUSCULAR; INTRAVENOUS at 20:26

## 2024-01-23 RX ADMIN — GUAIFENESIN 400 MG: 400 TABLET ORAL at 12:19

## 2024-01-23 RX ADMIN — PANTOPRAZOLE SODIUM 40 MG: 40 TABLET, DELAYED RELEASE ORAL at 05:49

## 2024-01-23 RX ADMIN — SUCRALFATE 1 G: 1 TABLET ORAL at 12:19

## 2024-01-23 RX ADMIN — IPRATROPIUM BROMIDE AND ALBUTEROL SULFATE 1 DOSE: .5; 2.5 SOLUTION RESPIRATORY (INHALATION) at 12:59

## 2024-01-23 RX ADMIN — KETOROLAC TROMETHAMINE 15 MG: 30 INJECTION, SOLUTION INTRAMUSCULAR; INTRAVENOUS at 12:50

## 2024-01-23 RX ADMIN — HYDROCHLOROTHIAZIDE 12.5 MG: 25 TABLET ORAL at 07:47

## 2024-01-23 RX ADMIN — NALOXEGOL OXALATE 25 MG: 25 TABLET, FILM COATED ORAL at 05:49

## 2024-01-23 RX ADMIN — GUAIFENESIN 400 MG: 400 TABLET ORAL at 07:47

## 2024-01-23 RX ADMIN — SODIUM CHLORIDE, PRESERVATIVE FREE 10 ML: 5 INJECTION INTRAVENOUS at 20:27

## 2024-01-23 RX ADMIN — ALLOPURINOL 300 MG: 100 TABLET ORAL at 07:48

## 2024-01-23 RX ADMIN — OXYCODONE HYDROCHLORIDE 10 MG: 10 TABLET ORAL at 03:20

## 2024-01-23 RX ADMIN — OXYCODONE HYDROCHLORIDE 15 MG: 15 TABLET ORAL at 22:28

## 2024-01-23 RX ADMIN — MORPHINE SULFATE 2 MG: 2 INJECTION, SOLUTION INTRAMUSCULAR; INTRAVENOUS at 09:53

## 2024-01-23 RX ADMIN — OXYCODONE HYDROCHLORIDE 15 MG: 15 TABLET ORAL at 17:02

## 2024-01-23 RX ADMIN — MORPHINE SULFATE 2 MG: 2 INJECTION, SOLUTION INTRAMUSCULAR; INTRAVENOUS at 11:38

## 2024-01-23 RX ADMIN — POTASSIUM CHLORIDE 40 MEQ: 750 TABLET, EXTENDED RELEASE ORAL at 07:47

## 2024-01-23 RX ADMIN — SUCRALFATE 1 G: 1 TABLET ORAL at 07:47

## 2024-01-23 RX ADMIN — OXYCODONE HYDROCHLORIDE 15 MG: 15 TABLET ORAL at 12:19

## 2024-01-23 RX ADMIN — MORPHINE SULFATE 2 MG: 2 INJECTION, SOLUTION INTRAMUSCULAR; INTRAVENOUS at 05:49

## 2024-01-23 RX ADMIN — POTASSIUM CHLORIDE 40 MEQ: 750 TABLET, EXTENDED RELEASE ORAL at 20:26

## 2024-01-23 RX ADMIN — BENZONATATE 100 MG: 100 CAPSULE ORAL at 00:15

## 2024-01-23 ASSESSMENT — PAIN - FUNCTIONAL ASSESSMENT
PAIN_FUNCTIONAL_ASSESSMENT: PREVENTS OR INTERFERES WITH MANY ACTIVE NOT PASSIVE ACTIVITIES
PAIN_FUNCTIONAL_ASSESSMENT: ACTIVITIES ARE NOT PREVENTED
PAIN_FUNCTIONAL_ASSESSMENT: ACTIVITIES ARE NOT PREVENTED
PAIN_FUNCTIONAL_ASSESSMENT: PREVENTS OR INTERFERES WITH MANY ACTIVE NOT PASSIVE ACTIVITIES
PAIN_FUNCTIONAL_ASSESSMENT: PREVENTS OR INTERFERES SOME ACTIVE ACTIVITIES AND ADLS
PAIN_FUNCTIONAL_ASSESSMENT: PREVENTS OR INTERFERES SOME ACTIVE ACTIVITIES AND ADLS
PAIN_FUNCTIONAL_ASSESSMENT: ACTIVITIES ARE NOT PREVENTED
PAIN_FUNCTIONAL_ASSESSMENT: PREVENTS OR INTERFERES SOME ACTIVE ACTIVITIES AND ADLS
PAIN_FUNCTIONAL_ASSESSMENT: ACTIVITIES ARE NOT PREVENTED
PAIN_FUNCTIONAL_ASSESSMENT: ACTIVITIES ARE NOT PREVENTED

## 2024-01-23 ASSESSMENT — PAIN DESCRIPTION - FREQUENCY
FREQUENCY: CONTINUOUS

## 2024-01-23 ASSESSMENT — PAIN SCALES - GENERAL
PAINLEVEL_OUTOF10: 8
PAINLEVEL_OUTOF10: 9
PAINLEVEL_OUTOF10: 10
PAINLEVEL_OUTOF10: 6
PAINLEVEL_OUTOF10: 10
PAINLEVEL_OUTOF10: 7
PAINLEVEL_OUTOF10: 9
PAINLEVEL_OUTOF10: 10
PAINLEVEL_OUTOF10: 8
PAINLEVEL_OUTOF10: 8

## 2024-01-23 ASSESSMENT — PAIN DESCRIPTION - DESCRIPTORS
DESCRIPTORS: ACHING;DISCOMFORT;SORE
DESCRIPTORS: ACHING;DISCOMFORT;SORE
DESCRIPTORS: TENDER;THROBBING;SHOOTING
DESCRIPTORS: TENDER;THROBBING;STABBING
DESCRIPTORS: ACHING;DISCOMFORT;SORE
DESCRIPTORS: TENDER;THROBBING;SHOOTING
DESCRIPTORS: ACHING;DISCOMFORT;SORE
DESCRIPTORS: THROBBING;TENDER;STABBING
DESCRIPTORS: ACHING;DISCOMFORT;SORE
DESCRIPTORS: SORE;SHOOTING;THROBBING

## 2024-01-23 ASSESSMENT — PAIN DESCRIPTION - ORIENTATION
ORIENTATION: RIGHT
ORIENTATION: RIGHT
ORIENTATION: LOWER
ORIENTATION: RIGHT
ORIENTATION: MID
ORIENTATION: RIGHT
ORIENTATION: RIGHT
ORIENTATION: LOWER
ORIENTATION: RIGHT

## 2024-01-23 ASSESSMENT — PAIN DESCRIPTION - LOCATION
LOCATION: BACK
LOCATION: BACK
LOCATION: BACK;RIB CAGE
LOCATION: BACK
LOCATION: BACK;RIB CAGE
LOCATION: BACK
LOCATION: BACK;RIB CAGE
LOCATION: BACK
LOCATION: BACK
LOCATION: BACK;RIB CAGE

## 2024-01-23 ASSESSMENT — PAIN DESCRIPTION - PAIN TYPE
TYPE: ACUTE PAIN;SURGICAL PAIN
TYPE: ACUTE PAIN

## 2024-01-23 ASSESSMENT — PAIN DESCRIPTION - ONSET
ONSET: PROGRESSIVE
ONSET: ON-GOING
ONSET: PROGRESSIVE

## 2024-01-23 NOTE — PROGRESS NOTES
Palliative Care Department  [phone]  Palliative Care Progress Note  Provider Mary Kay Joe, APRN - CNP     Maria Victoria Elliott  [id number]  Hospital Day: 7  Date of Initial Consult: 1/19/2024  Referring Provider:  Enrique Gaytan DO  Palliative Medicine was consulted for assistance with: overwhelming symptoms    HPI:   Maria Victoria Elliott is a 73 y.o. with a medical history of atrial fibrillation on Eliquis, HTN, osteoarthritis, PVD, hypothyroidism, DM who was admitted on 1/17/2024 from home with a CHIEF COMPLAINT of rectal bleeding. Patient was a transfer from [facility] for CTA evaluation. Patient states that she is constipated and began having rectal bleeding prompting her to come to the emergency room for further evaluation.  On arrival labs were obtained and patient was found to have a lactic acid of 2.7, mildly elevated LFTs, leukocytosis of 19.7, H&H of 11.1/36.2.  CT abdomen pelvis revealed no acute findings in the abdomen however a partially necrotic mass in the right lower lobe. Hospitalized in November 2023 found to have a lung mass which was biopsied by pulmonology showing necrotic tissue insufficient for further diagnosis. Then underwent a bronchoscopy on 1/2/2024 with transbronchial biopsy showing benign bronchial mucosa negative for neoplasm.  She was to continue to follow with pulmonology and treat for pulmonary abscess with close CT follow-up in 4 weeks post antibiotics.  She was also referred to see Dr. Hallman in November 2023 however does not appear to have made an appointment.  Pulmnology, ID, CTS, oncology following. She is for possible RLL lobectomy. Palliative medicine consulted for further assistance.   ASSESSMENT/PLAN:     Pertinent Hospital Diagnoses     Right lung mass  History of lung abscess  Constipation    Palliative Care Encounter / Counseling Regarding Goals of Care  Please see detailed goals of care discussion as below  At this time, Maria Victoria Elliott, Does have capacity  Nurse and Patient    Time/Communication  Greater than 50% of time spent, total 55 minutes in counseling and coordination of care at the bedside regarding goals of care, diagnosis and prognosis, and see above.    Thank you for allowing Palliative Medicine to participate in the care of Maria Victoria Elliott.

## 2024-01-23 NOTE — PROGRESS NOTES
Hospitalist Progress Note      PCP: Phill Wadsworth III,     Date of Admission: 1/17/2024        Hospital Course:  PRESENTED WITH RIGHT LUNG MASS, THOUGHT TO BE OPERABLE, SEEN BY CTS AND IT IS NOT.  SHE IS TO START CHEMO AND RADIATION       1/23 HAVING PAIN IN  HER BACK, DOES NOT WANT TO GO HOME         Subjective:   STATES SHE LIVES ALONE AND CANNOT GO HOME BY HIMSELF          Medications:  Reviewed    Infusion Medications    sodium chloride      dextrose       Scheduled Medications    sennosides-docusate sodium  1 tablet Oral Daily    potassium chloride  40 mEq Oral BID    allopurinol  300 mg Oral Daily    [Held by provider] apixaban  5 mg Oral BID    aspirin  81 mg Oral Daily    atorvastatin  10 mg Oral Daily    metoprolol tartrate  25 mg Oral BID    pantoprazole  40 mg Oral Daily    sertraline  50 mg Oral Daily    sucralfate  1 g Oral 4x Daily    levothyroxine  137 mcg Oral Daily    insulin lispro  0-8 Units SubCUTAneous TID WC    insulin lispro  0-4 Units SubCUTAneous Nightly    sodium chloride flush  5-40 mL IntraVENous 2 times per day    ipratropium 0.5 mg-albuterol 2.5 mg  1 Dose Inhalation Q4H WA RT    losartan  100 mg Oral Daily    And    hydroCHLOROthiazide  12.5 mg Oral Daily    polyethylene glycol  17 g Oral Daily    guaiFENesin  400 mg Oral TID    naloxegol  25 mg Oral QAM AC     PRN Meds: oxyCODONE **OR** oxyCODONE, benzonatate, ketorolac, albuterol, sodium chloride flush, sodium chloride, ondansetron **OR** ondansetron, magnesium hydroxide, acetaminophen **OR** acetaminophen, docusate sodium, dextrose bolus **OR** dextrose bolus, glucagon (rDNA), dextrose, Glucose, morphine      Intake/Output Summary (Last 24 hours) at 1/23/2024 1618  Last data filed at 1/23/2024 0956  Gross per 24 hour   Intake 360 ml   Output --   Net 360 ml       Exam:    BP (!) 115/55   Pulse 77   Temp 97.3 °F (36.3 °C) (Temporal)   Resp 20   Ht 1.651 m (5' 5\")   Wt 92.5 kg (204 lb)   SpO2 95%   BMI 33.95 kg/m²

## 2024-01-23 NOTE — CARE COORDINATION
1/23/24  Transition of care Update.  Discharge order noted.  Patient is s/p IR guided right lung biopsy from yesterday.  Pathology is pending .  Oncology recommending out patient PET once pathology is confirmed and Systemic therapy pending pathology and PET results . Patient is not a surgical candidate. ID continues to follow off ATB.   Discharge goal is to return home.  [facility] is following with home care orders requested.  Palliative also messaged for pain management and to see if they can follow post discharge as patient is tearful in the room.  SW/Frankie to follow.    12:45pm  reached out to [facility] care who has contacted patient PCP in the community and [place] working on setting up Palliative Care to follow at home.     Electronically signed by VIRGEN Davenport on 1/23/2024 at 11:45 AM

## 2024-01-23 NOTE — PLAN OF CARE
Problem: Safety - Adult  Goal: Free from fall injury  1/22/2024 2243 by Sloane Becker RN  Outcome: Progressing     Problem: Chronic Conditions and Co-morbidities  Goal: Patient's chronic conditions and co-morbidity symptoms are monitored and maintained or improved  1/22/2024 2243 by Sloane Becker RN  Outcome: Progressing     Problem: Discharge Planning  Goal: Discharge to home or other facility with appropriate resources  1/22/2024 2243 by Sloane Becker RN  Outcome: Progressing     Problem: Pain  Goal: Verbalizes/displays adequate comfort level or baseline comfort level  1/22/2024 2243 by Sloane Becker RN  Outcome: Progressing     Problem: ABCDS Injury Assessment  Goal: Absence of physical injury  1/22/2024 2243 by Sloane Becker RN  Outcome: Progressing     Problem: Nutrition Deficit:  Goal: Optimize nutritional status  1/22/2024 2243 by Sloane Becker RN  Outcome: Progressing

## 2024-01-23 NOTE — PROGRESS NOTES
Due to increased pain medication and breathing difficulties along with being off Eliquis and at risk for Afib with patient's mass and recent biopsy patient placed back on telemetry monitor per Pulmonology.

## 2024-01-23 NOTE — PATIENT CARE CONFERENCE
P Quality Flow/Interdisciplinary Rounds Progress Note        Quality Flow Rounds held on January 23, 2024    Disciplines Attending:  Bedside Nurse, , , and Nursing Unit Leadership    Maria Victoria Elliott was admitted on 1/17/2024  8:21 PM    Anticipated Discharge Date:       Disposition:    Callum Score:  Callum Scale Score: 19    Readmission Risk              Risk of Unplanned Readmission:  18           Discussed patient goal for the day, patient clinical progression, and barriers to discharge.  The following Goal(s) of the Day/Commitment(s) have been identified:  downgrade/discharge planning       Kamryn Bright RN  January 23, 2024

## 2024-01-23 NOTE — PROGRESS NOTES
[facility]  [facility]  Progress note         HISTORY OF PRESENT ILLNESS:   73-year-old female with past medical history of A-fib, HTN, hyperuricemia, lung mass, osteoarthritis, thyroid disease, DM, multiple admissions related to pneumonia and right lower lobe mass, status post KENZIE in the right lower lobe mass biopsy with pathology showed cores of necrotic tissue insufficient for further diagnosis, status post bronchoscopy and BAL showed normal respiratory guru, fungal and AFB cultures negative, presented to [place] ER with abdominal pain and nausea and bright red blood per rectum.  CT of the abdomen pelvis shows partially necrotic mass in the right lower lobe.  Transferred to [facility] for CT surgery evaluation.  ID consulted for suspected necrotic right lower lobe mass.    Subjective:  01/23/2024  Afebrile, hemodynamically stable  Leukocytosis with WBC 17.4  Past Medical History:        Diagnosis Date    A-fib (HCC)     Back problem     Carotid artery stenosis     Diabetic neuropathy (HCC) 12/04/2015    Full dentures     Hiatal hernia     Hypertension     Hyperuricemia 12/04/2015    Lung mass     Osteoarthritis     lumbosacral spine arthritis and disc disease    PVD (peripheral vascular disease) with claudication (HCC) 12/13/2012    follows with Dr Rajput    Thyroid disease     Type II or unspecified type diabetes mellitus without mention of complication, not stated as uncontrolled     Wears glasses      Past Surgical History:        Procedure Laterality Date    APPENDECTOMY      BRONCHOSCOPY N/A 1/2/2024    BRONCHOSCOPY WITH BIOPSY performed by Enrique Gaytan DO at [facility] OR    COLONOSCOPY      CT NEEDLE BIOPSY LUNG PERCUTANEOUS  11/13/2023    CT NEEDLE BIOPSY LUNG PERCUTANEOUS 11/13/2023 [facility] CT    CT NEEDLE BIOPSY LUNG PERCUTANEOUS  1/22/2024    CT NEEDLE BIOPSY LUNG PERCUTANEOUS 1/22/2024 GUIDO Berrios MD [facility]    HYSTERECTOMY (CERVIX STATUS UNKNOWN)      with appendectomy     \"BLOODCULT2\" in the last 72 hours.  No results for input(s): \"STREPNEUMAGU\" in the last 72 hours.  No results for input(s): \"LP1UAG\" in the last 72 hours.  No results for input(s): \"ASO\" in the last 72 hours.  No results for input(s): \"CULTRESP\" in the last 72 hours.    Assessment:  Right lower lobe necrotic mass likely malignancy  Previous 2 biopsies done which shows only necrotic tissue/benign mucosa  Leukocytosis likely related to malignancy  Blood cultures no growth so far    Plan:    Monitor off antibiotics  Not a surgical candidate as per CT surgery  Appreciate heme-onc follow-up, now on chemoimmunotherapy  Status post CT-guided lung biopsy by IR 01/22  ID will continue to follow      Thank you for having us see this patient in consultation. I will be discussing this case with the treating physicians.      Electronically signed by Sree Capps MD on 1/23/2024 at 12:02 PM

## 2024-01-23 NOTE — PROGRESS NOTES
iterative reconstruction, and/or weight based adjustment of the mA/kV was utilized to reduce the radiation dose to as low as reasonably achievable. COMPARISON: 11/07/2023 HISTORY: ORDERING SYSTEM PROVIDED HISTORY: right sided abd pain TECHNOLOGIST PROVIDED HISTORY: Reason for exam:->right sided abd pain Additional Contrast?->None Decision Support Exception - unselect if not a suspected or confirmed emergency medical condition->Emergency Medical Condition (MA) FINDINGS: Grossly similar partially visualized large indeterminate right lower lobe mass.  Mild right pleural thickening/fluid.  Coronary artery disease.  Tiny hiatal hernia.  Fatty liver.  Stable nodular thickening of the left adrenal gland as seen previously.  Solid organs otherwise negative.  Nonobstructive bowel gas pattern.  Diverticulosis.  Moderate stool.  Appendix not definitively seen but no evidence of appendicitis.  Atherosclerotic vascular calcifications.  Hysterectomy.  Pelvis otherwise grossly negative, though partially obscured by hardware artifact from the left hip.  No acute osseous findings or destructive bone lesions.     1. No acute intra-abdominal or pelvic process identified. 2. Grossly similar partially visualized large right lower lobe mass. 3. Fatty liver. 4. Diverticulosis. 5. Coronary artery disease. 6. Stable nodular thickening of the left adrenal gland as seen previously.     CTA PULMONARY W CONTRAST    Result Date: 12/26/2023  EXAMINATION: CTA OF THE CHEST 12/26/2023 11:33 am TECHNIQUE: CTA of the chest was performed after the administration of intravenous contrast.  Multiplanar reformatted images are provided for review.  MIP images are provided for review. Automated exposure control, iterative reconstruction, and/or weight based adjustment of the mA/kV was utilized to reduce the radiation dose to as low as reasonably achievable. COMPARISON: None. HISTORY: ORDERING SYSTEM PROVIDED HISTORY: r/o PE TECHNOLOGIST PROVIDED HISTORY:  Reason for exam:->r/o PE Additional Contrast?->1 FINDINGS: Pulmonary Arteries: Pulmonary arteries are adequately opacified for evaluation.  No evidence of intraluminal filling defect to suggest pulmonary embolism.  Main pulmonary artery is normal in caliber. Mediastinum: No evidence of mediastinal lymphadenopathy.  The heart and pericardium demonstrate no acute abnormality.  There is no acute abnormality of the thoracic aorta. Lungs/pleura: There is no pulmonary infiltrate.  There is a stable right lower lobe mass which measures 10 cm x 7 cm by 8.2 cm.  No left lung mass is noted. Upper Abdomen: Limited images of the upper abdomen are unremarkable.  Note is made of a right renal cyst which measures 3 cm by 2.7 cm. Soft Tissues/Bones: No acute bone or soft tissue abnormality.     1. There is no pulmonary embolus 2. Stable 10 cm x 7 cm x 8.2 cm right lower lobe mass. .     XR CHEST PORTABLE    Result Date: 12/26/2023  EXAMINATION: ONE XRAY VIEW OF THE CHEST 12/26/2023 10:46 am COMPARISON: December 5, 2023 HISTORY: ORDERING SYSTEM PROVIDED HISTORY: mass TECHNOLOGIST PROVIDED HISTORY: Reason for exam:->mass FINDINGS: There is 9.4 x 7.1 cm well-circumscribed right mid lung mass appears slightly increased in size.  Focal opacity in the left lung base.  The heart is not enlarged.  There is no pneumothorax or pleural effusion.     1. 9.4 x 7.1 cm well-circumscribed right mid lung mass appears slightly increased in size.  Consider correlation with chest CT. 2. Focal opacity in the left lung base may represent atelectasis or developing pneumonia.         ASSESSMENT/PLAN :  73-year-old female  - A-fib on Eliquis, CAD, DM II, HTN HTN, HLD, PVD   - Hospitalized in November 2023 found to have a lung mass which was biopsied by pulmonology showing necrotic tissue insufficient for further diagnosis. Then underwent a bronchoscopy on 1/2/2024 with transbronchial biopsy showing benign bronchial mucosa negative for neoplasm.  She was to

## 2024-01-24 LAB
ALBUMIN SERPL-MCNC: 2.6 G/DL (ref 3.5–5.2)
ALP SERPL-CCNC: 79 U/L (ref 35–104)
ALT SERPL-CCNC: 22 U/L (ref 0–32)
ANION GAP SERPL CALCULATED.3IONS-SCNC: 11 MMOL/L (ref 7–16)
AST SERPL-CCNC: 13 U/L (ref 0–31)
BASOPHILS # BLD: 0 K/UL (ref 0–0.2)
BASOPHILS NFR BLD: 0 % (ref 0–2)
BILIRUB SERPL-MCNC: 0.4 MG/DL (ref 0–1.2)
BUN SERPL-MCNC: 16 MG/DL (ref 6–23)
CALCIUM SERPL-MCNC: 9.3 MG/DL (ref 8.6–10.2)
CHLORIDE SERPL-SCNC: 99 MMOL/L (ref 98–107)
CO2 SERPL-SCNC: 22 MMOL/L (ref 22–29)
CREAT SERPL-MCNC: 0.8 MG/DL (ref 0.5–1)
EOSINOPHIL # BLD: 0.84 K/UL (ref 0.05–0.5)
EOSINOPHILS RELATIVE PERCENT: 5 % (ref 0–6)
ERYTHROCYTE [DISTWIDTH] IN BLOOD BY AUTOMATED COUNT: 17.1 % (ref 11.5–15)
GFR SERPL CREATININE-BSD FRML MDRD: >60 ML/MIN/1.73M2
GLUCOSE BLD-MCNC: 128 MG/DL (ref 74–99)
GLUCOSE BLD-MCNC: 167 MG/DL (ref 74–99)
GLUCOSE BLD-MCNC: 168 MG/DL (ref 74–99)
GLUCOSE BLD-MCNC: 177 MG/DL (ref 74–99)
GLUCOSE SERPL-MCNC: 117 MG/DL (ref 74–99)
HCT VFR BLD AUTO: 27.9 % (ref 34–48)
HGB BLD-MCNC: 8 G/DL (ref 11.5–15.5)
LYMPHOCYTES NFR BLD: 1.4 K/UL (ref 1.5–4)
LYMPHOCYTES RELATIVE PERCENT: 9 % (ref 20–42)
MCH RBC QN AUTO: 23.3 PG (ref 26–35)
MCHC RBC AUTO-ENTMCNC: 28.7 G/DL (ref 32–34.5)
MCV RBC AUTO: 81.1 FL (ref 80–99.9)
MONOCYTES NFR BLD: 0.98 K/UL (ref 0.1–0.95)
MONOCYTES NFR BLD: 6 % (ref 2–12)
NEUTROPHILS NFR BLD: 80 % (ref 43–80)
NEUTS SEG NFR BLD: 12.88 K/UL (ref 1.8–7.3)
PLATELET # BLD AUTO: 376 K/UL (ref 130–450)
PMV BLD AUTO: 9.8 FL (ref 7–12)
POTASSIUM SERPL-SCNC: 4.4 MMOL/L (ref 3.5–5)
PROT SERPL-MCNC: 5.8 G/DL (ref 6.4–8.3)
RBC # BLD AUTO: 3.44 M/UL (ref 3.5–5.5)
RBC # BLD: ABNORMAL 10*6/UL
SODIUM SERPL-SCNC: 132 MMOL/L (ref 132–146)
WBC OTHER # BLD: 16.1 K/UL (ref 4.5–11.5)

## 2024-01-24 PROCEDURE — 6370000000 HC RX 637 (ALT 250 FOR IP): Performed by: FAMILY MEDICINE

## 2024-01-24 PROCEDURE — 94640 AIRWAY INHALATION TREATMENT: CPT

## 2024-01-24 PROCEDURE — 99231 SBSQ HOSP IP/OBS SF/LOW 25: CPT | Performed by: NURSE PRACTITIONER

## 2024-01-24 PROCEDURE — 80053 COMPREHEN METABOLIC PANEL: CPT

## 2024-01-24 PROCEDURE — 6360000002 HC RX W HCPCS: Performed by: INTERNAL MEDICINE

## 2024-01-24 PROCEDURE — 97530 THERAPEUTIC ACTIVITIES: CPT

## 2024-01-24 PROCEDURE — 36415 COLL VENOUS BLD VENIPUNCTURE: CPT

## 2024-01-24 PROCEDURE — 6360000002 HC RX W HCPCS: Performed by: FAMILY MEDICINE

## 2024-01-24 PROCEDURE — 6370000000 HC RX 637 (ALT 250 FOR IP): Performed by: NURSE PRACTITIONER

## 2024-01-24 PROCEDURE — 82962 GLUCOSE BLOOD TEST: CPT

## 2024-01-24 PROCEDURE — 6370000000 HC RX 637 (ALT 250 FOR IP): Performed by: INTERNAL MEDICINE

## 2024-01-24 PROCEDURE — 2140000000 HC CCU INTERMEDIATE R&B

## 2024-01-24 PROCEDURE — 85025 COMPLETE CBC W/AUTO DIFF WBC: CPT

## 2024-01-24 PROCEDURE — 2580000003 HC RX 258: Performed by: FAMILY MEDICINE

## 2024-01-24 RX ADMIN — GUAIFENESIN 400 MG: 400 TABLET ORAL at 08:38

## 2024-01-24 RX ADMIN — GUAIFENESIN 400 MG: 400 TABLET ORAL at 12:01

## 2024-01-24 RX ADMIN — SUCRALFATE 1 G: 1 TABLET ORAL at 17:08

## 2024-01-24 RX ADMIN — KETOROLAC TROMETHAMINE 15 MG: 30 INJECTION, SOLUTION INTRAMUSCULAR; INTRAVENOUS at 04:37

## 2024-01-24 RX ADMIN — IPRATROPIUM BROMIDE AND ALBUTEROL SULFATE 1 DOSE: .5; 2.5 SOLUTION RESPIRATORY (INHALATION) at 19:22

## 2024-01-24 RX ADMIN — ACETAMINOPHEN 650 MG: 325 TABLET ORAL at 19:51

## 2024-01-24 RX ADMIN — BENZONATATE 100 MG: 100 CAPSULE ORAL at 19:51

## 2024-01-24 RX ADMIN — KETOROLAC TROMETHAMINE 15 MG: 30 INJECTION, SOLUTION INTRAMUSCULAR; INTRAVENOUS at 21:21

## 2024-01-24 RX ADMIN — OXYCODONE HYDROCHLORIDE 15 MG: 15 TABLET ORAL at 07:38

## 2024-01-24 RX ADMIN — METOPROLOL TARTRATE 25 MG: 25 TABLET, FILM COATED ORAL at 08:38

## 2024-01-24 RX ADMIN — OXYCODONE HYDROCHLORIDE 15 MG: 15 TABLET ORAL at 12:02

## 2024-01-24 RX ADMIN — NALOXEGOL OXALATE 25 MG: 25 TABLET, FILM COATED ORAL at 05:43

## 2024-01-24 RX ADMIN — LOSARTAN POTASSIUM 100 MG: 50 TABLET, FILM COATED ORAL at 08:37

## 2024-01-24 RX ADMIN — PANTOPRAZOLE SODIUM 40 MG: 40 TABLET, DELAYED RELEASE ORAL at 05:43

## 2024-01-24 RX ADMIN — MORPHINE SULFATE 2 MG: 2 INJECTION, SOLUTION INTRAMUSCULAR; INTRAVENOUS at 10:16

## 2024-01-24 RX ADMIN — LEVOTHYROXINE SODIUM 137 MCG: 0.14 TABLET ORAL at 05:43

## 2024-01-24 RX ADMIN — BENZONATATE 100 MG: 100 CAPSULE ORAL at 03:02

## 2024-01-24 RX ADMIN — SUCRALFATE 1 G: 1 TABLET ORAL at 08:37

## 2024-01-24 RX ADMIN — MORPHINE SULFATE 2 MG: 2 INJECTION, SOLUTION INTRAMUSCULAR; INTRAVENOUS at 05:43

## 2024-01-24 RX ADMIN — IPRATROPIUM BROMIDE AND ALBUTEROL SULFATE 1 DOSE: .5; 2.5 SOLUTION RESPIRATORY (INHALATION) at 15:43

## 2024-01-24 RX ADMIN — ATORVASTATIN CALCIUM 10 MG: 10 TABLET, FILM COATED ORAL at 08:38

## 2024-01-24 RX ADMIN — SODIUM CHLORIDE, PRESERVATIVE FREE 10 ML: 5 INJECTION INTRAVENOUS at 19:51

## 2024-01-24 RX ADMIN — SUCRALFATE 1 G: 1 TABLET ORAL at 19:51

## 2024-01-24 RX ADMIN — METOPROLOL TARTRATE 25 MG: 25 TABLET, FILM COATED ORAL at 19:51

## 2024-01-24 RX ADMIN — SODIUM CHLORIDE, PRESERVATIVE FREE 10 ML: 5 INJECTION INTRAVENOUS at 08:38

## 2024-01-24 RX ADMIN — SUCRALFATE 1 G: 1 TABLET ORAL at 12:01

## 2024-01-24 RX ADMIN — APIXABAN 5 MG: 5 TABLET, FILM COATED ORAL at 19:46

## 2024-01-24 RX ADMIN — ASPIRIN 81 MG: 81 TABLET, COATED ORAL at 08:37

## 2024-01-24 RX ADMIN — IPRATROPIUM BROMIDE AND ALBUTEROL SULFATE 1 DOSE: .5; 2.5 SOLUTION RESPIRATORY (INHALATION) at 12:56

## 2024-01-24 RX ADMIN — ALLOPURINOL 300 MG: 100 TABLET ORAL at 08:37

## 2024-01-24 RX ADMIN — SENNOSIDES AND DOCUSATE SODIUM 1 TABLET: 50; 8.6 TABLET ORAL at 08:38

## 2024-01-24 RX ADMIN — OXYCODONE HYDROCHLORIDE 15 MG: 15 TABLET ORAL at 03:02

## 2024-01-24 RX ADMIN — IPRATROPIUM BROMIDE AND ALBUTEROL SULFATE 1 DOSE: .5; 2.5 SOLUTION RESPIRATORY (INHALATION) at 09:01

## 2024-01-24 RX ADMIN — GUAIFENESIN 400 MG: 400 TABLET ORAL at 19:46

## 2024-01-24 RX ADMIN — POTASSIUM CHLORIDE 40 MEQ: 750 TABLET, EXTENDED RELEASE ORAL at 08:37

## 2024-01-24 RX ADMIN — HYDROCHLOROTHIAZIDE 12.5 MG: 25 TABLET ORAL at 08:42

## 2024-01-24 RX ADMIN — OXYCODONE HYDROCHLORIDE 15 MG: 15 TABLET ORAL at 19:46

## 2024-01-24 RX ADMIN — MORPHINE SULFATE 2 MG: 2 INJECTION, SOLUTION INTRAMUSCULAR; INTRAVENOUS at 15:32

## 2024-01-24 RX ADMIN — POTASSIUM CHLORIDE 40 MEQ: 750 TABLET, EXTENDED RELEASE ORAL at 19:51

## 2024-01-24 RX ADMIN — SERTRALINE HYDROCHLORIDE 50 MG: 50 TABLET ORAL at 08:37

## 2024-01-24 ASSESSMENT — PAIN DESCRIPTION - ORIENTATION
ORIENTATION: RIGHT
ORIENTATION: LOWER
ORIENTATION: RIGHT

## 2024-01-24 ASSESSMENT — PAIN DESCRIPTION - LOCATION
LOCATION: BACK
LOCATION: RIB CAGE
LOCATION: BACK
LOCATION: BACK
LOCATION: RIB CAGE
LOCATION: RIB CAGE
LOCATION: BACK
LOCATION: BACK;RIB CAGE

## 2024-01-24 ASSESSMENT — PAIN SCALES - GENERAL
PAINLEVEL_OUTOF10: 9
PAINLEVEL_OUTOF10: 3
PAINLEVEL_OUTOF10: 9
PAINLEVEL_OUTOF10: 10
PAINLEVEL_OUTOF10: 9
PAINLEVEL_OUTOF10: 9
PAINLEVEL_OUTOF10: 4
PAINLEVEL_OUTOF10: 10
PAINLEVEL_OUTOF10: 3
PAINLEVEL_OUTOF10: 4
PAINLEVEL_OUTOF10: 4
PAINLEVEL_OUTOF10: 8
PAINLEVEL_OUTOF10: 9
PAINLEVEL_OUTOF10: 4
PAINLEVEL_OUTOF10: 4
PAINLEVEL_OUTOF10: 2
PAINLEVEL_OUTOF10: 10
PAINLEVEL_OUTOF10: 8
PAINLEVEL_OUTOF10: 3
PAINLEVEL_OUTOF10: 10
PAINLEVEL_OUTOF10: 6
PAINLEVEL_OUTOF10: 4

## 2024-01-24 ASSESSMENT — PAIN DESCRIPTION - FREQUENCY
FREQUENCY: CONTINUOUS
FREQUENCY: CONTINUOUS

## 2024-01-24 ASSESSMENT — PAIN - FUNCTIONAL ASSESSMENT

## 2024-01-24 ASSESSMENT — PAIN DESCRIPTION - DESCRIPTORS
DESCRIPTORS: ACHING;DISCOMFORT;SORE
DESCRIPTORS: ACHING;THROBBING;SHOOTING
DESCRIPTORS: ACHING;THROBBING;SHARP
DESCRIPTORS: ACHING;DISCOMFORT;SORE
DESCRIPTORS: ACHING;DISCOMFORT;SORE
DESCRIPTORS: THROBBING;TENDER;SORE

## 2024-01-24 ASSESSMENT — PAIN DESCRIPTION - ONSET
ONSET: PROGRESSIVE
ONSET: PROGRESSIVE

## 2024-01-24 ASSESSMENT — PAIN DESCRIPTION - PAIN TYPE
TYPE: ACUTE PAIN
TYPE: ACUTE PAIN

## 2024-01-24 NOTE — CARE COORDINATION
1/24/24  Transition of care Update.  Patient admitted for a mass of her right lung that is inoperable. Patient is planned for discharge but  fearful about discharge to home.  Patient is now requesting placement per the attending.  Updated therapy evaluations requested to review functional status.  Reviewed with patient that she may not qualify for a skilled rehab stay as her past therapy evaluations showed she is independent with ADL's.  Reviewed with patient private pay cost at a facility but patient states she does not have the funds to private pay.  Call placed to patients son to update him on above.  [facility] post discharge.  Will await therapy updates to finalize the discharge plan.  HOLGER/MIA to follow.    Electronically signed by VIRGEN Davenport on 1/24/2024 at 1:43 PM

## 2024-01-24 NOTE — PROGRESS NOTES
[facility]  [facility]  Progress note         HISTORY OF PRESENT ILLNESS:   73-year-old female with past medical history of A-fib, HTN, hyperuricemia, lung mass, osteoarthritis, thyroid disease, DM, multiple admissions related to pneumonia and right lower lobe mass, status post KENZIE in the right lower lobe mass biopsy with pathology showed cores of necrotic tissue insufficient for further diagnosis, status post bronchoscopy and BAL showed normal respiratory guru, fungal and AFB cultures negative, presented to [place] ER with abdominal pain and nausea and bright red blood per rectum.  CT of the abdomen pelvis shows partially necrotic mass in the right lower lobe.  Transferred to [facility] for CT surgery evaluation.  ID consulted for suspected necrotic right lower lobe mass.    Subjective:  01/23/2024  Afebrile, hemodynamically stable  Leukocytosis with WBC 17.4  Past Medical History:        Diagnosis Date    A-fib (HCC)     Back problem     Carotid artery stenosis     Diabetic neuropathy (HCC) 12/04/2015    Full dentures     Hiatal hernia     Hypertension     Hyperuricemia 12/04/2015    Lung mass     Osteoarthritis     lumbosacral spine arthritis and disc disease    PVD (peripheral vascular disease) with claudication (HCC) 12/13/2012    follows with Dr Rajput    Thyroid disease     Type II or unspecified type diabetes mellitus without mention of complication, not stated as uncontrolled     Wears glasses      Past Surgical History:        Procedure Laterality Date    APPENDECTOMY      BRONCHOSCOPY N/A 1/2/2024    BRONCHOSCOPY WITH BIOPSY performed by Enrique Gaytan DO at [facility] OR    COLONOSCOPY      CT NEEDLE BIOPSY LUNG PERCUTANEOUS  11/13/2023    CT NEEDLE BIOPSY LUNG PERCUTANEOUS 11/13/2023 [facility] CT    CT NEEDLE BIOPSY LUNG PERCUTANEOUS  1/22/2024    CT NEEDLE BIOPSY LUNG PERCUTANEOUS 1/22/2024 GUIDO Berrios MD [facility]    HYSTERECTOMY (CERVIX STATUS UNKNOWN)      with appendectomy     deficit.  BEHAVIOR/PSYCH:  No psychosis.     PHYSICAL EXAM:    Vitals:    BP (!) 134/51   Pulse 79   Temp 98.2 °F (36.8 °C) (Temporal)   Resp 16   Ht 1.651 m (5' 5\")   Wt 92.5 kg (204 lb)   SpO2 98%   BMI 33.95 kg/m²   Constitutional: The patient is awake, alert, and oriented.   Skin: Warm and dry. No rashes were noted. No jaundice.  HEENT: Eyes show round, and reactive pupils. Moist mucous membranes, no ulcerations, no thrush.   Neck: Supple to movements. No lymphadenopathy.   Chest: No use of accessory muscles to breathe. Symmetrical expansion. Auscultation reveals poor air entry in the right lower lobe.  Cardiovascular: S1 and S2 are rhythmic and regular. No murmurs appreciated.   Abdomen: Positive bowel sounds to auscultation. Benign to palpation. No masses felt. No hepatosplenomegaly.  Genitourinary: No pain in the lower abdomen  Extremities: No clubbing, no cyanosis, no edema.  Musculoskeletal: No pain in range of motion of any joints  Neurological: Following commands, no focal neurodeficit  Lines: peripheral      CBC+dif:  Recent Labs     01/22/24  0630 01/23/24  0547 01/24/24  0447   WBC 16.0* 17.4* 16.1*   HGB 8.6* 8.3* 8.0*   HCT 29.8* 28.6* 27.9*   MCV 81.4 81.5 81.1    365 376   NEUTROABS 11.99* 13.16* 12.88*     Lab Results   Component Value Date    .0 (H) 01/16/2024     No results found for: \"CRPHS\"  Lab Results   Component Value Date    SEDRATE 85 (H) 01/16/2024     Lab Results   Component Value Date    ALT 22 01/24/2024    AST 13 01/24/2024    ALKPHOS 79 01/24/2024    BILITOT 0.4 01/24/2024     Lab Results   Component Value Date/Time     01/24/2024 04:47 AM    K 4.4 01/24/2024 04:47 AM    K 3.7 07/14/2020 05:20 AM    CL 99 01/24/2024 04:47 AM    CO2 22 01/24/2024 04:47 AM    BUN 16 01/24/2024 04:47 AM    CREATININE 0.8 01/24/2024 04:47 AM    GFRAA >60 07/14/2020 05:20 AM    LABGLOM >60 01/24/2024 04:47 AM    GLUCOSE 117 01/24/2024 04:47 AM    PROT 5.8 01/24/2024 04:47 AM

## 2024-01-24 NOTE — PROGRESS NOTES
Palliative Care Department  [phone]  Palliative Care Progress Note  Provider Keena Royal, APRN - CNP     Maria Victoria Elliott  [id number]  Hospital Day: 8  Date of Initial Consult: 1/19/2024  Referring Provider:  Enrique Gaytan DO  Palliative Medicine was consulted for assistance with: overwhelming symptoms    HPI:   Maria Victoria Elliott is a 73 y.o. with a medical history of atrial fibrillation on Eliquis, HTN, osteoarthritis, PVD, hypothyroidism, DM who was admitted on 1/17/2024 from home with a CHIEF COMPLAINT of rectal bleeding. Patient was a transfer from [facility] for CTA evaluation. Patient states that she is constipated and began having rectal bleeding prompting her to come to the emergency room for further evaluation.  On arrival labs were obtained and patient was found to have a lactic acid of 2.7, mildly elevated LFTs, leukocytosis of 19.7, H&H of 11.1/36.2.  CT abdomen pelvis revealed no acute findings in the abdomen however a partially necrotic mass in the right lower lobe. Hospitalized in November 2023 found to have a lung mass which was biopsied by pulmonology showing necrotic tissue insufficient for further diagnosis. Then underwent a bronchoscopy on 1/2/2024 with transbronchial biopsy showing benign bronchial mucosa negative for neoplasm.  She was to continue to follow with pulmonology and treat for pulmonary abscess with close CT follow-up in 4 weeks post antibiotics.  She was also referred to see Dr. Hallman in November 2023 however does not appear to have made an appointment.  Pulmnology, ID, CTS, oncology following. She is for possible RLL lobectomy. Palliative medicine consulted for further assistance.   ASSESSMENT/PLAN:     Pertinent Hospital Diagnoses     Right lung mass  History of lung abscess  Constipation    Palliative Care Encounter / Counseling Regarding Goals of Care  Please see detailed goals of care discussion as below  At this time, Maria Victoria Elliott, Does have capacity

## 2024-01-24 NOTE — PROGRESS NOTES
Comprehensive Nutrition Assessment    Type and Reason for Visit:  Reassess    Nutrition Recommendations/Plan:   Recommend to continue Glucerna supplement BID to help meet nutritional needs.    Recommend and start Gelatein high protein supplement daily for additional calories, protein, and nutrients.           Malnutrition Assessment:  Malnutrition Status:  At risk for malnutrition (Comment) (01/24/24 1241)    Context:  Acute Illness     Findings of the 6 clinical characteristics of malnutrition:  Energy Intake:  50% or less of estimated energy requirements for 5 or more days  Weight Loss:  Unable to assess (d/t lack of long term actual weight history)     Body Fat Loss:  Unable to assess     Muscle Mass Loss:  Unable to assess    Fluid Accumulation:  No significant fluid accumulation     Strength:  Not Performed    Nutrition Assessment:    Patients po intake has been sporadic, averaging 25-50% of meals served ; adm w/ rectal bleeding/abd pain/nausea  ; noted constipation PTA ; noted necrotic right lung mass and history of lung abscess ; s/p bronchoscopy on 1/2 ; s/p lung biopsy on 1/22 showing benign bronchial mucosa negative for neoplasm; hx of DM/CAD/PVD/PUD ; will continue and modify ONS    Nutrition Related Findings:    +I&Os (+1.9 L), trace edema, A&O x 4, upper dentures, active BS, rounded abd, decreased appetite, constipation, hyperglycemia ; Wound Type: None       Current Nutrition Intake & Therapies:    Average Meal Intake: 26-50%  Average Supplements Intake: Unable to assess  ADULT DIET; Regular; 4 carb choices (60 gm/meal)  ADULT ORAL NUTRITION SUPPLEMENT; Breakfast, Lunch; Diabetic Oral Supplement    Anthropometric Measures:  Height: 165.1 cm (5' 5\")  Ideal Body Weight (IBW): 125 lbs (57 kg)    Admission Body Weight: 92.5 kg (203 lb 14.8 oz) (1/17-BS)  Current Body Weight: 92.5 kg (204 lb) (1/17, no method), 163.2 % IBW. Weight Source: Bed Scale  Current BMI (kg/m2): 33.9  Usual Body Weight:  (UTO ;

## 2024-01-24 NOTE — PATIENT CARE CONFERENCE
P Quality Flow/Interdisciplinary Rounds Progress Note        Quality Flow Rounds held on January 24, 2024    Disciplines Attending:  Bedside Nurse, , , and Nursing Unit Leadership    Maria Victoria Elliott was admitted on 1/17/2024  8:21 PM    Anticipated Discharge Date:       Disposition:    Callum Score:  Callum Scale Score: 19    Readmission Risk              Risk of Unplanned Readmission:  19           Discussed patient goal for the day, patient clinical progression, and barriers to discharge.  The following Goal(s) of the Day/Commitment(s) have been identified:  downgrade/discharge planning      Kamryn Bright RN  January 24, 2024

## 2024-01-24 NOTE — PROGRESS NOTES
Hospitalist Progress Note      PCP: Phill Wadsworth III,     Date of Admission: 1/17/2024        Hospital Course:  PRESENTED WITH RIGHT LUNG MASS, THOUGHT TO BE OPERABLE, SEEN BY CTS AND IT IS NOT.  SHE IS TO START CHEMO AND RADIATION        1/23 HAVING PAIN IN  HER BACK, DOES NOT WANT TO GO HOME        Subjective:   CRYING, STATES HER BACK PAIN IS EXCRUCIATING    AND SHE IS HAVING DIFFICULTY WALKING          Medications:  Reviewed    Infusion Medications    sodium chloride      dextrose       Scheduled Medications    sennosides-docusate sodium  1 tablet Oral Daily    potassium chloride  40 mEq Oral BID    allopurinol  300 mg Oral Daily    apixaban  5 mg Oral BID    aspirin  81 mg Oral Daily    atorvastatin  10 mg Oral Daily    metoprolol tartrate  25 mg Oral BID    pantoprazole  40 mg Oral Daily    sertraline  50 mg Oral Daily    sucralfate  1 g Oral 4x Daily    levothyroxine  137 mcg Oral Daily    insulin lispro  0-8 Units SubCUTAneous TID WC    insulin lispro  0-4 Units SubCUTAneous Nightly    sodium chloride flush  5-40 mL IntraVENous 2 times per day    ipratropium 0.5 mg-albuterol 2.5 mg  1 Dose Inhalation Q4H WA RT    losartan  100 mg Oral Daily    And    hydroCHLOROthiazide  12.5 mg Oral Daily    polyethylene glycol  17 g Oral Daily    guaiFENesin  400 mg Oral TID    naloxegol  25 mg Oral QAM AC     PRN Meds: oxyCODONE **OR** oxyCODONE, benzonatate, ketorolac, albuterol, sodium chloride flush, sodium chloride, ondansetron **OR** ondansetron, magnesium hydroxide, acetaminophen **OR** acetaminophen, docusate sodium, dextrose bolus **OR** dextrose bolus, glucagon (rDNA), dextrose, Glucose, morphine      Intake/Output Summary (Last 24 hours) at 1/24/2024 1629  Last data filed at 1/24/2024 1404  Gross per 24 hour   Intake 360 ml   Output --   Net 360 ml       Exam:    BP (!) 131/46   Pulse 81   Temp 97.8 °F (36.6 °C) (Temporal)   Resp 18   Ht 1.651 m (5' 5\")   Wt 92.5 kg (204 lb)   SpO2 96%

## 2024-01-24 NOTE — PLAN OF CARE
Problem: Safety - Adult  Goal: Free from fall injury  Outcome: Progressing     Problem: Pain  Goal: Verbalizes/displays adequate comfort level or baseline comfort level  Outcome: Progressing     Problem: ABCDS Injury Assessment  Goal: Absence of physical injury  Outcome: Progressing     Problem: Nutrition Deficit:  Goal: Optimize nutritional status  Outcome: Progressing  Flowsheets (Taken 1/24/2024 1228 by Jamie Rivera, RD, LD)  Nutrient intake appropriate for improving, restoring, or maintaining nutritional needs: Recommend appropriate diets, oral nutritional supplements, and vitamin/mineral supplements

## 2024-01-24 NOTE — PROGRESS NOTES
Physical Therapy    Treatment Note    Name: Maria Victoria Elliott  : 1950  MRN: [id number]      Date of Service: 2024    Evaluating PT:  Chago Escalona PT, DPT [id number]    Room #:  6506/6506-A  Diagnosis:  Mass of right lung [R91.8]  PMHx/PSHx:   has a past medical history of A-fib (HCC), Back problem, Carotid artery stenosis, Diabetic neuropathy (HCC), Full dentures, Hiatal hernia, Hypertension, Hyperuricemia, Lung mass, Osteoarthritis, PVD (peripheral vascular disease) with claudication (HCC), Thyroid disease, Type II or unspecified type diabetes mellitus without mention of complication, not stated as uncontrolled, and Wears glasses.   has a past surgical history that includes Appendectomy; Throat surgery (); Colonoscopy; Hysterectomy; joint replacement (Left, 12/3/2015); Total hip arthroplasty (Left, 2015); Upper gastrointestinal endoscopy (N/A, 2020); Upper gastrointestinal endoscopy (N/A, 7/15/2020); CT NEEDLE BIOPSY LUNG PERCUTANEOUS (2023); bronchoscopy (N/A, 2024); and CT NEEDLE BIOPSY LUNG PERCUTANEOUS (2024).  Procedure/Surgery:  PFT study (2024); CT guided lung biopsy (2024)  Precautions:  Falls, no lifting over 10 lbs for 24 hours post biopsy  Equipment Owned:  FWW, cane, wheelchair  Equipment Needs:  None    SUBJECTIVE:    Pt lives alone in a 1st floor apartment with 0 stair(s) to enter.  Pt ambulated with FWW as needed prior to admission.    OBJECTIVE:   Initial Evaluation  Date: 2024 Treatment Date: 24 Short Term/ Long Term   Goals   AM-PAC 6 Clicks     Was pt agreeable to Eval/treatment? Yes Yes    Does pt have pain? 9/10 posterior R side \"13/10\" R back pain    Bed Mobility  Rolling: NT  Supine to sit: Independent  Sit to supine: NT  Scooting: NT Rolling: NT  Supine to sit: SBA  Sit to supine: SBA  Scooting: SBA to EOB Rolling: Independent  Supine to sit: Independent  Sit to supine: Independent  Scooting: Independent   Transfers Sit to  stand: SBA  Stand to sit: SBA  Stand pivot: NT Sit to stand: [place]  Stand to sit: [place]  Stand pivot: [place] with HHA Sit to stand: Mod I  Stand to sit: Mod I  Stand pivot: Mod I with AAD   Ambulation    180 feet with WW SBA 25, 50 feet with HHA with Min A 500 feet with AAD Mod I   Stair negotiation: ascended and descended  NT NT TBD   ROM BUE:  See OT note  BLE:  WFL NT    Strength BUE:  See OT note  BLE:  Grossly 5/5 NT    Balance Sitting EOB:  Independent  Dynamic Standing:  SBA with WW Sitting EOB:  Independent  Dynamic Standing:  SBA with HHA Sitting EOB:  Independent  Dynamic Standing:  Mod I with AAD     Pt is A & O x: 4 to person, place, month/year, and situation.   Sensation: Pt denies numbness and tingling of extremities.   Edema: Unremarkable    Patient education  Pt educated on PT role in acute care setting..    Patient response to education:   Pt verbalized understanding Pt demonstrated skill Pt requires further education in this area   Yes NA No     ASSESSMENT:    Comments:    Pt was in bed upon room entry; agreeable to PT treatment. Pt complaints of back pain. Pt completed all mobility noted above. Pt moved very slowly and was guarded. Pt reached for walls and therapists for support as she walked. Pt completed transfers from commode and ambulated in hallway/back to bed. Pt was assisted back to bed and positioned comfortably. Pt was left in bed with all needs met at conclusion of session.     Treatment:  Patient practiced and was instructed in the following treatment:    Therapeutic activities:  Bed mobility: Pt was cued for technique during bed mobility transfers.  Transfers: Pt was cued for hand placement during sit <> stand transfers. Pt completed multiple transfers from different surface heights (EOB x3, commode x1).  Ambulation: Pt was cued for safety when ambulating.  Vitals and symptoms were closely monitored throughout session.  Skilled positioning in bed to protect skin/joint

## 2024-01-24 NOTE — PROGRESS NOTES
Occupational Therapy  OT BEDSIDE TREATMENT NOTE   [facility]  [address], [place]      Date:2024  Patient Name: Maria Victoria Elliott  MRN: [id number]  : 1950  Room: [address] [place] [address]     Evaluating OT: Remedios Flores OTR/L [id number]  Referring Provider: MD Zay  Specific Provider Orders: 24  Recommended Adaptive Equipment: Owns- rw & SPC; recommend indwelling tub seat with a back     Diagnosis: lung mass & possible METS, R flank pain   Pertinent Medical History:  HTN, OA, PVD, DM, hx LTHA, a-fib, chronic back pain, thyroid disease & diabetic neuropathy  Precautions:  Fall Risk, adult diet 4 carb choices & tape allergy (paper tape recommended)     Assessment of current deficits   [x] Functional mobility           [x]ADLs           [] Strength                  []Cognition   [x] Functional transfers         [x] IADLs         [] Safety Awareness   [x]Endurance   [] Fine Coordination                         [x] Balance      [] Vision/perception   []Sensation     []Gross Motor Coordination             [] ROM           [] Delirium                   [] Motor Control      OT PLAN OF CARE   OT POC based on physician orders, patient diagnosis and results of clinical assessment     Frequency/Duration: 1-3 days/wk for 5-7 days PRN   Specific OT Treatment to include:   * Functional transfer/mobility training/DME recommendations for increased independence, safety, and fall prevention  * Patient/Family education to increase follow through with safety techniques and functional independence  * Therapeutic exercise to improve motor endurance, ROM, and functional strength for ADLs/functional transfers  * Therapeutic activities to facilitate/challenge dynamic balance, stand tolerance for increased safety and independence with ADLs  * Therapeutic activities to facilitate gross/fine motor skills for increased independence with ADLs     Modified Salcha Scale  (MRS)  Score     Description  0             No symptoms  1             No significant disability despite symptoms  2             Slight disability; able to look after own affairs  3             Moderate disability; able to ambulate without assist/ requires assist with ADLs  4             Moderate/Severe disability;requires assist to ambulate/assist with ADLs  5             Severe disability;bedridden/incontinent   6               Score:3     Home Living: Pt lives alone in an apartment; bed/bath on 1st floor   Bathroom setup: tub-doors  Equipment owned: rw & SPC  Prior Level of Function:  with ADLs, with IADLs; using no device for functional mobility & transfers but if she feels unsteady she will occasionally use SPC or rw depending on how she feels  Driving: yes  Medication Management: independent  Comments- uses laundromat     Pain Level: Pt c/o 13/10 pain but was willing to engage in functional activities     Cognition: A&O: 4/4; Follows 2-3 step directions              Memory:  G-             Sequencing:  good -             Problem solving:  fair +             Judgement/safety:  G-      Functional Assessment:  AM-PAC Daily Activity Raw Score:     Initial Eval Status  Date: 23 Treatment Status  Date:24 STGs=LTGs  Time Frame: 5-7 days   Feeding independent   Independent     Grooming SBA- seated  Min A  Standing at sink     UB Dressing Min A  Min A   To don gown around back sitting EOB independent   LB Dressing Min A  Mod A  To dof/don socks sitting EOB using figure 4 tech, education on LB AE, pt reports she owns AE at home Mod I    Bathing Min A- simulated Mod A   Simulated reaching sitting and standing. Pt limited by increased pain Mod I    Toileting Sup Min A  For clothing management/hygiene using commode  independent   Bed Mobility  Log roll: independent  Supine <> sit: independent     SBA  Vc for body mechanics to reduce pain      Functional Transfers Sit <> stand: Sup  Stand pivot: Min

## 2024-01-24 NOTE — PLAN OF CARE
Problem: Safety - Adult  Goal: Free from fall injury  1/24/2024 0115 by Sloane Becker RN  Outcome: Progressing     Problem: Chronic Conditions and Co-morbidities  Goal: Patient's chronic conditions and co-morbidity symptoms are monitored and maintained or improved  1/24/2024 0115 by Sloane Becker RN  Outcome: Progressing     Problem: Discharge Planning  Goal: Discharge to home or other facility with appropriate resources  1/24/2024 0115 by Sloane Becker RN  Outcome: Progressing     Problem: Pain  Goal: Verbalizes/displays adequate comfort level or baseline comfort level  1/24/2024 0115 by Sloane Becker RN  Outcome: Progressing     Problem: ABCDS Injury Assessment  Goal: Absence of physical injury  1/24/2024 0115 by Sloane Becker RN  Outcome: Progressing     Problem: Nutrition Deficit:  Goal: Optimize nutritional status  1/24/2024 0115 by Sloane Becker RN  Outcome: Progressing

## 2024-01-24 NOTE — PROGRESS NOTES
CLINICAL PHARMACY NOTE: MEDS TO BEDS    Total # of Prescriptions Filled: 3   The following medications were delivered to the patient:  Benzonatate 100 mg  Chest congestion relief 400 mg  Oxycodone 5 mg    Additional Documentation:  Movantik sent to sp

## 2024-01-25 VITALS
DIASTOLIC BLOOD PRESSURE: 50 MMHG | SYSTOLIC BLOOD PRESSURE: 118 MMHG | HEIGHT: 65 IN | BODY MASS INDEX: 33.99 KG/M2 | HEART RATE: 77 BPM | OXYGEN SATURATION: 91 % | TEMPERATURE: 97.8 F | RESPIRATION RATE: 16 BRPM | WEIGHT: 204 LBS

## 2024-01-25 LAB
ALBUMIN SERPL-MCNC: 2.3 G/DL (ref 3.5–5.2)
ALP SERPL-CCNC: 75 U/L (ref 35–104)
ALT SERPL-CCNC: 22 U/L (ref 0–32)
ANION GAP SERPL CALCULATED.3IONS-SCNC: 11 MMOL/L (ref 7–16)
AST SERPL-CCNC: 13 U/L (ref 0–31)
BASOPHILS # BLD: 0.28 K/UL (ref 0–0.2)
BASOPHILS NFR BLD: 2 % (ref 0–2)
BILIRUB SERPL-MCNC: 0.4 MG/DL (ref 0–1.2)
BUN SERPL-MCNC: 15 MG/DL (ref 6–23)
CALCIUM SERPL-MCNC: 9.9 MG/DL (ref 8.6–10.2)
CHLORIDE SERPL-SCNC: 102 MMOL/L (ref 98–107)
CO2 SERPL-SCNC: 22 MMOL/L (ref 22–29)
CREAT SERPL-MCNC: 0.8 MG/DL (ref 0.5–1)
EOSINOPHIL # BLD: 0.71 K/UL (ref 0.05–0.5)
EOSINOPHILS RELATIVE PERCENT: 4 % (ref 0–6)
ERYTHROCYTE [DISTWIDTH] IN BLOOD BY AUTOMATED COUNT: 17.2 % (ref 11.5–15)
GFR SERPL CREATININE-BSD FRML MDRD: >60 ML/MIN/1.73M2
GLUCOSE BLD-MCNC: 148 MG/DL (ref 74–99)
GLUCOSE BLD-MCNC: 178 MG/DL (ref 74–99)
GLUCOSE SERPL-MCNC: 135 MG/DL (ref 74–99)
HCT VFR BLD AUTO: 28.1 % (ref 34–48)
HGB BLD-MCNC: 8.1 G/DL (ref 11.5–15.5)
LYMPHOCYTES NFR BLD: 0.99 K/UL (ref 1.5–4)
LYMPHOCYTES RELATIVE PERCENT: 6 % (ref 20–42)
MCH RBC QN AUTO: 23.3 PG (ref 26–35)
MCHC RBC AUTO-ENTMCNC: 28.8 G/DL (ref 32–34.5)
MCV RBC AUTO: 81 FL (ref 80–99.9)
MICROORGANISM SPEC CULT: NORMAL
MICROORGANISM/AGENT SPEC: NORMAL
MONOCYTES NFR BLD: 1.41 K/UL (ref 0.1–0.95)
MONOCYTES NFR BLD: 9 % (ref 2–12)
NEUTROPHILS NFR BLD: 79 % (ref 43–80)
NEUTS SEG NFR BLD: 12.71 K/UL (ref 1.8–7.3)
PLATELET # BLD AUTO: 360 K/UL (ref 130–450)
PMV BLD AUTO: 10 FL (ref 7–12)
POTASSIUM SERPL-SCNC: 4.7 MMOL/L (ref 3.5–5)
PROT SERPL-MCNC: 5.7 G/DL (ref 6.4–8.3)
RBC # BLD AUTO: 3.47 M/UL (ref 3.5–5.5)
RBC # BLD: ABNORMAL 10*6/UL
SODIUM SERPL-SCNC: 135 MMOL/L (ref 132–146)
SPECIMEN DESCRIPTION: NORMAL
WBC OTHER # BLD: 16.1 K/UL (ref 4.5–11.5)

## 2024-01-25 PROCEDURE — 6370000000 HC RX 637 (ALT 250 FOR IP): Performed by: FAMILY MEDICINE

## 2024-01-25 PROCEDURE — 6370000000 HC RX 637 (ALT 250 FOR IP): Performed by: INTERNAL MEDICINE

## 2024-01-25 PROCEDURE — 99232 SBSQ HOSP IP/OBS MODERATE 35: CPT | Performed by: NURSE PRACTITIONER

## 2024-01-25 PROCEDURE — 94640 AIRWAY INHALATION TREATMENT: CPT

## 2024-01-25 PROCEDURE — 94669 MECHANICAL CHEST WALL OSCILL: CPT

## 2024-01-25 PROCEDURE — 82962 GLUCOSE BLOOD TEST: CPT

## 2024-01-25 PROCEDURE — 80053 COMPREHEN METABOLIC PANEL: CPT

## 2024-01-25 PROCEDURE — 36415 COLL VENOUS BLD VENIPUNCTURE: CPT

## 2024-01-25 PROCEDURE — 2580000003 HC RX 258: Performed by: FAMILY MEDICINE

## 2024-01-25 PROCEDURE — 6370000000 HC RX 637 (ALT 250 FOR IP): Performed by: NURSE PRACTITIONER

## 2024-01-25 PROCEDURE — 85025 COMPLETE CBC W/AUTO DIFF WBC: CPT

## 2024-01-25 RX ORDER — OXYCODONE HYDROCHLORIDE 15 MG/1
15 TABLET ORAL EVERY 4 HOURS PRN
Qty: 12 TABLET | Refills: 0 | Status: SHIPPED | OUTPATIENT
Start: 2024-01-25 | End: 2024-01-27

## 2024-01-25 RX ORDER — GLUCAGON 1 MG/ML
1 KIT INJECTION PRN
Status: DISCONTINUED | OUTPATIENT
Start: 2024-01-25 | End: 2024-01-25 | Stop reason: HOSPADM

## 2024-01-25 RX ORDER — OXYCODONE HYDROCHLORIDE 10 MG/1
20 TABLET ORAL EVERY 4 HOURS PRN
Status: DISCONTINUED | OUTPATIENT
Start: 2024-01-25 | End: 2024-01-25 | Stop reason: HOSPADM

## 2024-01-25 RX ADMIN — OXYCODONE HYDROCHLORIDE 15 MG: 15 TABLET ORAL at 02:17

## 2024-01-25 RX ADMIN — METOPROLOL TARTRATE 25 MG: 25 TABLET, FILM COATED ORAL at 08:11

## 2024-01-25 RX ADMIN — POLYETHYLENE GLYCOL 3350 17 G: 17 POWDER, FOR SOLUTION ORAL at 08:10

## 2024-01-25 RX ADMIN — POTASSIUM CHLORIDE 40 MEQ: 750 TABLET, EXTENDED RELEASE ORAL at 08:10

## 2024-01-25 RX ADMIN — LOSARTAN POTASSIUM 100 MG: 50 TABLET, FILM COATED ORAL at 08:11

## 2024-01-25 RX ADMIN — SODIUM CHLORIDE, PRESERVATIVE FREE 10 ML: 5 INJECTION INTRAVENOUS at 08:17

## 2024-01-25 RX ADMIN — SUCRALFATE 1 G: 1 TABLET ORAL at 08:11

## 2024-01-25 RX ADMIN — IPRATROPIUM BROMIDE AND ALBUTEROL SULFATE 1 DOSE: .5; 2.5 SOLUTION RESPIRATORY (INHALATION) at 09:16

## 2024-01-25 RX ADMIN — SENNOSIDES AND DOCUSATE SODIUM 1 TABLET: 50; 8.6 TABLET ORAL at 08:10

## 2024-01-25 RX ADMIN — SERTRALINE HYDROCHLORIDE 50 MG: 50 TABLET ORAL at 08:11

## 2024-01-25 RX ADMIN — LEVOTHYROXINE SODIUM 137 MCG: 0.14 TABLET ORAL at 06:47

## 2024-01-25 RX ADMIN — OXYCODONE HYDROCHLORIDE 15 MG: 15 TABLET ORAL at 08:17

## 2024-01-25 RX ADMIN — APIXABAN 5 MG: 5 TABLET, FILM COATED ORAL at 08:11

## 2024-01-25 RX ADMIN — ALLOPURINOL 300 MG: 100 TABLET ORAL at 08:11

## 2024-01-25 RX ADMIN — ATORVASTATIN CALCIUM 10 MG: 10 TABLET, FILM COATED ORAL at 08:10

## 2024-01-25 RX ADMIN — HYDROCHLOROTHIAZIDE 12.5 MG: 25 TABLET ORAL at 08:11

## 2024-01-25 RX ADMIN — NALOXEGOL OXALATE 25 MG: 25 TABLET, FILM COATED ORAL at 06:47

## 2024-01-25 RX ADMIN — GUAIFENESIN 400 MG: 400 TABLET ORAL at 08:10

## 2024-01-25 RX ADMIN — PANTOPRAZOLE SODIUM 40 MG: 40 TABLET, DELAYED RELEASE ORAL at 06:47

## 2024-01-25 RX ADMIN — ASPIRIN 81 MG: 81 TABLET, COATED ORAL at 08:11

## 2024-01-25 ASSESSMENT — PAIN SCALES - GENERAL
PAINLEVEL_OUTOF10: 0
PAINLEVEL_OUTOF10: 10
PAINLEVEL_OUTOF10: 5
PAINLEVEL_OUTOF10: 10

## 2024-01-25 ASSESSMENT — PAIN - FUNCTIONAL ASSESSMENT
PAIN_FUNCTIONAL_ASSESSMENT: PREVENTS OR INTERFERES WITH MANY ACTIVE NOT PASSIVE ACTIVITIES
PAIN_FUNCTIONAL_ASSESSMENT: ACTIVITIES ARE NOT PREVENTED

## 2024-01-25 ASSESSMENT — PAIN DESCRIPTION - DESCRIPTORS
DESCRIPTORS: THROBBING;SHOOTING;TENDER
DESCRIPTORS: ACHING;DISCOMFORT;SORE

## 2024-01-25 ASSESSMENT — PAIN DESCRIPTION - LOCATION
LOCATION: RIB CAGE
LOCATION: BACK;RIB CAGE

## 2024-01-25 ASSESSMENT — PAIN DESCRIPTION - FREQUENCY: FREQUENCY: CONTINUOUS

## 2024-01-25 ASSESSMENT — PAIN DESCRIPTION - PAIN TYPE: TYPE: ACUTE PAIN

## 2024-01-25 ASSESSMENT — PAIN DESCRIPTION - ONSET: ONSET: PROGRESSIVE

## 2024-01-25 ASSESSMENT — PAIN DESCRIPTION - ORIENTATION
ORIENTATION: RIGHT
ORIENTATION: RIGHT

## 2024-01-25 NOTE — PROGRESS NOTES
Discharge paperwork reviewed with pt and son. Medication education provided. All questions answered.    IV and heart monitor removed..    Lauren Frommelt, RN

## 2024-01-25 NOTE — PROGRESS NOTES
Palliative Care Department  [phone]  Palliative Care Progress Note  Provider Keena Royal, APRN - CNP     Maria Victoria Elliott  [id number]  Hospital Day: 9  Date of Initial Consult: 1/19/2024  Referring Provider:  Enrique Gaytan DO  Palliative Medicine was consulted for assistance with: overwhelming symptoms    HPI:   Maria Victoria Elliott is a 73 y.o. with a medical history of atrial fibrillation on Eliquis, HTN, osteoarthritis, PVD, hypothyroidism, DM who was admitted on 1/17/2024 from home with a CHIEF COMPLAINT of rectal bleeding. Patient was a transfer from [facility] for CTA evaluation. Patient states that she is constipated and began having rectal bleeding prompting her to come to the emergency room for further evaluation.  On arrival labs were obtained and patient was found to have a lactic acid of 2.7, mildly elevated LFTs, leukocytosis of 19.7, H&H of 11.1/36.2.  CT abdomen pelvis revealed no acute findings in the abdomen however a partially necrotic mass in the right lower lobe. Hospitalized in November 2023 found to have a lung mass which was biopsied by pulmonology showing necrotic tissue insufficient for further diagnosis. Then underwent a bronchoscopy on 1/2/2024 with transbronchial biopsy showing benign bronchial mucosa negative for neoplasm.  She was to continue to follow with pulmonology and treat for pulmonary abscess with close CT follow-up in 4 weeks post antibiotics.  She was also referred to see Dr. Hallman in November 2023 however does not appear to have made an appointment.  Pulmnology, ID, CTS, oncology following. She is for possible RLL lobectomy. Palliative medicine consulted for further assistance.   ASSESSMENT/PLAN:     Pertinent Hospital Diagnoses     Right lung mass  History of lung abscess  Constipation    Palliative Care Encounter / Counseling Regarding Goals of Care  Please see detailed goals of care discussion as below  At this time, Maria Victoria Elliott, Does have capacity

## 2024-01-25 NOTE — CARE COORDINATION
1/25/24  Transition of Care Update.  Discharge order noted.  Therapy updates complete and patient will still not meet criteria for a covered TANA stay.  Patient and son aware.  Patient set up to discharge home with [facility] and palliative support.  [facility] updated on discharge order. Son to provide transport home.  SW/Cm to follow.    Electronically signed by VIRGEN Davenport on 1/25/2024 at 7:24 AM

## 2024-01-25 NOTE — PATIENT CARE CONFERENCE
[facility] Quality Flow/Interdisciplinary Rounds Progress Note        Quality Flow Rounds held on January 25, 2024    Disciplines Attending:  Bedside Nurse, , , and Nursing Unit Leadership    Maria Victoria Elliott was admitted on 1/17/2024  8:21 PM    Anticipated Discharge Date:       Disposition:    Callum Score:  Callum Scale Score: 19    Readmission Risk              Risk of Unplanned Readmission:  20           Discussed patient goal for the day, patient clinical progression, and barriers to discharge.  The following Goal(s) of the Day/Commitment(s) have been identified:  Discharge - Obtain Order and home by valery Ling RN  January 25, 2024

## 2024-01-25 NOTE — PROGRESS NOTES
01/25/24 0918   Encounter Summary   Encounter Overview/Reason  Spiritual/Emotional Needs   Service Provided For: Patient   Referral/Consult From: Palliative Care   Support System Children;Family members   Last Encounter  01/25/24  (P-DL)   Complexity of Encounter High   Spiritual/Emotional needs   Type Spiritual Support   Grief, Loss, and Adjustments   Type Adjustment to illness   Assessment/Intervention/Outcome   Assessment Coping;Calm;Fearful   Intervention Active listening;Discussed belief system/[employer] practices/cristina;Discussed illness injury and it’s impact;Explored/Affirmed feelings, thoughts, concerns;Nurtured Hope;Discussed meaning/purpose;Discussed relationship with God;Prayer (assurance of)/[place]   Outcome Comfort;Engaged in conversation;Expressed feelings, needs, and concerns;Expressed Gratitude     Maria Victoria was sitting up alert and just finished breakfast. She is of the [employer] cristina and received communion from Fr. [place] who also provided anointing on 1/21. Speaking of her mass Maria Victoria talked about next treatment steps with radiation and chemo, but seemed unclear on if she was having any more biopsies. Spiritually she spoke tearfully about trusting God in her next steps of treatment and expressed that there still is fear in the outcome. She does have the support of her son who lives close by, and spoke of enjoying time with his dog. She welcomed the emotional and prayer support. I have provided her with my contact information should she want any further spiritual support.    Chaplain Gabi Arroyo, [facility], [facility] Contact at Ext: [phone]

## 2024-01-25 NOTE — PLAN OF CARE
Problem: Safety - Adult  Goal: Free from fall injury  1/25/2024 0921 by Frommelt, Lauren, RN  Outcome: Progressing     Problem: Chronic Conditions and Co-morbidities  Goal: Patient's chronic conditions and co-morbidity symptoms are monitored and maintained or improved  1/25/2024 0921 by Frommelt, Lauren, RN  Outcome: Progressing     Problem: Pain  Goal: Verbalizes/displays adequate comfort level or baseline comfort level  1/25/2024 0921 by Frommelt, Lauren, RN  Outcome: Progressing     Problem: ABCDS Injury Assessment  Goal: Absence of physical injury  1/25/2024 0921 by Frommelt, Lauren, RN  Outcome: Progressing     Problem: Nutrition Deficit:  Goal: Optimize nutritional status  1/25/2024 0921 by Frommelt, Lauren, RN  Outcome: Progressing

## 2024-01-25 NOTE — PLAN OF CARE
Problem: Safety - Adult  Goal: Free from fall injury  1/24/2024 2241 by Kira Howard, RN  Outcome: Progressing     Problem: Chronic Conditions and Co-morbidities  Goal: Patient's chronic conditions and co-morbidity symptoms are monitored and maintained or improved  Outcome: Progressing     Problem: Discharge Planning  Goal: Discharge to home or other facility with appropriate resources  Outcome: Progressing     Problem: Pain  Goal: Verbalizes/displays adequate comfort level or baseline comfort level  1/24/2024 2241 by Kira Howard, RN  Outcome: Progressing     Problem: ABCDS Injury Assessment  Goal: Absence of physical injury  1/24/2024 2241 by Kira Howard, RN  Outcome: Progressing     Problem: Nutrition Deficit:  Goal: Optimize nutritional status  1/24/2024 2241 by Kira Howard, RN  Outcome: Progressing

## 2024-01-25 NOTE — PROGRESS NOTES
Hospitalist Progress Note      PCP: Phill Wadsworth III, DO    Date of Admission: 1/17/2024        Hospital Course:  PRESENTED WITH RIGHT LUNG MASS, THOUGHT TO BE OPERABLE, SEEN BY CTS AND IT IS NOT.  SHE IS TO START CHEMO AND RADIATION        1/23 HAVING PAIN IN  HER BACK, DOES NOT WANT TO GO HOME               Subjective:  LEFT BEFORE SHE COULD BE SEEN           Medications:  Reviewed    Infusion Medications   Scheduled Medications   PRN Meds:     No intake or output data in the 24 hours ending 01/25/24 1512    Exam:    BP (!) 118/50   Pulse 77   Temp 97.8 °F (36.6 °C) (Temporal)   Resp 16   Ht 1.651 m (5' 5\")   Wt 92.5 kg (204 lb)   SpO2 91%   BMI 33.95 kg/m²             Labs:   Recent Labs     01/23/24  0547 01/24/24 0447 01/25/24  0556   WBC 17.4* 16.1* 16.1*   HGB 8.3* 8.0* 8.1*   HCT 28.6* 27.9* 28.1*    376 360     Recent Labs     01/23/24  0547 01/24/24  0447 01/25/24  0556    132 135   K 4.8 4.4 4.7    99 102   CO2 23 22 22   BUN 19 16 15   CREATININE 0.9 0.8 0.8   CALCIUM 9.5 9.3 9.9     Recent Labs     01/23/24  0547 01/24/24  0447 01/25/24  0556   AST 12 13 13   ALT 24 22 22   BILITOT 0.5 0.4 0.4   ALKPHOS 79 79 75     No results for input(s): \"INR\" in the last 72 hours.  No results for input(s): \"CKTOTAL\", \"TROPONINI\" in the last 72 hours.  Recent Labs     01/23/24  0547 01/24/24  0447 01/25/24  0556   AST 12 13 13   ALT 24 22 22   BILITOT 0.5 0.4 0.4   ALKPHOS 79 79 75     No results for input(s): \"LACTA\" in the last 72 hours.  No results found for: \"LABURIC\", \"URICACID\"  No results found for: \"AMMONIA\"    Assessment:    Active Hospital Problems    Diagnosis Date Noted    Palliative care encounter [Z51.5] 01/19/2024    Goals of care, counseling/discussion [Z71.89] 01/19/2024    Mass of right lung [R91.8] 01/17/2024   PAF   HTN   II DM  COUGH   HYPOTHYROID       Plan:DC HOME WITH [facility]  Electronically signed by Eliseo Chen DO on 1/25/2024 at 3:12 PM FACOI

## 2024-01-26 LAB — SURGICAL PATHOLOGY REPORT: NORMAL

## 2024-01-26 NOTE — DISCHARGE SUMMARY
Hospitalist Discharge Summary    Patient ID: Maria Victoria Elliott   Patient : 1950  Patient's PCP: Phill Wadsworth III, DO    Admit Date: 2024   Admitting Physician: No admitting provider for patient encounter.    Discharge Date:  2024  Discharge Physician: Eliseo Chen DO   Discharge Condition: Stable  Discharge Disposition: Home with Home Health Care      Discharge Diagnoses:     Active Hospital Problems    Diagnosis Date Noted    Palliative care encounter [Z51.5] 2024    Goals of care, counseling/discussion [Z71.89] 2024    Mass of right lung [R91.8] 2024           Hospital course in brief:  PRESENTED WITH RIGHT LUNG MASS, THOUGHT TO BE OPERABLE, SEEN BY CTS AND IT IS NOT.  SHE IS TO START CHEMO AND RADIATION         HAVING PAIN IN  HER BACK, DOES NOT WANT TO GO HOME / REFUSING  REPEAT LUNG BIOPSY          PHYSICAL EXAM:    BP (!) 118/50   Pulse 77   Temp 97.8 °F (36.6 °C) (Temporal)   Resp 16   Ht 1.651 m (5' 5\")   Wt 92.5 kg (204 lb)   SpO2 91%   BMI 33.95 kg/m²     LEFT BEFORE SHE COULD BE SEEN    Prior to Admission medications    Medication Sig Start Date End Date Taking? Authorizing Provider   oxyCODONE (OXY-IR) 15 MG immediate release tablet Take 1 tablet by mouth every 4 hours as needed for Pain (hold for sedation) for up to 2 days. Max Daily Amount: 90 mg 24 Yes Keena Royal, APRN - CNP   guaiFENesin 400 MG tablet Take 1 tablet by mouth 3 times daily 24  Yes Eliseo Chen DO   oxyCODONE (ROXICODONE) 5 MG immediate release tablet Take 1 tablet by mouth every 6 hours as needed for Pain for up to 3 days. Max Daily Amount: 20 mg 24 Yes Eliseo Chen DO   naloxegol (MOVANTIK) 25 MG TABS tablet Take 1 tablet by mouth every morning (before breakfast) 24  Yes Eliseo Chen DO   benzonatate (TESSALON) 100 MG capsule Take 1 capsule by mouth 3 times daily as needed for Cough 24 Yes  WORK            Discharge Instructions / Follow up:    Future Appointments   Date Time Provider Department Center   6/19/2024  1:30 PM [facility] US 1 [facility] Rad/Car   6/19/2024  2:00 PM Stuart Rajput MD VASC/MED [facility]       Continued appropriate risk factor modification of blood pressure, diabetes and serum lipids will remain essential to reducing risk of future atherosclerotic development    Activity: activity as tolerated    Significant labs:  CBC:   Recent Labs     01/24/24  0447 01/25/24  0556   WBC 16.1* 16.1*   RBC 3.44* 3.47*   HGB 8.0* 8.1*   HCT 27.9* 28.1*   MCV 81.1 81.0   RDW 17.1* 17.2*    360     BMP:   Recent Labs     01/24/24  0447 01/25/24  0556    135   K 4.4 4.7   CL 99 102   CO2 22 22   BUN 16 15   CREATININE 0.8 0.8     LFT:  Recent Labs     01/24/24  0447 01/25/24  0556   PROT 5.8* 5.7*   ALKPHOS 79 75   ALT 22 22   AST 13 13   BILITOT 0.4 0.4     PT/INR: No results for input(s): \"INR\", \"APTT\" in the last 72 hours.  BNP: No results for input(s): \"BNP\" in the last 72 hours.  Hgb A1C:   Lab Results   Component Value Date    LABA1C 6.0 (H) 12/06/2023     Folate and B12:   Lab Results   Component Value Date    HHCINKSO69 708 01/19/2024   ,   Lab Results   Component Value Date    FOLATE 18.3 01/19/2024     Thyroid Studies:   Lab Results   Component Value Date    TSH 6.030 (H) 07/14/2020       Urinalysis:    Lab Results   Component Value Date/Time    NITRU NEGATIVE 12/05/2023 09:54 PM    WBCUA 10 TO 20 12/05/2023 09:54 PM    BACTERIA 1+ 12/05/2023 09:54 PM    RBCUA 0 TO 2 12/05/2023 09:54 PM    BLOODU MODERATE 07/13/2020 10:13 PM    SPECGRAV 1.010 12/05/2023 09:54 PM    GLUCOSEU NEGATIVE 12/05/2023 09:54 PM       Imaging:  CT NEEDLE BIOPSY LUNG/MEDIASTINUM PERCUTANEOUS    Result Date: 1/22/2024  PROCEDURE: 1/22/2024 1. CT guided lung core biopsy 2. [facility] , Lung biopsy plug with delivery system BIOPSY LOCATION: Right upper lobe : Dr. Berrios ASSISTANT: None

## 2024-01-26 NOTE — CONSULTS
Radiation Oncology        -chart reviewed  -imaging reviewed  -RT as OP  -out office will call to sched        Tima Rodriguez III, MD MS [facility]  Radiation Oncology  Cell: [phone]    [facility]:  [phone]   FAX: [phone]  [facility]:  [phone]   FAX:    [phone]  [facility]:  [phone]   FAX:  [phone]

## 2024-01-28 LAB
MICROORGANISM SPEC CULT: NORMAL
MICROORGANISM/AGENT SPEC: NORMAL
SPECIMEN DESCRIPTION: NORMAL

## 2024-02-05 ENCOUNTER — TELEPHONE (OUTPATIENT)
Dept: VASCULAR SURGERY | Age: 74
End: 2024-02-05

## 2024-02-05 ENCOUNTER — PREP FOR PROCEDURE (OUTPATIENT)
Dept: VASCULAR SURGERY | Age: 74
End: 2024-02-05

## 2024-02-05 DIAGNOSIS — C34.11 MALIGNANT NEOPLASM OF UPPER LOBE OF RIGHT LUNG (HCC): ICD-10-CM

## 2024-02-05 NOTE — PROGRESS NOTES
[facility]   PRE-ADMISSION TESTING GENERAL INSTRUCTIONS  PAT Phone Number: [phone]      GENERAL INSTRUCTIONS:    [x] Antibacterial Soap Shower Night before or AM of surgery.  [] CHG Wipes instruction sheet and wipes given.  [] Hibiclens shower the night before AND the morning of surgery.   -Do not use Hibiclens on your face or head.   [x] Do not wear makeup, lotions, powders, deodorant.   [x] Nothing by mouth after midnight; including gum, candy, mints, or water.  [x] You may brush your teeth, gargle, but do NOT swallow water.   [x] No tobacco products, illegal drugs, or alcohol within 24 hours of your surgery.  [x] Jewelry or valuables should not be brought to the hospital. All body and/or tongue piercing's must be removed prior to arriving to hospital. No contact lens or hair pins.   [x] Arrange transportation with a responsible adult  to and from the hospital. Arrange for someone to be with you for the remainder of the day and for 24 hours after your procedure due to having had anesthesia.          -Who will be your  for transportation? Brother or son        -Who will be staying with you for 24 hrs after your procedure? Brother or son  [x] Bring insurance card and photo ID.  [x] Bring copy of living will or healthcare power of  papers to be placed in your electronic record.  [] Urine Pregnancy test will be preformed the day of surgery. A specimen sample may be brought from home.  [] Transfusion (Green) Bracelet: Please bring with you to hospital, day of surgery.     PARKING INSTRUCTIONS:     [x] ARRIVAL DATE & TIME:   2/8 at 10 am   [x] Times are subject to change. We will contact you the business day before surgery if that were to occur.  [x] Enter into the [facility] Entrance. Two people may accompany you. Masks are not required.  [x] Parking Lot \"I\" is where you will park. It is located on the corner of [address] and [address]. The entrance

## 2024-02-05 NOTE — TELEPHONE ENCOUNTER
Request for insertion of a venous port received from Dr. Hallman.  Spoke with the pt, scheduled insertion with Dr. Amaro 2/8/24.  Pt instructed to be NPO after midnight the night before except heart and/or BP meds in the a.m with sips of water, holding Eliquis x 2 days.

## 2024-02-07 ENCOUNTER — ANESTHESIA EVENT (OUTPATIENT)
Dept: OPERATING ROOM | Age: 74
End: 2024-02-07
Payer: MEDICARE

## 2024-02-07 ASSESSMENT — LIFESTYLE VARIABLES: SMOKING_STATUS: 0

## 2024-02-07 NOTE — PROGRESS NOTES
Informed patient of the following: Surgery scheduled on 2/8 time has been changed to 11:40 am will need to arrive at 9:45 am.  Patient verbalized understanding. Patient repeated arrival time 9:45 am.

## 2024-02-08 ENCOUNTER — HOSPITAL ENCOUNTER (OUTPATIENT)
Age: 74
Setting detail: OUTPATIENT SURGERY
Discharge: HOME | End: 2024-02-08
Attending: SURGERY | Admitting: SURGERY
Payer: MEDICARE

## 2024-02-08 ENCOUNTER — ANESTHESIA (OUTPATIENT)
Dept: OPERATING ROOM | Age: 74
End: 2024-02-08
Payer: MEDICARE

## 2024-02-08 ENCOUNTER — APPOINTMENT (OUTPATIENT)
Dept: GENERAL RADIOLOGY | Age: 74
End: 2024-02-08
Attending: SURGERY
Payer: MEDICARE

## 2024-02-08 VITALS
WEIGHT: 196 LBS | HEIGHT: 65 IN | TEMPERATURE: 97.6 F | RESPIRATION RATE: 18 BRPM | OXYGEN SATURATION: 97 % | DIASTOLIC BLOOD PRESSURE: 50 MMHG | SYSTOLIC BLOOD PRESSURE: 100 MMHG | HEART RATE: 83 BPM | BODY MASS INDEX: 32.65 KG/M2

## 2024-02-08 DIAGNOSIS — Z01.812 PRE-OPERATIVE LABORATORY EXAMINATION: ICD-10-CM

## 2024-02-08 DIAGNOSIS — C34.11 MALIGNANT NEOPLASM OF UPPER LOBE OF RIGHT LUNG (HCC): Primary | ICD-10-CM

## 2024-02-08 LAB
GLUCOSE BLD-MCNC: 146 MG/DL (ref 74–99)
MICROORGANISM SPEC CULT: NORMAL
MICROORGANISM/AGENT SPEC: NORMAL
SPECIMEN DESCRIPTION: NORMAL

## 2024-02-08 PROCEDURE — 7100000010 HC PHASE II RECOVERY - FIRST 15 MIN: Performed by: SURGERY

## 2024-02-08 PROCEDURE — 71045 X-RAY EXAM CHEST 1 VIEW: CPT

## 2024-02-08 PROCEDURE — 2709999900 HC NON-CHARGEABLE SUPPLY: Performed by: SURGERY

## 2024-02-08 PROCEDURE — C1894 INTRO/SHEATH, NON-LASER: HCPCS | Performed by: SURGERY

## 2024-02-08 PROCEDURE — 82962 GLUCOSE BLOOD TEST: CPT

## 2024-02-08 PROCEDURE — 6360000002 HC RX W HCPCS: Performed by: PHYSICIAN ASSISTANT

## 2024-02-08 PROCEDURE — 2500000003 HC RX 250 WO HCPCS: Performed by: SURGERY

## 2024-02-08 PROCEDURE — 7100000011 HC PHASE II RECOVERY - ADDTL 15 MIN: Performed by: SURGERY

## 2024-02-08 PROCEDURE — 2580000003 HC RX 258: Performed by: SURGERY

## 2024-02-08 PROCEDURE — 6360000002 HC RX W HCPCS

## 2024-02-08 PROCEDURE — 3600000007 HC SURGERY HYBRID BASE: Performed by: SURGERY

## 2024-02-08 PROCEDURE — 2580000003 HC RX 258

## 2024-02-08 PROCEDURE — 2580000003 HC RX 258: Performed by: PHYSICIAN ASSISTANT

## 2024-02-08 PROCEDURE — 6360000002 HC RX W HCPCS: Performed by: SURGERY

## 2024-02-08 PROCEDURE — C1769 GUIDE WIRE: HCPCS | Performed by: SURGERY

## 2024-02-08 PROCEDURE — 3700000001 HC ADD 15 MINUTES (ANESTHESIA): Performed by: SURGERY

## 2024-02-08 PROCEDURE — A4217 STERILE WATER/SALINE, 500 ML: HCPCS | Performed by: SURGERY

## 2024-02-08 PROCEDURE — C1788 PORT, INDWELLING, IMP: HCPCS | Performed by: SURGERY

## 2024-02-08 PROCEDURE — 36561 INSERT TUNNELED CV CATH: CPT | Performed by: SURGERY

## 2024-02-08 PROCEDURE — 3700000000 HC ANESTHESIA ATTENDED CARE: Performed by: SURGERY

## 2024-02-08 PROCEDURE — 3600000017 HC SURGERY HYBRID ADDL 15MIN: Performed by: SURGERY

## 2024-02-08 DEVICE — VACCESS CT POWER-INJECTABLE IMPLANTABLE PORT (WITH SUTURE PLUGS) (8F)
Type: IMPLANTABLE DEVICE | Site: CHEST | Status: FUNCTIONAL
Brand: VACCESS

## 2024-02-08 RX ORDER — SODIUM CHLORIDE 9 MG/ML
INJECTION, SOLUTION INTRAVENOUS CONTINUOUS PRN
Status: DISCONTINUED | OUTPATIENT
Start: 2024-02-08 | End: 2024-02-08 | Stop reason: SDUPTHER

## 2024-02-08 RX ORDER — FENTANYL CITRATE 50 UG/ML
INJECTION, SOLUTION INTRAMUSCULAR; INTRAVENOUS PRN
Status: DISCONTINUED | OUTPATIENT
Start: 2024-02-08 | End: 2024-02-08 | Stop reason: SDUPTHER

## 2024-02-08 RX ORDER — SODIUM CHLORIDE 0.9 % (FLUSH) 0.9 %
5-40 SYRINGE (ML) INJECTION PRN
Status: CANCELLED | OUTPATIENT
Start: 2024-02-08

## 2024-02-08 RX ORDER — ONDANSETRON 2 MG/ML
4 INJECTION INTRAMUSCULAR; INTRAVENOUS
Status: CANCELLED | OUTPATIENT
Start: 2024-02-08 | End: 2024-02-09

## 2024-02-08 RX ORDER — HEPARIN 100 UNIT/ML
SYRINGE INTRAVENOUS PRN
Status: DISCONTINUED | OUTPATIENT
Start: 2024-02-08 | End: 2024-02-08 | Stop reason: ALTCHOICE

## 2024-02-08 RX ORDER — SODIUM CHLORIDE 0.9 % (FLUSH) 0.9 %
5-40 SYRINGE (ML) INJECTION PRN
Status: DISCONTINUED | OUTPATIENT
Start: 2024-02-08 | End: 2024-02-08 | Stop reason: HOSPADM

## 2024-02-08 RX ORDER — SODIUM CHLORIDE 9 MG/ML
INJECTION, SOLUTION INTRAVENOUS PRN
Status: DISCONTINUED | OUTPATIENT
Start: 2024-02-08 | End: 2024-02-08 | Stop reason: HOSPADM

## 2024-02-08 RX ORDER — PROPOFOL 10 MG/ML
INJECTION, EMULSION INTRAVENOUS CONTINUOUS PRN
Status: DISCONTINUED | OUTPATIENT
Start: 2024-02-08 | End: 2024-02-08 | Stop reason: SDUPTHER

## 2024-02-08 RX ORDER — SODIUM CHLORIDE 0.9 % (FLUSH) 0.9 %
5-40 SYRINGE (ML) INJECTION EVERY 12 HOURS SCHEDULED
Status: DISCONTINUED | OUTPATIENT
Start: 2024-02-08 | End: 2024-02-08 | Stop reason: HOSPADM

## 2024-02-08 RX ORDER — MORPHINE SULFATE 2 MG/ML
2 INJECTION, SOLUTION INTRAMUSCULAR; INTRAVENOUS EVERY 5 MIN PRN
Status: CANCELLED | OUTPATIENT
Start: 2024-02-08

## 2024-02-08 RX ORDER — FENTANYL CITRATE 50 UG/ML
25 INJECTION, SOLUTION INTRAMUSCULAR; INTRAVENOUS EVERY 5 MIN PRN
Status: CANCELLED | OUTPATIENT
Start: 2024-02-08

## 2024-02-08 RX ORDER — OXYCODONE HYDROCHLORIDE AND ACETAMINOPHEN 5; 325 MG/1; MG/1
1 TABLET ORAL EVERY 6 HOURS PRN
Qty: 12 TABLET | Refills: 0 | Status: SHIPPED | OUTPATIENT
Start: 2024-02-08 | End: 2024-02-11

## 2024-02-08 RX ORDER — SODIUM CHLORIDE 9 MG/ML
INJECTION, SOLUTION INTRAVENOUS CONTINUOUS
Status: DISCONTINUED | OUTPATIENT
Start: 2024-02-08 | End: 2024-02-08 | Stop reason: HOSPADM

## 2024-02-08 RX ORDER — SODIUM CHLORIDE 0.9 % (FLUSH) 0.9 %
5-40 SYRINGE (ML) INJECTION EVERY 12 HOURS SCHEDULED
Status: CANCELLED | OUTPATIENT
Start: 2024-02-08

## 2024-02-08 RX ORDER — SODIUM CHLORIDE 9 MG/ML
INJECTION, SOLUTION INTRAVENOUS PRN
Status: CANCELLED | OUTPATIENT
Start: 2024-02-08

## 2024-02-08 RX ORDER — MIDAZOLAM HYDROCHLORIDE 1 MG/ML
INJECTION INTRAMUSCULAR; INTRAVENOUS PRN
Status: DISCONTINUED | OUTPATIENT
Start: 2024-02-08 | End: 2024-02-08 | Stop reason: SDUPTHER

## 2024-02-08 RX ADMIN — PROPOFOL 8.8 MG: 10 INJECTION, EMULSION INTRAVENOUS at 11:14

## 2024-02-08 RX ADMIN — CEFAZOLIN 2000 MG: 2 INJECTION, POWDER, FOR SOLUTION INTRAMUSCULAR; INTRAVENOUS at 11:05

## 2024-02-08 RX ADMIN — FENTANYL CITRATE 50 MCG: 50 INJECTION, SOLUTION INTRAMUSCULAR; INTRAVENOUS at 11:10

## 2024-02-08 RX ADMIN — PROPOFOL 50 MCG/KG/MIN: 10 INJECTION, EMULSION INTRAVENOUS at 11:05

## 2024-02-08 RX ADMIN — PROPOFOL 8.8 MG: 10 INJECTION, EMULSION INTRAVENOUS at 11:11

## 2024-02-08 RX ADMIN — MIDAZOLAM 1 MG: 1 INJECTION INTRAMUSCULAR; INTRAVENOUS at 10:51

## 2024-02-08 RX ADMIN — PROPOFOL 8.8 MG: 10 INJECTION, EMULSION INTRAVENOUS at 11:04

## 2024-02-08 RX ADMIN — SODIUM CHLORIDE: 9 INJECTION, SOLUTION INTRAVENOUS at 10:51

## 2024-02-08 ASSESSMENT — PAIN SCALES - GENERAL
PAINLEVEL_OUTOF10: 0
PAINLEVEL_OUTOF10: 0

## 2024-02-08 ASSESSMENT — PAIN - FUNCTIONAL ASSESSMENT
PAIN_FUNCTIONAL_ASSESSMENT: 0-10
PAIN_FUNCTIONAL_ASSESSMENT: 0-10

## 2024-02-08 NOTE — DISCHARGE INSTRUCTIONS
Ok to resume eliquis tomorrow 2/9/23     [facility] AMBULATORY PROCEDURE DISCHARGE INSTRUCTIONS  You may be drowsy or lightheaded after receiving sedation or anesthesia.    A responsible person should be with you for the next 24 hours.    Please follow the instructions checked below:    DIET INSTRUCTIONS:  [x]Start with light diet and progress to your normal diet as you feel like eating. If you experience nausea or repeated episodes of vomiting which persist beyond 12-24 hours, notify your doctor.  []Other     ACTIVITY INSTRUCTIONS:  [x]Rest today. Increase activity as tolerated    []Elevate operative limb   [x]No heavy lifting or strenuous activity     [x]No driving while taking narcotics  []Other     WOUND/DRESSING INSTRUCTIONS:  Always ensure you and your care giver clean hands before and after caring for the wound.  []May shower      []May bathe      [x]Derma bond dressing-Do not apply lotion, gel, or liquid to wound while the derma bond is in place.   []Other         MEDICATION INSTRUCTIONS:    [x]Prescriptions sent with you.  Use as directed.  When taking pain medications, you may experience dizziness or drowsiness.  Do not drink alcohol or drive when taking these medications.  [x]You may take a non-prescription “headache remedy”, preferably one that does not contain aspirin.    Other Instructions:      FOLLOW-UP CARE:  [x]Call the office at [phone] for follow-up appointment in 2 weeks    Call physician if they or any other problems occur:  Fever over 101°    Redness, swelling, hardness or warmth at the operative site  Unrelieved nausea    Foul smelling or cloudy drainage at the operative site   Unrelieved pain    Blood soaked dressing. (Some oozing may be normal)

## 2024-02-08 NOTE — PROGRESS NOTES
Phase II care completed.  Waiting for XR to result before removing IV and discharging patient.   Cough

## 2024-02-08 NOTE — OP NOTE
Operative Note      Patient: Maria Victoria Elliott  YOB: 1950  MRN: [id number]    Date of Procedure: 2/8/2024    Pre-Op Diagnosis Codes:     * Malignant neoplasm of upper lobe of right lung (HCC) [C34.11]    Post-Op Diagnosis: Same       Procedure(s):  INSERTION OF TUNNELED CENTRAL VENOUS CATHTER WITH SUBCUTANEOUS RESEVOIR    Surgeon(s):  Lesley Amaro MD    Assistant:   * No surgical staff found *    Anesthesia: Monitor Anesthesia Care    Estimated Blood Loss (mL): Minimal    Complications: None    Specimens:   * No specimens in log *    Implants:  Implant Name Type Inv. Item Serial No.  Lot No. LRB No. Used Action   PORT INFUS 8FR PWR INJ CT FOR VASC ACCS CATH - [id number]  PORT INFUS 8FR PWR INJ CT FOR VASC ACCS CATH  Applifier-WD [id number] Right 1 Implanted         Drains: * No LDAs found *    Findings: Port in good position        Detailed Description of Procedure:   The patient was identified and the procedure was confirmed. The right neck and chest were prepped and draped in the usual sterile fashion. Next, 1% lidocaine mixed with 0.25% Marcaine was used for local anesthesia. The right internal jugular vein was percutaneously entered and a guidewire was advanced into the superior vena cava under fluoroscopic guidance. A small incision was made around the wire. A second skin incision was made below the clavicle and a pocket was made in the subcutaneous tissue for the reservoir.  The catheter was pulled through a subcutaneous tunnel from the inferior chest incision to the neck incision. The introducer was passed over the wire then the catheter was passed through the introducer and the tip was positioned in the superior vena cava right atrial junction under fluoroscopic guidance. The catheter was connected to the reservoir and the locking hub was engaged.  The port was noted to withdrawal and flush blood easily and was flushed with heparinized saline solution. The

## 2024-02-08 NOTE — H&P
Vascular Surgery Inpatient Consultation Note      Reason for Consultation:  Need for port    HISTORY OF PRESENT ILLNESS:                The patient is a 73 y.o. female who is admitted to the hospital for port placement.  She has lung cancer requiring chemotherapy.  Vascular surgery is consulted for evaluation and treatment.   She has never had any central venous lines before.     IMPRESSION:  73 y female with need for port.     RECOMMENDATIONS:  Plan for RIJ, possible LIJ port placement. Risks, benefits, alts including but not limited to bleeding, infection, pneumothorax discussed and patient agrees to proceed.     Patient Active Problem List   Diagnosis Code    PVD (peripheral vascular disease) with claudication (HCC) I73.9    Diabetes mellitus type 2, uncontrolled [id number]    Hyperlipidemia LDL goal <100 E78.5    Acquired hypothyroidism E03.9    Essential hypertension I10    PUD (peptic ulcer disease) K27.9    Morbid obesity with BMI of 40.0-44.9, adult (HCC) E66.01, Z68.41    Obesity (BMI 30-39.9) E66.9    Lung mass R91.8    History of lung abscess Z87.09    Mass of right lung R91.8    Palliative care encounter Z51.5    Goals of care, counseling/discussion Z71.89    Malignant neoplasm of upper lobe of right lung (HCC) C34.11       Past Medical History:   Diagnosis Date    A-fib (HCC)     Back problem     Carotid artery stenosis     Diabetic neuropathy (HCC) 12/04/2015    Full dentures     Hiatal hernia     Hypertension     Hyperuricemia 12/04/2015    Lung mass     Osteoarthritis     lumbosacral spine arthritis and disc disease    PVD (peripheral vascular disease) with claudication (HCC) 12/13/2012    follows with Dr Rajput    Thyroid disease     Type II or unspecified type diabetes mellitus without mention of complication, not stated as uncontrolled     Wears glasses         Past Surgical History:   Procedure Laterality Date    APPENDECTOMY      BRONCHOSCOPY N/A 1/2/2024    BRONCHOSCOPY WITH BIOPSY performed by

## 2024-02-08 NOTE — ANESTHESIA POSTPROCEDURE EVALUATION
Department of Anesthesiology  Postprocedure Note    Patient: Maria Victoria Elliott  MRN: [id number]  YOB: 1950  Date of evaluation: 2/8/2024    Procedure Summary       Date: 02/08/24 Room / Location: [address]    Anesthesia Start: 1051 Anesthesia Stop: 1157    Procedure: INSERTION OF TUNNELED CENTRAL VENOUS CATHTER WITH SUBCUTANEOUS RESEVOIR (Chest) Diagnosis:       Malignant neoplasm of upper lobe of right lung (HCC)      (Malignant neoplasm of upper lobe of right lung (HCC) [C34.11])    Surgeons: Lesley Amaro MD Responsible Provider: Junior Miguel DO    Anesthesia Type: MAC ASA Status: 4            Anesthesia Type: MAC    Lane Phase I: Lane Score: 10    Lane Phase II: Lane Score: 10    Anesthesia Post Evaluation    Patient location during evaluation: PACU  Patient participation: complete - patient participated  Level of consciousness: awake and alert  Pain score: 1  Airway patency: patent  Nausea & Vomiting: no nausea and no vomiting  Cardiovascular status: hemodynamically stable  Respiratory status: acceptable  Hydration status: euvolemic  Pain management: adequate    No notable events documented.

## 2024-02-08 NOTE — PROGRESS NOTES
Still waiting for XR to be read for patient to be discharged home.  Called radiology center and they said they will have it read STAT.

## 2024-02-15 LAB
MICROORGANISM SPEC CULT: NORMAL
MICROORGANISM/AGENT SPEC: NORMAL
SPECIMEN DESCRIPTION: NORMAL

## 2024-02-21 ENCOUNTER — OFFICE VISIT (OUTPATIENT)
Dept: VASCULAR SURGERY | Age: 74
End: 2024-02-21

## 2024-02-21 VITALS — WEIGHT: 196 LBS | BODY MASS INDEX: 32.62 KG/M2

## 2024-02-21 DIAGNOSIS — C34.11 MALIGNANT NEOPLASM OF UPPER LOBE OF RIGHT LUNG (HCC): Primary | ICD-10-CM

## 2024-02-21 DIAGNOSIS — Z98.890 HISTORY OF INSERTION OF TUNNELED CENTRAL VENOUS CATHETER (CVC) WITH PORT: ICD-10-CM

## 2024-02-21 PROCEDURE — 99024 POSTOP FOLLOW-UP VISIT: CPT

## 2024-02-21 NOTE — PROGRESS NOTES
Vascular Surgery Progress Note    Chief Complaint   Patient presents with    Post-Op Check     S/p port insertion       Patient returns for post operative evaluation status post port insertion.  The patient denies any unexpected problems since hospital discharge.  She starts using the port tomorrow.  Incision is healing well.           Procedure Laterality Date    APPENDECTOMY      BRONCHOSCOPY N/A 1/2/2024    BRONCHOSCOPY WITH BIOPSY performed by Enrique Gaytan DO at [facility] OR    COLONOSCOPY      CT NEEDLE BIOPSY LUNG PERCUTANEOUS  11/13/2023    CT NEEDLE BIOPSY LUNG PERCUTANEOUS 11/13/2023 [facility] CT    CT NEEDLE BIOPSY LUNG PERCUTANEOUS  1/22/2024    CT NEEDLE BIOPSY LUNG PERCUTANEOUS 1/22/2024 GUIDO Berrios MD [facility]    HYSTERECTOMY (CERVIX STATUS UNKNOWN)      with appendectomy    JOINT REPLACEMENT Left 12/3/2015    total hip    PORT SURGERY N/A 2/8/2024    INSERTION OF TUNNELED CENTRAL VENOUS CATHTER WITH SUBCUTANEOUS RESEVOIR performed by Lesley Amaro MD at [facility] OR    THROAT SURGERY  2002    benign    TOTAL HIP ARTHROPLASTY Left 12/2015    UPPER GASTROINTESTINAL ENDOSCOPY N/A 7/7/2020    EGD BIOPSY performed by Osvaldo Martínez MD at [facility] ENDOSCOPY    UPPER GASTROINTESTINAL ENDOSCOPY N/A 7/15/2020    EGD performed by Osvaldo Martínez MD at [facility] ENDOSCOPY     Physical Exam:  The incision(s) are healing without evidence of infection.  Heart rhythm is regular.          Right      Left   Brachial     Radial 2 2   Femoral     Popliteal     Dorsalis Pedis     Posterior Tibial     (3=normal, 2=diminished, 1=barely palpable, 4=widened)    Problem List Items Addressed This Visit          Respiratory    Malignant neoplasm of upper lobe of right lung (HCC) - Primary       Other    History of insertion of tunneled central venous catheter (CVC) with port       I reviewed with the patient that normal activities can be resumed as tolerated.  She has not started using the port yet but states her first treatment is

## 2024-02-25 ENCOUNTER — APPOINTMENT (OUTPATIENT)
Dept: CT IMAGING | Age: 74
DRG: 065 | End: 2024-02-25
Payer: MEDICARE

## 2024-02-25 ENCOUNTER — APPOINTMENT (OUTPATIENT)
Dept: GENERAL RADIOLOGY | Age: 74
DRG: 065 | End: 2024-02-25
Payer: MEDICARE

## 2024-02-25 ENCOUNTER — HOSPITAL ENCOUNTER (INPATIENT)
Age: 74
LOS: 4 days | Discharge: HOSPICE | DRG: 065 | End: 2024-02-29
Attending: EMERGENCY MEDICINE | Admitting: INTERNAL MEDICINE
Payer: MEDICARE

## 2024-02-25 DIAGNOSIS — R91.8 LUNG MASS: ICD-10-CM

## 2024-02-25 DIAGNOSIS — I60.9 SUBARACHNOID HEMORRHAGE (HCC): Primary | ICD-10-CM

## 2024-02-25 DIAGNOSIS — E83.42 HYPOMAGNESEMIA: ICD-10-CM

## 2024-02-25 DIAGNOSIS — E87.6 HYPOKALEMIA: ICD-10-CM

## 2024-02-25 DIAGNOSIS — E87.1 HYPONATREMIA: ICD-10-CM

## 2024-02-25 LAB
ALBUMIN SERPL-MCNC: 2.4 G/DL (ref 3.5–5.2)
ALP SERPL-CCNC: 97 U/L (ref 35–104)
ALT SERPL-CCNC: 24 U/L (ref 0–32)
ANION GAP SERPL CALCULATED.3IONS-SCNC: 10 MMOL/L (ref 7–16)
ANION GAP SERPL CALCULATED.3IONS-SCNC: 12 MMOL/L (ref 7–16)
AST SERPL-CCNC: 11 U/L (ref 0–31)
BACTERIA URNS QL MICRO: ABNORMAL
BASOPHILS # BLD: 0 K/UL (ref 0–0.2)
BASOPHILS NFR BLD: 0 % (ref 0–2)
BILIRUB SERPL-MCNC: 0.7 MG/DL (ref 0–1.2)
BILIRUB UR QL STRIP: NEGATIVE
BNP SERPL-MCNC: 528 PG/ML (ref 0–450)
BUN SERPL-MCNC: 19 MG/DL (ref 6–23)
BUN SERPL-MCNC: 20 MG/DL (ref 6–23)
CALCIUM SERPL-MCNC: 8.4 MG/DL (ref 8.6–10.2)
CALCIUM SERPL-MCNC: 8.9 MG/DL (ref 8.6–10.2)
CHLORIDE SERPL-SCNC: 85 MMOL/L (ref 98–107)
CHLORIDE SERPL-SCNC: 86 MMOL/L (ref 98–107)
CK SERPL-CCNC: 27 U/L (ref 20–180)
CLARITY UR: CLEAR
CO2 SERPL-SCNC: 25 MMOL/L (ref 22–29)
CO2 SERPL-SCNC: 28 MMOL/L (ref 22–29)
COLOR UR: YELLOW
CREAT SERPL-MCNC: 0.8 MG/DL (ref 0.5–1)
CREAT SERPL-MCNC: 0.8 MG/DL (ref 0.5–1)
CREAT UR-MCNC: 64.1 MG/DL (ref 29–226)
EOSINOPHIL # BLD: 0.34 K/UL (ref 0.05–0.5)
EOSINOPHILS RELATIVE PERCENT: 3 % (ref 0–6)
ERYTHROCYTE [DISTWIDTH] IN BLOOD BY AUTOMATED COUNT: 18.4 % (ref 11.5–15)
GFR SERPL CREATININE-BSD FRML MDRD: >60 ML/MIN/1.73M2
GFR SERPL CREATININE-BSD FRML MDRD: >60 ML/MIN/1.73M2
GLUCOSE SERPL-MCNC: 133 MG/DL (ref 74–99)
GLUCOSE SERPL-MCNC: 136 MG/DL (ref 74–99)
GLUCOSE UR STRIP-MCNC: NEGATIVE MG/DL
HCT VFR BLD AUTO: 30 % (ref 34–48)
HGB BLD-MCNC: 9.1 G/DL (ref 11.5–15.5)
HGB UR QL STRIP.AUTO: NEGATIVE
KETONES UR STRIP-MCNC: NEGATIVE MG/DL
LEUKOCYTE ESTERASE UR QL STRIP: NEGATIVE
LYMPHOCYTES NFR BLD: 0.23 K/UL (ref 1.5–4)
LYMPHOCYTES RELATIVE PERCENT: 2 % (ref 20–42)
MAGNESIUM SERPL-MCNC: 1.1 MG/DL (ref 1.6–2.6)
MCH RBC QN AUTO: 22.6 PG (ref 26–35)
MCHC RBC AUTO-ENTMCNC: 30.3 G/DL (ref 32–34.5)
MCV RBC AUTO: 74.6 FL (ref 80–99.9)
MONOCYTES NFR BLD: 0.11 K/UL (ref 0.1–0.95)
MONOCYTES NFR BLD: 1 % (ref 2–12)
NEUTROPHILS NFR BLD: 95 % (ref 43–80)
NEUTS SEG NFR BLD: 12.32 K/UL (ref 1.8–7.3)
NITRITE UR QL STRIP: NEGATIVE
OSMOLALITY SERPL: 264 MOSM/KG (ref 285–310)
OSMOLALITY UR: 321 MOSM/KG (ref 300–900)
PH UR STRIP: 6 [PH] (ref 5–9)
PLATELET # BLD AUTO: 208 K/UL (ref 130–450)
PMV BLD AUTO: 9.5 FL (ref 7–12)
POTASSIUM SERPL-SCNC: 3.2 MMOL/L (ref 3.5–5)
POTASSIUM SERPL-SCNC: 3.8 MMOL/L (ref 3.5–5)
PROT SERPL-MCNC: 5.5 G/DL (ref 6.4–8.3)
PROT UR STRIP-MCNC: ABNORMAL MG/DL
RBC # BLD AUTO: 4.02 M/UL (ref 3.5–5.5)
RBC # BLD: ABNORMAL 10*6/UL
RBC #/AREA URNS HPF: ABNORMAL /HPF
SODIUM SERPL-SCNC: 122 MMOL/L (ref 132–146)
SODIUM SERPL-SCNC: 124 MMOL/L (ref 132–146)
SODIUM UR-SCNC: <20 MMOL/L
SP GR UR STRIP: 1.02 (ref 1–1.03)
TROPONIN I SERPL HS-MCNC: 24 NG/L (ref 0–9)
TROPONIN I SERPL HS-MCNC: 25 NG/L (ref 0–9)
TSH SERPL DL<=0.05 MIU/L-ACNC: 5.71 UIU/ML (ref 0.27–4.2)
UROBILINOGEN UR STRIP-ACNC: 0.2 EU/DL (ref 0–1)
WBC #/AREA URNS HPF: ABNORMAL /HPF
WBC OTHER # BLD: 13 K/UL (ref 4.5–11.5)

## 2024-02-25 PROCEDURE — 83735 ASSAY OF MAGNESIUM: CPT

## 2024-02-25 PROCEDURE — 99285 EMERGENCY DEPT VISIT HI MDM: CPT

## 2024-02-25 PROCEDURE — 84484 ASSAY OF TROPONIN QUANT: CPT

## 2024-02-25 PROCEDURE — 6360000004 HC RX CONTRAST MEDICATION: Performed by: RADIOLOGY

## 2024-02-25 PROCEDURE — 2060000000 HC ICU INTERMEDIATE R&B

## 2024-02-25 PROCEDURE — 2580000003 HC RX 258: Performed by: EMERGENCY MEDICINE

## 2024-02-25 PROCEDURE — 70470 CT HEAD/BRAIN W/O & W/DYE: CPT

## 2024-02-25 PROCEDURE — 71045 X-RAY EXAM CHEST 1 VIEW: CPT

## 2024-02-25 PROCEDURE — 72128 CT CHEST SPINE W/O DYE: CPT

## 2024-02-25 PROCEDURE — 82550 ASSAY OF CK (CPK): CPT

## 2024-02-25 PROCEDURE — 6370000000 HC RX 637 (ALT 250 FOR IP): Performed by: EMERGENCY MEDICINE

## 2024-02-25 PROCEDURE — 96375 TX/PRO/DX INJ NEW DRUG ADDON: CPT

## 2024-02-25 PROCEDURE — 96361 HYDRATE IV INFUSION ADD-ON: CPT

## 2024-02-25 PROCEDURE — 84300 ASSAY OF URINE SODIUM: CPT

## 2024-02-25 PROCEDURE — 83880 ASSAY OF NATRIURETIC PEPTIDE: CPT

## 2024-02-25 PROCEDURE — 70450 CT HEAD/BRAIN W/O DYE: CPT

## 2024-02-25 PROCEDURE — 84443 ASSAY THYROID STIM HORMONE: CPT

## 2024-02-25 PROCEDURE — 83935 ASSAY OF URINE OSMOLALITY: CPT

## 2024-02-25 PROCEDURE — 93005 ELECTROCARDIOGRAM TRACING: CPT | Performed by: EMERGENCY MEDICINE

## 2024-02-25 PROCEDURE — 72125 CT NECK SPINE W/O DYE: CPT

## 2024-02-25 PROCEDURE — 81001 URINALYSIS AUTO W/SCOPE: CPT

## 2024-02-25 PROCEDURE — 80053 COMPREHEN METABOLIC PANEL: CPT

## 2024-02-25 PROCEDURE — 71250 CT THORAX DX C-: CPT

## 2024-02-25 PROCEDURE — 85025 COMPLETE CBC W/AUTO DIFF WBC: CPT

## 2024-02-25 PROCEDURE — 96365 THER/PROPH/DIAG IV INF INIT: CPT

## 2024-02-25 PROCEDURE — 82570 ASSAY OF URINE CREATININE: CPT

## 2024-02-25 PROCEDURE — 6360000002 HC RX W HCPCS: Performed by: EMERGENCY MEDICINE

## 2024-02-25 PROCEDURE — 83930 ASSAY OF BLOOD OSMOLALITY: CPT

## 2024-02-25 PROCEDURE — 51701 INSERT BLADDER CATHETER: CPT

## 2024-02-25 PROCEDURE — 6370000000 HC RX 637 (ALT 250 FOR IP): Performed by: FAMILY MEDICINE

## 2024-02-25 PROCEDURE — 80048 BASIC METABOLIC PNL TOTAL CA: CPT

## 2024-02-25 PROCEDURE — 72170 X-RAY EXAM OF PELVIS: CPT

## 2024-02-25 PROCEDURE — 2580000003 HC RX 258: Performed by: FAMILY MEDICINE

## 2024-02-25 RX ORDER — OXYCODONE HYDROCHLORIDE 5 MG/1
5 TABLET ORAL DAILY PRN
COMMUNITY

## 2024-02-25 RX ORDER — BENZONATATE 200 MG/1
200 CAPSULE ORAL 3 TIMES DAILY PRN
COMMUNITY

## 2024-02-25 RX ORDER — PEGFILGRASTIM 6 MG/0.6ML
6 KIT SUBCUTANEOUS
COMMUNITY

## 2024-02-25 RX ORDER — POTASSIUM CHLORIDE 7.45 MG/ML
10 INJECTION INTRAVENOUS PRN
Status: DISCONTINUED | OUTPATIENT
Start: 2024-02-25 | End: 2024-02-29 | Stop reason: HOSPADM

## 2024-02-25 RX ORDER — POTASSIUM CHLORIDE 20 MEQ/1
40 TABLET, EXTENDED RELEASE ORAL PRN
Status: DISCONTINUED | OUTPATIENT
Start: 2024-02-25 | End: 2024-02-29 | Stop reason: HOSPADM

## 2024-02-25 RX ORDER — LEVOTHYROXINE SODIUM 137 UG/1
137 TABLET ORAL DAILY
COMMUNITY

## 2024-02-25 RX ORDER — ALBUTEROL SULFATE 90 UG/1
2 AEROSOL, METERED RESPIRATORY (INHALATION) 4 TIMES DAILY PRN
Status: DISCONTINUED | OUTPATIENT
Start: 2024-02-25 | End: 2024-02-25 | Stop reason: CLARIF

## 2024-02-25 RX ORDER — ACETAMINOPHEN 650 MG/1
650 SUPPOSITORY RECTAL EVERY 6 HOURS PRN
Status: DISCONTINUED | OUTPATIENT
Start: 2024-02-25 | End: 2024-02-29 | Stop reason: HOSPADM

## 2024-02-25 RX ORDER — FOLIC ACID 1 MG/1
1 TABLET ORAL DAILY
COMMUNITY

## 2024-02-25 RX ORDER — LEVETIRACETAM 500 MG/5ML
1000 INJECTION, SOLUTION, CONCENTRATE INTRAVENOUS ONCE
Status: COMPLETED | OUTPATIENT
Start: 2024-02-25 | End: 2024-02-25

## 2024-02-25 RX ORDER — ALBUTEROL SULFATE 2.5 MG/3ML
2.5 SOLUTION RESPIRATORY (INHALATION) 4 TIMES DAILY PRN
Status: DISCONTINUED | OUTPATIENT
Start: 2024-02-25 | End: 2024-02-29 | Stop reason: HOSPADM

## 2024-02-25 RX ORDER — ALLOPURINOL 100 MG/1
300 TABLET ORAL DAILY
Status: DISCONTINUED | OUTPATIENT
Start: 2024-02-25 | End: 2024-02-29 | Stop reason: HOSPADM

## 2024-02-25 RX ORDER — MAGNESIUM SULFATE IN WATER 40 MG/ML
2000 INJECTION, SOLUTION INTRAVENOUS PRN
Status: DISCONTINUED | OUTPATIENT
Start: 2024-02-25 | End: 2024-02-29 | Stop reason: HOSPADM

## 2024-02-25 RX ORDER — SODIUM CHLORIDE 0.9 % (FLUSH) 0.9 %
10 SYRINGE (ML) INJECTION PRN
Status: DISCONTINUED | OUTPATIENT
Start: 2024-02-25 | End: 2024-02-29 | Stop reason: HOSPADM

## 2024-02-25 RX ORDER — ONDANSETRON 4 MG/1
4 TABLET, ORALLY DISINTEGRATING ORAL EVERY 8 HOURS PRN
Status: DISCONTINUED | OUTPATIENT
Start: 2024-02-25 | End: 2024-02-29 | Stop reason: HOSPADM

## 2024-02-25 RX ORDER — SODIUM CHLORIDE 0.9 % (FLUSH) 0.9 %
5-40 SYRINGE (ML) INJECTION EVERY 12 HOURS SCHEDULED
Status: DISCONTINUED | OUTPATIENT
Start: 2024-02-25 | End: 2024-02-29 | Stop reason: HOSPADM

## 2024-02-25 RX ORDER — POTASSIUM CHLORIDE 750 MG/1
10 CAPSULE, EXTENDED RELEASE ORAL 2 TIMES DAILY
COMMUNITY

## 2024-02-25 RX ORDER — PROCHLORPERAZINE MALEATE 10 MG
10 TABLET ORAL EVERY 6 HOURS PRN
COMMUNITY

## 2024-02-25 RX ORDER — SERTRALINE HYDROCHLORIDE 100 MG/1
50 TABLET, FILM COATED ORAL DAILY
Status: DISCONTINUED | OUTPATIENT
Start: 2024-02-25 | End: 2024-02-29 | Stop reason: HOSPADM

## 2024-02-25 RX ORDER — FENTANYL CITRATE 50 UG/ML
50 INJECTION, SOLUTION INTRAMUSCULAR; INTRAVENOUS ONCE
Status: COMPLETED | OUTPATIENT
Start: 2024-02-25 | End: 2024-02-25

## 2024-02-25 RX ORDER — PANTOPRAZOLE SODIUM 40 MG/1
40 TABLET, DELAYED RELEASE ORAL DAILY
Status: DISCONTINUED | OUTPATIENT
Start: 2024-02-25 | End: 2024-02-29 | Stop reason: HOSPADM

## 2024-02-25 RX ORDER — LEVOTHYROXINE SODIUM 137 UG/1
137 TABLET ORAL DAILY
Status: DISCONTINUED | OUTPATIENT
Start: 2024-02-25 | End: 2024-02-29 | Stop reason: HOSPADM

## 2024-02-25 RX ORDER — POLYETHYLENE GLYCOL 3350 17 G/17G
17 POWDER, FOR SOLUTION ORAL DAILY PRN
Status: DISCONTINUED | OUTPATIENT
Start: 2024-02-25 | End: 2024-02-29 | Stop reason: HOSPADM

## 2024-02-25 RX ORDER — SUCRALFATE 1 G/1
1 TABLET ORAL 4 TIMES DAILY
Status: DISCONTINUED | OUTPATIENT
Start: 2024-02-25 | End: 2024-02-29 | Stop reason: HOSPADM

## 2024-02-25 RX ORDER — 0.9 % SODIUM CHLORIDE 0.9 %
500 INTRAVENOUS SOLUTION INTRAVENOUS ONCE
Status: DISCONTINUED | OUTPATIENT
Start: 2024-02-25 | End: 2024-02-25

## 2024-02-25 RX ORDER — MAGNESIUM SULFATE IN WATER 40 MG/ML
2000 INJECTION, SOLUTION INTRAVENOUS ONCE
Status: COMPLETED | OUTPATIENT
Start: 2024-02-25 | End: 2024-02-25

## 2024-02-25 RX ORDER — ONDANSETRON 8 MG/1
8 TABLET, ORALLY DISINTEGRATING ORAL DAILY PRN
COMMUNITY

## 2024-02-25 RX ORDER — SODIUM CHLORIDE 9 MG/ML
INJECTION, SOLUTION INTRAVENOUS PRN
Status: DISCONTINUED | OUTPATIENT
Start: 2024-02-25 | End: 2024-02-29 | Stop reason: HOSPADM

## 2024-02-25 RX ORDER — ATORVASTATIN CALCIUM 10 MG/1
10 TABLET, FILM COATED ORAL DAILY
Status: DISCONTINUED | OUTPATIENT
Start: 2024-02-25 | End: 2024-02-29 | Stop reason: HOSPADM

## 2024-02-25 RX ORDER — SODIUM CHLORIDE 9 MG/ML
INJECTION, SOLUTION INTRAVENOUS CONTINUOUS
Status: DISCONTINUED | OUTPATIENT
Start: 2024-02-25 | End: 2024-02-29 | Stop reason: HOSPADM

## 2024-02-25 RX ORDER — ACETAMINOPHEN 325 MG/1
650 TABLET ORAL EVERY 6 HOURS PRN
Status: DISCONTINUED | OUTPATIENT
Start: 2024-02-25 | End: 2024-02-29 | Stop reason: HOSPADM

## 2024-02-25 RX ORDER — ONDANSETRON 2 MG/ML
4 INJECTION INTRAMUSCULAR; INTRAVENOUS EVERY 6 HOURS PRN
Status: DISCONTINUED | OUTPATIENT
Start: 2024-02-25 | End: 2024-02-29 | Stop reason: HOSPADM

## 2024-02-25 RX ORDER — OXYCODONE HYDROCHLORIDE 15 MG/1
15 TABLET ORAL EVERY 12 HOURS PRN
COMMUNITY

## 2024-02-25 RX ORDER — FUROSEMIDE 20 MG/1
20 TABLET ORAL DAILY
COMMUNITY

## 2024-02-25 RX ORDER — POTASSIUM CHLORIDE 1.5 G/1.58G
40 POWDER, FOR SOLUTION ORAL ONCE
Status: DISCONTINUED | OUTPATIENT
Start: 2024-02-25 | End: 2024-02-25 | Stop reason: CLARIF

## 2024-02-25 RX ADMIN — SODIUM CHLORIDE 500 ML: 9 INJECTION, SOLUTION INTRAVENOUS at 12:20

## 2024-02-25 RX ADMIN — LEVOTHYROXINE SODIUM 137 MCG: 0.14 TABLET ORAL at 19:35

## 2024-02-25 RX ADMIN — SUCRALFATE 1 G: 1 TABLET ORAL at 23:49

## 2024-02-25 RX ADMIN — FENTANYL CITRATE 50 MCG: 50 INJECTION INTRAMUSCULAR; INTRAVENOUS at 12:20

## 2024-02-25 RX ADMIN — SODIUM CHLORIDE: 9 INJECTION, SOLUTION INTRAVENOUS at 16:48

## 2024-02-25 RX ADMIN — METFORMIN HYDROCHLORIDE 500 MG: 500 TABLET ORAL at 19:31

## 2024-02-25 RX ADMIN — LEVETIRACETAM 1000 MG: 100 INJECTION INTRAVENOUS at 14:14

## 2024-02-25 RX ADMIN — PANTOPRAZOLE SODIUM 40 MG: 40 TABLET, DELAYED RELEASE ORAL at 19:31

## 2024-02-25 RX ADMIN — IOPAMIDOL 75 ML: 755 INJECTION, SOLUTION INTRAVENOUS at 17:58

## 2024-02-25 RX ADMIN — ALLOPURINOL 300 MG: 100 TABLET ORAL at 19:31

## 2024-02-25 RX ADMIN — MAGNESIUM SULFATE HEPTAHYDRATE 2000 MG: 40 INJECTION, SOLUTION INTRAVENOUS at 14:17

## 2024-02-25 RX ADMIN — SUCRALFATE 1 G: 1 TABLET ORAL at 19:30

## 2024-02-25 RX ADMIN — POTASSIUM BICARBONATE 40 MEQ: 782 TABLET, EFFERVESCENT ORAL at 14:14

## 2024-02-25 RX ADMIN — SERTRALINE HYDROCHLORIDE 50 MG: 25 TABLET ORAL at 19:30

## 2024-02-25 RX ADMIN — ATORVASTATIN CALCIUM 10 MG: 10 TABLET, FILM COATED ORAL at 19:31

## 2024-02-25 ASSESSMENT — PAIN SCALES - GENERAL: PAINLEVEL_OUTOF10: 10

## 2024-02-25 ASSESSMENT — PAIN DESCRIPTION - ORIENTATION: ORIENTATION: LOWER

## 2024-02-25 ASSESSMENT — PAIN DESCRIPTION - DESCRIPTORS: DESCRIPTORS: THROBBING;DULL

## 2024-02-25 ASSESSMENT — PAIN DESCRIPTION - LOCATION: LOCATION: BACK;BUTTOCKS

## 2024-02-25 NOTE — ED PROVIDER NOTES
[facility] EMERGENCY DEPARTMENT  EMERGENCY DEPARTMENT ENCOUNTER        Pt Name: Maria Victoria Elliott  MRN: [id number]  Birthdate 1950  Date of evaluation: 2/25/2024  Provider: Patience Herman DO  PCP: Phill Wadsworth III, DO  Note Started: 11:05 AM EST 2/25/24    CHIEF COMPLAINT       Chief Complaint   Patient presents with    Fall     Fell at home. Fell on buttock. Stating she did not hit her head. On Eliquis and 81 mg ASA. When asked where her pain is, Pt stated \" everywhere.\" Pt also started Chemo on Thursday 2-22-24       HISTORY OF PRESENT ILLNESS: 1 or more Elements   History From: Patient and EMS    Limitations to history : None    Maria Victoria Elliott is a 73 y.o. female who presents with concern for worsening weakness, fatigue, a fall earlier today.  States that she was walking to the bathroom when she felt that her legs gave out on her and she fell down hitting her buttock and her mid back.  She does not think that she hit her head although she is on Eliquis.  No headaches vision changes, no numbness or tingling just progressive weakness over the past few days.  She did recently have a port placed for chemotherapy for lung cancer, her first treatment was 3 days ago.  Has not been having fevers or chills, no melena or hematochezia, no nausea, vomiting, diarrhea, dysuria or hematuria.  She is still been trying to eat and drink normally.  She also complaining of worsening swelling in her bilateral lower extremities.  She does have that she is always short of breath with ambulation.  No other associated complaints.    REVIEW OF SYSTEMS :      Positives and Pertinent negatives as per HPI.     SURGICAL HISTORY     Past Surgical History:   Procedure Laterality Date    APPENDECTOMY      BRONCHOSCOPY N/A 1/2/2024    BRONCHOSCOPY WITH BIOPSY performed by Enrique Gaytan DO at [facility] OR    COLONOSCOPY      CT NEEDLE BIOPSY LUNG PERCUTANEOUS  11/13/2023    CT NEEDLE BIOPSY LUNG

## 2024-02-26 LAB
ALBUMIN SERPL-MCNC: 2.2 G/DL (ref 3.5–5.2)
ALP SERPL-CCNC: 70 U/L (ref 35–104)
ALT SERPL-CCNC: 17 U/L (ref 0–32)
ANION GAP SERPL CALCULATED.3IONS-SCNC: 7 MMOL/L (ref 7–16)
AST SERPL-CCNC: 16 U/L (ref 0–31)
BASOPHILS # BLD: 0 K/UL (ref 0–0.2)
BASOPHILS NFR BLD: 0 % (ref 0–2)
BILIRUB SERPL-MCNC: 0.8 MG/DL (ref 0–1.2)
BUN SERPL-MCNC: 21 MG/DL (ref 6–23)
CALCIUM SERPL-MCNC: 8.2 MG/DL (ref 8.6–10.2)
CHLORIDE SERPL-SCNC: 91 MMOL/L (ref 98–107)
CO2 SERPL-SCNC: 29 MMOL/L (ref 22–29)
CREAT SERPL-MCNC: 0.9 MG/DL (ref 0.5–1)
EOSINOPHIL # BLD: 0.14 K/UL (ref 0.05–0.5)
EOSINOPHILS RELATIVE PERCENT: 5 % (ref 0–6)
ERYTHROCYTE [DISTWIDTH] IN BLOOD BY AUTOMATED COUNT: 18.2 % (ref 11.5–15)
GFR SERPL CREATININE-BSD FRML MDRD: >60 ML/MIN/1.73M2
GLUCOSE BLD-MCNC: 124 MG/DL (ref 74–99)
GLUCOSE SERPL-MCNC: 97 MG/DL (ref 74–99)
HCT VFR BLD AUTO: 24.7 % (ref 34–48)
HGB BLD-MCNC: 7.3 G/DL (ref 11.5–15.5)
LYMPHOCYTES NFR BLD: 0.36 K/UL (ref 1.5–4)
LYMPHOCYTES RELATIVE PERCENT: 14 % (ref 20–42)
MCH RBC QN AUTO: 22.6 PG (ref 26–35)
MCHC RBC AUTO-ENTMCNC: 29.6 G/DL (ref 32–34.5)
MCV RBC AUTO: 76.5 FL (ref 80–99.9)
MONOCYTES NFR BLD: 0 % (ref 2–12)
MONOCYTES NFR BLD: 0 K/UL (ref 0.1–0.95)
NEUTROPHILS NFR BLD: 81 % (ref 43–80)
NEUTS SEG NFR BLD: 2.1 K/UL (ref 1.8–7.3)
PLATELET # BLD AUTO: 116 K/UL (ref 130–450)
PMV BLD AUTO: 9.9 FL (ref 7–12)
POTASSIUM SERPL-SCNC: 3.7 MMOL/L (ref 3.5–5)
PROT SERPL-MCNC: 4.8 G/DL (ref 6.4–8.3)
RBC # BLD AUTO: 3.23 M/UL (ref 3.5–5.5)
RBC # BLD: ABNORMAL 10*6/UL
SODIUM SERPL-SCNC: 127 MMOL/L (ref 132–146)
WBC OTHER # BLD: 2.6 K/UL (ref 4.5–11.5)

## 2024-02-26 PROCEDURE — 80053 COMPREHEN METABOLIC PANEL: CPT

## 2024-02-26 PROCEDURE — 82962 GLUCOSE BLOOD TEST: CPT

## 2024-02-26 PROCEDURE — 82436 ASSAY OF URINE CHLORIDE: CPT

## 2024-02-26 PROCEDURE — 83935 ASSAY OF URINE OSMOLALITY: CPT

## 2024-02-26 PROCEDURE — 6370000000 HC RX 637 (ALT 250 FOR IP): Performed by: NEUROLOGICAL SURGERY

## 2024-02-26 PROCEDURE — 94640 AIRWAY INHALATION TREATMENT: CPT

## 2024-02-26 PROCEDURE — 2580000003 HC RX 258: Performed by: FAMILY MEDICINE

## 2024-02-26 PROCEDURE — 6370000000 HC RX 637 (ALT 250 FOR IP): Performed by: FAMILY MEDICINE

## 2024-02-26 PROCEDURE — 2060000000 HC ICU INTERMEDIATE R&B

## 2024-02-26 PROCEDURE — 84133 ASSAY OF URINE POTASSIUM: CPT

## 2024-02-26 PROCEDURE — 85025 COMPLETE CBC W/AUTO DIFF WBC: CPT

## 2024-02-26 PROCEDURE — 94664 DEMO&/EVAL PT USE INHALER: CPT

## 2024-02-26 PROCEDURE — 97161 PT EVAL LOW COMPLEX 20 MIN: CPT

## 2024-02-26 PROCEDURE — 6370000000 HC RX 637 (ALT 250 FOR IP): Performed by: NURSE PRACTITIONER

## 2024-02-26 PROCEDURE — 6360000002 HC RX W HCPCS: Performed by: INTERNAL MEDICINE

## 2024-02-26 PROCEDURE — 84300 ASSAY OF URINE SODIUM: CPT

## 2024-02-26 PROCEDURE — 97530 THERAPEUTIC ACTIVITIES: CPT

## 2024-02-26 PROCEDURE — 82570 ASSAY OF URINE CREATININE: CPT

## 2024-02-26 RX ORDER — LEVETIRACETAM 500 MG/1
500 TABLET ORAL 2 TIMES DAILY
Status: DISCONTINUED | OUTPATIENT
Start: 2024-02-27 | End: 2024-02-29 | Stop reason: HOSPADM

## 2024-02-26 RX ORDER — LEVETIRACETAM 500 MG/1
500 TABLET ORAL 2 TIMES DAILY
Status: DISCONTINUED | OUTPATIENT
Start: 2024-02-26 | End: 2024-02-26

## 2024-02-26 RX ORDER — BENZONATATE 100 MG/1
100 CAPSULE ORAL 3 TIMES DAILY PRN
Status: DISCONTINUED | OUTPATIENT
Start: 2024-02-26 | End: 2024-02-29 | Stop reason: HOSPADM

## 2024-02-26 RX ORDER — OXYCODONE HYDROCHLORIDE 5 MG/1
5 TABLET ORAL DAILY PRN
Status: DISCONTINUED | OUTPATIENT
Start: 2024-02-26 | End: 2024-02-29 | Stop reason: HOSPADM

## 2024-02-26 RX ADMIN — ALBUTEROL SULFATE 2.5 MG: 2.5 SOLUTION RESPIRATORY (INHALATION) at 20:28

## 2024-02-26 RX ADMIN — PANTOPRAZOLE SODIUM 40 MG: 40 TABLET, DELAYED RELEASE ORAL at 08:38

## 2024-02-26 RX ADMIN — SUCRALFATE 1 G: 1 TABLET ORAL at 20:07

## 2024-02-26 RX ADMIN — SUCRALFATE 1 G: 1 TABLET ORAL at 08:37

## 2024-02-26 RX ADMIN — LEVOTHYROXINE SODIUM 137 MCG: 0.14 TABLET ORAL at 09:34

## 2024-02-26 RX ADMIN — ATORVASTATIN CALCIUM 10 MG: 10 TABLET, FILM COATED ORAL at 08:38

## 2024-02-26 RX ADMIN — SODIUM CHLORIDE: 9 INJECTION, SOLUTION INTRAVENOUS at 07:43

## 2024-02-26 RX ADMIN — METFORMIN HYDROCHLORIDE 500 MG: 500 TABLET ORAL at 17:12

## 2024-02-26 RX ADMIN — ALLOPURINOL 300 MG: 100 TABLET ORAL at 08:37

## 2024-02-26 RX ADMIN — ACETAMINOPHEN 650 MG: 325 TABLET ORAL at 20:05

## 2024-02-26 RX ADMIN — OXYCODONE 15 MG: 5 TABLET ORAL at 12:21

## 2024-02-26 RX ADMIN — BENZONATATE 100 MG: 100 CAPSULE ORAL at 16:09

## 2024-02-26 RX ADMIN — LEVETIRACETAM 500 MG: 500 TABLET, FILM COATED ORAL at 16:59

## 2024-02-26 RX ADMIN — ALBUTEROL SULFATE 2.5 MG: 2.5 SOLUTION RESPIRATORY (INHALATION) at 16:08

## 2024-02-26 RX ADMIN — SERTRALINE HYDROCHLORIDE 50 MG: 25 TABLET ORAL at 08:38

## 2024-02-26 RX ADMIN — SUCRALFATE 1 G: 1 TABLET ORAL at 17:02

## 2024-02-26 RX ADMIN — NALOXEGOL OXALATE 25 MG: 25 TABLET, FILM COATED ORAL at 09:34

## 2024-02-26 RX ADMIN — METOPROLOL TARTRATE 25 MG: 25 TABLET, FILM COATED ORAL at 20:07

## 2024-02-26 RX ADMIN — METFORMIN HYDROCHLORIDE 500 MG: 500 TABLET ORAL at 08:38

## 2024-02-26 RX ADMIN — BENZONATATE 100 MG: 100 CAPSULE ORAL at 20:05

## 2024-02-26 ASSESSMENT — PAIN DESCRIPTION - LOCATION
LOCATION: BACK
LOCATION: GENERALIZED

## 2024-02-26 ASSESSMENT — PAIN SCALES - GENERAL
PAINLEVEL_OUTOF10: 6
PAINLEVEL_OUTOF10: 10

## 2024-02-26 ASSESSMENT — PAIN DESCRIPTION - DESCRIPTORS
DESCRIPTORS: DISCOMFORT;SHARP;STABBING
DESCRIPTORS: ACHING;TENDER;SORE

## 2024-02-26 NOTE — CONSULTS
Neurosurgery Consult Note      CHIEF COMPLAINT: Reason for neurosurgical consultation fall from a standing height with traumatic stable left parietal subarachnoid hemorrhage    HPI:Maria Victoria Elliott presented to the emergency department for evaluation of Fall (Fell at home. Fell on buttock. Stating she did not hit her head. On Eliquis and 81 mg ASA. When asked where her pain is, Pt stated \" everywhere.\" Pt also started Chemo on Thursday 2-22-24)    Patient seen and evaluated in the emergency room on the [facility] awake alert oriented speech was fluent no focal neurologic deficits answer questions appropriately counseled greater than 50% of encounter time reviewed CT of the head CT of the head with contrast showed no evidence for lung CVA metastasis to the brain the subarachnoid hemorrhage was stable she is on Eliquis for atrial fibs which will be held because of the intracranial bleed  Maria Victoria Elliott is a 73 y.o. female patient being seen for complaints of .    Past Medical History:   Diagnosis Date    A-fib (HCC)     Back problem     Cancer of lung (HCC)     Carotid artery stenosis     Diabetic neuropathy (HCC) 12/04/2015    Full dentures     Hiatal hernia     Hypertension     Hyperuricemia 12/04/2015    Lung mass     Osteoarthritis     lumbosacral spine arthritis and disc disease    PVD (peripheral vascular disease) with claudication (HCC) 12/13/2012    follows with Dr Rajput    Thyroid disease     Type II or unspecified type diabetes mellitus without mention of complication, not stated as uncontrolled     Wears glasses      Past Surgical History:   Procedure Laterality Date    APPENDECTOMY      BRONCHOSCOPY N/A 1/2/2024    BRONCHOSCOPY WITH BIOPSY performed by Enrique Gaytan DO at [facility] OR    COLONOSCOPY      CT NEEDLE BIOPSY LUNG PERCUTANEOUS  11/13/2023    CT NEEDLE BIOPSY LUNG PERCUTANEOUS 11/13/2023 [facility] CT    CT NEEDLE BIOPSY LUNG PERCUTANEOUS  1/22/2024    CT NEEDLE BIOPSY LUNG PERCUTANEOUS 1/22/2024  Activity: Not on file   Stress: Not on file   Social Connections: Not on file   Intimate Partner Violence: Not on file   Housing Stability: Low Risk  (1/17/2024)    Housing Stability Vital Sign     Unable to Pay for Housing in the Last Year: No     Number of Places Lived in the Last Year: 1     Unstable Housing in the Last Year: No     Family History   Problem Relation Age of Onset    Kidney Disease Mother     Cancer Father     Heart Disease Maternal Grandmother        ROS: Complete 10 point ROS was obtained with positives in HPI, otherwise:  Pt denies fevers, denies chills.  Pt complains of chest pain, denies SOB, denies nausea, denies vomiting, complains of headache.      VITALS/DRAINS:   VITALS:  BP (!) 100/37   Pulse (!) 106   Temp 98 °F (36.7 °C)   Resp 24   Ht 1.651 m (5' 5\")   Wt 86.2 kg (190 lb)   SpO2 95%   BMI 31.62 kg/m²   24HR INTAKE/OUTPUT:    Intake/Output Summary (Last 24 hours) at 2/26/2024 1344  Last data filed at 2/26/2024 0948  Gross per 24 hour   Intake 210 ml   Output --   Net 210 ml       EXAMINATION: Awake alert oriented friendly and cooperative speech was fluent seem to be reliable historian  Cranial Nerves:  I: smell NA   II: visual acuity  NA   II: visual fields Full to confrontation   II: pupils SRINI   III,VII: ptosis None   III,IV,VI: extraocular muscles  Full ROM   V: mastication Normal   V: facial light touch sensation  Normal   V,VII: corneal reflex      VII: facial muscle function - upper  Normal   VII: facial muscle function - lower Normal   VIII: hearing Normal   IX: soft palate elevation  Normal   IX,X: gag reflex     XI: trapezius strength  5/5   XI: sternocleidomastoid strength 5/5   XI: neck extension strength  5/5   XII: tongue strength  Normal     Motor: No focal motor weakness or drift    Sensory: Grossly intact to light touch    Cerebellar: Normal finger-to-nose testing    Gait:    DTRs: No pathologic reflexes symmetric bilaterally      IMAGING STUDIES:  CT HEAD W WO

## 2024-02-26 NOTE — ED NOTES
Report given to receiving, RN from 8SE.   Report method by phone   The following was reviewed with receiving RN:   Current vital signs:  BP (!) 100/37   Pulse (!) 106   Temp 98 °F (36.7 °C)   Resp 24   Ht 1.651 m (5' 5\")   Wt 86.2 kg (190 lb)   SpO2 95%   BMI 31.62 kg/m²                      Any medication or safety alerts were reviewed. Any pending diagnostics and notifications were also reviewed, as well as any safety concerns or issues, abnormal labs, abnormal imaging, and abnormal assessment findings. Questions were answered.

## 2024-02-26 NOTE — H&P
[facility]  History and Physical      CHIEF COMPLAINT:    Chief Complaint   Patient presents with    Fall     Fell at home. Fell on buttock. Stating she did not hit her head. On Eliquis and 81 mg ASA. When asked where her pain is, Pt stated \" everywhere.\" Pt also started Chemo on Thursday 2-22-24        Patient of Phill Wadsworth CONTRERAS III, DO presents with:  Hyponatremia    History of Present Illness:   Patient is a 73-year-old female with past medical history of atrial fibrillation on Eliquis, diabetic neuropathy, HTN, hypothyroidism, DM, recently diagnosed right lung CA s/p port placement 2/8 who presents to the emergency room for fall.  Patient was at home when she states she became increasingly weak and fatigued while walking to her bathroom causing her legs to give out on her and falling to the ground.  Patient recently started chemotherapy on 2/22 for her recently diagnosed lung CA.  On arrival patient had a x-ray of her pelvis which revealed no acute fractures.  A CT head did reveal a small left parietal lobe subarachnoid hemorrhage.  A CT of her C-spine and thoracic spine were both negative for any acute findings.  A CT of her chest revealed right lower lobe necrotic mass with increased necrosis and gas locules with multiple pathological rib fractures.  Repeat CT head confirmed small left parietal lobe subarachnoid hemorrhage.  Labs on arrival revealed hyponatremia 122,  hypokalemia of 3.2, hypomagnesemia 1.1, albumin 2.4, TSH of 5.71, leukocytosis of 13.0, anemia of 9.1/30.0.  Patient is admitted to intermediate telemetry and for further workup and treatment.   On evaluation she is resting comfortably in no acute distress.  She indicates she feels very weak.  She just had chemotherapy on Thursday and now comes in with falls and profound weakness.    REVIEW OF SYSTEMS:  Pertinent negatives are above in HPI.  10 point ROS otherwise negative.      Past Medical History:   Diagnosis Date    A-fib  ALKPHOS 97 02/25/2024 11:15 AM    AST 11 02/25/2024 11:15 AM    ALT 24 02/25/2024 11:15 AM       Imaging:  CT head: Possible trace subarachnoid hemorrhage and left parietal lobe  Repeat CT head: Unchanged small left parietal subarachnoid hemorrhage  CT chest: Right lower lobe necrotic mass with increased necrosis appearance and gas locules which could represent bronchopleural fistula formation or increasing necrosis of soft tissue mass in the right lower lung versus post instrumentation changes up to 10 cm AP dimension previously 11.8 cm with increased irregularity of the adjacent right ribs including right 7th, 8th 9th and 10th ribs of likely pathologic fractures.     EKG:  Sinus tachycardia    Telemetry:  I've personally reviewed the patient's telemetry:  Normal sinus rhythm    ASSESSMENT/PLAN:  Principal Problem:    Hyponatremia  Resolved Problems:    * No resolved hospital problems. *    Patient is a 73-year-old female with recently diagnosed lung cancer status post most recent chemotherapy on 2/22/2024, admitted to [facility] for profound weakness and falls    Hyponatremia  -Monitor labs  -Na+ 122-124  -Continue IVF NS at 73  -Nephrology following  -Strict I's and O's  -Hold any offending agents  - Hold chemotherapy for now    Small left parietal subarachnoid hemorrhage, anemia  -Monitor neuro's  -Neurosurgery consulted  -Hold home Eliquis  -Tight BP control  -PT OT  -Transfuse 1 unit PRBC if hemoglobin drops further, she may benefit from a unit of PRBC now given profoundly weak status  -May be dilutional as there is an acute drop from 9.1-7.3 this morning-repeat in a.m.    History of lung CA-recently diagnosed  -Port placed recently on 2/8  -Pending outpatient PET scan  -Follows with the blood and cancer center    Atrial fibrillation  -Continue home metoprolol 25 mg twice daily with parameters for rate control  -Hold home Eliquis due to bleed       Medication for other comorbidities continue as appropriate

## 2024-02-26 NOTE — PROGRESS NOTES
4 Eyes Skin Assessment     NAME:  Maria Victoria Elliott  YOB: 1950  MEDICAL RECORD NUMBER:  [id number]    The patient is being assessed for  Admission    I agree that at least one RN has performed a thorough Head to Toe Skin Assessment on the patient. ALL assessment sites listed below have been assessed.      Areas assessed by both nurses:    Head, Face, Ears, Shoulders, Back, Chest, Arms, Elbows, Hands, Sacrum. Buttock, Coccyx, Ischium, Legs. Feet and Heels, and Under Medical Devices         Does the Patient have a Wound? Yes wound(s) were present on assessment. LDA wound assessment was Initiated and completed by RN     2 left buttock wounds, 1 right buttock wound, 1 sacrum wound, skin tear to right and left wrists.     Callum Prevention initiated by RN: Yes  Wound Care Orders initiated by RN: Yes    Pressure Injury (Stage 3,4, Unstageable, DTI, NWPT, and Complex wounds) if present, place Wound referral order by RN under : No    New Ostomies, if present place, Ostomy referral order under : No     Nurse 1 eSignature: Electronically signed by Keena Kerns RN on 2/26/24 at 6:17 PM EST    **SHARE this note so that the co-signing nurse can place an eSignature**    Nurse 2 eSignature: Electronically signed by Anisha Pride RN on 2/26/24 at 6:19 PM EST

## 2024-02-26 NOTE — PROGRESS NOTES
Physical Therapy  Physical Therapy Initial Assessment    Name: Maria Victoria Elliott  : 1950  MRN: [id number]      Date of Service: 2024    Evaluating PT:  Chago Escalona PT, DPT [id number]    Room #:  8512/8512-A  Diagnosis:  Subarachnoid hemorrhage (HCC) [I60.9]  Hypokalemia [E87.6]  Hypomagnesemia [E83.42]  Hyponatremia [E87.1]  Lung mass [R91.8]  PMHx/PSHx:   has a past medical history of A-fib (HCC), Back problem, Cancer of lung (HCC), Carotid artery stenosis, Diabetic neuropathy (HCC), Full dentures, Hiatal hernia, Hypertension, Hyperuricemia, Lung mass, Osteoarthritis, PVD (peripheral vascular disease) with claudication (HCC), Thyroid disease, Type II or unspecified type diabetes mellitus without mention of complication, not stated as uncontrolled, and Wears glasses.   has a past surgical history that includes Appendectomy; Throat surgery (); Colonoscopy; Hysterectomy; joint replacement (Left, 12/3/2015); Total hip arthroplasty (Left, 2015); Upper gastrointestinal endoscopy (N/A, 2020); Upper gastrointestinal endoscopy (N/A, 7/15/2020); CT NEEDLE BIOPSY LUNG PERCUTANEOUS (2023); bronchoscopy (N/A, 2024); CT NEEDLE BIOPSY LUNG PERCUTANEOUS (2024); and Port Surgery (N/A, 2024).  Procedure/Surgery:  None  Precautions:  Falls, O2, monitor HR, monitor SpO2  Equipment Owned:  FWW, cane, wheelchair  Equipment Needs:  TBD    SUBJECTIVE:    Pt lives alone in a 1st floor apartment with 0 stair(s) to enter.  Pt ambulated with FWW prior to admission.    OBJECTIVE:   Initial Evaluation  Date: 2024 Treatment Short Term/ Long Term   Goals   AM-PAC 6 Clicks      Was pt agreeable to Eval/treatment? Yes     Does pt have pain? \"Everything\"     Bed Mobility  Rolling: SBA  Supine to sit: Vinita  Sit to supine: MaxA  Scooting: Vinita (seated)  Rolling: Independent  Supine to sit: Independent  Sit to supine: Independent  Scooting: Independent   Transfers Sit to stand: Vinita  Stand to sit:  Transfer Training to improve safety and independence with all functional transfers   [x] Gait Training to improve gait mechanics, endurance and assess need for appropriate assistive device  [] Stair Training in preparation for safe discharge home and/or into the community   [x] Positioning to prevent skin breakdown and contractures  [x] Safety and Education Training   [x] Patient/Caregiver Education   [] HEP  [] Other     PT long term treatment goals are located in above grid    Frequency of treatments: 2-5x/week x 1-2 weeks.    Time in  1521  Time out  1601    Total Treatment Time  25 minutes     Evaluation Time includes thorough review of current medical information, gathering information on past medical history/social history and prior level of function, completion of standardized testing/informal observation of tasks, assessment of data and education on plan of care and goals.    CPT codes:  [x] Low Complexity PT evaluation 83039  [] Moderate Complexity PT evaluation 09553  [] High Complexity PT evaluation 32461  [] PT Re-evaluation 68667  [] Gait training 82960 0 minutes  [] Manual therapy 22171 0 minutes  [x] Therapeutic activities 13222 25 minutes  [] Therapeutic exercises 60107 0 minutes  [] Neuromuscular reeducation 58467 0 minutes     Chago Escalona, PT, DPT  [id number]

## 2024-02-26 NOTE — CARE COORDINATION
2/26. Spoke with the pt regarding transition of care. The pt lives alone in a first floor apartment. She is independent. Her PCP is Dr Wadsworth and her pharmacy is [facility]). She was recently hospitalized for insertion of tunneled catheter for RU lobe lung cancer. She will return home when medically stable. One of her children will provide transportation. Quita Perkins RN    Case Management Assessment  Initial Evaluation    Date/Time of Evaluation: 2/26/2024 4:41 PM  Assessment Completed by: Quita Perkins RN    If patient is discharged prior to next notation, then this note serves as note for discharge by case management.    Patient Name: Maria Victoria Elliott                   YOB: 1950  Diagnosis: Subarachnoid hemorrhage (HCC) [I60.9]  Hypokalemia [E87.6]  Hypomagnesemia [E83.42]  Hyponatremia [E87.1]  Lung mass [R91.8]                   Date / Time: 2/25/2024 11:04 AM    Patient Admission Status: Inpatient   Readmission Risk (Low < 19, Mod (19-27), High > 27): Readmission Risk Score: 27.3    Current PCP: Phill Wadsworth III, DO  PCP verified by CM? Yes (Dr Wadsworth)    Chart Reviewed: Yes      History Provided by: Patient  Patient Orientation: Alert and Oriented    Patient Cognition: Alert    Hospitalization in the last 30 days (Readmission):  Yes    If yes, Readmission Assessment in CM Navigator will be completed.    Advance Directives:      Code Status: Full Code   Patient's Primary Decision Maker is: Legal Next of Kin    Primary Decision Maker (Active): Jean Gr M - Child - [phone]    Secondary Decision Maker: Jana Gr S - Child - [phone]    Supplemental (Other) Decision Maker: Reuben Kiser Jr. - Brother/Sister - [phone]    Discharge Planning:    Patient lives with: Alone Type of Home: House  Primary Care Giver: Self  Patient Support Systems include: Children, Family Members   Current Financial resources:    Current community  resources:    Current services prior to admission: Home Care ([place])            Current DME:              Type of Home Care services:  Nursing Services    ADLS  Prior functional level: Independent in ADLs/IADLs  Current functional level: Independent in ADLs/IADLs    PT AM-PAC: 12 /24  OT AM-PAC:   /24    Family can provide assistance at DC: No  Would you like Case Management to discuss the discharge plan with any other family members/significant others, and if so, who? No  Plans to Return to Present Housing: Yes  Other Identified Issues/Barriers to RETURNING to current housing: lives alone  Potential Assistance needed at discharge: N/A            Potential DME:    Patient expects to discharge to: House  Plan for transportation at discharge:      Financial    Payor: [employer] MEDICARE / Plan: [employer] MEDICARE-ADVANTAGE PPO / Product Type: Medicare /     Does insurance require precert for SNF: Yes    Potential assistance Purchasing Medications:    Meds-to-Beds request:        [id number] - [facility] [address] - P [phone] - F [phone]  [facility] [address]  [facility] [address] [address]  Phone: [phone] Fax: [phone]      Notes:    Factors facilitating achievement of predicted outcomes: Family support, Motivated, Cooperative, and Pleasant    Barriers to discharge: lives alone. Frequent falls    Additional Case Management Notes: see above    The Plan for Transition of Care is related to the following treatment goals of Subarachnoid hemorrhage (HCC) [I60.9]  Hypokalemia [E87.6]  Hypomagnesemia [E83.42]  Hyponatremia [E87.1]  Lung mass [R91.8]    IF APPLICABLE: The Patient and/or patient representative Maria Victoria and her family were provided with a choice of provider and agrees with the discharge plan. Freedom of choice list with basic dialogue that supports the patient's individualized plan of care/goals and shares the quality data associated with the providers was provided to:

## 2024-02-26 NOTE — CONSULTS
Consult Note  NEPHROLOGY    Reason for Consult: Evaluation and management of hyponatremia    Requesting Physician:  Ludivina Victoria APRN - CNP     Chief Complaint: Admitted status post fall    History Obtained From:  patient, electronic medical record    History of Present Ilness:        Patient is a 73-year-old female with past medical history of atrial fibrillation on Eliquis, diabetic neuropathy, HTN, hypothyroidism, DM, recently diagnosed right lung CA s/p port placement 2/8 therapy; who presents to the emergency room after a fall.   Vitals upon arrival included blood pressure 120/49, pulse 109, respirations 20, and 98% saturation on room air.  Pertinent labs included sodium 122, potassium 3.2, chloride 85, CO2 25, BUN 20 and creatinine 0.8.  Magnesium very low at 1.1.  Initial CBC revealed WBC 13.0, hemoglobin 9.1, hematocrit 30.0 and platelet count 208.  Repeat labs at 1418 on 2/25 revealed sodium of 124, potassium 3.8, chloride 86, CO2 28 and BUN 19 and creatinine 0.8.  No a.m. labs yet this morning as patient is a very difficult stick.    Further review of the medical record shows that she was hospitalized in November 2023 and found to have a lung mass which was biopsied by pulmonology showing necrotic tissue insufficient for further diagnosis. She then underwent a bronchoscopy on 1/2/2024 with transbronchial biopsy showing benign bronchial mucosa negative for neoplasm. She was to continue to follow with pulmonology and treat for pulmonary abscess with close CT follow-up in 4 weeks post antibiotics. She was referred to see Dr. Hallman in November 2023.  Patient recently started chemotherapy on 2/22/2024.  She did have a port placement on 2/8/2024.  Per patient, first chemo was received this past Thursday.    Patient now seen and examined in the ER setting.  Her brother and son are also present to help with recent medical history.  She is currently alert and oriented.  She denies any CP or SOB at rest.  She

## 2024-02-26 NOTE — ED NOTES
Pt was stuck multiple times by lab for morning blood work with no success. This RN tried to art stick pt for labs with no success. Pt now states she is done being poked.

## 2024-02-27 ENCOUNTER — APPOINTMENT (OUTPATIENT)
Dept: GENERAL RADIOLOGY | Age: 74
DRG: 065 | End: 2024-02-27
Payer: MEDICARE

## 2024-02-27 PROBLEM — E44.0 MODERATE PROTEIN-CALORIE MALNUTRITION (HCC): Status: ACTIVE | Noted: 2024-02-27

## 2024-02-27 LAB
ALBUMIN SERPL-MCNC: 2 G/DL (ref 3.5–5.2)
ALBUMIN SERPL-MCNC: 2.1 G/DL (ref 3.5–5.2)
ALP SERPL-CCNC: 55 U/L (ref 35–104)
ALP SERPL-CCNC: 65 U/L (ref 35–104)
ALT SERPL-CCNC: 15 U/L (ref 0–32)
ALT SERPL-CCNC: 18 U/L (ref 0–32)
AMMONIA PLAS-SCNC: 19 UMOL/L (ref 11–51)
ANION GAP SERPL CALCULATED.3IONS-SCNC: 8 MMOL/L (ref 7–16)
ANION GAP SERPL CALCULATED.3IONS-SCNC: 9 MMOL/L (ref 7–16)
AST SERPL-CCNC: 13 U/L (ref 0–31)
AST SERPL-CCNC: 19 U/L (ref 0–31)
BASOPHILS # BLD: 0.01 K/UL (ref 0–0.2)
BASOPHILS # BLD: 0.02 K/UL (ref 0–0.2)
BASOPHILS NFR BLD: 1 % (ref 0–2)
BASOPHILS NFR BLD: 4 % (ref 0–2)
BILIRUB SERPL-MCNC: 0.7 MG/DL (ref 0–1.2)
BILIRUB SERPL-MCNC: 0.7 MG/DL (ref 0–1.2)
BNP SERPL-MCNC: 1145 PG/ML (ref 0–450)
BUN SERPL-MCNC: 20 MG/DL (ref 6–23)
BUN SERPL-MCNC: 23 MG/DL (ref 6–23)
CALCIUM SERPL-MCNC: 7.9 MG/DL (ref 8.6–10.2)
CALCIUM SERPL-MCNC: 8.3 MG/DL (ref 8.6–10.2)
CHLORIDE SERPL-SCNC: 91 MMOL/L (ref 98–107)
CHLORIDE SERPL-SCNC: 92 MMOL/L (ref 98–107)
CHLORIDE UR-SCNC: 8 MMOL/L
CO2 SERPL-SCNC: 26 MMOL/L (ref 22–29)
CO2 SERPL-SCNC: 27 MMOL/L (ref 22–29)
CORTIS SERPL-MCNC: 39.9 UG/DL (ref 2.7–18.4)
CORTISOL COLLECTION INFO: ABNORMAL
CREAT SERPL-MCNC: 0.7 MG/DL (ref 0.5–1)
CREAT SERPL-MCNC: 0.9 MG/DL (ref 0.5–1)
CREAT UR-MCNC: 73.9 MG/DL (ref 29–226)
CRP SERPL HS-MCNC: 222 MG/L (ref 0–5)
EKG ATRIAL RATE: 102 BPM
EKG P AXIS: 71 DEGREES
EKG P-R INTERVAL: 134 MS
EKG Q-T INTERVAL: 318 MS
EKG QRS DURATION: 88 MS
EKG QTC CALCULATION (BAZETT): 414 MS
EKG R AXIS: 65 DEGREES
EKG T AXIS: 63 DEGREES
EKG VENTRICULAR RATE: 102 BPM
EOSINOPHIL # BLD: 0.02 K/UL (ref 0.05–0.5)
EOSINOPHIL # BLD: 0.12 K/UL (ref 0.05–0.5)
EOSINOPHILS RELATIVE PERCENT: 11 % (ref 0–6)
EOSINOPHILS RELATIVE PERCENT: 6 % (ref 0–6)
ERYTHROCYTE [DISTWIDTH] IN BLOOD BY AUTOMATED COUNT: 18 % (ref 11.5–15)
ERYTHROCYTE [DISTWIDTH] IN BLOOD BY AUTOMATED COUNT: 18.3 % (ref 11.5–15)
GFR SERPL CREATININE-BSD FRML MDRD: >60 ML/MIN/1.73M2
GFR SERPL CREATININE-BSD FRML MDRD: >60 ML/MIN/1.73M2
GLUCOSE BLD-MCNC: 118 MG/DL (ref 74–99)
GLUCOSE BLD-MCNC: 136 MG/DL (ref 74–99)
GLUCOSE BLD-MCNC: 159 MG/DL (ref 74–99)
GLUCOSE SERPL-MCNC: 101 MG/DL (ref 74–99)
GLUCOSE SERPL-MCNC: 148 MG/DL (ref 74–99)
HCT VFR BLD AUTO: 22.5 % (ref 34–48)
HCT VFR BLD AUTO: 27 % (ref 34–48)
HGB BLD-MCNC: 6.6 G/DL (ref 11.5–15.5)
HGB BLD-MCNC: 7.9 G/DL (ref 11.5–15.5)
LYMPHOCYTES NFR BLD: 0.29 K/UL (ref 1.5–4)
LYMPHOCYTES NFR BLD: 0.67 K/UL (ref 1.5–4)
LYMPHOCYTES RELATIVE PERCENT: 61 % (ref 20–42)
LYMPHOCYTES RELATIVE PERCENT: 73 % (ref 20–42)
MAGNESIUM SERPL-MCNC: 1.5 MG/DL (ref 1.6–2.6)
MCH RBC QN AUTO: 22.4 PG (ref 26–35)
MCH RBC QN AUTO: 22.9 PG (ref 26–35)
MCHC RBC AUTO-ENTMCNC: 29.3 G/DL (ref 32–34.5)
MCHC RBC AUTO-ENTMCNC: 29.3 G/DL (ref 32–34.5)
MCV RBC AUTO: 76.3 FL (ref 80–99.9)
MCV RBC AUTO: 78.3 FL (ref 80–99.9)
MONOCYTES NFR BLD: 0.01 K/UL (ref 0.1–0.95)
MONOCYTES NFR BLD: 0.04 K/UL (ref 0.1–0.95)
MONOCYTES NFR BLD: 1 % (ref 2–12)
MONOCYTES NFR BLD: 10 % (ref 2–12)
NEUTROPHILS NFR BLD: 26 % (ref 43–80)
NEUTROPHILS NFR BLD: 7 % (ref 43–80)
NEUTS SEG NFR BLD: 0.03 K/UL (ref 1.8–7.3)
NEUTS SEG NFR BLD: 0.29 K/UL (ref 1.8–7.3)
PLATELET # BLD AUTO: 62 K/UL (ref 130–450)
PLATELET # BLD AUTO: 78 K/UL (ref 130–450)
PLATELET CONFIRMATION: NORMAL
PMV BLD AUTO: 10 FL (ref 7–12)
PMV BLD AUTO: 10.4 FL (ref 7–12)
POTASSIUM SERPL-SCNC: 3.7 MMOL/L (ref 3.5–5)
POTASSIUM SERPL-SCNC: 3.8 MMOL/L (ref 3.5–5)
POTASSIUM, UR: 27.8 MMOL/L
PROT SERPL-MCNC: 4.6 G/DL (ref 6.4–8.3)
PROT SERPL-MCNC: 5.1 G/DL (ref 6.4–8.3)
RBC # BLD AUTO: 2.95 M/UL (ref 3.5–5.5)
RBC # BLD AUTO: 3.45 M/UL (ref 3.5–5.5)
RBC # BLD: ABNORMAL 10*6/UL
SODIUM SERPL-SCNC: 126 MMOL/L (ref 132–146)
SODIUM SERPL-SCNC: 127 MMOL/L (ref 132–146)
SODIUM UR-SCNC: 5 MMOL/L
TSH SERPL DL<=0.05 MIU/L-ACNC: 4.15 UIU/ML (ref 0.27–4.2)
URATE SERPL-MCNC: 5.8 MG/DL (ref 2.4–5.7)
WBC OTHER # BLD: 0.4 K/UL (ref 4.5–11.5)
WBC OTHER # BLD: 1.1 K/UL (ref 4.5–11.5)

## 2024-02-27 PROCEDURE — 36415 COLL VENOUS BLD VENIPUNCTURE: CPT

## 2024-02-27 PROCEDURE — 93010 ELECTROCARDIOGRAM REPORT: CPT | Performed by: INTERNAL MEDICINE

## 2024-02-27 PROCEDURE — 6370000000 HC RX 637 (ALT 250 FOR IP): Performed by: NURSE PRACTITIONER

## 2024-02-27 PROCEDURE — 6360000002 HC RX W HCPCS

## 2024-02-27 PROCEDURE — 6370000000 HC RX 637 (ALT 250 FOR IP): Performed by: FAMILY MEDICINE

## 2024-02-27 PROCEDURE — 82140 ASSAY OF AMMONIA: CPT

## 2024-02-27 PROCEDURE — 80053 COMPREHEN METABOLIC PANEL: CPT

## 2024-02-27 PROCEDURE — 6360000002 HC RX W HCPCS: Performed by: INTERNAL MEDICINE

## 2024-02-27 PROCEDURE — 6370000000 HC RX 637 (ALT 250 FOR IP): Performed by: NEUROLOGICAL SURGERY

## 2024-02-27 PROCEDURE — 85025 COMPLETE CBC W/AUTO DIFF WBC: CPT

## 2024-02-27 PROCEDURE — 84443 ASSAY THYROID STIM HORMONE: CPT

## 2024-02-27 PROCEDURE — 2500000003 HC RX 250 WO HCPCS: Performed by: INTERNAL MEDICINE

## 2024-02-27 PROCEDURE — 99222 1ST HOSP IP/OBS MODERATE 55: CPT

## 2024-02-27 PROCEDURE — 82962 GLUCOSE BLOOD TEST: CPT

## 2024-02-27 PROCEDURE — 83735 ASSAY OF MAGNESIUM: CPT

## 2024-02-27 PROCEDURE — 86140 C-REACTIVE PROTEIN: CPT

## 2024-02-27 PROCEDURE — 84550 ASSAY OF BLOOD/URIC ACID: CPT

## 2024-02-27 PROCEDURE — 2700000000 HC OXYGEN THERAPY PER DAY

## 2024-02-27 PROCEDURE — 83880 ASSAY OF NATRIURETIC PEPTIDE: CPT

## 2024-02-27 PROCEDURE — 2580000003 HC RX 258: Performed by: FAMILY MEDICINE

## 2024-02-27 PROCEDURE — 97165 OT EVAL LOW COMPLEX 30 MIN: CPT

## 2024-02-27 PROCEDURE — 85652 RBC SED RATE AUTOMATED: CPT

## 2024-02-27 PROCEDURE — 82533 TOTAL CORTISOL: CPT

## 2024-02-27 PROCEDURE — 71045 X-RAY EXAM CHEST 1 VIEW: CPT

## 2024-02-27 PROCEDURE — 94640 AIRWAY INHALATION TREATMENT: CPT

## 2024-02-27 PROCEDURE — 2060000000 HC ICU INTERMEDIATE R&B

## 2024-02-27 RX ORDER — MAGNESIUM SULFATE IN WATER 40 MG/ML
2000 INJECTION, SOLUTION INTRAVENOUS ONCE
Status: COMPLETED | OUTPATIENT
Start: 2024-02-27 | End: 2024-02-27

## 2024-02-27 RX ADMIN — MICONAZOLE NITRATE: 20.6 POWDER TOPICAL at 09:12

## 2024-02-27 RX ADMIN — NALOXEGOL OXALATE 25 MG: 25 TABLET, FILM COATED ORAL at 06:45

## 2024-02-27 RX ADMIN — LEVETIRACETAM 500 MG: 500 TABLET, FILM COATED ORAL at 05:37

## 2024-02-27 RX ADMIN — SUCRALFATE 1 G: 1 TABLET ORAL at 09:06

## 2024-02-27 RX ADMIN — MAGNESIUM SULFATE HEPTAHYDRATE 2000 MG: 40 INJECTION, SOLUTION INTRAVENOUS at 10:55

## 2024-02-27 RX ADMIN — MICONAZOLE NITRATE: 20.6 POWDER TOPICAL at 05:37

## 2024-02-27 RX ADMIN — METOPROLOL TARTRATE 25 MG: 25 TABLET, FILM COATED ORAL at 09:06

## 2024-02-27 RX ADMIN — POLYETHYLENE GLYCOL 3350 17 G: 17 POWDER, FOR SOLUTION ORAL at 16:34

## 2024-02-27 RX ADMIN — MICONAZOLE NITRATE: 20.6 POWDER TOPICAL at 22:20

## 2024-02-27 RX ADMIN — SUCRALFATE 1 G: 1 TABLET ORAL at 18:27

## 2024-02-27 RX ADMIN — LEVETIRACETAM 500 MG: 500 TABLET, FILM COATED ORAL at 18:27

## 2024-02-27 RX ADMIN — BENZONATATE 100 MG: 100 CAPSULE ORAL at 06:48

## 2024-02-27 RX ADMIN — PANTOPRAZOLE SODIUM 40 MG: 40 TABLET, DELAYED RELEASE ORAL at 09:06

## 2024-02-27 RX ADMIN — SUCRALFATE 1 G: 1 TABLET ORAL at 13:23

## 2024-02-27 RX ADMIN — ATORVASTATIN CALCIUM 10 MG: 10 TABLET, FILM COATED ORAL at 09:06

## 2024-02-27 RX ADMIN — SODIUM CHLORIDE, PRESERVATIVE FREE 10 ML: 5 INJECTION INTRAVENOUS at 22:21

## 2024-02-27 RX ADMIN — LEVOTHYROXINE SODIUM 137 MCG: 0.14 TABLET ORAL at 06:45

## 2024-02-27 RX ADMIN — SERTRALINE HYDROCHLORIDE 50 MG: 25 TABLET ORAL at 09:06

## 2024-02-27 RX ADMIN — ALLOPURINOL 300 MG: 100 TABLET ORAL at 09:02

## 2024-02-27 RX ADMIN — ALBUTEROL SULFATE 2.5 MG: 2.5 SOLUTION RESPIRATORY (INHALATION) at 07:01

## 2024-02-27 NOTE — PROGRESS NOTES
OCCUPATIONAL THERAPY INITIAL EVALUATION      [facility] [address], [place]       Date:2024                                                  Patient Name: Maria Victoria Elliott  MRN: [id number]  : 1950  Room: [address] [place] [address]-A    Evaluating OT: Christen Ruiz, OTR/L #[id number]    Referring Provider::  Lucía Eckert APRN - CNP    Specific Provider Orders/Date: OT evaluation and treatment 24    Diagnosis:  Subarachnoid hemorrhage (HCC) [I60.9]  Hypokalemia [E87.6]  Hypomagnesemia [E83.42]  Hyponatremia [E87.1]  Lung mass [R91.8]      Pertinent Medical History:  has a past medical history of A-fib (HCC), Back problem, Cancer of lung (HCC), Carotid artery stenosis, Diabetic neuropathy (HCC), Full dentures, Hiatal hernia, Hypertension, Hyperuricemia, Lung mass, Osteoarthritis, PVD (peripheral vascular disease) with claudication (HCC), Thyroid disease, Type II or unspecified type diabetes mellitus without mention of complication, not stated as uncontrolled, and Wears glasses.       Precautions:  Fall Risk, continuous pulse ox, TAPS, O2, pure wick,     Assessment of current deficits   [x] Functional mobility   [x]ADLs  [x] Strength               []Cognition   [x] Functional transfers   [] IADLs         [x] Safety Awareness   [x]Endurance   [] Fine Coordination              [x] Balance      [] Vision/perception   []Sensation    []Gross Motor Coordination  [] ROM  [] Delirium                   [] Motor Control     OT PLAN OF CARE   OT POC based on physician orders, patient diagnosis and results of clinical assessment    Frequency/Duration 1-3 days/wk for 2 weeks PRN   Specific OT Treatment to include:   * Instruction/training on adapted ADL techniques and AE recommendations to increase functional independence within precautions       * Training on energy conservation strategies, correct breathing pattern and techniques to improve  instructed on functional diagnosis, prognosis/goals and OT plan of care. Demonstrated good understanding.     Eval Complexity: Low    Time In: 3:30  Time Out: 3:50  Total Treatment Time: 5 minutes    Min Units   OT Eval Low 70894  x     OT Eval Medium 21237      OT Eval High 96171       OT Re-Eval 88024       Therapeutic Ex 49718       Therapeutic Activities 22817  5 0   ADL/Self Care 79240      Orthotic Management 66752       Neuro Re-Ed 80846       Non-Billable Time          Evaluation Time includes thorough review of current medical information, gathering information on past medical history/social history and prior level of function, completion of standardized testing/informal observation of tasks, assessment of data and education on plan of care and goals.            Christen Ruiz, OTR/L #[id number]

## 2024-02-27 NOTE — PROGRESS NOTES
[facility]  Nephrology Progress Note    Patient's Name: Maria Victoria Elliott    History of Present Illness from 2/26 Consult Note:    \"Patient is a 73-year-old female with past medical history of atrial fibrillation on Eliquis, diabetic neuropathy, HTN, hypothyroidism, DM, recently diagnosed right lung CA s/p port placement 2/8 therapy; who presents to the emergency room after a fall.   Vitals upon arrival included blood pressure 120/49, pulse 109, respirations 20, and 98% saturation on room air.  Pertinent labs included sodium 122, potassium 3.2, chloride 85, CO2 25, BUN 20 and creatinine 0.8.  Magnesium very low at 1.1.  Initial CBC revealed WBC 13.0, hemoglobin 9.1, hematocrit 30.0 and platelet count 208.  Repeat labs at 1418 on 2/25 revealed sodium of 124, potassium 3.8, chloride 86, CO2 28 and BUN 19 and creatinine 0.8.  No a.m. labs yet this morning as patient is a very difficult stick.     Further review of the medical record shows that she was hospitalized in November 2023 and found to have a lung mass which was biopsied by pulmonology showing necrotic tissue insufficient for further diagnosis. She then underwent a bronchoscopy on 1/2/2024 with transbronchial biopsy showing benign bronchial mucosa negative for neoplasm. She was to continue to follow with pulmonology and treat for pulmonary abscess with close CT follow-up in 4 weeks post antibiotics. She was referred to see Dr. Hallman in November 2023.  Patient recently started chemotherapy on 2/22/2024.  She did have a port placement on 2/8/2024.  Per patient, first chemo was received this past Thursday.     Patient now seen and examined in the ER setting.  Her brother and son are also present to help with recent medical history.  She is currently alert and oriented.  She denies any CP or SOB at rest.  She denies any nausea or vomiting.  She also denies any diarrhea.  She does endorse a poor appetite over the past few days.  She denies any specific

## 2024-02-27 NOTE — ACP (ADVANCE CARE PLANNING)
Advance Care Planning   Healthcare Decision Maker:    Primary Decision Maker (Active): Jean Gr M - Child - [phone]    Secondary Decision Maker: Jana Gr S - Child - [phone]    Supplemental (Other) Decision Maker: Reuben Kiser Jr. - Brother/Sister - [phone]    Click here to complete Healthcare Decision Makers including selection of the Healthcare Decision Maker Relationship (ie \"Primary\").

## 2024-02-27 NOTE — PROGRESS NOTES
Pt. Assessed for IV sites and lab draws. There are no appreciated eins at this time.. pt. Currently has an accessed port. Would recommend using port.

## 2024-02-27 NOTE — PROGRESS NOTES
CMR called RN to alert pt sitting at 72% O2. RN went in, 5L O2 on pt, sat dropped to 69. RN placed pt on nonrebreather 15l. Sat came back up to 95%. Respiratory called for PRN breathing treatment, Tessalon benita administered. See MAR for details.     Placed on high flow tubing @ 10L/min

## 2024-02-27 NOTE — CARE COORDINATION
Here after a fall at home. Pt also started Chemo on Thursday 2-22-24 for lung cancer. O2 had dropped to 69 %  on midnights and placed on 15 L nonrebreather. Currently on 9 liters high flow. Chest xray ordered. Pt 12 awaiting OT. Discussed with patient  her PT eval and possible rehab and she shook her head no. Await to discuss with family.Spoke with son in room and would like to speak to palliative. Message to attending.Electronically signed by Rhianna Rowe RN on 2/27/2024 at 2:21 PM    Message to Dr Abarca that son would like to speak to oncology Dr Hallman asked for consult.Electronically signed by Rhianna Rowe RN on 2/27/2024 at 3:51 PM

## 2024-02-27 NOTE — PROGRESS NOTES
02/27/24 0701   Treatment   Treatment Type HHN   $Treatment Type $Inhaled Therapy/Meds   Medications Albuterol/Ipratropium   Pre-Tx Pulse 108   Pre-Tx Resps 22   Breath Sounds Pre-Tx JAYSHREE Diminished;Expiratory wheezes;End expiratory wheezes;Inspiratory wheezes   Breath Sounds Pre-Tx LLL Diminished;Expiratory wheezes;End expiratory wheezes;Inspiratory wheezes   Breath Sounds Pre-Tx RUL Diminished;Expiratory wheezes;End Expiratory wheezes;Inspiratory wheezes   Breath Sounds Pre-Tx RML Diminished;Expiratory wheezes;End expiratory wheezes;Inspiratory wheezes   Breath Sounds Pre-Tx RLL Diminished;Expiratory wheezes;End expiratory wheezes;Inspiratory wheezes   Breath Sounds Post-Tx JAYSHREE Diminished;Expiratory wheezes;End expiratory wheezes;Inspiratory wheezes   Breath Sounds Post-Tx LLL Diminished;Expiratory wheezes;End expiratory wheezes;Inspiratory wheezes   Breath Sounds Post-Tx RUL Diminished;Expiratory wheezes;End expiratory wheezes;Inspiratory wheezes   Breath Sounds Post-Tx RML Diminished;Expiratory wheezes;End expiratory wheezes   Breath Sounds Post-Tx RLL Diminished;Expiratory wheezes;End expiratory wheezes;Inspiratory wheezes   Post-Tx Pulse 109   Post-Tx Resps 22   Delivery Source Oxygen;Mask   Position Semi-Weber's   Treatment Tolerance Well   Duration 10   Is patient on O2? Y   Breath Sounds   Breath Sounds Bilateral Diminished;Expiratory wheezing;Inspiratory wheezing   Right Upper Lobe Diminished;Expiratory wheezes;End expiratory wheezes;Inspiratory wheezes   Right Middle Lobe Diminished;Expiratory wheezes;End expiratory wheezes;Inspiratory wheezes   Right Lower Lobe Diminished;Expiratory wheezes;End expiratory wheezes;Inspiratory wheezes   Left Upper Lobe Diminished;Expiratory wheezes;End expiratory wheezes;Inspiratory wheezes   Left Lower Lobe Diminished;Expiratory wheezes;End expiratory wheezes;Inspiratory wheezes   Oxygen Therapy/Pulse Ox   O2 Therapy Oxygen   O2 Device (S)  Non-rebreather mask   O2

## 2024-02-27 NOTE — CONSULTS
Palliative Care Department  [phone]  Palliative Care Initial Consult  Provider Saskia Malik, STACI - CNP     Maria Victoria Elliott  [id number]  Hospital Day: 3  Date of Initial Consult: 2/27/24  Referring Provider: /   Palliative Medicine was consulted for assistance with: Goals of care, lung cancer    HPI:   Maria Victoria Elliott is a 73 y.o. with a medical history of atrial fibrillation on Eliquis, diabetic neuropathy, HTN, hypothyroidism, TONY, recent diagnosis of right lung cancer s/p port placement 2/8, started chemotherapy 2/22 who was admitted on 2/25/2024 from home with a CHIEF COMPLAINT of fall. ED workup significant for: Na 122,  K 3.2, magnesium 1.1, albumin 2.4, TSH 5.71, WBC 13.0, hemoglobin 9.1. CT head showed small left parietal lobe subarachnoid hemorrhage. CT C-spine and thoracic spine negative for acute findings. CT chest revealed right lower lobe necrotic mass with increased necrosis and gas locules with multiple pathological rib fractures. UA negative.  Neurosurgery consulted, recommend holding Eliquis until CT head shows complete resolution of SAH. Nephrology consulted for hyponatremia. Patient follows with [facility]. Palliative medicine consulted for goals of care.    ASSESSMENT/PLAN:     Pertinent Hospital Diagnoses     Fall   Left SAH 2/2 above  Hyponatremia    Palliative Care Encounter / Counseling Regarding Goals of Care  Please see detailed goals of care discussion as below  At this time, Maria Victoria Elliott, Does have capacity for medical decision-making.  Capacity is time limited and situation/question specific  During encounter Jean was surrogate medical decision-maker  Outcome of goals of care meeting:   Continue with current medical treatment  Patient son Jean, would like to speak with oncology Dr. Hallman, and obtain prognosis before further decisions can be made  Jc Pena discussing CODE STATUS with patient  Code status Full Code  Advanced Directives: POA

## 2024-02-27 NOTE — PLAN OF CARE
Problem: Safety - Adult  Goal: Free from fall injury  Outcome: Progressing     Problem: Chronic Conditions and Co-morbidities  Goal: Patient's chronic conditions and co-morbidity symptoms are monitored and maintained or improved  Outcome: Progressing     Problem: Discharge Planning  Goal: Discharge to home or other facility with appropriate resources  Outcome: Progressing     Problem: Skin/Tissue Integrity  Goal: Absence of new skin breakdown  Description: 1.  Monitor for areas of redness and/or skin breakdown  2.  Assess vascular access sites hourly  3.  Every 4-6 hours minimum:  Change oxygen saturation probe site  4.  Every 4-6 hours:  If on nasal continuous positive airway pressure, respiratory therapy assess nares and determine need for appliance change or resting period.  Outcome: Progressing     Problem: ABCDS Injury Assessment  Goal: Absence of physical injury  Outcome: Progressing

## 2024-02-27 NOTE — PROGRESS NOTES
instrumentation changes up to 10 cm AP dimension previously 11.8 cm with increased irregularity of the adjacent right ribs including right 7th, 8th 9th and 10th ribs of likely pathologic fractures with increased fragmentation of the costovertebral junction margin right posteromedial 7th rib from prior comparison 01/18/2024 correlate with instrumentation at this site or previous biopsy otherwise acute on chronic pathologic fractures with minimal increased displacement. 2. There are few scattered small areas of patchy densities in the right upper lung tiny could represent areas of bronchiolitis without new nodule or mass. 3. Bronchiolitis in the right lower lung with tree-in-bud opacifications new from prior. 4. Persistent thickening versus nodularity the left adrenal similar to prior. 5. No acute thoracic spine trauma.     XR PELVIS (1-2 VIEWS)    Result Date: 2/25/2024  EXAMINATION: ONE XRAY VIEW OF THE PELVIS 2/25/2024 12:46 pm COMPARISON: CT abdomen and pelvis dated 01/15/2024 HISTORY: ORDERING SYSTEM PROVIDED HISTORY: fall TECHNOLOGIST PROVIDED HISTORY: Reason for exam:->fall What reading provider will be dictating this exam?->CRC FINDINGS: SI joints symmetric without widening.  No displaced pelvic ring fracture. Left hip arthroplasty in place without evidence of hardware loosening or failure.  No periprosthetic fracture.  Vascular calcifications in the soft tissues.  Moderate right hip DJD.     1. No displaced pelvic ring fracture. 2. Left hip arthroplasty without evidence of hardware loosening or failure. 3. Moderate right hip DJD with single view unremarkable for acute displaced fracture     XR CHEST PORTABLE    Result Date: 2/25/2024  EXAMINATION: ONE XRAY VIEW OF THE CHEST 2/25/2024 12:46 pm COMPARISON: Chest x-ray dated 02/08/2024 HISTORY: ORDERING SYSTEM PROVIDED HISTORY: fall, short of breath TECHNOLOGIST PROVIDED HISTORY: Reason for exam:->fall, short of breath What reading provider will be dictating  LDL goal <100    Acquired hypothyroidism    Essential hypertension    PUD (peptic ulcer disease)    Morbid obesity with BMI of 40.0-44.9, adult (HCC)    Obesity (BMI 30-39.9)    Lung mass    History of lung abscess    Mass of right lung    Palliative care encounter    Goals of care, counseling/discussion    Malignant neoplasm of upper lobe of right lung (HCC)    History of insertion of tunneled central venous catheter (CVC) with port    Hyponatremia    Moderate protein-calorie malnutrition (HCC)     Plan:***Continue current care  Everardo Warren MD M.D.

## 2024-02-27 NOTE — PROGRESS NOTES
Comprehensive Nutrition Assessment    Type and Reason for Visit:  Initial, Consult, Wound    Nutrition Recommendations/Plan:   Continue current diet. Recommend and start ONS : Gelatein 20 BID per pt. Preference to optimize PO/nutrient intake. Will continue to monitor.     Malnutrition Assessment:  Malnutrition Status:  Moderate malnutrition (02/27/24 1430)    Context:  Acute Illness     Findings of the 6 clinical characteristics of malnutrition:  Energy Intake:  75% or less of estimated energy requirements for 7 or more days  Weight Loss:        Body Fat Loss:  Mild body fat loss Orbital   Muscle Mass Loss:  Mild muscle mass loss Temples (temporalis), Clavicles (pectoralis & deltoids)  Fluid Accumulation:  Unable to assess     Strength:  Not Performed    Nutrition Assessment:    Pt. admitted s/p fall pta w/ small left parietal subarachnoid hemorrhage and hyponatremia. Hx of recently diagnosed right lung CA s/p port placement 2/8; on chemotherapy (started on 2/22/2024). hx of DM/CAD/PVD. Pt. also meets criteria for moderate malnutriton. Pt. reports poor appetite/intake current and pta. Amendable to gelatein 20 only. Will start ONS and monitor.    Nutrition Related Findings:    Oriented x4, +I/O, GI/BS WDL, +2 pitting edema, hyponatremia, low Mg, Wound Type: Multiple, Wound Consult Pending       Current Nutrition Intake & Therapies:    Average Meal Intake: 0% (x1)  Average Supplements Intake: None Ordered  ADULT DIET; Regular    Anthropometric Measures:  Height: 165.1 cm (5' 5\")  Ideal Body Weight (IBW): 125 lbs (57 kg)    Admission Body Weight: 95.9 kg (211 lb 8 oz) (2/27 BS First measured w/ method)  Current Body Weight: 95.9 kg (211 lb 8 oz) (2/27 BS), 169.2 % IBW. Weight Source: Bed Scale  Current BMI (kg/m2): 35.2  Usual Body Weight: 98.7 kg (217 lb 9.5 oz) (12/7/23)  % Weight Change (Calculated): -2.8  Weight Adjustment For: No Adjustment                      Estimated Daily Nutrient Needs:  Energy  Requirements Based On: Formula  Weight Used for Energy Requirements: Current  Energy (kcal/day): 1800-1900kcal (MSJ x1.3SF)  Weight Used for Protein Requirements: Ideal  Protein (g/day): 85-102g (1.5-1.8g/kgIBW)  Method Used for Fluid Requirements: Other (Comment)  Fluid (ml/day): per nephrology ; hyponatremia    Nutrition Diagnosis:   Moderate malnutrition, In context of chronic illness related to catabolic illness (2/2 lung CA) as evidenced by poor intake prior to admission, Criteria as identified in malnutrition assessment    Nutrition Interventions:   Food and/or Nutrient Delivery: Continue Current Diet, Start Oral Nutrition Supplement (start ONS : Gelatein 20 BID)  Nutrition Education/Counseling: No recommendation at this time  Coordination of Nutrition Care: Continue to monitor while inpatient       Goals:     Goals: PO intake 50% or greater, by next RD assessment       Nutrition Monitoring and Evaluation:   Behavioral-Environmental Outcomes: None Identified  Food/Nutrient Intake Outcomes: Food and Nutrient Intake, Supplement Intake  Physical Signs/Symptoms Outcomes: Biochemical Data, GI Status, Fluid Status or Edema, Nutrition Focused Physical Findings, Skin, Weight    Discharge Planning:    Too soon to determine     Sekou Baum RD  Contact: ext [phone]

## 2024-02-27 NOTE — PROGRESS NOTES
[facility]                                Progress note    Subjective:    The patient is awake and alert.    She is not very talkative but indicates that she feels okay  Appears frail and chronically ill  Objective:    BP (!) 124/46   Pulse 88   Temp 97.5 °F (36.4 °C) (Temporal)   Resp 22   Ht 1.651 m (5' 5\")   Wt 86.2 kg (190 lb)   SpO2 95%   BMI 31.62 kg/m²     In: 160 [P.O.:160]  Out: -   In: 160   Out: -     General appearance: NAD, conversant  HEENT: AT/NC, MMM  Neck: FROM, supple  Lungs: Clear to auscultation  CV: RRR, no MRGs  Vasc: Radial pulses 2+  Abdomen: Soft, non-tender; no masses or HSM  Extremities: No peripheral edema or digital cyanosis  Skin: no rash, lesions or ulcers  Psych: Alert and oriented to person, place and time  Neuro: Alert and interactive     Recent Labs     02/25/24  1115 02/26/24  0600 02/27/24  0629   WBC 13.0* 2.6* 1.1*   HGB 9.1* 7.3* 7.9*   HCT 30.0* 24.7* 27.0*    116* 78*       Recent Labs     02/25/24  1418 02/26/24  0600 02/27/24  0629   * 127* 127*   K 3.8 3.7 3.8   CL 86* 91* 92*   CO2 28 29 27   BUN 19 21 23   CREATININE 0.8 0.9 0.9   CALCIUM 8.4* 8.2* 8.3*       Assessment:    Principal Problem:    Hyponatremia  Resolved Problems:    * No resolved hospital problems. *      Plan:  Patient is a 73-year-old female with recently diagnosed lung cancer status post most recent chemotherapy on 2/22/2024, admitted to [facility] for profound weakness and falls     Hyponatremia  -Monitor labs  -Na+ 122-124  -Continue IVF NS at 73  -Nephrology following  -Strict I's and O's  -Hold any offending agents  - Hold chemotherapy for now     Small left parietal subarachnoid hemorrhage, anemia  -Monitor neuro's  -Neurosurgery consulted  -Hold home Eliquis  -Tight BP control  -PT OT  -Transfuse 1 unit PRBC if hemoglobin drops further, she may benefit from a unit of PRBC now given profoundly weak status  -May be dilutional as there is an acute drop from

## 2024-02-28 LAB
ALBUMIN SERPL-MCNC: 1.9 G/DL (ref 3.5–5.2)
ALP SERPL-CCNC: 58 U/L (ref 35–104)
ALT SERPL-CCNC: 16 U/L (ref 0–32)
ANION GAP SERPL CALCULATED.3IONS-SCNC: 9 MMOL/L (ref 7–16)
AST SERPL-CCNC: 11 U/L (ref 0–31)
BILIRUB SERPL-MCNC: 0.5 MG/DL (ref 0–1.2)
BUN SERPL-MCNC: 19 MG/DL (ref 6–23)
CALCIUM SERPL-MCNC: 8 MG/DL (ref 8.6–10.2)
CHLORIDE SERPL-SCNC: 93 MMOL/L (ref 98–107)
CO2 SERPL-SCNC: 27 MMOL/L (ref 22–29)
CREAT SERPL-MCNC: 0.6 MG/DL (ref 0.5–1)
ERYTHROCYTE [SEDIMENTATION RATE] IN BLOOD BY WESTERGREN METHOD: 40 MM/HR (ref 0–20)
GFR SERPL CREATININE-BSD FRML MDRD: >60 ML/MIN/1.73M2
GLUCOSE BLD-MCNC: 154 MG/DL (ref 74–99)
GLUCOSE BLD-MCNC: 168 MG/DL (ref 74–99)
GLUCOSE SERPL-MCNC: 142 MG/DL (ref 74–99)
MAGNESIUM SERPL-MCNC: 1.6 MG/DL (ref 1.6–2.6)
OSMOLALITY UR: 384 MOSM/KG (ref 300–900)
PHOSPHATE SERPL-MCNC: 2.1 MG/DL (ref 2.5–4.5)
PLATELET CONFIRMATION: NORMAL
POTASSIUM SERPL-SCNC: 3.4 MMOL/L (ref 3.5–5)
PROT SERPL-MCNC: 4.6 G/DL (ref 6.4–8.3)
SODIUM SERPL-SCNC: 129 MMOL/L (ref 132–146)

## 2024-02-28 PROCEDURE — 2700000000 HC OXYGEN THERAPY PER DAY

## 2024-02-28 PROCEDURE — 99231 SBSQ HOSP IP/OBS SF/LOW 25: CPT

## 2024-02-28 PROCEDURE — 84100 ASSAY OF PHOSPHORUS: CPT

## 2024-02-28 PROCEDURE — 83735 ASSAY OF MAGNESIUM: CPT

## 2024-02-28 PROCEDURE — 2580000003 HC RX 258: Performed by: FAMILY MEDICINE

## 2024-02-28 PROCEDURE — 80053 COMPREHEN METABOLIC PANEL: CPT

## 2024-02-28 PROCEDURE — 2060000000 HC ICU INTERMEDIATE R&B

## 2024-02-28 PROCEDURE — 82962 GLUCOSE BLOOD TEST: CPT

## 2024-02-28 PROCEDURE — 6360000002 HC RX W HCPCS

## 2024-02-28 RX ORDER — MORPHINE SULFATE 2 MG/ML
2 INJECTION, SOLUTION INTRAMUSCULAR; INTRAVENOUS
Status: DISCONTINUED | OUTPATIENT
Start: 2024-02-28 | End: 2024-02-29 | Stop reason: HOSPADM

## 2024-02-28 RX ORDER — GLUCAGON 1 MG/ML
1 KIT INJECTION PRN
Status: DISCONTINUED | OUTPATIENT
Start: 2024-02-28 | End: 2024-02-29 | Stop reason: HOSPADM

## 2024-02-28 RX ORDER — DEXTROSE MONOHYDRATE 100 MG/ML
INJECTION, SOLUTION INTRAVENOUS CONTINUOUS PRN
Status: DISCONTINUED | OUTPATIENT
Start: 2024-02-28 | End: 2024-02-29 | Stop reason: HOSPADM

## 2024-02-28 RX ORDER — INSULIN LISPRO 100 [IU]/ML
0-4 INJECTION, SOLUTION INTRAVENOUS; SUBCUTANEOUS NIGHTLY
Status: DISCONTINUED | OUTPATIENT
Start: 2024-02-28 | End: 2024-02-29 | Stop reason: HOSPADM

## 2024-02-28 RX ORDER — GLYCOPYRROLATE 0.2 MG/ML
0.2 INJECTION INTRAMUSCULAR; INTRAVENOUS EVERY 4 HOURS PRN
Status: DISCONTINUED | OUTPATIENT
Start: 2024-02-28 | End: 2024-02-29 | Stop reason: HOSPADM

## 2024-02-28 RX ORDER — MORPHINE SULFATE 4 MG/ML
4 INJECTION, SOLUTION INTRAMUSCULAR; INTRAVENOUS
Status: DISCONTINUED | OUTPATIENT
Start: 2024-02-28 | End: 2024-02-29 | Stop reason: HOSPADM

## 2024-02-28 RX ORDER — LORAZEPAM 2 MG/ML
1 INJECTION INTRAMUSCULAR EVERY 6 HOURS PRN
Status: DISCONTINUED | OUTPATIENT
Start: 2024-02-28 | End: 2024-02-29 | Stop reason: HOSPADM

## 2024-02-28 RX ORDER — INSULIN LISPRO 100 [IU]/ML
0-4 INJECTION, SOLUTION INTRAVENOUS; SUBCUTANEOUS
Status: DISCONTINUED | OUTPATIENT
Start: 2024-02-28 | End: 2024-02-29 | Stop reason: HOSPADM

## 2024-02-28 RX ADMIN — MICONAZOLE NITRATE: 20.6 POWDER TOPICAL at 09:52

## 2024-02-28 RX ADMIN — SODIUM CHLORIDE, PRESERVATIVE FREE 5 ML: 5 INJECTION INTRAVENOUS at 20:48

## 2024-02-28 RX ADMIN — MORPHINE SULFATE 4 MG: 4 INJECTION, SOLUTION INTRAMUSCULAR; INTRAVENOUS at 20:48

## 2024-02-28 RX ADMIN — SODIUM CHLORIDE, PRESERVATIVE FREE 10 ML: 5 INJECTION INTRAVENOUS at 09:53

## 2024-02-28 RX ADMIN — MORPHINE SULFATE 2 MG: 2 INJECTION, SOLUTION INTRAMUSCULAR; INTRAVENOUS at 18:38

## 2024-02-28 RX ADMIN — MICONAZOLE NITRATE: 20.6 POWDER TOPICAL at 20:50

## 2024-02-28 ASSESSMENT — PAIN SCALES - GENERAL
PAINLEVEL_OUTOF10: 6
PAINLEVEL_OUTOF10: 9

## 2024-02-28 ASSESSMENT — PAIN DESCRIPTION - DESCRIPTORS: DESCRIPTORS: ACHING;THROBBING

## 2024-02-28 ASSESSMENT — PAIN DESCRIPTION - LOCATION
LOCATION: BACK;HIP
LOCATION: BACK

## 2024-02-28 NOTE — PROGRESS NOTES
Palliative Care Department  [phone]  Palliative Care Progress Note  Provider Saskia Malik, APRN - CNP     Maria Victoria Elliott  [id number]  Hospital Day: 4  Date of Initial Consult: 2/27/24  Referring Provider: /   Palliative Medicine was consulted for assistance with: Goals of care, lung cancer    HPI:   Maria Victoria Elliott is a 73 y.o. with a medical history of atrial fibrillation on Eliquis, diabetic neuropathy, HTN, hypothyroidism, TONY, recent diagnosis of right lung cancer s/p port placement 2/8, started chemotherapy 2/22 who was admitted on 2/25/2024 from home with a CHIEF COMPLAINT of fall. ED workup significant for: Na 122,  K 3.2, magnesium 1.1, albumin 2.4, TSH 5.71, WBC 13.0, hemoglobin 9.1. CT head showed small left parietal lobe subarachnoid hemorrhage. CT C-spine and thoracic spine negative for acute findings. CT chest revealed right lower lobe necrotic mass with increased necrosis and gas locules with multiple pathological rib fractures. UA negative.  Neurosurgery consulted, recommend holding Eliquis until CT head shows complete resolution of SAH. Nephrology consulted for hyponatremia. Patient follows with [facility]. Palliative medicine consulted for goals of care.    ASSESSMENT/PLAN:     Pertinent Hospital Diagnoses     Fall   Left SAH 2/2 above  Hyponatremia    Palliative Care Encounter / Counseling Regarding Goals of Care  Please see detailed goals of care discussion as below  At this time, Maria Victoria Elliott, Does have capacity for medical decision-making.  Capacity is time limited and situation/question specific  During encounter Jean was surrogate medical decision-maker  Outcome of goals of care meeting:   Continue with comfort measures  Comfort medications placed  Hospice consult placed  Code status DNR-CC  Advanced Directives: POA and living will in [facility]  Surrogate/Legal NOK:  Jean Gr (POA/son): [phone]  Reuben Rashel (first alternate/brother):

## 2024-02-28 NOTE — CARE COORDINATION
Here after a fall. Subarachnoid hemorrhage Has lung cancer. RRT thru night-.became unresponsive  Currently on 9 liters high flow o2. Patient currently awakens and is very soft spoken. Did discuss yesterday palliative and [facility] [place] - has hospice house with son yesterday. Call placed to son for hospice consult. Would like consult to [facility] - as they have hospice house. Cm/sw to follow.Electronically signed by Rhianna Rowe RN on 2/28/2024 at 9:47 AM

## 2024-02-28 NOTE — PROGRESS NOTES
Hospitalist Progress Note      PCP: Phill Wadsworth III,     Date of Admission: 2/25/2024        Hospital Course:  jessica is a 73-year-old female with past medical history of atrial fibrillation on Eliquis, diabetic neuropathy, HTN, hypothyroidism, DM, recently diagnosed right lung CA s/p port placement 2/8 who presents to the emergency room for fall.  Patient was at home when she states she became increasingly weak and fatigued while walking to her bathroom causing her legs to give out on her and falling to the ground.  Patient recently started chemotherapy on 2/22 for her recently diagnosed lung CA.  On arrival patient had a x-ray of her pelvis which revealed no acute fractures.  A CT head did reveal a small left parietal lobe subarachnoid hemorrhage.  A CT of her C-spine and thoracic spine were both negative for any acute findings.  A CT of her chest revealed right lower lobe necrotic mass with increased necrosis and gas locules with multiple pathological rib fractures.  Repeat CT head confirmed small left parietal lobe subarachnoid hemorrhage.  Labs on arrival revealed hyponatremia 122,  hypokalemia of 3.2, hypomagnesemia 1.1, albumin 2.4, TSH of 5.71, leukocytosis of 13.0, anemia of 9.1/30.0.  Patient is admitted to intermediate telemetry and for further workup and treatment.   On evaluation she is resting comfortably in no acute distress.  She indicates she feels very weak.  She just had chemotherapy on Thursday and now comes in with falls and profound weakness.        2/28   PT IS NOW DNR CC     Subjective: ASLEEP           Medications:  Reviewed    Infusion Medications    sodium chloride 75 mL/hr at 02/26/24 0743    sodium chloride       Scheduled Medications    levETIRAcetam  500 mg Oral BID    miconazole   Topical BID    allopurinol  300 mg Oral Daily    atorvastatin  10 mg Oral Daily    metFORMIN  500 mg Oral BID WC    metoprolol tartrate  25 mg Oral BID    naloxegol  25 mg Oral QAM AC     input(s): \"LACTA\" in the last 72 hours.  Lab Results   Component Value Date    URICACID 5.8 (H) 02/27/2024     Lab Results   Component Value Date    AMMONIA 19 02/27/2024       Assessment:    Active Hospital Problems    Diagnosis Date Noted    Moderate protein-calorie malnutrition (HCC) [E44.0] 02/27/2024    Hyponatremia [E87.1] 02/25/2024   mall left parietal subarachnoid hemorrhage, anemia  History of lung CA-recently diagnosed  -Atrial fibrillation    Plan:    HOSPICE?      DVT Prophylaxis: SCD  Diet: ADULT DIET; Regular  ADULT ORAL NUTRITION SUPPLEMENT; Lunch, Dinner; Fortified Gelatin Oral Supplement  Code Status: DNR-CC    PT/OT Eval Status:  NA    Dispo -  HOSPICE VS SNF      Electronically signed by Eliseo Chen DO on 2/28/2024 at 3:43 PM FACOI

## 2024-02-28 NOTE — PROGRESS NOTES
HOSPICE [facility]    Consult received. Chart reviewed. Visited patient at bedside. She is lethargic and when trying to communicate, she was short of breath on 9 liters nc. No family at bedside. Called son Jean and discussed hospice on phone.     The hospice benefit and philosophy were explained including that hospice is end of life care in which, per Medicare, a patient has a terminal diagnosis that life expectancy would be 6 months or less. Explained that once in hospice care, all aggressive treatments would be stopped and allow nature to takes its course with focus on comfort care for the patient.  Explained hospice services at home, at [facility] with room/board private pay unless patient has Medicaid and the Hospice House for short-term symptom management and respite.    Jean and daughter in law Jana want his mother to be comfortable and do not what her to have any aggressive treatment.  They are agreeable to have comfort medications started and to evaluate for the hospice house. Contacted Palliative care for comfort medications. HOTV will evaluate patient again after patient is medicated.     CM and bedside nurse updated.     1400 visited patient. She was awake and oriented. Oxygen is decreased to 7 Liters NC.She was not having pain or sob. Spoke with son, Jean. Explained that she does not meet criteria for the hospice house. Spoke with CM. Jean is coming in to visit and speak to CM about plan. HOTV will visit patient tomorrow. Updated charge.     Electronically signed by Lucía Diggs RN on 2/28/2024 at 11:13 AM  [phone]; [phone]

## 2024-02-28 NOTE — SIGNIFICANT EVENT
Critical Care - Rapid Response Team Note      Date of event: 2/27/2024   Time of event: 21:09    Maria Victoria Elliott 73 y.o. year old female   YOB: 1950     PCP:  Phill Wadsworth III, DO   Location: [address] [place] [address]-A   Witnessed? : [x]Yes  [] No  Initial Code status: [x] Full  [x] DNR-CCA  []DNR-CC    []Limited  ______________________________________________________________________  Reason for RRT:   Altered Mental Status    Chief Complaint for this admission:   Chief Complaint   Patient presents with    Fall     Fell at home. Fell on buttock. Stating she did not hit her head. On Eliquis and 81 mg ASA. When asked where her pain is, Pt stated \" everywhere.\" Pt also started Chemo on Thursday 2-22-24       Admit date:  2/25/2024     Admitting Diagnosis: Subarachnoid hemorrhage (HCC) [I60.9]  Hypokalemia [E87.6]  Hypomagnesemia [E83.42]  Hyponatremia [E87.1]  Lung mass [R91.8]      Current Diagnosis: The primary encounter diagnosis was Subarachnoid hemorrhage (HCC). Diagnoses of Hyponatremia, Hypokalemia, Hypomagnesemia, and Lung mass were also pertinent to this visit.     Subjective:   RRT was called for acute change in mental status.  Nurse reports that patient was initially alert and oriented and communicative.  Patient simply became unresponsive prompting RRT to be called.  On examination, patient's vitals were stable but was unresponsive.  She minimally withdrew to pain but cannot follow commands appropriately.  NIHSS was difficult to evaluate.  Pupils were reactive to light appropriately.  Concern for stroke or evolving subarachnoid hemorrhage, CT scan of the head was ordered.  Prior to CT scan, patient's son Jean was called to update on patient's current status.  Son requested patient's CODE STATUS be changed to comfort care.  Patient's legal documents were also reviewed which stated patient was to be made DNR CCA prior to this event.      Labs since last 72 hours:   CBC with Differential:    Lab

## 2024-02-28 NOTE — PROGRESS NOTES
Neurosurg progress note  VITALS:  BP (!) 110/45   Pulse 87   Temp 98.4 °F (36.9 °C) (Oral)   Resp 16   Ht 1.651 m (5' 5\")   Wt 95.9 kg (211 lb 8 oz)   SpO2 99%   BMI 35.20 kg/m²   24HR INTAKE/OUTPUT:  No intake or output data in the 24 hours ending 02/28/24 1448  CT HEAD W WO CONTRAST    Result Date: 2/25/2024  EXAMINATION: CT OF THE HEAD WITH AND WITHOUT CONTRAST  2/25/2024 5:59 pm TECHNIQUE: CT of the head/brain was performed without and with the administration of intravenous contrast. Multiplanar reformatted images are provided for review. Automated exposure control, iterative reconstruction, and/or weight based adjustment of the mA/kV was utilized to reduce the radiation dose to as low as reasonably achievable. COMPARISON: Imaging from earlier the same day. HISTORY: ORDERING SYSTEM PROVIDED HISTORY: evaluate for hemorrhage progression TECHNOLOGIST PROVIDED HISTORY: Reason for exam:->evaluate for hemorrhage progression Additional Contrast?->1 What reading provider will be dictating this exam?->CRC FINDINGS: BRAIN/VENTRICLES: Small focus of left parietal subarachnoid hemorrhage that is unchanged.  No new acute hemorrhage is identified.  There is no herniation or hydrocephalus.  No abnormal intracranial enhancement. ORBITS: The visualized portion of the orbits demonstrate no acute abnormality. SINUSES: The visualized paranasal sinuses and mastoid air cells demonstrate no acute abnormality. SOFT TISSUES/SKULL:  No acute abnormality of the visualized skull or soft tissues.     Unchanged small left parietal subarachnoid hemorrhage.     CT CSpine W/O Contrast    Result Date: 2/25/2024  EXAMINATION: CT OF THE CERVICAL SPINE WITHOUT CONTRAST 2/25/2024 1:22 pm TECHNIQUE: CT of the cervical spine was performed without the administration of intravenous contrast. Multiplanar reformatted images are provided for review. Automated exposure control, iterative reconstruction, and/or weight based adjustment of the mA/kV was

## 2024-02-28 NOTE — PLAN OF CARE
I called and spoke with patient's Jean who identified himself as patient's POA. Discussed patient's acute status change and reason for RRT. He defers to halt all medical management at this point. He is ok for drawn labs to be processed but nothing beyond that. Jean wishes for patient's code status to be changed to DNR-CC as he reports those are patient's wishes.     Code status changed to comfort care. CT head order canceled.    Electronically signed by Gerardo Dillon MD on 2/27/2024 at 10:49 PM

## 2024-02-29 VITALS
DIASTOLIC BLOOD PRESSURE: 47 MMHG | OXYGEN SATURATION: 97 % | HEART RATE: 104 BPM | BODY MASS INDEX: 35.24 KG/M2 | TEMPERATURE: 98.2 F | SYSTOLIC BLOOD PRESSURE: 121 MMHG | WEIGHT: 211.5 LBS | RESPIRATION RATE: 18 BRPM | HEIGHT: 65 IN

## 2024-02-29 LAB
ANION GAP SERPL CALCULATED.3IONS-SCNC: 7 MMOL/L (ref 7–16)
BUN SERPL-MCNC: 31 MG/DL (ref 6–23)
CALCIUM SERPL-MCNC: 8.4 MG/DL (ref 8.6–10.2)
CHLORIDE SERPL-SCNC: 93 MMOL/L (ref 98–107)
CO2 SERPL-SCNC: 28 MMOL/L (ref 22–29)
CREAT SERPL-MCNC: 0.8 MG/DL (ref 0.5–1)
GFR SERPL CREATININE-BSD FRML MDRD: >60 ML/MIN/1.73M2
GLUCOSE BLD-MCNC: 113 MG/DL (ref 74–99)
GLUCOSE BLD-MCNC: 126 MG/DL (ref 74–99)
GLUCOSE BLD-MCNC: 134 MG/DL (ref 74–99)
GLUCOSE SERPL-MCNC: 110 MG/DL (ref 74–99)
MAGNESIUM SERPL-MCNC: 1.5 MG/DL (ref 1.6–2.6)
PHOSPHATE SERPL-MCNC: 2.2 MG/DL (ref 2.5–4.5)
POTASSIUM SERPL-SCNC: 3.7 MMOL/L (ref 3.5–5)
SODIUM SERPL-SCNC: 128 MMOL/L (ref 132–146)

## 2024-02-29 PROCEDURE — 84100 ASSAY OF PHOSPHORUS: CPT

## 2024-02-29 PROCEDURE — 82962 GLUCOSE BLOOD TEST: CPT

## 2024-02-29 PROCEDURE — 2580000003 HC RX 258: Performed by: FAMILY MEDICINE

## 2024-02-29 PROCEDURE — 80048 BASIC METABOLIC PNL TOTAL CA: CPT

## 2024-02-29 PROCEDURE — 6360000002 HC RX W HCPCS

## 2024-02-29 PROCEDURE — 83735 ASSAY OF MAGNESIUM: CPT

## 2024-02-29 PROCEDURE — 2700000000 HC OXYGEN THERAPY PER DAY

## 2024-02-29 PROCEDURE — 6370000000 HC RX 637 (ALT 250 FOR IP): Performed by: NURSE PRACTITIONER

## 2024-02-29 RX ADMIN — SODIUM CHLORIDE, PRESERVATIVE FREE 10 ML: 5 INJECTION INTRAVENOUS at 10:00

## 2024-02-29 RX ADMIN — MORPHINE SULFATE 2 MG: 2 INJECTION, SOLUTION INTRAMUSCULAR; INTRAVENOUS at 20:17

## 2024-02-29 RX ADMIN — MORPHINE SULFATE 2 MG: 2 INJECTION, SOLUTION INTRAMUSCULAR; INTRAVENOUS at 17:55

## 2024-02-29 RX ADMIN — MORPHINE SULFATE 2 MG: 2 INJECTION, SOLUTION INTRAMUSCULAR; INTRAVENOUS at 10:00

## 2024-02-29 RX ADMIN — OXYCODONE 15 MG: 5 TABLET ORAL at 03:35

## 2024-02-29 ASSESSMENT — PAIN SCALES - GENERAL
PAINLEVEL_OUTOF10: 10
PAINLEVEL_OUTOF10: 6
PAINLEVEL_OUTOF10: 6
PAINLEVEL_OUTOF10: 10

## 2024-02-29 ASSESSMENT — PAIN - FUNCTIONAL ASSESSMENT: PAIN_FUNCTIONAL_ASSESSMENT: ACTIVITIES ARE NOT PREVENTED

## 2024-02-29 ASSESSMENT — PAIN DESCRIPTION - LOCATION
LOCATION: GENERALIZED
LOCATION: BACK
LOCATION: GENERALIZED
LOCATION: GENERALIZED

## 2024-02-29 ASSESSMENT — PAIN DESCRIPTION - DESCRIPTORS
DESCRIPTORS: ACHING
DESCRIPTORS: ACHING;SORE;TENDER

## 2024-02-29 NOTE — PROGRESS NOTES
Neurosurg progress note  VITALS:  BP (!) 110/49   Pulse (!) 101   Temp 97.7 °F (36.5 °C) (Temporal)   Resp 16   Ht 1.651 m (5' 5\")   Wt 95.9 kg (211 lb 8 oz)   SpO2 98%   BMI 35.20 kg/m²   24HR INTAKE/OUTPUT:    Intake/Output Summary (Last 24 hours) at 2/29/2024 1220  Last data filed at 2/29/2024 0637  Gross per 24 hour   Intake --   Output 300 ml   Net -300 ml     CT HEAD W WO CONTRAST    Result Date: 2/25/2024  EXAMINATION: CT OF THE HEAD WITH AND WITHOUT CONTRAST  2/25/2024 5:59 pm TECHNIQUE: CT of the head/brain was performed without and with the administration of intravenous contrast. Multiplanar reformatted images are provided for review. Automated exposure control, iterative reconstruction, and/or weight based adjustment of the mA/kV was utilized to reduce the radiation dose to as low as reasonably achievable. COMPARISON: Imaging from earlier the same day. HISTORY: ORDERING SYSTEM PROVIDED HISTORY: evaluate for hemorrhage progression TECHNOLOGIST PROVIDED HISTORY: Reason for exam:->evaluate for hemorrhage progression Additional Contrast?->1 What reading provider will be dictating this exam?->CRC FINDINGS: BRAIN/VENTRICLES: Small focus of left parietal subarachnoid hemorrhage that is unchanged.  No new acute hemorrhage is identified.  There is no herniation or hydrocephalus.  No abnormal intracranial enhancement. ORBITS: The visualized portion of the orbits demonstrate no acute abnormality. SINUSES: The visualized paranasal sinuses and mastoid air cells demonstrate no acute abnormality. SOFT TISSUES/SKULL:  No acute abnormality of the visualized skull or soft tissues.     Unchanged small left parietal subarachnoid hemorrhage.     CT CSpine W/O Contrast    Result Date: 2/25/2024  EXAMINATION: CT OF THE CERVICAL SPINE WITHOUT CONTRAST 2/25/2024 1:22 pm TECHNIQUE: CT of the cervical spine was performed without the administration of intravenous contrast. Multiplanar reformatted images are provided for

## 2024-02-29 NOTE — PROGRESS NOTES
HOSPICE [facility]    Met patient at bedside. No family members present. Patient resting quietly. Updates received from nursing and CM. Call placed to patient's son Jean. Jean is agreeable for patient to transition to  if approved for GIP level of care. Jean will be at the hospital today after 330 pm when he finishes work. Call placed to Hospice NP to review case for  GIP level of care. Await return call.     1618 - Patient approved for  GIP level of care. Son Jean signed Hospice consents. Nurse to nurse report called to [facility]. [facility] transport arranged for 7 pm  tonight. Please leave all IV access in place for transport. Transportation packet placed in soft chart. Updates provided to nursing and CM.         Electronically signed by Samia Adler RN on 2/29/2024 at 12:08 PM  [phone]: [phone]

## 2024-02-29 NOTE — FLOWSHEET NOTE
Inpatient Wound Care (Initial consult) 8512A    Admit Date: 2/25/2024 11:04 AM    Reason for consult: Multiple Buttocks, wrist    Significant history: Per H&P    History of Present Illness:   Patient is a 73-year-old female with past medical history of atrial fibrillation on Eliquis, diabetic neuropathy, HTN, hypothyroidism, DM, recently diagnosed right lung CA s/p port placement 2/8 who presents to the emergency room for fall.  Patient was at home when she states she became increasingly weak and fatigued while walking to her bathroom causing her legs to give out on her and falling to the ground.  Patient recently started chemotherapy on 2/22 for her recently diagnosed lung CA.  On arrival patient had a x-ray of her pelvis which revealed no acute fractures.  A CT head did reveal a small left parietal lobe subarachnoid hemorrhage.  A CT of her C-spine and thoracic spine were both negative for any acute findings.  A CT of her chest revealed right lower lobe necrotic mass with increased necrosis and gas locules with multiple pathological rib fractures.  Repeat CT head confirmed small left parietal lobe subarachnoid hemorrhage.  Labs on arrival revealed hyponatremia 122,  hypokalemia of 3.2, hypomagnesemia 1.1, albumin 2.4, TSH of 5.71, leukocytosis of 13.0, anemia of 9.1/30.0.  Patient is admitted to intermediate telemetry and for further workup and treatment.     Findings:     02/29/24 0935   Skin Integumentary    Skin Integrity Ecchymosis   Location BUE   Skin Integrity Site 2   Skin Integrity Location 2 Erosion/denuded   Location 2 bilateral inner buttocks, left gluteal cleft   Skin Integrity Site 3   Skin Integrity Location 3 Rash;Redness    Location 3 groin   Skin Integrity Site 4   Skin Integrity Location 4 Excoriation   Location 4 abdominal fold   Skin Integrity Site 5   Skin Integrity Location 5   (dry flaky)   Location 5 bilateral feet   Wound 02/26/24 Buttocks Left   Date First Assessed/Time First Assessed:  Wrist  Wound Location Orientation: Right;Anterior   Wound Etiology Skin Tear   Dressing/Treatment   (versatel)   Wound Length (cm) 2 cm   Wound Width (cm) 1 cm   Wound Depth (cm) 0.1 cm   Wound Surface Area (cm^2) 2 cm^2   Change in Wound Size % (l*w) 73.47   Wound Volume (cm^3) 0.2 cm^3   Wound Healing % 73   Wound Assessment Pink/red   Drainage Amount None (dry)   Odor None   Christy-wound Assessment Dry/flaky   Wound 02/26/24 Radial Distal;Left   Date First Assessed/Time First Assessed: 02/26/24 1809   Primary Wound Type: Skin Tear  Location: Radial  Wound Location Orientation: Distal;Left   Wound Etiology Skin Tear   Dressing/Treatment   (Versatel)   Wound Length (cm) 2.5 cm   Wound Width (cm) 1 cm   Wound Depth (cm) 0.1 cm   Wound Surface Area (cm^2) 2.5 cm^2   Change in Wound Size % (l*w) -100   Wound Volume (cm^3) 0.25 cm^3   Wound Healing % -100   Wound Assessment Pink/red   Drainage Amount None (dry)   Odor None   Christy-wound Assessment Dry/flaky   Wound 02/29/24 Foot Left;Dorsal   Date First Assessed/Time First Assessed: 02/29/24 0935   Present on Original Admission: Yes  Location: Foot  Wound Location Orientation: Left;Dorsal   Wound Image    Wound Etiology Deep tissue/Injury   Dressing/Treatment Open to air   Wound Length (cm) 1 cm   Wound Width (cm) 1 cm   Wound Depth (cm)   (unable to determine)   Wound Surface Area (cm^2) 1 cm^2   Wound Assessment Purple/maroon;Pink/red   Drainage Amount None (dry)   Odor None   Christy-wound Assessment Dry/flaky     **Informed Consent**    The patient has given verbal consent to have photos taken of wounds and inserted into their chart as part of their permanent medical record for purposes of documentation, treatment management and/or medical review.   All Images taken on 2/29/24 of patient name: Maria Victoria Elliott were transmitted and stored on secured Epic  Site located within Media Folder Tab by a registered Epic-[employer] Mobile Application Device.        Impression:

## 2024-02-29 NOTE — PROGRESS NOTES
Units SubCUTAneous Nightly    levETIRAcetam  500 mg Oral BID    miconazole   Topical BID    allopurinol  300 mg Oral Daily    atorvastatin  10 mg Oral Daily    metoprolol tartrate  25 mg Oral BID    naloxegol  25 mg Oral QAM AC    pantoprazole  40 mg Oral Daily    sertraline  50 mg Oral Daily    sucralfate  1 g Oral 4x Daily    levothyroxine  137 mcg Oral Daily    sodium chloride flush  5-40 mL IntraVENous 2 times per day     PRN Meds: miconazole nitrate **AND** miconazole nitrate, morphine **OR** morphine, glycopyrrolate, LORazepam, glucose, dextrose bolus **OR** dextrose bolus, glucagon (rDNA), dextrose, white petrolatum, oxyCODONE, oxyCODONE, benzonatate, sodium chloride flush, sodium chloride, potassium chloride **OR** potassium alternative oral replacement **OR** potassium chloride, magnesium sulfate, ondansetron **OR** ondansetron, polyethylene glycol, acetaminophen **OR** acetaminophen, albuterol      Intake/Output Summary (Last 24 hours) at 2/29/2024 1716  Last data filed at 2/29/2024 0637  Gross per 24 hour   Intake --   Output 300 ml   Net -300 ml       Exam:    BP (!) 121/47   Pulse (!) 104   Temp 98.2 °F (36.8 °C) (Temporal)   Resp 16   Ht 1.651 m (5' 5\")   Wt 95.9 kg (211 lb 8 oz)   SpO2 97%   BMI 35.20 kg/m²         Gen: WELL DEVELOPED  HEENT: NC/AT, moist mucous membranes, no oropharyngeal erythema or exudate  Neck: supple, trachea midline, no anterior cervical or SC LAD  Heart:  IRREG  Lungs:   bilaterally,  Abd: bowel sounds present, soft, nontender, nondistended, no masses  Extrem:  No clubbing, cyanosis,  NO edema  Skin: no rashes or lesions                Labs:   Recent Labs     02/27/24 0629 02/27/24 2124   WBC 1.1* 0.4*   HGB 7.9* 6.6*   HCT 27.0* 22.5*   PLT 78* 62*     Recent Labs     02/27/24 2124 02/28/24  0600 02/29/24  0400 02/29/24  0615   * 129* 128*  --    K 3.7 3.4* 3.7  --    CL 91* 93* 93*  --    CO2 26 27 28  --    BUN 20 19 31*  --    CREATININE 0.7 0.6 0.8  --     CALCIUM 7.9* 8.0* 8.4*  --    PHOS  --  2.1*  --  2.2*     Recent Labs     02/27/24  0629 02/27/24 2124 02/28/24  0600   AST 19 13 11   ALT 18 15 16   BILITOT 0.7 0.7 0.5   ALKPHOS 65 55 58     No results for input(s): \"INR\" in the last 72 hours.  No results for input(s): \"CKTOTAL\", \"TROPONINI\" in the last 72 hours.  Recent Labs     02/27/24  0629 02/27/24 2124 02/28/24  0600   AST 19 13 11   ALT 18 15 16   BILITOT 0.7 0.7 0.5   ALKPHOS 65 55 58     No results for input(s): \"LACTA\" in the last 72 hours.  Lab Results   Component Value Date    URICACID 5.8 (H) 02/27/2024     Lab Results   Component Value Date    AMMONIA 19 02/27/2024       Assessment:    Active Hospital Problems    Diagnosis Date Noted    Moderate protein-calorie malnutrition (HCC) [E44.0] 02/27/2024    Hyponatremia [E87.1] 02/25/2024   Small left parietal subarachnoid hemorrhage, anemia  History of lung CA-recently diagnosed  -Atrial fibrillation    Plan:   HOSPICE  Electronically signed by Eliseo Chen DO on 2/29/2024 at 5:16 PM HARLEEN

## 2024-02-29 NOTE — CARE COORDINATION
Here after a fall. Subarachnoid hemorrhage Has lung cancer. Palliative and hospice consult. Currently on oxygen 10 liters high flow.Met with patient today who is currently A&O to discuss discharge plan and she asked that I call her son. Called and spoke with son Jean to discuss discharge plan..he is asking to speak with her oncologist Dr Hallman - message to attending for hem/onc consult. Discussed yesterday hospice did not feel she was house appropriate.Await hospice to see today. Discussed for insurance to approve snf she must be able to participate in therapy..Discussed private pay @ snf with hospice - he states  neither him or his mother can afford this.Also discussed home with hospice - and would need someone home with her. He states that would not be an option he is an only child  and he works and his wife works and they have a dog which they would have to deal with. Plan to be determined.Electronically signed by Rhianna Rowe RN on 2/29/2024 at 11:16 AM    Per hospice liaison - has been approved for [facility]. With 7 PM .She has spoken with son who will here after work to sign consents.Electronically signed by Rhianna Rowe RN on 2/29/2024 at 3:45 PM

## 2024-02-29 NOTE — PROGRESS NOTES
Pt slightly lethargic awakens to voice able to answer orientation questions. Pt refusing all PO meds having c/o mod to severe generalized pain throughout

## 2024-03-01 NOTE — PROGRESS NOTES
Physician Progress Note      PATIENT:               RUPERTO DALEY  CSN #:                  [id number]  :                       1950  ADMIT DATE:       2024 11:04 AM  DISCH DATE:        2024 9:00 PM  RESPONDING  PROVIDER #:        Eliseo Chen DO          QUERY TEXT:    Dear Provider,    Patient with recently diagnosed lung cancer s/p chemo on , noted to have   WBC 0.4,  Hgb 6.6/Hct 22.5,  Plt 62. If possible, please document in progress   notes and discharge summary if you are evaluating and/or treating any of the   following:    The medical record reflects the following:  Risk Factors: lung cancer s/p chemo on 2024  Clinical Indicators:  : WBC 0.4, Hgb 6.6/Hct 22.5,  Plt 62  Treatment: CBC    Thank you,  Eladia Dewitt RN  Clinical Documentation Integrity  [phone]  Options provided:  -- Antineoplastic (chemotherapy) induced pancytopenia  -- Other - I will add my own diagnosis  -- Disagree - Not applicable / Not valid  -- Disagree - Clinically unable to determine / Unknown  -- Refer to Clinical Documentation Reviewer    PROVIDER RESPONSE TEXT:    Patient has antineoplastic (chemotherapy) induced pancytopenia.    Query created by: Eladia Dewitt on 2024 2:26 PM      Electronically signed by:  Eliseo Chen DO 3/1/2024 8:45 AM

## 2024-03-01 NOTE — DISCHARGE SUMMARY
Hospitalist Discharge Summary    Patient ID: Maria Victoria Elliott   Patient : 1950  Patient's PCP: Phill Wadsworth III, DO    Admit Date: 2024   Admitting Physician: Eliseo Chen,     Discharge Date:  2024  Discharge Physician: Eliseo Chen,    Discharge Condition: Stable  Discharge Disposition: Home      Discharge Diagnoses:     Active Hospital Problems    Diagnosis Date Noted    Moderate protein-calorie malnutrition (HCC) [E44.0] 2024    Hyponatremia [E87.1] 2024   Small left parietal subarachnoid hemorrhage, anemia  History of lung CA-recently diagnosed  -Atrial fibrillation   LUNG CANCER        Hospital course in brief:  Patient is a 73-year-old female with past medical history of atrial fibrillation on Eliquis, diabetic neuropathy, HTN, hypothyroidism, DM, recently diagnosed right lung CA s/p port placement  who presents to the emergency room for fall.  Patient was at home when she states she became increasingly weak and fatigued while walking to her bathroom causing her legs to give out on her and falling to the ground.  Patient recently started chemotherapy on  for her recently diagnosed lung CA.  On arrival patient had a x-ray of her pelvis which revealed no acute fractures.  A CT head did reveal a small left parietal lobe subarachnoid hemorrhage.  A CT of her C-spine and thoracic spine were both negative for any acute findings.  A CT of her chest revealed right lower lobe necrotic mass with increased necrosis and gas locules with multiple pathological rib fractures.  Repeat CT head confirmed small left parietal lobe subarachnoid hemorrhage.  Labs on arrival revealed hyponatremia 122,  hypokalemia of 3.2, hypomagnesemia 1.1, albumin 2.4, TSH of 5.71, leukocytosis of 13.0, anemia of 9.1/30.0.  Patient is admitted to intermediate telemetry and for further workup and treatment.   On evaluation she is resting comfortably in no acute distress.  She  US 1 SEYZ CARDIO [facility] Rad/Car   6/19/2024  2:00 PM Stuart Rajput MD VASC/MED [facility]       Continued appropriate risk factor modification of blood pressure, diabetes and serum lipids will remain essential to reducing risk of future atherosclerotic development    Activity: activity as tolerated    Significant labs:  CBC:   Recent Labs     02/27/24 2124   WBC 0.4*   RBC 2.95*   HGB 6.6*   HCT 22.5*   MCV 76.3*   RDW 18.0*   PLT 62*     BMP:   Recent Labs     02/27/24 2124 02/28/24  0600 02/29/24  0400 02/29/24  0615   * 129* 128*  --    K 3.7 3.4* 3.7  --    CL 91* 93* 93*  --    CO2 26 27 28  --    BUN 20 19 31*  --    CREATININE 0.7 0.6 0.8  --    MG  --  1.6  --  1.5*   PHOS  --  2.1*  --  2.2*     LFT:  Recent Labs     02/27/24 2124 02/28/24  0600   PROT 4.6* 4.6*   ALKPHOS 55 58   ALT 15 16   AST 13 11   BILITOT 0.7 0.5     PT/INR: No results for input(s): \"INR\", \"APTT\" in the last 72 hours.  BNP: No results for input(s): \"BNP\" in the last 72 hours.  Hgb A1C:   Lab Results   Component Value Date    LABA1C 6.0 (H) 12/06/2023     Folate and B12:   Lab Results   Component Value Date    [id number] 708 01/19/2024   ,   Lab Results   Component Value Date    FOLATE 18.3 01/19/2024     Thyroid Studies:   Lab Results   Component Value Date    TSH 4.15 02/27/2024       Urinalysis:    Lab Results   Component Value Date/Time    NITRU NEGATIVE 02/25/2024 11:50 AM    WBCUA 0 TO 5 02/25/2024 11:50 AM    BACTERIA TRACE 02/25/2024 11:50 AM    RBCUA 0 TO 2 02/25/2024 11:50 AM    BLOODU MODERATE 07/13/2020 10:13 PM    SPECGRAV 1.020 02/25/2024 11:50 AM    GLUCOSEU NEGATIVE 02/25/2024 11:50 AM       Imaging:  XR CHEST PORTABLE    Result Date: 2/27/2024  EXAMINATION: ONE XRAY VIEW OF THE CHEST 2/27/2024 2:57 pm COMPARISON: 02/25/2024 HISTORY: ORDERING SYSTEM PROVIDED HISTORY: new significant hypoxia TECHNOLOGIST PROVIDED HISTORY: Reason for exam:->new significant hypoxia What reading provider will be dictating this

## 2024-03-02 NOTE — PROGRESS NOTES
Physician Progress Note      PATIENT:               RUPERTO DALEY  [facility] #:                  [id number]  :                       1950  ADMIT DATE:       2024 11:04 AM  DISCH DATE:        2024 9:00 PM  RESPONDING  PROVIDER #:        Eliseo Chen DO          QUERY TEXT:    Dear Provider,    Patient admitted with SAH after fall and it is noted that she did not hit   head. On Eliquis for atrial fibrillation.   If possible, please document in   progress notes and discharge summary if you are treating/evaluating any of the   following:    The medical record reflects the following:  Risk Factors: 74 yo s/p fall , Eliquis for atrial fibrillation, lung ca dx   with recent chemotherapy treatment  Clinical Indicators: Documented: Fell at home. Fell on buttock. Stating she   did not hit her head. On Eliquis  and 81 mg ASA. When asked where her pain is, Pt stated \" everywhere.  HPI: She was walking into the bathroom when her legs gave out on her and she   fell onto her butt. Denies head injury.  She is on Eliquis.  She recently started chemotherapy for a newly diagnosed   lung cancer.   Palliative care notes Fall  Left SAH  above   Neurosurgery consult. Reason for neurosurgical consultation fall from a   standing height with traumatic  stable left parietal subarachnoid hemorrhage.  Impression: Stable slight left parietal subarachnoid hemorrhage  Recommendations: Hold Coumadin until follow-up CT shows complete resolution of   the subarachnoid  hemorrhage   CT head: Possible trace quantity of subarachnoid hemorrhage in the left   parietal  lobe. No significant mass effect or midline shift.   CT head( repeat):Unchanged small left parietal subarachnoid hemorrhage.  Treatment: Imaging, Neurology and neurosurgery consulted, Eliquis held,   Hospice    Thank you,  Eladia Dewitt RN  Clinical Documentation Integrity  [phone]  Options provided:  -- Nontraumatic SAH  -- Traumatic SAH without

## 2024-10-12 LAB
ERYTHROCYTE [SEDIMENTATION RATE] IN BLOOD BY WESTERGREN METHOD: 85 MM/HR (ref 0–20)
LACTATE BLDV-SCNC: 2 MMOL/L (ref 0.5–2.2)

## (undated) DEVICE — MICROPUNCTURE INTRODUCER SET SILHOUETTE TRANSITIONLESS WITH STAINLESS STEEL WIRE GUIDE: Brand: MICROPUNCTURE

## (undated) DEVICE — 18 GA N.G. KIT, 10 PACK: Brand: SITE-RITE

## (undated) DEVICE — GLOVE ORANGE PI 8   MSG9080

## (undated) DEVICE — BENTSON WIRE GUIDE 20CM DISTAL FLEXIBILITY WITH SOFTENED TIP: Brand: BENTSON

## (undated) DEVICE — CONNECTOR TBNG AUX H2O JET DISP FOR OLY 160/180 SER

## (undated) DEVICE — GOWN,SIRUS,FABRNF,2XL,18/CS: Brand: MEDLINE

## (undated) DEVICE — Device: Brand: SINGLE USE ASPIRATION NEEDLE NA

## (undated) DEVICE — GAUZE,SPONGE,POST-OP,4X3,STRL,LF: Brand: MEDLINE

## (undated) DEVICE — SPONGE GZ W4XL4IN RAYON POLY FILL CVR W/ NONWOVEN FAB

## (undated) DEVICE — BITEBLOCK 54FR W/ DENT RIM BLOX

## (undated) DEVICE — SURGICAL PROCEDURE PACK BRONCH

## (undated) DEVICE — BLOCK BITE 60FR RUBBER ADLT DENTAL

## (undated) DEVICE — DRAPE SHEET: Brand: UNBRANDED

## (undated) DEVICE — SOLUTION IRRIG 500ML 0.9% SOD CHLO USP POUR PLAS BTL

## (undated) DEVICE — ELECTRODE PT RET AD L9FT HI MOIST COND ADH HYDRGEL CORDED

## (undated) DEVICE — DRAPE EQUIP BANDED BG 36X28 IN W/ROUNDED CORNER SNAPKOVER

## (undated) DEVICE — SOLUTION IV IRRIG 500ML 0.9% SODIUM CHL 2F7123

## (undated) DEVICE — 3M™ IOBAN™ 2 ANTIMICROBIAL INCISE DRAPE 6640EZ: Brand: IOBAN™ 2

## (undated) DEVICE — SOLUTION IRRIG 1000ML 0.9% SOD CHL USP POUR PLAS BTL

## (undated) DEVICE — Z DISCONTINUED NO SUB IDED MASK RESP UNIV N95 4 PANEL HD STRP INDIVIDUALLY WRP LF

## (undated) DEVICE — ADAPTER TBNG DIA15MM SWVL FBROPT BRONCHSCP TERM 2 AXIS PEEP

## (undated) DEVICE — BRONCHOSCOPE CYTOLOGY BRUSH: Brand: COOK

## (undated) DEVICE — CANNULA NSL ORAL AD FOR CAPNOFLEX CO2 O2 AIRLFE

## (undated) DEVICE — LIQUIBAND RAPID ADHESIVE 36/CS 0.8ML: Brand: MEDLINE

## (undated) DEVICE — GLOVE SURG SZ 6 THK91MIL LTX FREE SYN POLYISOPRENE ANTI

## (undated) DEVICE — GLOVE ORANGE PI 7   MSG9070

## (undated) DEVICE — GRADUATE TRIANG MEASURE 1000ML BLK PRNT

## (undated) DEVICE — MINOR VASCULAR: Brand: MEDLINE INDUSTRIES, INC.

## (undated) DEVICE — FORCEPS BX L100CM DIA1.8MM WRK CHN 2MM PULM S STL RAD JAW 4

## (undated) DEVICE — VALVE SUCTION AIR H2O SET ORCA POD + DISP